# Patient Record
Sex: MALE | Race: OTHER | NOT HISPANIC OR LATINO | URBAN - METROPOLITAN AREA
[De-identification: names, ages, dates, MRNs, and addresses within clinical notes are randomized per-mention and may not be internally consistent; named-entity substitution may affect disease eponyms.]

---

## 2019-04-06 ENCOUNTER — INPATIENT (INPATIENT)
Facility: HOSPITAL | Age: 44
LOS: 27 days | Discharge: NURSING, REHAB OR LONG-TERM CARE | DRG: 3 | End: 2019-05-04
Attending: NEUROLOGICAL SURGERY | Admitting: NEUROLOGICAL SURGERY
Payer: MEDICAID

## 2019-04-06 VITALS — OXYGEN SATURATION: 96 % | HEART RATE: 99 BPM | RESPIRATION RATE: 17 BRPM

## 2019-04-06 LAB
ALBUMIN SERPL ELPH-MCNC: 4.8 G/DL — SIGNIFICANT CHANGE UP (ref 3.3–5)
ALP SERPL-CCNC: 71 U/L — SIGNIFICANT CHANGE UP (ref 40–120)
ALT FLD-CCNC: 34 U/L — SIGNIFICANT CHANGE UP (ref 10–45)
ANION GAP SERPL CALC-SCNC: 12 MMOL/L — SIGNIFICANT CHANGE UP (ref 5–17)
ANION GAP SERPL CALC-SCNC: 13 MMOL/L — SIGNIFICANT CHANGE UP (ref 5–17)
ANION GAP SERPL CALC-SCNC: 9 MMOL/L — SIGNIFICANT CHANGE UP (ref 5–17)
APPEARANCE UR: CLEAR — SIGNIFICANT CHANGE UP
APTT BLD: 28 SEC — SIGNIFICANT CHANGE UP (ref 27.5–36.3)
AST SERPL-CCNC: 40 U/L — SIGNIFICANT CHANGE UP (ref 10–40)
BILIRUB SERPL-MCNC: 1 MG/DL — SIGNIFICANT CHANGE UP (ref 0.2–1.2)
BILIRUB UR-MCNC: NEGATIVE — SIGNIFICANT CHANGE UP
BLD GP AB SCN SERPL QL: NEGATIVE — SIGNIFICANT CHANGE UP
BLD GP AB SCN SERPL QL: NEGATIVE — SIGNIFICANT CHANGE UP
BUN SERPL-MCNC: 18 MG/DL — SIGNIFICANT CHANGE UP (ref 7–23)
BUN SERPL-MCNC: 21 MG/DL — SIGNIFICANT CHANGE UP (ref 7–23)
BUN SERPL-MCNC: 23 MG/DL — SIGNIFICANT CHANGE UP (ref 7–23)
CALCIUM SERPL-MCNC: 8.5 MG/DL — SIGNIFICANT CHANGE UP (ref 8.4–10.5)
CALCIUM SERPL-MCNC: 8.9 MG/DL — SIGNIFICANT CHANGE UP (ref 8.4–10.5)
CALCIUM SERPL-MCNC: 9.2 MG/DL — SIGNIFICANT CHANGE UP (ref 8.4–10.5)
CHLORIDE SERPL-SCNC: 101 MMOL/L — SIGNIFICANT CHANGE UP (ref 96–108)
CHLORIDE SERPL-SCNC: 105 MMOL/L — SIGNIFICANT CHANGE UP (ref 96–108)
CHLORIDE SERPL-SCNC: 108 MMOL/L — SIGNIFICANT CHANGE UP (ref 96–108)
CHOLEST SERPL-MCNC: 200 MG/DL — HIGH (ref 10–199)
CO2 SERPL-SCNC: 23 MMOL/L — SIGNIFICANT CHANGE UP (ref 22–31)
COLOR SPEC: YELLOW — SIGNIFICANT CHANGE UP
CREAT SERPL-MCNC: 1.22 MG/DL — SIGNIFICANT CHANGE UP (ref 0.5–1.3)
CREAT SERPL-MCNC: 1.23 MG/DL — SIGNIFICANT CHANGE UP (ref 0.5–1.3)
CREAT SERPL-MCNC: 1.42 MG/DL — HIGH (ref 0.5–1.3)
DIFF PNL FLD: ABNORMAL
GLUCOSE SERPL-MCNC: 148 MG/DL — HIGH (ref 70–99)
GLUCOSE SERPL-MCNC: 149 MG/DL — HIGH (ref 70–99)
GLUCOSE SERPL-MCNC: 155 MG/DL — HIGH (ref 70–99)
GLUCOSE UR QL: NEGATIVE — SIGNIFICANT CHANGE UP
HCT VFR BLD CALC: 39.7 % — SIGNIFICANT CHANGE UP (ref 39–50)
HCT VFR BLD CALC: 44.5 % — SIGNIFICANT CHANGE UP (ref 39–50)
HDLC SERPL-MCNC: 70 MG/DL — SIGNIFICANT CHANGE UP
HGB BLD-MCNC: 13.9 G/DL — SIGNIFICANT CHANGE UP (ref 13–17)
HGB BLD-MCNC: 15.4 G/DL — SIGNIFICANT CHANGE UP (ref 13–17)
INR BLD: 1.06 — SIGNIFICANT CHANGE UP (ref 0.88–1.16)
KETONES UR-MCNC: NEGATIVE — SIGNIFICANT CHANGE UP
LEUKOCYTE ESTERASE UR-ACNC: NEGATIVE — SIGNIFICANT CHANGE UP
LIPID PNL WITH DIRECT LDL SERPL: 113 MG/DL — SIGNIFICANT CHANGE UP
MAGNESIUM SERPL-MCNC: 1.9 MG/DL — SIGNIFICANT CHANGE UP (ref 1.6–2.6)
MCHC RBC-ENTMCNC: 31.4 PG — SIGNIFICANT CHANGE UP (ref 27–34)
MCHC RBC-ENTMCNC: 31.9 PG — SIGNIFICANT CHANGE UP (ref 27–34)
MCHC RBC-ENTMCNC: 34.6 GM/DL — SIGNIFICANT CHANGE UP (ref 32–36)
MCHC RBC-ENTMCNC: 35 GM/DL — SIGNIFICANT CHANGE UP (ref 32–36)
MCV RBC AUTO: 90.6 FL — SIGNIFICANT CHANGE UP (ref 80–100)
MCV RBC AUTO: 91.1 FL — SIGNIFICANT CHANGE UP (ref 80–100)
NITRITE UR-MCNC: NEGATIVE — SIGNIFICANT CHANGE UP
NRBC # BLD: 0 /100 WBCS — SIGNIFICANT CHANGE UP (ref 0–0)
NRBC # BLD: 0 /100 WBCS — SIGNIFICANT CHANGE UP (ref 0–0)
PCP SPEC-MCNC: SIGNIFICANT CHANGE UP
PH UR: 6.5 — SIGNIFICANT CHANGE UP (ref 5–8)
PHOSPHATE SERPL-MCNC: 3 MG/DL — SIGNIFICANT CHANGE UP (ref 2.5–4.5)
PLATELET # BLD AUTO: 290 K/UL — SIGNIFICANT CHANGE UP (ref 150–400)
PLATELET # BLD AUTO: 326 K/UL — SIGNIFICANT CHANGE UP (ref 150–400)
POTASSIUM SERPL-MCNC: 3.7 MMOL/L — SIGNIFICANT CHANGE UP (ref 3.5–5.3)
POTASSIUM SERPL-SCNC: 3.7 MMOL/L — SIGNIFICANT CHANGE UP (ref 3.5–5.3)
PROT SERPL-MCNC: 8.2 G/DL — SIGNIFICANT CHANGE UP (ref 6–8.3)
PROT UR-MCNC: NEGATIVE MG/DL — SIGNIFICANT CHANGE UP
PROTHROM AB SERPL-ACNC: 12 SEC — SIGNIFICANT CHANGE UP (ref 10–12.9)
RBC # BLD: 4.36 M/UL — SIGNIFICANT CHANGE UP (ref 4.2–5.8)
RBC # BLD: 4.91 M/UL — SIGNIFICANT CHANGE UP (ref 4.2–5.8)
RBC # FLD: 12.7 % — SIGNIFICANT CHANGE UP (ref 10.3–14.5)
RBC # FLD: 12.8 % — SIGNIFICANT CHANGE UP (ref 10.3–14.5)
RH IG SCN BLD-IMP: POSITIVE — SIGNIFICANT CHANGE UP
RH IG SCN BLD-IMP: POSITIVE — SIGNIFICANT CHANGE UP
SODIUM SERPL-SCNC: 137 MMOL/L — SIGNIFICANT CHANGE UP (ref 135–145)
SODIUM SERPL-SCNC: 140 MMOL/L — SIGNIFICANT CHANGE UP (ref 135–145)
SODIUM SERPL-SCNC: 140 MMOL/L — SIGNIFICANT CHANGE UP (ref 135–145)
SP GR SPEC: <=1.005 — SIGNIFICANT CHANGE UP (ref 1–1.03)
TOTAL CHOLESTEROL/HDL RATIO MEASUREMENT: 2.9 RATIO — LOW (ref 3.4–9.6)
TRIGL SERPL-MCNC: 85 MG/DL — SIGNIFICANT CHANGE UP (ref 10–149)
TSH SERPL-MCNC: 0.73 UIU/ML — SIGNIFICANT CHANGE UP (ref 0.35–4.94)
UROBILINOGEN FLD QL: 0.2 E.U./DL — SIGNIFICANT CHANGE UP
WBC # BLD: 11.61 K/UL — HIGH (ref 3.8–10.5)
WBC # BLD: 13.36 K/UL — HIGH (ref 3.8–10.5)
WBC # FLD AUTO: 11.61 K/UL — HIGH (ref 3.8–10.5)
WBC # FLD AUTO: 13.36 K/UL — HIGH (ref 3.8–10.5)

## 2019-04-06 PROCEDURE — 43752 NASAL/OROGASTRIC W/TUBE PLMT: CPT

## 2019-04-06 PROCEDURE — 93306 TTE W/DOPPLER COMPLETE: CPT | Mod: 26

## 2019-04-06 PROCEDURE — G0508: CPT | Mod: GT

## 2019-04-06 PROCEDURE — 70450 CT HEAD/BRAIN W/O DYE: CPT | Mod: 26,59

## 2019-04-06 PROCEDURE — 70450 CT HEAD/BRAIN W/O DYE: CPT | Mod: 26,59,77

## 2019-04-06 PROCEDURE — 99223 1ST HOSP IP/OBS HIGH 75: CPT

## 2019-04-06 PROCEDURE — 99291 CRITICAL CARE FIRST HOUR: CPT | Mod: 24

## 2019-04-06 PROCEDURE — 70496 CT ANGIOGRAPHY HEAD: CPT | Mod: 26

## 2019-04-06 PROCEDURE — 71045 X-RAY EXAM CHEST 1 VIEW: CPT | Mod: 26

## 2019-04-06 RX ORDER — DEXTROSE 50 % IN WATER 50 %
25 SYRINGE (ML) INTRAVENOUS ONCE
Qty: 0 | Refills: 0 | Status: DISCONTINUED | OUTPATIENT
Start: 2019-04-06 | End: 2019-04-27

## 2019-04-06 RX ORDER — SENNA PLUS 8.6 MG/1
2 TABLET ORAL AT BEDTIME
Qty: 0 | Refills: 0 | Status: DISCONTINUED | OUTPATIENT
Start: 2019-04-06 | End: 2019-04-14

## 2019-04-06 RX ORDER — NICARDIPINE HYDROCHLORIDE 30 MG/1
5 CAPSULE, EXTENDED RELEASE ORAL
Qty: 40 | Refills: 0 | Status: DISCONTINUED | OUTPATIENT
Start: 2019-04-06 | End: 2019-04-10

## 2019-04-06 RX ORDER — PROPOFOL 10 MG/ML
20 INJECTION, EMULSION INTRAVENOUS
Qty: 1000 | Refills: 0 | Status: DISCONTINUED | OUTPATIENT
Start: 2019-04-06 | End: 2019-04-08

## 2019-04-06 RX ORDER — DILTIAZEM HCL 120 MG
5 CAPSULE, EXT RELEASE 24 HR ORAL
Qty: 125 | Refills: 0 | Status: DISCONTINUED | OUTPATIENT
Start: 2019-04-06 | End: 2019-04-06

## 2019-04-06 RX ORDER — ATORVASTATIN CALCIUM 80 MG/1
80 TABLET, FILM COATED ORAL AT BEDTIME
Qty: 0 | Refills: 0 | Status: DISCONTINUED | OUTPATIENT
Start: 2019-04-06 | End: 2019-04-06

## 2019-04-06 RX ORDER — POTASSIUM CHLORIDE 20 MEQ
10 PACKET (EA) ORAL
Qty: 0 | Refills: 0 | Status: COMPLETED | OUTPATIENT
Start: 2019-04-06 | End: 2019-04-06

## 2019-04-06 RX ORDER — CEFAZOLIN SODIUM 1 G
2000 VIAL (EA) INJECTION ONCE
Qty: 0 | Refills: 0 | Status: COMPLETED | OUTPATIENT
Start: 2019-04-06 | End: 2019-04-06

## 2019-04-06 RX ORDER — HYDRALAZINE HCL 50 MG
10 TABLET ORAL EVERY 4 HOURS
Qty: 0 | Refills: 0 | Status: DISCONTINUED | OUTPATIENT
Start: 2019-04-06 | End: 2019-04-10

## 2019-04-06 RX ORDER — GLUCAGON INJECTION, SOLUTION 0.5 MG/.1ML
1 INJECTION, SOLUTION SUBCUTANEOUS ONCE
Qty: 0 | Refills: 0 | Status: DISCONTINUED | OUTPATIENT
Start: 2019-04-06 | End: 2019-05-04

## 2019-04-06 RX ORDER — MIDAZOLAM HYDROCHLORIDE 1 MG/ML
4 INJECTION, SOLUTION INTRAMUSCULAR; INTRAVENOUS ONCE
Qty: 0 | Refills: 0 | Status: DISCONTINUED | OUTPATIENT
Start: 2019-04-06 | End: 2019-04-06

## 2019-04-06 RX ORDER — FENTANYL CITRATE 50 UG/ML
12.5 INJECTION INTRAVENOUS ONCE
Qty: 0 | Refills: 0 | Status: DISCONTINUED | OUTPATIENT
Start: 2019-04-06 | End: 2019-04-06

## 2019-04-06 RX ORDER — ACETAMINOPHEN 500 MG
650 TABLET ORAL EVERY 6 HOURS
Qty: 0 | Refills: 0 | Status: DISCONTINUED | OUTPATIENT
Start: 2019-04-06 | End: 2019-04-07

## 2019-04-06 RX ORDER — ONDANSETRON 8 MG/1
4 TABLET, FILM COATED ORAL EVERY 6 HOURS
Qty: 0 | Refills: 0 | Status: DISCONTINUED | OUTPATIENT
Start: 2019-04-06 | End: 2019-05-04

## 2019-04-06 RX ORDER — LEVETIRACETAM 250 MG/1
1000 TABLET, FILM COATED ORAL EVERY 12 HOURS
Qty: 0 | Refills: 0 | Status: DISCONTINUED | OUTPATIENT
Start: 2019-04-06 | End: 2019-04-11

## 2019-04-06 RX ORDER — FENTANYL CITRATE 50 UG/ML
50 INJECTION INTRAVENOUS ONCE
Qty: 0 | Refills: 0 | Status: DISCONTINUED | OUTPATIENT
Start: 2019-04-06 | End: 2019-04-06

## 2019-04-06 RX ORDER — LIDOCAINE HCL 20 MG/ML
20 VIAL (ML) INJECTION ONCE
Qty: 0 | Refills: 0 | Status: COMPLETED | OUTPATIENT
Start: 2019-04-06 | End: 2019-04-06

## 2019-04-06 RX ORDER — DESMOPRESSIN ACETATE 0.1 MG/1
21 TABLET ORAL ONCE
Qty: 0 | Refills: 0 | Status: COMPLETED | OUTPATIENT
Start: 2019-04-06 | End: 2019-04-06

## 2019-04-06 RX ORDER — SODIUM CHLORIDE 5 G/100ML
1000 INJECTION, SOLUTION INTRAVENOUS
Qty: 0 | Refills: 0 | Status: DISCONTINUED | OUTPATIENT
Start: 2019-04-06 | End: 2019-04-07

## 2019-04-06 RX ORDER — DEXTROSE 50 % IN WATER 50 %
12.5 SYRINGE (ML) INTRAVENOUS ONCE
Qty: 0 | Refills: 0 | Status: DISCONTINUED | OUTPATIENT
Start: 2019-04-06 | End: 2019-04-27

## 2019-04-06 RX ORDER — SODIUM CHLORIDE 9 MG/ML
1000 INJECTION INTRAMUSCULAR; INTRAVENOUS; SUBCUTANEOUS
Qty: 0 | Refills: 0 | Status: DISCONTINUED | OUTPATIENT
Start: 2019-04-06 | End: 2019-04-06

## 2019-04-06 RX ORDER — ATORVASTATIN CALCIUM 80 MG/1
40 TABLET, FILM COATED ORAL AT BEDTIME
Qty: 0 | Refills: 0 | Status: DISCONTINUED | OUTPATIENT
Start: 2019-04-06 | End: 2019-05-04

## 2019-04-06 RX ORDER — CEFAZOLIN SODIUM 1 G
2000 VIAL (EA) INJECTION ONCE
Qty: 0 | Refills: 0 | Status: DISCONTINUED | OUTPATIENT
Start: 2019-04-06 | End: 2019-04-06

## 2019-04-06 RX ORDER — DOCUSATE SODIUM 100 MG
100 CAPSULE ORAL
Qty: 0 | Refills: 0 | Status: DISCONTINUED | OUTPATIENT
Start: 2019-04-06 | End: 2019-04-12

## 2019-04-06 RX ORDER — INSULIN LISPRO 100/ML
VIAL (ML) SUBCUTANEOUS
Qty: 0 | Refills: 0 | Status: DISCONTINUED | OUTPATIENT
Start: 2019-04-06 | End: 2019-04-27

## 2019-04-06 RX ORDER — SODIUM CHLORIDE 9 MG/ML
1000 INJECTION, SOLUTION INTRAVENOUS
Qty: 0 | Refills: 0 | Status: DISCONTINUED | OUTPATIENT
Start: 2019-04-06 | End: 2019-04-27

## 2019-04-06 RX ORDER — PANTOPRAZOLE SODIUM 20 MG/1
40 TABLET, DELAYED RELEASE ORAL DAILY
Qty: 0 | Refills: 0 | Status: DISCONTINUED | OUTPATIENT
Start: 2019-04-06 | End: 2019-04-19

## 2019-04-06 RX ORDER — DESMOPRESSIN ACETATE 0.1 MG/1
20 TABLET ORAL ONCE
Qty: 0 | Refills: 0 | Status: DISCONTINUED | OUTPATIENT
Start: 2019-04-06 | End: 2019-04-06

## 2019-04-06 RX ORDER — DEXTROSE 50 % IN WATER 50 %
15 SYRINGE (ML) INTRAVENOUS ONCE
Qty: 0 | Refills: 0 | Status: DISCONTINUED | OUTPATIENT
Start: 2019-04-06 | End: 2019-04-27

## 2019-04-06 RX ORDER — DOCUSATE SODIUM 100 MG
100 CAPSULE ORAL THREE TIMES A DAY
Qty: 0 | Refills: 0 | Status: DISCONTINUED | OUTPATIENT
Start: 2019-04-06 | End: 2019-04-06

## 2019-04-06 RX ADMIN — Medication 650 MILLIGRAM(S): at 12:20

## 2019-04-06 RX ADMIN — FENTANYL CITRATE 50 MICROGRAM(S): 50 INJECTION INTRAVENOUS at 03:23

## 2019-04-06 RX ADMIN — SODIUM CHLORIDE 50 MILLILITER(S): 9 INJECTION INTRAMUSCULAR; INTRAVENOUS; SUBCUTANEOUS at 03:36

## 2019-04-06 RX ADMIN — FENTANYL CITRATE 12.5 MICROGRAM(S): 50 INJECTION INTRAVENOUS at 16:00

## 2019-04-06 RX ADMIN — FENTANYL CITRATE 50 MICROGRAM(S): 50 INJECTION INTRAVENOUS at 03:31

## 2019-04-06 RX ADMIN — DESMOPRESSIN ACETATE 221 MICROGRAM(S): 0.1 TABLET ORAL at 04:01

## 2019-04-06 RX ADMIN — Medication 100 MILLIEQUIVALENT(S): at 06:39

## 2019-04-06 RX ADMIN — FENTANYL CITRATE 50 MICROGRAM(S): 50 INJECTION INTRAVENOUS at 04:01

## 2019-04-06 RX ADMIN — FENTANYL CITRATE 12.5 MICROGRAM(S): 50 INJECTION INTRAVENOUS at 15:30

## 2019-04-06 RX ADMIN — Medication 2000 MILLIGRAM(S): at 04:25

## 2019-04-06 RX ADMIN — LEVETIRACETAM 400 MILLIGRAM(S): 250 TABLET, FILM COATED ORAL at 18:10

## 2019-04-06 RX ADMIN — Medication 100 MILLIEQUIVALENT(S): at 04:51

## 2019-04-06 RX ADMIN — Medication 100 MILLIEQUIVALENT(S): at 04:17

## 2019-04-06 RX ADMIN — SENNA PLUS 2 TABLET(S): 8.6 TABLET ORAL at 21:08

## 2019-04-06 RX ADMIN — ATORVASTATIN CALCIUM 40 MILLIGRAM(S): 80 TABLET, FILM COATED ORAL at 21:08

## 2019-04-06 RX ADMIN — MIDAZOLAM HYDROCHLORIDE 4 MILLIGRAM(S): 1 INJECTION, SOLUTION INTRAMUSCULAR; INTRAVENOUS at 03:18

## 2019-04-06 RX ADMIN — Medication 100 MILLIGRAM(S): at 18:10

## 2019-04-06 RX ADMIN — Medication 20 MILLILITER(S): at 03:09

## 2019-04-06 RX ADMIN — NICARDIPINE HYDROCHLORIDE 25 MG/HR: 30 CAPSULE, EXTENDED RELEASE ORAL at 11:15

## 2019-04-06 RX ADMIN — Medication 650 MILLIGRAM(S): at 11:24

## 2019-04-06 RX ADMIN — LEVETIRACETAM 400 MILLIGRAM(S): 250 TABLET, FILM COATED ORAL at 06:39

## 2019-04-06 RX ADMIN — SODIUM CHLORIDE 50 MILLILITER(S): 5 INJECTION, SOLUTION INTRAVENOUS at 04:02

## 2019-04-06 RX ADMIN — NICARDIPINE HYDROCHLORIDE 25 MG/HR: 30 CAPSULE, EXTENDED RELEASE ORAL at 05:47

## 2019-04-06 RX ADMIN — NICARDIPINE HYDROCHLORIDE 25 MG/HR: 30 CAPSULE, EXTENDED RELEASE ORAL at 03:14

## 2019-04-06 NOTE — CONSULT NOTE ADULT - SUBJECTIVE AND OBJECTIVE BOX
Stroke Consult Note    HPI: 44 year-old male with no known PMH transferred from [place] for treatment of ICH (mainly right thalamic but also has in left thalamus and right conner).  He had complained of tingling in his left face and arm at 6:30PM.  Found to have left facial droop and left hemiplegia upon arrival to [place].  He was vomiting in ED with elevated blood pressures.  Intubated for airway protection and then transferred.  EVD placed at [facility] by Neurosurgery team.      PAST MEDICAL & SURGICAL HISTORY:  none known    MEDICATIONS  (STANDING):  atorvastatin 40 milliGRAM(s) Oral at bedtime  dextrose 5%. 1000 milliLiter(s) (50 mL/Hr) IV Continuous <Continuous>  dextrose 50% Injectable 12.5 Gram(s) IV Push once  dextrose 50% Injectable 25 Gram(s) IV Push once  dextrose 50% Injectable 25 Gram(s) IV Push once  docusate sodium Liquid 100 milliGRAM(s) Oral two times a day  insulin lispro (HumaLOG) corrective regimen sliding scale   SubCutaneous Before meals and at bedtime  levETIRAcetam  IVPB 1000 milliGRAM(s) IV Intermittent every 12 hours  niCARdipine Infusion 5 mG/Hr (25 mL/Hr) IV Continuous <Continuous>  senna 2 Tablet(s) Oral at bedtime  sodium chloride 2% . 1000 milliLiter(s) (50 mL/Hr) IV Continuous <Continuous>    MEDICATIONS  (PRN):  acetaminophen   Tablet .. 650 milliGRAM(s) Oral every 6 hours PRN Temp greater or equal to 38C (100.4F), Mild Pain (1 - 3)  bisacodyl 5 milliGRAM(s) Oral daily PRN Constipation  bisacodyl Suppository 10 milliGRAM(s) Rectal daily PRN Constipation  dextrose 40% Gel 15 Gram(s) Oral once PRN Blood Glucose LESS THAN 70 milliGRAM(s)/deciliter  glucagon  Injectable 1 milliGRAM(s) IntraMuscular once PRN Glucose LESS THAN 70 milligrams/deciliter  hydrALAZINE Injectable 10 milliGRAM(s) IV Push every 4 hours PRN BP>140  ondansetron Injectable 4 milliGRAM(s) IV Push every 6 hours PRN Nausea and/or Vomiting      Allergies  No Known Allergies    FAMILY HISTORY:  unable to obtain    Social History:  non-smoker    REVIEW OF SYSTEMS:  As per HPI, otherwise negative for Constitutional, Eyes, Ears/Nose/Mouth/Throat, Neck, Cardiovascular, Respiratory, Gastrointestinal, Genitourinary, Skin, Endocrine, Musculoskeletal, Psychiatric, and Hematologic/Lymphatic.    Vital Signs Last 24 Hrs  T(C): 37.7 (06 Apr 2019 09:33), Max: 37.7 (06 Apr 2019 09:33)  T(F): 99.9 (06 Apr 2019 09:33), Max: 99.9 (06 Apr 2019 09:33)  HR: 94 (06 Apr 2019 10:00) (82 - 107)  BP: 137/71 (06 Apr 2019 10:00) (123/62 - 155/77)  BP(mean): 99 (06 Apr 2019 10:00) (84 - 108)  RR: 16 (06 Apr 2019 10:00) (12 - 20)  SpO2: 98% (06 Apr 2019 10:00) (95% - 100%)    CAPILLARY BLOOD GLUCOSE  POCT Blood Glucose.: 133 mg/dL (06 Apr 2019 03:03)      Physical exam:  Gen: lying in bed, intubated, EVD in place, well-nourished  Eyes: anicteric sclera  CV: reg rate and rhythm  Extremities: 2+ radial pulses biltarerally    Neurological examination:  Mental status: unresponsive - barely opened eyes to deep sternal rub despite no sedation, not following commands    Cranial nerves: right gaze preference - able move to midline with oculocephalic reflex, both pupils responsive to light, unable assess facial secondary to ETT, tongue midline  Motor:  Normal bulk and tone, moving right UE spontaneously, moves right LE to pain, no movement on left side    Sensation: Decreased response to pain on left  Coordination: uncooperative  Reflexes: upgoing toe on left, equivocal on right  Gait: deferred    NIHSS:    No tPA secondary to hemorrhage    Labs:                        13.9   13.36 )-----------( 326      ( 06 Apr 2019 06:10 )             39.7     04-06    140  |  105  |  23  ----------------------------<  148<H>  3.7   |  23  |  1.42<H>    Ca    8.9      06 Apr 2019 06:10  Phos  3.0     04-06  Mg     1.9     04-06    TPro  8.2  /  Alb  4.8  /  TBili  1.0  /  DBili  x   /  AST  40  /  ALT  34  /  AlkPhos  71  04-06    PT/INR - ( 06 Apr 2019 01:44 )   PT: 12.0 sec;   INR: 1.06     PTT - ( 06 Apr 2019 01:44 )  PTT:28.0 sec    Hemoglobin A1C, Whole Blood: 4.9 % (04-06-19 @ 02:40)  Cholesterol, Serum: 200 mg/dL (04-06-19 @ 01:44)  HDL Cholesterol, Serum: 70 mg/dL (04-06-19 @ 01:44)  Triglycerides, Serum: 85 mg/dL (04-06-19 @ 01:44)      Radiology and Additional Studies:  CT Head No Cont (04.06.19 @ 02:11) >   Large right basal ganglia/thalamic intraparenchymal acute hemorrhage.   Small left basal ganglia and right pontine acute hemorrhage. Findings are   compatible with hypertensive intracerebral hemorrhage. There is extension   of the hemorrhage into the right lateral ventricle.  There is a 2 mm   right to left midline shift. Additionally there is mass effect with mild   compression of the right lateral ventricle. No hydrocephalus      Assessment and plan: 44 year-old male presents with ICH with IVH extension probably secondary to hypertension, though previously undiagnosed.    - No antiplatelet or anticoagulation due to hemorrhage  - Atorvastatin 40mg   - On Cardene gtt for blood pressure control - sbp 140   - Treatment of cerebral edema with hypertonic saline  - DVT prophylaxis - SCDs in place    Plan as per NSICU/Neurosurgery

## 2019-04-06 NOTE — H&P ADULT - NSHPPHYSICALEXAM_GEN_ALL_CORE
Gen: laying in hospital bed, NAD. +intubated, off sedation  Neuro: Lethargic, not OE, not FC  CN II-XII: PERRL, left pupil with downward gaze, + corneal in right pupil, - corneal in left pupil, weak gag  Motor: MAEx4, LUE extensor posturing, LLE withdraws to pain, RUE and RLE moving spontaneously   Cardiac: regular rate and rhythm  Pulm: clear to auscultation  GI: soft, nontender, nondistended

## 2019-04-06 NOTE — PROGRESS NOTE ADULT - ASSESSMENT
44y/M with   1.  intracerebral hemorrhage (r basal ganglia / thalamic; small L basal ganglia, R pontine), brain compression, cerebral edema   2.  dyslipidemia    PLAN:   NEURO: neurochecks q1h, PRN pain meds with Tylenol  ICH:  BP control; repeat CT this AM  seizure prophylaxis: as per neurosurgery  EVD: monitor output,  REHAB:  physical therapy evaluation and management    EARLY MOB:      PULM:  Room air, incentive spirometry  CARDIO:  SBP goal 100-140mm Hg; cardene drip to SBP goal  ENDO:  Blood sugar goals 140-180 mg/dL, continue insulin sliding scale; decrease atorvastatin to 40mg PO daily   GI:  docusate senna  DIET: NPO for now  RENAL:  IVF while NPO  HEM/ONC: Hb stable; given DDAVP and platelet transfusion for aspirin reversal  VTE Prophylaxis: SCDs only, no DVT chemoprophylaxis for now as patient is high risk for bleed (fresh bleed)  ID: afebrile, no leukocytosis  Social: will update family    ATTENDING ATTESTATION:  I was physically present for the key portions of the evaluation and management (E/M) service provided.  I agree with the above history, physical and plan, which I have reviewed and edited where appropriate.    Patient at high risk for neurological deterioration or death due to:  ICU delirium, aspiration PNA, DVT / PE.  Critical care time, excluding procedures: 60 minutes spent on total encounter, more than 50% of the visit was spent counseling and/or coordinating care by the attending physician.     Plan discussed with RN, house staff. 44y/M with   1.  intracerebral hemorrhage (r basal ganglia / thalamic; small L basal ganglia, R pontine), brain compression, cerebral edema   2.  dyslipidemia    PLAN:   NEURO: neurochecks q1h, PRN pain meds with Tylenol  ICH:  BP control; repeat CT this AM  seizure prophylaxis: as per neurosurgery  EVD: monitor output, keep at 10cm h20  REHAB:  physical therapy evaluation and management    EARLY MOB:   HOB bedrest    PULM:  full vent support  CARDIO:  SBP goal 100-140mm Hg; cardene drip to SBP goal  ENDO:  Blood sugar goals 140-180 mg/dL, continue insulin sliding scale; decrease atorvastatin to 40mg PO daily, A1C   GI:  docusate senna  DIET: NPO for now  RENAL:  Na goal 140-145; cont 2% at 50cc/hr, decrease NS @ 25cc/hr; recheck BMP this afternoon  HEM/ONC: Hb stable; given DDAVP and platelet transfusion for aspirin reversal  VTE Prophylaxis: SCDs only, no DVT chemoprophylaxis for now as patient is high risk for bleed (fresh bleed)  ID: afebrile, leukocytosis prob reactive   Social: will update family; sister freeman, father, common law wife who is pregnant    ATTENDING ATTESTATION:  I was physically present for the key portions of the evaluation and management (E/M) service provided.  I agree with the above history, physical and plan, which I have reviewed and edited where appropriate.    Patient at high risk for neurological deterioration or death due to:  ICU delirium, aspiration PNA, DVT / PE.  Critical care time, excluding procedures: 60 minutes spent on total encounter, more than 50% of the visit was spent counseling and/or coordinating care by the attending physician.     Plan discussed with RN, house staff. 44y/M with   1.  intracerebral hemorrhage (r basal ganglia / thalamic; small L basal ganglia, R pontine), brain compression, cerebral edema   2.  dyslipidemia    PLAN:   NEURO: neurochecks q1h, PRN pain meds with Tylenol  ICH:  BP control; repeat CT this AM  seizure prophylaxis: as per neurosurgery  EVD: monitor output, keep at 10cm h20  REHAB:  physical therapy evaluation and management    EARLY MOB:   HOB bedrest    PULM:  full vent support  CARDIO:  SBP goal 100-140mm Hg; cardene drip to SBP goal  ENDO:  Blood sugar goals 140-180 mg/dL, continue insulin sliding scale; decrease atorvastatin to 40mg PO daily, A1C   GI:  docusate senna; PPI for GI prophylaxis (intubated)  DIET: NPO for now  RENAL:  Na goal 140-145; cont 2% at 50cc/hr, decrease NS @ 25cc/hr; recheck BMP this afternoon  HEM/ONC: Hb stable; given DDAVP and platelet transfusion for aspirin reversal  VTE Prophylaxis: SCDs only, no DVT chemoprophylaxis for now as patient is high risk for bleed (fresh bleed)  ID: afebrile, leukocytosis prob reactive   Social: will update family; sister freeman, father, common law wife who is pregnant    ATTENDING ATTESTATION:  I was physically present for the key portions of the evaluation and management (E/M) service provided.  I agree with the above history, physical and plan, which I have reviewed and edited where appropriate.    Patient at high risk for neurological deterioration or death due to:  ICU delirium, aspiration PNA, DVT / PE.  Critical care time, excluding procedures: 60 minutes spent on total encounter, more than 50% of the visit was spent counseling and/or coordinating care by the attending physician.     Plan discussed with RN, house staff.

## 2019-04-06 NOTE — H&P ADULT - ATTENDING COMMENTS
Patient seen and examined by me. He presented with an acute right thalamic ICH with IVH and hydrocephalus. EVD placed. Patient intubated, continues to be encephalopathic but will follow some commands; left hemiparesis. Catheter angiogram today unrevealing of hemorrhage source. Suspect hypertensive hemorrhage. Plan for EVD drainage @10, ICU supportive care, SBP , MRI brain prior to hospital discharge.    Lamont So M.D.

## 2019-04-06 NOTE — CONSULT NOTE ADULT - SUBJECTIVE AND OBJECTIVE BOX
Patient is a 44y old  Male who presents with a chief complaint of right thalamic hemorrhage (06 Apr 2019 00:04)      HPI:  History obtained by patient's girlfriend and chart from [place], patient is unresponsive:     43 y/o male with no significant PMHx presents to [place] with AMA, left side weakness and numbness. Per [place], [place] when he developed acute onset of left facial numbness and LUE and numbness around 6:30PM. When get got home he developed AMS, dysarthria, and weakness in the LUE and LLE. At [place] ED patient had several episodes of emesis and was hypertensive to SBP in the 200's. Work up revealed right thalamic hemorrhage on CT head. Patient not on any anticoagulation use per girlfriend. Of note, patient took Excedrin for headache at home. Patient transferred to [facility] for further management. (06 Apr 2019 00:04)      Allergies    No Known Allergies    Intolerances        MEDICATIONS  (STANDING):  atorvastatin 80 milliGRAM(s) Oral at bedtime  docusate sodium 100 milliGRAM(s) Oral three times a day  levETIRAcetam  IVPB 1000 milliGRAM(s) IV Intermittent every 12 hours  niCARdipine Infusion 5 mG/Hr (25 mL/Hr) IV Continuous <Continuous>  potassium chloride  10 mEq/100 mL IVPB 10 milliEquivalent(s) IV Intermittent every 1 hour  senna 2 Tablet(s) Oral at bedtime  sodium chloride 0.9%. 1000 milliLiter(s) (50 mL/Hr) IV Continuous <Continuous>  sodium chloride 2% . 1000 milliLiter(s) (50 mL/Hr) IV Continuous <Continuous>    MEDICATIONS  (PRN):  acetaminophen   Tablet .. 650 milliGRAM(s) Oral every 6 hours PRN Temp greater or equal to 38C (100.4F), Mild Pain (1 - 3)  bisacodyl 5 milliGRAM(s) Oral daily PRN Constipation  bisacodyl Suppository 10 milliGRAM(s) Rectal daily PRN Constipation  hydrALAZINE Injectable 10 milliGRAM(s) IV Push every 4 hours PRN BP>140  ondansetron Injectable 4 milliGRAM(s) IV Push every 6 hours PRN Nausea and/or Vomiting      Drug Dosing Weight  Height (cm): 175.3 (06 Apr 2019 03:03)  Weight (kg): 72 (06 Apr 2019 03:03)  BMI (kg/m2): 23.4 (06 Apr 2019 03:03)  BSA (m2): 1.87 (06 Apr 2019 03:03)    PAST MEDICAL & SURGICAL HISTORY:      FAMILY HISTORY:      SOCIAL HISTORY:    ADVANCE DIRECTIVES:            REVIEW OF SYSTEMS: [x ] Unable to Assess due to neurologic exam   [ ] All ROS addressed below are non-contributory, except:  Neuro: [ ] Headache [ ] Back pain [ ] Numbness [ ] Weakness [ ] Ataxia [ ] Dizziness [ ] Aphasia [ ] Dysarthria [ ] Visual disturbance  Resp: [ ] Shortness of breath/dyspnea, [ ] Orthopnea [ ] Cough  CV: [ ] Chest pain [ ] Palpitation [ ] Lightheadedness [ ] Syncope  Renal: [ ] Thirst [ ] Edema  GI: [ ] Nausea [ ] Emesis [ ] Abdominal pain [ ] Constipation [ ] Diarrhea  Hem: [ ] Hematemesis [ ] bright red blood per rectum  ID: [ ] Fever [ ] Chills [ ] Dysuria  ENT: [ ] Rhinorrhea            Mode: AC/ CMV (Assist Control/ Continuous Mandatory Ventilation)  RR (machine): 12  TV (machine): 400  FiO2: 50  PEEP: 5  ITime: 1  MAP: 7.2  PIP: 13      ICU Vital Signs Last 24 Hrs  T(C): 36.8 (06 Apr 2019 01:00), Max: 36.8 (06 Apr 2019 01:00)  T(F): 98.2 (06 Apr 2019 01:00), Max: 98.2 (06 Apr 2019 01:00)  HR: 86 (06 Apr 2019 04:00) (82 - 99)  BP: 138/67 (06 Apr 2019 04:00) (132/67 - 155/77)  BP(mean): 88 (06 Apr 2019 04:00) (88 - 108)  ABP: --  ABP(mean): --  RR: 12 (06 Apr 2019 04:00) (12 - 20)  SpO2: 98% (06 Apr 2019 04:00) (95% - 100%)          I&O's Detail    05 Apr 2019 07:01  -  06 Apr 2019 05:07  --------------------------------------------------------  IN:    IV PiggyBack: 50 mL    niCARdipine Infusion: 300 mL    sodium chloride 0.9%.: 150 mL  Total IN: 500 mL    OUT:    Voided: 225 mL  Total OUT: 225 mL    Total NET: 275 mL        LABS:  CBC Full  -  ( 06 Apr 2019 02:40 )  WBC Count : 11.61 K/uL  RBC Count : 4.91 M/uL  Hemoglobin : 15.4 g/dL  Hematocrit : 44.5 %  Platelet Count - Automated : 290 K/uL  Mean Cell Volume : 90.6 fl  Mean Cell Hemoglobin : 31.4 pg  Mean Cell Hemoglobin Concentration : 34.6 gm/dL  Auto Neutrophil # : x  Auto Lymphocyte # : x  Auto Monocyte # : x  Auto Eosinophil # : x  Auto Basophil # : x  Auto Neutrophil % : x  Auto Lymphocyte % : x  Auto Monocyte % : x  Auto Eosinophil % : x  Auto Basophil % : x    04-06    137  |  101  |  21  ----------------------------<  155<H>  3.7   |  23  |  1.23    Ca    9.2      06 Apr 2019 01:44    TPro  8.2  /  Alb  4.8  /  TBili  1.0  /  DBili  x   /  AST  40  /  ALT  34  /  AlkPhos  71  04-06    CAPILLARY BLOOD GLUCOSE      POCT Blood Glucose.: 133 mg/dL (06 Apr 2019 03:03)    PT/INR - ( 06 Apr 2019 01:44 )   PT: 12.0 sec;   INR: 1.06          PTT - ( 06 Apr 2019 01:44 )  PTT:28.0 sec    Physical Exam: Gen:  NAD. +intubated, off sedation, received fenatnyl 50 mcg and versed for EVD placement   	Neuro: sedated  	CN II-XII: Pupil equal sluggishly reactive, dysconjugate gaze, has dollsL,  + corneal in right pupil, - corneal in left pupil, weak gag  	Motor: localizes briskly with the right UE, LUE extensor posturing, LLE withdraws to pain, RUE and RLE moving spontaneously   	Pulm: clear to auscultation  GI: soft, nontender, nondistended       Labs and Results:  Labs, Radiology, Cardiology, and Other Results: CT head: right thalamic hemorrhage  CTA head and neck: negative per report     Assessment and Plan:   43 y/o male with no PMHx now with right thalamic hemorrhage/IC with IVH and hydrocephalus with CTA neg for vascular malformation per report ( images not on PACS). Likely HTN bleed    Neuro: neuro checks q 1 hr, CT head tomorrow morning,   Hydrocephalus, EVD at 10 cm water keep ICP<20 mmhg, and CPP> 60 mmhg, fentanyl PRN for agitation   keep head elevated and midline   Respiratory: intubated, advance ET 2 cm   CV: NSR,  -140 mmhg,on nicardipine   Endocrine: finger sticks q6hrs, ISS , keep sugar 120-180 mmhg   Heme/Onc: INR <1.5, Plt>100            DVT ppx: SCD, contraindicated to start anticoagulant as she is PBD0   Renal: brain edema, 2 % taregt sodium of 145-147 ( do not increase sodium of > 10 meq in 2 hrs)   ID: afebrile  GI: NPO, protonix, NG, colace   Discharge planning: ICU    Code Status: [x] Full Code [] DNR [] DNI [] Goals of Care:   Disposition: [x] ICU [] Stroke Unit [] RCU []PCU []Floor [] Discharge Home     Patient at high risk for neurologic deterioration,expansion of ICH, seizures, ICP crisis, critical care time, excluding procedures: 60 minutes Patient is a 44y old  Male who presents with a chief complaint of right thalamic hemorrhage (06 Apr 2019 00:04)      HPI:  History obtained by patient's girlfriend and chart from [place], patient is unresponsive:     45 y/o male with no significant PMHx presents to [place] with AMA, left side weakness and numbness. Per [place], [place] when he developed acute onset of left facial numbness and LUE and numbness around 6:30PM. When get got home he developed AMS, dysarthria, and weakness in the LUE and LLE. At [place] ED patient had several episodes of emesis and was hypertensive to SBP in the 200's. Work up revealed right thalamic hemorrhage on CT head. Patient not on any anticoagulation use per girlfriend. Of note, patient took Excedrin for headache at home. Patient transferred to [facility] for further management. (06 Apr 2019 00:04)      Allergies    No Known Allergies    Intolerances        MEDICATIONS  (STANDING):  atorvastatin 80 milliGRAM(s) Oral at bedtime  docusate sodium 100 milliGRAM(s) Oral three times a day  levETIRAcetam  IVPB 1000 milliGRAM(s) IV Intermittent every 12 hours  niCARdipine Infusion 5 mG/Hr (25 mL/Hr) IV Continuous <Continuous>  potassium chloride  10 mEq/100 mL IVPB 10 milliEquivalent(s) IV Intermittent every 1 hour  senna 2 Tablet(s) Oral at bedtime  sodium chloride 0.9%. 1000 milliLiter(s) (50 mL/Hr) IV Continuous <Continuous>  sodium chloride 2% . 1000 milliLiter(s) (50 mL/Hr) IV Continuous <Continuous>    MEDICATIONS  (PRN):  acetaminophen   Tablet .. 650 milliGRAM(s) Oral every 6 hours PRN Temp greater or equal to 38C (100.4F), Mild Pain (1 - 3)  bisacodyl 5 milliGRAM(s) Oral daily PRN Constipation  bisacodyl Suppository 10 milliGRAM(s) Rectal daily PRN Constipation  hydrALAZINE Injectable 10 milliGRAM(s) IV Push every 4 hours PRN BP>140  ondansetron Injectable 4 milliGRAM(s) IV Push every 6 hours PRN Nausea and/or Vomiting      Drug Dosing Weight  Height (cm): 175.3 (06 Apr 2019 03:03)  Weight (kg): 72 (06 Apr 2019 03:03)  BMI (kg/m2): 23.4 (06 Apr 2019 03:03)  BSA (m2): 1.87 (06 Apr 2019 03:03)    PAST MEDICAL & SURGICAL HISTORY:      FAMILY HISTORY:      SOCIAL HISTORY:    ADVANCE DIRECTIVES:            REVIEW OF SYSTEMS: [x ] Unable to Assess due to neurologic exam   [ ] All ROS addressed below are non-contributory, except:  Neuro: [ ] Headache [ ] Back pain [ ] Numbness [ ] Weakness [ ] Ataxia [ ] Dizziness [ ] Aphasia [ ] Dysarthria [ ] Visual disturbance  Resp: [ ] Shortness of breath/dyspnea, [ ] Orthopnea [ ] Cough  CV: [ ] Chest pain [ ] Palpitation [ ] Lightheadedness [ ] Syncope  Renal: [ ] Thirst [ ] Edema  GI: [ ] Nausea [ ] Emesis [ ] Abdominal pain [ ] Constipation [ ] Diarrhea  Hem: [ ] Hematemesis [ ] bright red blood per rectum  ID: [ ] Fever [ ] Chills [ ] Dysuria  ENT: [ ] Rhinorrhea            Mode: AC/ CMV (Assist Control/ Continuous Mandatory Ventilation)  RR (machine): 12  TV (machine): 400  FiO2: 50  PEEP: 5  ITime: 1  MAP: 7.2  PIP: 13      ICU Vital Signs Last 24 Hrs  T(C): 36.8 (06 Apr 2019 01:00), Max: 36.8 (06 Apr 2019 01:00)  T(F): 98.2 (06 Apr 2019 01:00), Max: 98.2 (06 Apr 2019 01:00)  HR: 86 (06 Apr 2019 04:00) (82 - 99)  BP: 138/67 (06 Apr 2019 04:00) (132/67 - 155/77)  BP(mean): 88 (06 Apr 2019 04:00) (88 - 108)  ABP: --  ABP(mean): --  RR: 12 (06 Apr 2019 04:00) (12 - 20)  SpO2: 98% (06 Apr 2019 04:00) (95% - 100%)          I&O's Detail    05 Apr 2019 07:01  -  06 Apr 2019 05:07  --------------------------------------------------------  IN:    IV PiggyBack: 50 mL    niCARdipine Infusion: 300 mL    sodium chloride 0.9%.: 150 mL  Total IN: 500 mL    OUT:    Voided: 225 mL  Total OUT: 225 mL    Total NET: 275 mL        LABS:  CBC Full  -  ( 06 Apr 2019 02:40 )  WBC Count : 11.61 K/uL  RBC Count : 4.91 M/uL  Hemoglobin : 15.4 g/dL  Hematocrit : 44.5 %  Platelet Count - Automated : 290 K/uL  Mean Cell Volume : 90.6 fl  Mean Cell Hemoglobin : 31.4 pg  Mean Cell Hemoglobin Concentration : 34.6 gm/dL  Auto Neutrophil # : x  Auto Lymphocyte # : x  Auto Monocyte # : x  Auto Eosinophil # : x  Auto Basophil # : x  Auto Neutrophil % : x  Auto Lymphocyte % : x  Auto Monocyte % : x  Auto Eosinophil % : x  Auto Basophil % : x    04-06    137  |  101  |  21  ----------------------------<  155<H>  3.7   |  23  |  1.23    Ca    9.2      06 Apr 2019 01:44    TPro  8.2  /  Alb  4.8  /  TBili  1.0  /  DBili  x   /  AST  40  /  ALT  34  /  AlkPhos  71  04-06    CAPILLARY BLOOD GLUCOSE      POCT Blood Glucose.: 133 mg/dL (06 Apr 2019 03:03)    PT/INR - ( 06 Apr 2019 01:44 )   PT: 12.0 sec;   INR: 1.06          PTT - ( 06 Apr 2019 01:44 )  PTT:28.0 sec    Physical Exam: Gen:  NAD. +intubated, off sedation, received fenatnyl 50 mcg and versed for EVD placement   	Neuro: sedated  	CN II-XII: Pupil equal sluggishly reactive, dysconjugate gaze, has dollsL,  + corneal in right pupil, - corneal in left pupil, weak gag  	Motor: localizes briskly with the right UE, LUE extensor posturing, LLE withdraws to pain, RUE and RLE moving spontaneously   	Pulm: clear to auscultation  GI: soft, nontender, nondistended       Labs and Results:  Labs, Radiology, Cardiology, and Other Results: CT head: right thalamic hemorrhage  CTA head and neck: negative per report     Assessment and Plan:   45 y/o male with no PMHx now with right thalamic hemorrhage/IC with IVH and hydrocephalus with CTA neg for vascular malformation per report ( images not on PACS). Likely HTN bleed    Neuro: neuro checks q 1 hr, CT head tomorrow morning,   Hydrocephalus, EVD at 10 cm water keep ICP<20 mmhg, and CPP> 60 mmhg, fentanyl PRN for agitation   keep head elevated and midline   Respiratory failure  intubated, advance ET 2 cm , ABG and adjust vent settings accordingly   CV: NSR,  -140 mmhg,on nicardipine , has an a line   Endocrine: finger sticks q6hrs, ISS , keep sugar 120-180 mmhg   Heme/Onc: INR <1.5, Plt>100            DVT ppx: SCD, contraindicated to start anticoagulant as she is PBD0   Renal: brain edema, 2 % taregt sodium of 145-147 ( do not increase sodium of > 10 meq in 2 hrs)   ID: afebrile  GI: NPO, protonix, NG, colace   Discharge planning: ICU    Code Status: [x] Full Code [] DNR [] DNI [] Goals of Care:   Disposition: [x] ICU [] Stroke Unit [] RCU []PCU []Floor [] Discharge Home     Patient at high risk for neurologic deterioration,expansion of ICH, seizures, ICP crisis, critical care time, excluding procedures: 60 minutes

## 2019-04-06 NOTE — H&P ADULT - HISTORY OF PRESENT ILLNESS
43 y/o male with no significant PMHx presents to [place] with left side weakness and numbness. Work up revealed right thalamic hemorrhage on CT head. Patient transferred to [facility] for further management. Patient not on any anticoagulation use. Patient hypertensive to 's at [place] ED. History obtained by patient's girlfriend and chart from [place], patient is unresponsive:     43 y/o male with no significant PMHx presents to [place] with AMA, left side weakness and numbness. Per girlfriend, [place] when he developed acute onset of left facial numbness and LUE and numbness around 6:30PM. When get got home he developed AMS, dysarthria, and weakness in the LUE and LLE. At [place] ED patient had several episodes of emesis and was hypertensive to SBP in the 200's. Work up revealed right thalamic hemorrhage on CT head. Patient not on any anticoagulation use per girlfriend. Patient transferred to [facility] for further management. History obtained by patient's girlfriend and chart from [place], patient is unresponsive:     45 y/o male with no significant PMHx presents to [place] with AMA, left side weakness and numbness. Per [place], [place] when he developed acute onset of left facial numbness and LUE and numbness around 6:30PM. When get got home he developed AMS, dysarthria, and weakness in the LUE and LLE. At [place] ED patient had several episodes of emesis and was hypertensive to SBP in the 200's. Work up revealed right thalamic hemorrhage on CT head. Patient not on any anticoagulation use per girlfriend. Of note, patient took Excedrin for headache at home. Patient transferred to [facility] for further management. History obtained by patient's girlfriend and chart from [place], patient is unresponsive:     45 y/o male with no significant PMHx presents to [place] with AMS, left side weakness and numbness. Per girlfriend, [place] when he developed acute onset of left facial numbness and LUE and numbness around 6:30PM. When get got home he developed AMS, dysarthria, and weakness in the LUE and LLE. At [place] ED patient had several episodes of emesis and was hypertensive to SBP in the 200's. Work up revealed right thalamic hemorrhage on CT head. Patient not on any anticoagulation use per girlfriend. Of note, patient took Excedrin for headache at home. Patient transferred to [facility] for further management.

## 2019-04-06 NOTE — PROGRESS NOTE ADULT - SUBJECTIVE AND OBJECTIVE BOX
=================================  NEUROCRITICAL CARE ATTENDING NOTE  =================================    ARPAN RUANO   MRN-[id number]  Summary:  44y/M with no PMH presented with L UE weakness / numbness, onset at 630 p.m.; then developed dysarthria, L UE/LE weakness.  He was brought to [place] ED, n/v, SBP 200s.  Imaging showed R thalamic hemorrhage.      COURSE IN THE HOSPITAL:   Admitted, given DDAVP / platelets    Past Medical History:   Allergies:  No Known Allergies  Home meds:     PHYSICAL EXAMINATION  T(C): 37.2 (- @ 06:00), Max: 37.2 (- @ 06:00) HR: 92 (- @ 06:00) (82 - 107) BP: 134/68 (- @ 06:00) (123/62 - 155/77) RR: 16 (- @ 06:00) (12 - 20) SpO2: 99% (- @ 06:00) (95% - 100%)  NEUROLOGIC EXAMINATION:  Patient is   GENERAL: not intubated, not in cardiorespiratory distress  EENT:  anicteric  CARDIOVASCULAR: (+) S1 S2, normal rate and regular rhythm  PULMONARY: clear to auscultation bilaterally  ABDOMEN: soft, nontender with normoactive bowel sounds  EXTREMITIES: no edema  SKIN: no rash    LABS:  CAPILLARY BLOOD GLUCOSE 133               13.9   13.36 )-----------( 326      ( 2019 06:10 )             39.7     137  |  101  |  21  ----------------------------<  155<H>  3.7   |  23  |  1.23    Ca    9.2      2019 01:44    TPro  8.2  /  Alb  4.8  /  TBili  1.0  /  DBili  x   /  AST  40  /  ALT  34  /  AlkPhos  71  04-06    PT/INR - ( 2019 01:44 )   PT: 12.0 sec;   INR: 1.06      PTT - ( 2019 01:44 )  PTT:28.0 sec    LDL = 113 ()   HDL = 70 ()  TG = 85 ()  TSH = 0.733 ()    04-05 @ 07:01  -   @ 07:00  IN: 1162.5 mL / OUT: 395 mL / NET: 767.5 mL    Bacteriology:  CSF studies:  EEG:  Neuroimagin/06 2:11 a.m. CT head:  large R basal ganglia / thalamic ICH, small L basal ganglia / R pontine acute hemorrhage; c/w hypertensive ICH; extension into R lateral ventricle; MLS, mass effect, no HCP  Other imaging:    MEDICATIONS: levetiracetam 1G IV q12h bisacodyl PO/supp PRN docusate 100mg PO BID senna 2mg PO HS atorvastatin 80mg PO HS    IV FLUIDS:  DRIPS: cardene  DIET:  Lines:  Drains:   EVD @ ?cm H20, 35cc/24h  Wounds:    CODE STATUS:  Full Code                       GOALS OF CARE:  aggressive                      DISPOSITION:  ICU  ICH Score on admission  =   GCS Score: 3-4 (2 pts)   GCS Score: 5-12 (1 pt)   ICH Volume: >30  (+) IVH    Estimated 30-day mortality  0 points: 0%  1 point: 13%  2 points: 26%  3 points: 72%  4 points: 97%  5 points: 100%  6 points: 100% =================================  NEUROCRITICAL CARE ATTENDING NOTE  =================================    ARPAN RUANO   MRN-[id number]  Summary:  44y/M with no PMH presented with L UE weakness / numbness, onset at 630 p.m.; then developed dysarthria, L UE/LE weakness.  He was brought to [place] ED, n/v, SBP 200s, more altered, intubated.  Imaging showed R thalamic hemorrhage.  Transferred to [facility].    COURSE IN THE HOSPITAL:   Admitted, given DDAVP / platelets; EVD inserted, improved     Past Medical History:   Allergies:  No Known Allergies  Home meds:     PHYSICAL EXAMINATION  T(C): 37.2 (- @ 06:00), Max: 37.2 (- @ 06:00) HR: 92 ( @ 06:00) (82 - 107) BP: 134/68 (- @ 06:00) (123/62 - 155/77) RR: 16 ( @ 06:00) (12 - 20) SpO2: 99% ( @ 06:00) (95% - 100%)  NEUROLOGIC EXAMINATION:  Patient is lethargic, rousable, follows commands (shows 2 fingers on R), pupils reactive and equal, gaze preferemce not fixed, downward and left; moves R UE/LE spontaneously, extensor posturing L UE, withdraws L LE  GENERAL: intubated AC 40 400 12 +5  EENT:  anicteric  CARDIOVASCULAR: (+) S1 S2, normal rate and regular rhythm  PULMONARY: clear to auscultation bilaterally  ABDOMEN: soft, nontender with normoactive bowel sounds  EXTREMITIES: no edema  SKIN: no rash    LABS:  CAPILLARY BLOOD GLUCOSE 133               13.9   13.36 )-----------( 326      ( 2019 06:10 )             39.7     137  |  101  |  21  ----------------------------<  155<H>  3.7   |  23  |  1.23    Ca    9.2      2019 01:44    TPro  8.2  /  Alb  4.8  /  TBili  1.0  /  DBili  x   /  AST  40  /  ALT  34  /  AlkPhos  71      PT/INR - ( 2019 01:44 )   PT: 12.0 sec;   INR: 1.06      PTT - ( 2019 01:44 )  PTT:28.0 sec    LDL = 113 ()   HDL = 70 ()  TG = 85 ()  TSH = 0.733 ()    04-05 @ 07:  -   @ 07:00  IN: 1162.5 mL / OUT: 395 mL / NET: 767.5 mL    Bacteriology:  CSF studies:  EEG:  Neuroimagin/06 2:11 a.m. CT head:  large R basal ganglia / thalamic ICH, small L basal ganglia / R pontine acute hemorrhage; c/w hypertensive ICH; extension into R lateral ventricle; MLS, mass effect, no HCP  Other imaging:    MEDICATIONS: levetiracetam 1G IV q12h bisacodyl PO/supp PRN docusate 100mg PO BID senna 2mg PO HS atorvastatin 80mg PO HS    IV FLUIDS: NS@50 2%@50  DRIPS: cardene  DIET:  Lines: Lizabeth Becerra (Ambar)  Drains:   EVD @ 10cm H20, 35cc/24h ICP 2-6   Wounds:    CODE STATUS:  Full Code                       GOALS OF CARE:  aggressive                      DISPOSITION:  ICU  ICH Score on admission  = GCS Score: 3-4 (2 pts) E1VTM2 (+) IVH = 3  ICH volume 20ml  Estimated 30-day mortality 3 points: 72%

## 2019-04-06 NOTE — H&P ADULT - ASSESSMENT
45 y/o male with no PMHx now with right thalamic hemorrhage on CT head    - neuro checks Q1 hour  - vitals checks  - pain control  - CT head  - possible EVD placement  - Keppra  - Versed for sedation  - full vent support  - SBP goal <140  - cardene gtt, cardizem gtt  - NPO  - DVT ppx: SCD's    d/w Dr. So 43 y/o male with no PMHx now with right thalamic hemorrhage on CT head    - neuro checks Q1 hour  - vitals checks  - pain control  - CT head  - repeat CT head, CTA in AM   - EVD placement  - Keppra 1g BID  - Versed for sedation  - full vent support  - SBP goal <140  - cardene gtt  - ddavp, platelets for Excedrin reversal   - 2% at 50, Na goal 140-150  - NPO  - DVT ppx: SCD's    d/w Dr. So 45 y/o male with no PMHx now with right thalamic hemorrhage on CT head, ICH score 3    - neuro checks Q1 hour  - vitals checks  - pain control  - CT head  - repeat CT head, CTA in AM   - EVD placement  - Keppra 1g BID  - Versed for sedation  - full vent support  - SBP goal <140  - cardene gtt  - ddavp, platelets for Excedrin reversal   - 2% at 50, Na goal 140-150  - NPO  - DVT ppx: SCD's    d/w Dr. So 45 y/o male with no PMHx now with right thalamic hemorrhage on CT head, ICH score 3, NIHSS 16    - neuro checks Q1 hour  - vitals checks  - pain control  - CT head  - repeat CT head, CTA in AM   - EVD placement  - Keppra 1g BID  - Versed for sedation  - full vent support  - SBP goal <140  - cardene gtt  - ddavp, platelets for Excedrin reversal   - 2% at 50, Na goal 140-150  - NPO  - DVT ppx: SCD's    d/w Dr. So

## 2019-04-07 LAB
ANION GAP SERPL CALC-SCNC: 7 MMOL/L — SIGNIFICANT CHANGE UP (ref 5–17)
ANION GAP SERPL CALC-SCNC: 8 MMOL/L — SIGNIFICANT CHANGE UP (ref 5–17)
BUN SERPL-MCNC: 13 MG/DL — SIGNIFICANT CHANGE UP (ref 7–23)
BUN SERPL-MCNC: 14 MG/DL — SIGNIFICANT CHANGE UP (ref 7–23)
CALCIUM SERPL-MCNC: 8.5 MG/DL — SIGNIFICANT CHANGE UP (ref 8.4–10.5)
CALCIUM SERPL-MCNC: 8.6 MG/DL — SIGNIFICANT CHANGE UP (ref 8.4–10.5)
CHLORIDE SERPL-SCNC: 113 MMOL/L — HIGH (ref 96–108)
CHLORIDE SERPL-SCNC: 114 MMOL/L — HIGH (ref 96–108)
CO2 SERPL-SCNC: 24 MMOL/L — SIGNIFICANT CHANGE UP (ref 22–31)
CO2 SERPL-SCNC: 24 MMOL/L — SIGNIFICANT CHANGE UP (ref 22–31)
CREAT SERPL-MCNC: 0.92 MG/DL — SIGNIFICANT CHANGE UP (ref 0.5–1.3)
CREAT SERPL-MCNC: 1.03 MG/DL — SIGNIFICANT CHANGE UP (ref 0.5–1.3)
GLUCOSE SERPL-MCNC: 125 MG/DL — HIGH (ref 70–99)
GLUCOSE SERPL-MCNC: 148 MG/DL — HIGH (ref 70–99)
HCT VFR BLD CALC: 36.7 % — LOW (ref 39–50)
HGB BLD-MCNC: 12.3 G/DL — LOW (ref 13–17)
MAGNESIUM SERPL-MCNC: 2.3 MG/DL — SIGNIFICANT CHANGE UP (ref 1.6–2.6)
MCHC RBC-ENTMCNC: 31.8 PG — SIGNIFICANT CHANGE UP (ref 27–34)
MCHC RBC-ENTMCNC: 33.5 GM/DL — SIGNIFICANT CHANGE UP (ref 32–36)
MCV RBC AUTO: 94.8 FL — SIGNIFICANT CHANGE UP (ref 80–100)
NRBC # BLD: 0 /100 WBCS — SIGNIFICANT CHANGE UP (ref 0–0)
PHOSPHATE SERPL-MCNC: 2.6 MG/DL — SIGNIFICANT CHANGE UP (ref 2.5–4.5)
PLATELET # BLD AUTO: 256 K/UL — SIGNIFICANT CHANGE UP (ref 150–400)
POTASSIUM SERPL-MCNC: 3.7 MMOL/L — SIGNIFICANT CHANGE UP (ref 3.5–5.3)
POTASSIUM SERPL-MCNC: 3.7 MMOL/L — SIGNIFICANT CHANGE UP (ref 3.5–5.3)
POTASSIUM SERPL-SCNC: 3.7 MMOL/L — SIGNIFICANT CHANGE UP (ref 3.5–5.3)
POTASSIUM SERPL-SCNC: 3.7 MMOL/L — SIGNIFICANT CHANGE UP (ref 3.5–5.3)
RBC # BLD: 3.87 M/UL — LOW (ref 4.2–5.8)
RBC # FLD: 13 % — SIGNIFICANT CHANGE UP (ref 10.3–14.5)
SODIUM SERPL-SCNC: 144 MMOL/L — SIGNIFICANT CHANGE UP (ref 135–145)
SODIUM SERPL-SCNC: 146 MMOL/L — HIGH (ref 135–145)
WBC # BLD: 8.85 K/UL — SIGNIFICANT CHANGE UP (ref 3.8–10.5)
WBC # FLD AUTO: 8.85 K/UL — SIGNIFICANT CHANGE UP (ref 3.8–10.5)

## 2019-04-07 PROCEDURE — 71045 X-RAY EXAM CHEST 1 VIEW: CPT | Mod: 26

## 2019-04-07 PROCEDURE — 99291 CRITICAL CARE FIRST HOUR: CPT | Mod: 24

## 2019-04-07 RX ORDER — DEXMEDETOMIDINE HYDROCHLORIDE IN 0.9% SODIUM CHLORIDE 4 UG/ML
0.01 INJECTION INTRAVENOUS
Qty: 200 | Refills: 0 | Status: DISCONTINUED | OUTPATIENT
Start: 2019-04-07 | End: 2019-04-08

## 2019-04-07 RX ORDER — SODIUM CHLORIDE 9 MG/ML
1000 INJECTION INTRAMUSCULAR; INTRAVENOUS; SUBCUTANEOUS
Qty: 0 | Refills: 0 | Status: DISCONTINUED | OUTPATIENT
Start: 2019-04-08 | End: 2019-04-11

## 2019-04-07 RX ORDER — HEPARIN SODIUM 5000 [USP'U]/ML
5000 INJECTION INTRAVENOUS; SUBCUTANEOUS EVERY 8 HOURS
Qty: 0 | Refills: 0 | Status: DISCONTINUED | OUTPATIENT
Start: 2019-04-07 | End: 2019-04-08

## 2019-04-07 RX ORDER — FENTANYL CITRATE 50 UG/ML
12.5 INJECTION INTRAVENOUS EVERY 4 HOURS
Qty: 0 | Refills: 0 | Status: DISCONTINUED | OUTPATIENT
Start: 2019-04-07 | End: 2019-04-07

## 2019-04-07 RX ORDER — FENTANYL CITRATE 50 UG/ML
25 INJECTION INTRAVENOUS
Qty: 0 | Refills: 0 | Status: DISCONTINUED | OUTPATIENT
Start: 2019-04-07 | End: 2019-04-11

## 2019-04-07 RX ORDER — FENTANYL CITRATE 50 UG/ML
12.5 INJECTION INTRAVENOUS ONCE
Qty: 0 | Refills: 0 | Status: DISCONTINUED | OUTPATIENT
Start: 2019-04-07 | End: 2019-04-07

## 2019-04-07 RX ORDER — ACETAMINOPHEN 500 MG
650 TABLET ORAL EVERY 6 HOURS
Qty: 0 | Refills: 0 | Status: DISCONTINUED | OUTPATIENT
Start: 2019-04-07 | End: 2019-04-21

## 2019-04-07 RX ORDER — SODIUM CHLORIDE 5 G/100ML
1000 INJECTION, SOLUTION INTRAVENOUS
Qty: 0 | Refills: 0 | Status: DISCONTINUED | OUTPATIENT
Start: 2019-04-07 | End: 2019-04-07

## 2019-04-07 RX ORDER — LISINOPRIL 2.5 MG/1
10 TABLET ORAL DAILY
Qty: 0 | Refills: 0 | Status: DISCONTINUED | OUTPATIENT
Start: 2019-04-07 | End: 2019-04-08

## 2019-04-07 RX ORDER — FENTANYL CITRATE 50 UG/ML
50 INJECTION INTRAVENOUS ONCE
Qty: 0 | Refills: 0 | Status: DISCONTINUED | OUTPATIENT
Start: 2019-04-07 | End: 2019-04-07

## 2019-04-07 RX ADMIN — FENTANYL CITRATE 50 MICROGRAM(S): 50 INJECTION INTRAVENOUS at 20:02

## 2019-04-07 RX ADMIN — PANTOPRAZOLE SODIUM 40 MILLIGRAM(S): 20 TABLET, DELAYED RELEASE ORAL at 11:31

## 2019-04-07 RX ADMIN — FENTANYL CITRATE 12.5 MICROGRAM(S): 50 INJECTION INTRAVENOUS at 06:12

## 2019-04-07 RX ADMIN — FENTANYL CITRATE 12.5 MICROGRAM(S): 50 INJECTION INTRAVENOUS at 09:10

## 2019-04-07 RX ADMIN — Medication 2: at 11:30

## 2019-04-07 RX ADMIN — FENTANYL CITRATE 12.5 MICROGRAM(S): 50 INJECTION INTRAVENOUS at 18:42

## 2019-04-07 RX ADMIN — LEVETIRACETAM 400 MILLIGRAM(S): 250 TABLET, FILM COATED ORAL at 05:22

## 2019-04-07 RX ADMIN — SENNA PLUS 2 TABLET(S): 8.6 TABLET ORAL at 21:51

## 2019-04-07 RX ADMIN — FENTANYL CITRATE 12.5 MICROGRAM(S): 50 INJECTION INTRAVENOUS at 07:00

## 2019-04-07 RX ADMIN — FENTANYL CITRATE 12.5 MICROGRAM(S): 50 INJECTION INTRAVENOUS at 19:00

## 2019-04-07 RX ADMIN — ATORVASTATIN CALCIUM 40 MILLIGRAM(S): 80 TABLET, FILM COATED ORAL at 21:51

## 2019-04-07 RX ADMIN — FENTANYL CITRATE 12.5 MICROGRAM(S): 50 INJECTION INTRAVENOUS at 09:30

## 2019-04-07 RX ADMIN — LEVETIRACETAM 400 MILLIGRAM(S): 250 TABLET, FILM COATED ORAL at 18:53

## 2019-04-07 RX ADMIN — Medication 100 MILLIGRAM(S): at 05:22

## 2019-04-07 RX ADMIN — HEPARIN SODIUM 5000 UNIT(S): 5000 INJECTION INTRAVENOUS; SUBCUTANEOUS at 21:51

## 2019-04-07 RX ADMIN — FENTANYL CITRATE 50 MICROGRAM(S): 50 INJECTION INTRAVENOUS at 20:19

## 2019-04-07 RX ADMIN — LISINOPRIL 10 MILLIGRAM(S): 2.5 TABLET ORAL at 11:31

## 2019-04-07 RX ADMIN — PROPOFOL 8.64 MICROGRAM(S)/KG/MIN: 10 INJECTION, EMULSION INTRAVENOUS at 20:53

## 2019-04-07 RX ADMIN — Medication 100 MILLIGRAM(S): at 18:53

## 2019-04-07 RX ADMIN — FENTANYL CITRATE 25 MICROGRAM(S): 50 INJECTION INTRAVENOUS at 23:45

## 2019-04-07 NOTE — PATIENT PROFILE ADULT - STATED REASON FOR ADMISSION
subdural hematoma - s/p trach 1/9   - weaning from vent as tolerated, tolerating trach collar during the day  - trach change done secondary to leak from collar 1/22/19  - trach care, suction PRN  - chest PT hemorrhage stroke

## 2019-04-07 NOTE — PROGRESS NOTE ADULT - SUBJECTIVE AND OBJECTIVE BOX
=================================  NEUROCRITICAL CARE ATTENDING NOTE  =================================    ARPAN RUANO   MRN-[id number]  Summary:  44y/M with no PMH presented with L UE weakness / numbness, onset at 630 p.m.; then developed dysarthria, L UE/LE weakness.  He was brought to [place] ED, n/v, SBP 200s, more altered, intubated.  Imaging showed R thalamic hemorrhage.  Transferred to [facility].    COURSE IN THE HOSPITAL:   Admitted, given DDAVP / platelets; EVD inserted, improved     Past Medical History:   Allergies:  No Known Allergies  Home meds:     PHYSICAL EXAMINATION  T(C): 37.6 ( @ 07:00), Max: 38.6 ( @ 11:00) HR: 76 ( @ 07:00) (76 - 108) BP: 129/66 ( @ 07:00) (124/69 - 156/71) RR: 16 ( @ 07:00) (15 - 25) SpO2: 99% ( @ 07:00) (97% - 100%)  NEUROLOGIC EXAMINATION:  Patient is lethargic, rousable, follows commands (shows 2 fingers on R), pupils reactive and equal, gaze preferemce not fixed, downward and left; moves R UE/LE spontaneously, extensor posturing L UE, withdraws L LE  GENERAL: intubated AC 40 400 12 +5  EENT:  anicteric  CARDIOVASCULAR: (+) S1 S2, normal rate and regular rhythm  PULMONARY: clear to auscultation bilaterally  ABDOMEN: soft, nontender with normoactive bowel sounds  EXTREMITIES: no edema  SKIN: no rash    LABS:  CAPILLARY BLOOD GLUCOSE 115 111 144 143               12.3   8.85  )-----------( 256      ( 2019 05:25 )             36.7     144  |  113<H>  |  14  ----------------------------<  148<H>  3.7   |  24  |  1.03    Ca    8.5      2019 05:25  Phos  2.6       Mg     2.3         TPro  8.2  /  Alb  4.8  /  TBili  1.0  /  DBili  x   /  AST  40  /  ALT  34  /  AlkPhos  71   @ 07:01  -   @ 07:00  IN: 2880.5 mL / OUT: 2445 mL / NET: 435.5 mL    LDL = 113 ()   HDL = 70 ()  TG = 85 ()  TSH = 0.733 ()    Bacteriology:  CSF studies:  EEG:  Neuroimagin/06 12:48 p.m. CT head:  s/p EVD, decrease in size of ventricles, R ICH unchanged with IV extension, punctate hyperdensity R BG R conner R thalamus, unchanged,    2:11 a.m. CT head:  large R basal ganglia / thalamic ICH, small L basal ganglia / R pontine acute hemorrhage; c/w hypertensive ICH; extension into R lateral ventricle; MLS, mass effect, no HCP  Other imaging:    MEDICATIONS: levetiracetam 1G IV q12h bisacodyl PO/supp PRN docusate 100mg PO BID senna 2mg PO HS atorvastatin 80mg PO HS    MEDICATIONS: levetiracetam 1G IV q12h docusate 100 PO BID pantoprazole 40 daily senna 2mg PO HS atorvastatin 40mg PO HS mod ISS bisacodyl PRN supp PRN fentanyl PRN     IV FLUIDS: NS@50 2%@50  DRIPS: cardene propofol   DIET:  Lines: Lizabeth Heartey (Ambar)  Drains:   EVD @ 10cm H20, 35cc/24h ICP 2-6   Wounds:    CODE STATUS:  Full Code                       GOALS OF CARE:  aggressive                      DISPOSITION:  ICU  ICH Score on admission  = GCS Score: 3-4 (2 pts) E1VTM2 (+) IVH = 3  ICH volume 20ml  Estimated 30-day mortality 3 points: 72% =================================  NEUROCRITICAL CARE ATTENDING NOTE  =================================    ARPAN RUANO   MRN-[id number]  Summary:  44y/M with no PMH presented with L UE weakness / numbness, onset at 630 p.m.; then developed dysarthria, L UE/LE weakness.  He was brought to [place] ED, n/v, SBP 200s, more altered, intubated.  Imaging showed R thalamic hemorrhage.  Transferred to [facility].    COURSE IN THE HOSPITAL:   Admitted, given DDAVP / platelets; EVD inserted, improved     Past Medical History:   Allergies:  No Known Allergies  Home meds:     PHYSICAL EXAMINATION  T(C): 37.6 ( @ 07:00), Max: 38.6 ( @ 11:00) HR: 76 ( @ :00) (76 - 108) BP: 129/66 ( @ 07:00) (124/69 - 156/71) RR: 16 ( @ 07:00) (15 - 25) SpO2: 99% ( @ 07:00) (97% - 100%)  NEUROLOGIC EXAMINATION:  Patient is   lethargic, rousable, follows commands (shows 2 fingers on R), pupils reactive and equal, gaze preference not fixed, downward and left; moves R UE/LE spontaneously, extensor posturing L UE, withdraws L LE  GENERAL: intubated AC 40 400 12 +5  EENT:  anicteric  CARDIOVASCULAR: (+) S1 S2, normal rate and regular rhythm  PULMONARY: clear to auscultation bilaterally; min secretions  ABDOMEN: soft, nontender with normoactive bowel sounds  EXTREMITIES: no edema  SKIN: no rash    LABS:  CAPILLARY BLOOD GLUCOSE 115 111 144 143               12.3   8.85  )-----------( 256      ( 2019 05:25 )             36.7     144  |  113<H>  |  14  ----------------------------<  148<H>  3.7   |  24  |  1.03    Ca    8.5      2019 05:25  Phos  2.6       Mg     2.3         TPro  8.2  /  Alb  4.8  /  TBili  1.0  /  DBili  x   /  AST  40  /  ALT  34  /  AlkPhos  71   @ 07:01  -   @ 07:00  IN: 2880.5 mL / OUT: 2445 mL / NET: 435.5 mL    LDL = 113 ()   HDL = 70 ()  TG = 85 ()  TSH = 0.733 ()    Bacteriology:  CSF studies:  EEG:  Neuroimagin/06 12:48 p.m. CT head:  s/p EVD, decrease in size of ventricles, R ICH unchanged with IV extension, punctate hyperdensity R BG R conner R thalamus, unchanged,    2:11 a.m. CT head:  large R basal ganglia / thalamic ICH, small L basal ganglia / R pontine acute hemorrhage; c/w hypertensive ICH; extension into R lateral ventricle; MLS, mass effect, no HCP  Other imaging:    MEDICATIONS: levetiracetam 1G IV q12h docusate 100 PO BID pantoprazole 40 daily senna 2mg PO HS atorvastatin 40mg PO HS mod ISS bisacodyl PRN supp PRN fentanyl PRN     IV FLUIDS: 2%50  DRIPS: cardene (11cc/hr) propofol   DIET: Jevity at 60cc/hr  Lines: Lizabeth Becerra ([place])  Drains:   EVD @ 10cm H20, 267 cc/24h ICP 6-9  Wounds:    CODE STATUS:  Full Code                       GOALS OF CARE:  aggressive                      DISPOSITION:  ICU  ICH Score on admission  = GCS Score: 3-4 (2 pts) E1VTM2 (+) IVH = 3  ICH volume 20ml  Estimated 30-day mortality 3 points: 72%

## 2019-04-07 NOTE — PROGRESS NOTE ADULT - ASSESSMENT
44y/M with   1.  intracerebral hemorrhage (r basal ganglia / thalamic; small L basal ganglia, R pontine), brain compression, cerebral edema   2.  dyslipidemia    PLAN:   NEURO: neurochecks q1h, PRN pain meds with Tylenol  ICH:  BP control; repeat CT this AM  seizure prophylaxis: as per neurosurgery  EVD: monitor output, keep at 10cm h20  REHAB:  physical therapy evaluation and management    EARLY MOB:   HOB bedrest    PULM:  full vent support  CARDIO:  SBP goal 100-140mm Hg; cardene drip to SBP goal  ENDO:  Blood sugar goals 140-180 mg/dL, continue insulin sliding scale; decrease atorvastatin to 40mg PO daily, A1C   GI:  docusate senna; PPI for GI prophylaxis (intubated)  DIET: NPO for now  RENAL:  Na goal 140-145; cont 2% at 50cc/hr, decrease NS @ 25cc/hr; recheck BMP this afternoon  HEM/ONC: Hb stable; given DDAVP and platelet transfusion for aspirin reversal  VTE Prophylaxis: SCDs only, no DVT chemoprophylaxis for now as patient is high risk for bleed (fresh bleed)  ID: afebrile, leukocytosis prob reactive   Social: will update family; sister freeman, father, common law wife who is pregnant    ATTENDING ATTESTATION:  I was physically present for the key portions of the evaluation and management (E/M) service provided.  I agree with the above history, physical and plan, which I have reviewed and edited where appropriate.    Patient at high risk for neurological deterioration or death due to:  ICU delirium, aspiration PNA, DVT / PE.  Critical care time, excluding procedures: 60 minutes spent on total encounter, more than 50% of the visit was spent counseling and/or coordinating care by the attending physician.     Plan discussed with RN, house staff. 44y/M with   1.  intracerebral hemorrhage (r basal ganglia / thalamic; small L basal ganglia, R pontine), brain compression, cerebral edema   2.  dyslipidemia    PLAN:   NEURO: neurochecks q1h, PRN pain meds with Tylenol / fentanyl PRN; switch sedation from propofol to precedex to RASS of 0 to -1  ICH:  BP control  seizure prophylaxis: as per neurosurgery  EVD: monitor output, keep at 10cm h20  REHAB:  physical therapy evaluation and management    EARLY MOB:   HOB bedrest    PULM:  full vent support; CPAP trial this afternoon if RASS achieved   CARDIO:  SBP goal 100-140mm Hg; cardene drip to SBP goal; start lisinopril 10 mg PO daily   ENDO:  Blood sugar goals 140-180 mg/dL, continue insulin sliding scale; atorvastatin 40mg PO daily, A1C  4.9  GI:  docusate senna; PPI for GI prophylaxis (intubated)  DIET: continue Jevity at goal  RENAL:  Na goal 140-145; decrease NS @ 25cc/hr; recheck BMP this afternoon  HEM/ONC: Hb stable; given DDAVP and platelet transfusion for aspirin reversal  VTE Prophylaxis: SCDs, SQH tonight if ok with NS  ID: afebrile, no leukocytosis, no ABx, high for aspiration / HAP  Social: will update family; sister freeman, father, common law wife who is pregnant    ATTENDING ATTESTATION:  I was physically present for the key portions of the evaluation and management (E/M) service provided.  I agree with the above history, physical and plan, which I have reviewed and edited where appropriate.    Patient at high risk for neurological deterioration or death due to:  ICU delirium, aspiration PNA, DVT / PE.  Critical care time, excluding procedures: 60 minutes spent on total encounter, more than 50% of the visit was spent counseling and/or coordinating care by the attending physician.     Plan discussed with RN, house staff. 44y/M with   1.  intracerebral hemorrhage (r basal ganglia / thalamic; small L basal ganglia, R pontine), brain compression, cerebral edema   2.  dyslipidemia    PLAN:   NEURO: neurochecks q1h, PRN pain meds with Tylenol / fentanyl PRN; switch sedation from propofol to precedex to RASS of 0 to -1  ICH:  BP control  seizure prophylaxis: as per neurosurgery  EVD: monitor output, keep at 10cm h20  REHAB:  physical therapy evaluation and management    EARLY MOB:   HOB bedrest    PULM:  full vent support; CPAP trial this afternoon if RASS achieved   CARDIO:  SBP goal 100-140mm Hg; cardene drip to SBP goal; start lisinopril 10 mg PO daily   ENDO:  Blood sugar goals 140-180 mg/dL, continue insulin sliding scale; atorvastatin 40mg PO daily, A1C  4.9  GI:  docusate senna; PPI for GI prophylaxis (intubated)  DIET: continue Jevity at goal  RENAL:  Na goal 140-145; decrease 2% to 25cc/hr; recheck BMP this afternoon  HEM/ONC: Hb stable; given DDAVP and platelet transfusion for aspirin reversal  VTE Prophylaxis: SCDs, SQH tonight if ok with NS  ID: afebrile, no leukocytosis, no ABx, high for aspiration / HAP  Social: will update family; sister freeman, father, common law wife who is pregnant    ATTENDING ATTESTATION:  I was physically present for the key portions of the evaluation and management (E/M) service provided.  I agree with the above history, physical and plan, which I have reviewed and edited where appropriate.    Patient at high risk for neurological deterioration or death due to:  ICU delirium, aspiration PNA, DVT / PE.  Critical care time, excluding procedures: 60 minutes spent on total encounter, more than 50% of the visit was spent counseling and/or coordinating care by the attending physician.     Plan discussed with RN, house staff.

## 2019-04-07 NOTE — PROGRESS NOTE ADULT - SUBJECTIVE AND OBJECTIVE BOX
43 y/o male with no significant PMHx presents to [place] with AMS, left side weakness and numbness. Per [place], [place] when he developed acute onset of left facial numbness and LUE and numbness around 6:30PM. When get got home he developed AMS, dysarthria, and weakness in the LUE and LLE. At [place] ED patient had several episodes of emesis and was hypertensive to SBP in the 200's. Work up revealed right thalamic hemorrhage on CT head. Patient not on any anticoagulation use per girlfriend. Of note, patient took Excedrin for headache at home. Patient transferred to [facility] for further management.     ***      Vital Signs Last 24 Hrs  T(C): 37.6 (07 Apr 2019 07:00), Max: 38.6 (06 Apr 2019 11:00)  T(F): 99.7 (07 Apr 2019 07:00), Max: 101.4 (06 Apr 2019 11:00)  HR: 90 (07 Apr 2019 08:00) (76 - 108)  BP: 146/74 (07 Apr 2019 08:00) (124/69 - 156/71)  BP(mean): 102 (07 Apr 2019 08:00) (91 - 112)  RR: 18 (07 Apr 2019 08:00) (15 - 25)  SpO2: 99% (07 Apr 2019 08:00) (97% - 100%)    I&O's Detail    06 Apr 2019 07:01  -  07 Apr 2019 07:00  --------------------------------------------------------  IN:    Enteral Tube Flush: 105 mL    niCARdipine Infusion: 872.5 mL    ns in tub fed  [id number]: 440 mL    propofol Infusion: 213 mL    sodium chloride 0.9%: 50 mL    sodium chloride 2% .: 1200 mL  Total IN: 2880.5 mL    OUT:    External Ventricular Device: 267 mL    Indwelling Catheter - Urethral: 2063 mL    Voided: 115 mL  Total OUT: 2445 mL    Total NET: 435.5 mL      07 Apr 2019 07:01  -  07 Apr 2019 08:40  --------------------------------------------------------  IN:    niCARdipine Infusion: 55 mL    ns in tub fed  [id number]: 60 mL    propofol Infusion: 15 mL    sodium chloride 2% .: 50 mL  Total IN: 180 mL    OUT:    External Ventricular Device: 10 mL    Indwelling Catheter - Urethral: 40 mL  Total OUT: 50 mL    Total NET: 130 mL        I&O's Summary    06 Apr 2019 07:01 - 07 Apr 2019 07:00  --------------------------------------------------------  IN: 2880.5 mL / OUT: 2445 mL / NET: 435.5 mL    07 Apr 2019 07:01  -  07 Apr 2019 08:40  --------------------------------------------------------  IN: 180 mL / OUT: 50 mL / NET: 130 mL        PHYSICAL EXAM:  Neurological:    Motor exam:         [] Upper extremity              Bi(c5)  WE(c6)  EE(c7)   FF(c8)                                                R         5/5        5/5        5/5       5/5                                               L          5/5        5/5        5/5       5/5         [] Lower extremeity          HF(l2)   KE(l3)    TA(l4)   EHL(l5)  GS(s1)                                                 R        5/5        5/5        5/5       5/5         5/5                                               L         5/5        5/5       5/5       5/5          5/5                                                        [] warm well perfused; capillary refill <3 seconds     Sensation: [] intact to light touch  [] decreased:       Cardiovascular:  Respiratory:  Gastrointestinal:  Genitourinary:  Extremities:    Incision/Wound:    DEVICE/DRAIN DRESSING:    TUBES/LINES:  [] CVC  [] A-line  [] Lumbar Drain  [] Ventriculostomy  [] Other    DIET:  [] NPO  [] Mechanical  [] Tube feeds    LABS:                        12.3   8.85  )-----------( 256      ( 07 Apr 2019 05:25 )             36.7     04-07    144  |  113<H>  |  14  ----------------------------<  148<H>  3.7   |  24  |  1.03    Ca    8.5      07 Apr 2019 05:25  Phos  2.6     04-07  Mg     2.3     04-07    TPro  8.2  /  Alb  4.8  /  TBili  1.0  /  DBili  x   /  AST  40  /  ALT  34  /  AlkPhos  71  04-06    PT/INR - ( 06 Apr 2019 01:44 )   PT: 12.0 sec;   INR: 1.06          PTT - ( 06 Apr 2019 01:44 )  PTT:28.0 sec  Urinalysis Basic - ( 06 Apr 2019 13:18 )    Color: Yellow / Appearance: Clear / SG: <=1.005 / pH: x  Gluc: x / Ketone: NEGATIVE  / Bili: Negative / Urobili: 0.2 E.U./dL   Blood: x / Protein: NEGATIVE mg/dL / Nitrite: NEGATIVE   Leuk Esterase: NEGATIVE / RBC: > 10 /HPF / WBC < 5 /HPF   Sq Epi: x / Non Sq Epi: 0-5 /HPF / Bacteria: None /HPF      CARDIAC MARKERS ( 06 Apr 2019 01:44 )  x     / <0.01 ng/mL / 669 U/L / x     / 4.0 ng/mL      CAPILLARY BLOOD GLUCOSE      POCT Blood Glucose.: 115 mg/dL (07 Apr 2019 06:22)  POCT Blood Glucose.: 111 mg/dL (06 Apr 2019 21:33)  POCT Blood Glucose.: 144 mg/dL (06 Apr 2019 17:29)  POCT Blood Glucose.: 143 mg/dL (06 Apr 2019 11:09)      Drug Levels: [] N/A    CSF Analysis: [] N/A      Allergies    No Known Allergies    Intolerances      MEDICATIONS:  Antibiotics:    Neuro:  acetaminophen   Tablet .. 650 milliGRAM(s) Oral every 6 hours PRN  fentaNYL    Injectable 12.5 MICROGram(s) IV Push every 4 hours PRN  levETIRAcetam  IVPB 1000 milliGRAM(s) IV Intermittent every 12 hours  ondansetron Injectable 4 milliGRAM(s) IV Push every 6 hours PRN  propofol Infusion 20 MICROgram(s)/kG/Min IV Continuous <Continuous>    Anticoagulation:    OTHER:  atorvastatin 40 milliGRAM(s) Oral at bedtime  bisacodyl 5 milliGRAM(s) Oral daily PRN  bisacodyl Suppository 10 milliGRAM(s) Rectal daily PRN  dextrose 40% Gel 15 Gram(s) Oral once PRN  dextrose 50% Injectable 12.5 Gram(s) IV Push once  dextrose 50% Injectable 25 Gram(s) IV Push once  dextrose 50% Injectable 25 Gram(s) IV Push once  docusate sodium Liquid 100 milliGRAM(s) Oral two times a day  glucagon  Injectable 1 milliGRAM(s) IntraMuscular once PRN  hydrALAZINE Injectable 10 milliGRAM(s) IV Push every 4 hours PRN  insulin lispro (HumaLOG) corrective regimen sliding scale   SubCutaneous Before meals and at bedtime  niCARdipine Infusion 5 mG/Hr IV Continuous <Continuous>  pantoprazole   Suspension 40 milliGRAM(s) Oral daily  senna 2 Tablet(s) Oral at bedtime    IVF:  dextrose 5%. 1000 milliLiter(s) IV Continuous <Continuous>  sodium chloride 2% . 1000 milliLiter(s) IV Continuous <Continuous>    CULTURES:    RADIOLOGY & ADDITIONAL TESTS:      ASSESSMENT:  44y Male s/p    HEMORRHAGE STROKE  HEMORRHAGE  Handoff      PLAN:  NEURO:  evd at [id number]  pain meds, prn fentanyl, switch sedation  neuro/vitals q1h  keppra prophylaxis    CARDIOVASCULAR:  - cardene  - lisinopil    PULMONARY:  - full vent support,  -CPAP if RASS of -2 achieved    RENAL:    GI:    HEME:    ID:    ENDO:  ISS    DVT PROPHYLAXIS:  [x] Venodynes                                [] Heparin/Lovenox    FALL RISK:  [] Low Risk                                    [] Impulsive    DISPOSITION:       Assessment:  Present when checked    []  GCS  E   V  M     Heart Failure: []Acute, [] acute on chronic , []chronic  Heart Failure:  [] Diastolic (HFpEF), [] Systolic (HFrEF), []Combined (HFpEF and HFrEF), [] RHF, [] Pulm HTN, [] Other    [] KRISTA, [] ATN, [] AIN, [] other  [] CKD1, [] CKD2, [] CKD 3, [] CKD 4, [] CKD 5, []ESRD    Encephalopathy: [] Metabolic, [] Hepatic, [] toxic, [] Neurological, [] Other    Abnormal Nurtitional Status: [] malnurtition (see nutrition note), [ ]underweight: BMI < 19, [] morbid obesity: BMI >40, [] Cachexia    [] Sepsis  [] hypovolemic shock,[] cardiogenic shock, [] hemorrhagic shock, [] neuogenic shock  [] Acute Respiratory Failure  []Cerebral edema, [] Brain compression/ herniation,   [] Functional quadriplegia  [] Acute blood loss anemia 45 y/o male with no significant PMHx presents to [place] with AMS, left side weakness and numbness. Per [place], [place] when he developed acute onset of left facial numbness and LUE and numbness around 6:30PM. When get got home he developed AMS, dysarthria, and weakness in the LUE and LLE. At [place] ED patient had several episodes of emesis and was hypertensive to SBP in the 200's. Work up revealed right thalamic hemorrhage on CT head. Patient not on any anticoagulation use per girlfriend. Of note, patient took Excedrin for headache at home. Patient transferred to [facility] for further management.     4/7: overnight, patient requiring large amount of cardene and propofol.  Plan today is to transition to precedex, start PO lisinopril and wean of cardene.  Becomes extremely agitated when off sedation.      Vital Signs Last 24 Hrs  T(C): 37.6 (07 Apr 2019 07:00), Max: 38.6 (06 Apr 2019 11:00)  T(F): 99.7 (07 Apr 2019 07:00), Max: 101.4 (06 Apr 2019 11:00)  HR: 90 (07 Apr 2019 08:00) (76 - 108)  BP: 146/74 (07 Apr 2019 08:00) (124/69 - 156/71)  BP(mean): 102 (07 Apr 2019 08:00) (91 - 112)  RR: 18 (07 Apr 2019 08:00) (15 - 25)  SpO2: 99% (07 Apr 2019 08:00) (97% - 100%)    I&O's Detail    06 Apr 2019 07:01  -  07 Apr 2019 07:00  --------------------------------------------------------  IN:    Enteral Tube Flush: 105 mL    niCARdipine Infusion: 872.5 mL    ns in tub fed  [id number]: 440 mL    propofol Infusion: 213 mL    sodium chloride 0.9%: 50 mL    sodium chloride 2% .: 1200 mL  Total IN: 2880.5 mL    OUT:    External Ventricular Device: 267 mL    Indwelling Catheter - Urethral: 2063 mL    Voided: 115 mL  Total OUT: 2445 mL    Total NET: 435.5 mL      07 Apr 2019 07:01  -  07 Apr 2019 08:40  --------------------------------------------------------  IN:    niCARdipine Infusion: 55 mL    ns in tub fed  [id number]: 60 mL    propofol Infusion: 15 mL    sodium chloride 2% .: 50 mL  Total IN: 180 mL    OUT:    External Ventricular Device: 10 mL    Indwelling Catheter - Urethral: 40 mL  Total OUT: 50 mL    Total NET: 130 mL        I&O's Summary    06 Apr 2019 07:01  -  07 Apr 2019 07:00  --------------------------------------------------------  IN: 2880.5 mL / OUT: 2445 mL / NET: 435.5 mL    07 Apr 2019 07:01  -  07 Apr 2019 08:40  --------------------------------------------------------  IN: 180 mL / OUT: 50 mL / NET: 130 mL        PHYSICAL EXAM:  on precedex  PERRL  FC in Rue and RLE  LLE spontaneous movement, not reproducable  LUE 0/5      DEVICE/DRAIN DRESSING:  EVD site C/D/I    TUBES/LINES:  [] CVC  [x] A-line  [x] Ventriculostomy  [] Other    DIET:  [] NPO  [] Mechanical  [] Tube feeds    LABS:                        12.3   8.85  )-----------( 256      ( 07 Apr 2019 05:25 )             36.7     04-07    144  |  113<H>  |  14  ----------------------------<  148<H>  3.7   |  24  |  1.03    Ca    8.5      07 Apr 2019 05:25  Phos  2.6     04-07  Mg     2.3     04-07    TPro  8.2  /  Alb  4.8  /  TBili  1.0  /  DBili  x   /  AST  40  /  ALT  34  /  AlkPhos  71  04-06    PT/INR - ( 06 Apr 2019 01:44 )   PT: 12.0 sec;   INR: 1.06          PTT - ( 06 Apr 2019 01:44 )  PTT:28.0 sec  Urinalysis Basic - ( 06 Apr 2019 13:18 )    Color: Yellow / Appearance: Clear / SG: <=1.005 / pH: x  Gluc: x / Ketone: NEGATIVE  / Bili: Negative / Urobili: 0.2 E.U./dL   Blood: x / Protein: NEGATIVE mg/dL / Nitrite: NEGATIVE   Leuk Esterase: NEGATIVE / RBC: > 10 /HPF / WBC < 5 /HPF   Sq Epi: x / Non Sq Epi: 0-5 /HPF / Bacteria: None /HPF      CARDIAC MARKERS ( 06 Apr 2019 01:44 )  x     / <0.01 ng/mL / 669 U/L / x     / 4.0 ng/mL      CAPILLARY BLOOD GLUCOSE      POCT Blood Glucose.: 115 mg/dL (07 Apr 2019 06:22)  POCT Blood Glucose.: 111 mg/dL (06 Apr 2019 21:33)  POCT Blood Glucose.: 144 mg/dL (06 Apr 2019 17:29)  POCT Blood Glucose.: 143 mg/dL (06 Apr 2019 11:09)      Drug Levels: [] N/A    CSF Analysis: [] N/A      Allergies    No Known Allergies    Intolerances      MEDICATIONS:  Antibiotics:    Neuro:  acetaminophen   Tablet .. 650 milliGRAM(s) Oral every 6 hours PRN  fentaNYL    Injectable 12.5 MICROGram(s) IV Push every 4 hours PRN  levETIRAcetam  IVPB 1000 milliGRAM(s) IV Intermittent every 12 hours  ondansetron Injectable 4 milliGRAM(s) IV Push every 6 hours PRN  propofol Infusion 20 MICROgram(s)/kG/Min IV Continuous <Continuous>    Anticoagulation:    OTHER:  atorvastatin 40 milliGRAM(s) Oral at bedtime  bisacodyl 5 milliGRAM(s) Oral daily PRN  bisacodyl Suppository 10 milliGRAM(s) Rectal daily PRN  dextrose 40% Gel 15 Gram(s) Oral once PRN  dextrose 50% Injectable 12.5 Gram(s) IV Push once  dextrose 50% Injectable 25 Gram(s) IV Push once  dextrose 50% Injectable 25 Gram(s) IV Push once  docusate sodium Liquid 100 milliGRAM(s) Oral two times a day  glucagon  Injectable 1 milliGRAM(s) IntraMuscular once PRN  hydrALAZINE Injectable 10 milliGRAM(s) IV Push every 4 hours PRN  insulin lispro (HumaLOG) corrective regimen sliding scale   SubCutaneous Before meals and at bedtime  niCARdipine Infusion 5 mG/Hr IV Continuous <Continuous>  pantoprazole   Suspension 40 milliGRAM(s) Oral daily  senna 2 Tablet(s) Oral at bedtime    IVF:  dextrose 5%. 1000 milliLiter(s) IV Continuous <Continuous>  sodium chloride 2% . 1000 milliLiter(s) IV Continuous <Continuous>    CULTURES:    RADIOLOGY & ADDITIONAL TESTS:      ASSESSMENT:  44y Male s/p    HEMORRHAGE STROKE  HEMORRHAGE  Handoff      PLAN:  NEURO:  evd at [id number]  pain meds, prn fentanyl, switch sedation  neuro/vitals q1h  keppra prophylaxis    CARDIOVASCULAR:  - cardene  - lisinopil    PULMONARY:  - full vent support,  -CPAP if RASS of -2 achieved    RENAL:    GI:    HEME:    ID:    ENDO:  ISS    DVT PROPHYLAXIS:  [x] Venodynes                                [] Heparin/Lovenox    FALL RISK:  [] Low Risk                                    [] Impulsive    DISPOSITION:       Assessment:  Present when checked    []  GCS  E   V  M     Heart Failure: []Acute, [] acute on chronic , []chronic  Heart Failure:  [] Diastolic (HFpEF), [] Systolic (HFrEF), []Combined (HFpEF and HFrEF), [] RHF, [] Pulm HTN, [] Other    [] KRISTA, [] ATN, [] AIN, [] other  [] CKD1, [] CKD2, [] CKD 3, [] CKD 4, [] CKD 5, []ESRD    Encephalopathy: [] Metabolic, [] Hepatic, [] toxic, [] Neurological, [] Other    Abnormal Nurtitional Status: [] malnurtition (see nutrition note), [ ]underweight: BMI < 19, [] morbid obesity: BMI >40, [] Cachexia    [] Sepsis  [] hypovolemic shock,[] cardiogenic shock, [] hemorrhagic shock, [] neuogenic shock  [] Acute Respiratory Failure  []Cerebral edema, [] Brain compression/ herniation,   [] Functional quadriplegia  [] Acute blood loss anemia 45 y/o male with no significant PMHx presents to [place] with AMS, left side weakness and numbness. [place] when he developed acute onset of left facial numbness and LUE and numbness around 6:30PM. When get got home he developed AMS, dysarthria, and weakness in the LUE and LLE. At [place] ED patient had several episodes of emesis and was hypertensive to SBP in the 200's. Work up revealed right thalamic hemorrhage on CT head. Patient not on any anticoagulation use per girlfriend. Of note, patient took Excedrin for headache at home. Patient transferred to [facility] for [facility] management.     4/7: overnight, patient requiring large amount of cardene and propofol.  Plan today is to transition to precedex, start PO lisinopril and wean of cardene.  Becomes extremely agitated when off sedation.      Vital Signs Last 24 Hrs  T(C): 37.6 (07 Apr 2019 07:00), Max: 38.6 (06 Apr 2019 11:00)  T(F): 99.7 (07 Apr 2019 07:00), Max: 101.4 (06 Apr 2019 11:00)  HR: 90 (07 Apr 2019 08:00) (76 - 108)  BP: 146/74 (07 Apr 2019 08:00) (124/69 - 156/71)  BP(mean): 102 (07 Apr 2019 08:00) (91 - 112)  RR: 18 (07 Apr 2019 08:00) (15 - 25)  SpO2: 99% (07 Apr 2019 08:00) (97% - 100%)    I&O's Detail    06 Apr 2019 07:01  -  07 Apr 2019 07:00  --------------------------------------------------------  IN:    Enteral Tube Flush: 105 mL    niCARdipine Infusion: 872.5 mL    ns in tub fed  [id number]: 440 mL    propofol Infusion: 213 mL    sodium chloride 0.9%: 50 mL    sodium chloride 2% .: 1200 mL  Total IN: 2880.5 mL    OUT:    External Ventricular Device: 267 mL    Indwelling Catheter - Urethral: 2063 mL    Voided: 115 mL  Total OUT: 2445 mL    Total NET: 435.5 mL      07 Apr 2019 07:01  -  07 Apr 2019 08:40  --------------------------------------------------------  IN:    niCARdipine Infusion: 55 mL    ns in tub fed  [id number]: 60 mL    propofol Infusion: 15 mL    sodium chloride 2% .: 50 mL  Total IN: 180 mL    OUT:    External Ventricular Device: 10 mL    Indwelling Catheter - Urethral: 40 mL  Total OUT: 50 mL    Total NET: 130 mL        I&O's Summary    06 Apr 2019 07:01  -  07 Apr 2019 07:00  --------------------------------------------------------  IN: 2880.5 mL / OUT: 2445 mL / NET: 435.5 mL    07 Apr 2019 07:01  -  07 Apr 2019 08:40  --------------------------------------------------------  IN: 180 mL / OUT: 50 mL / NET: 130 mL        PHYSICAL EXAM:  on precedex  PERRL, (+) cough (+) gag  FC in Rue and RLE  LLE spontaneous movement, not reproducable  LUE 0/5      DEVICE/DRAIN DRESSING:  EVD site C/D/I    TUBES/LINES:  [] CVC  [x] A-line  [x] Ventriculostomy  [] Other    DIET:  [] NPO  [] Mechanical  [] Tube feeds    LABS:                        12.3   8.85  )-----------( 256      ( 07 Apr 2019 05:25 )             36.7     04-07    144  |  113<H>  |  14  ----------------------------<  148<H>  3.7   |  24  |  1.03    Ca    8.5      07 Apr 2019 05:25  Phos  2.6     04-07  Mg     2.3     04-07    TPro  8.2  /  Alb  4.8  /  TBili  1.0  /  DBili  x   /  AST  40  /  ALT  34  /  AlkPhos  71  04-06    PT/INR - ( 06 Apr 2019 01:44 )   PT: 12.0 sec;   INR: 1.06          PTT - ( 06 Apr 2019 01:44 )  PTT:28.0 sec  Urinalysis Basic - ( 06 Apr 2019 13:18 )    Color: Yellow / Appearance: Clear / SG: <=1.005 / pH: x  Gluc: x / Ketone: NEGATIVE  / Bili: Negative / Urobili: 0.2 E.U./dL   Blood: x / Protein: NEGATIVE mg/dL / Nitrite: NEGATIVE   Leuk Esterase: NEGATIVE / RBC: > 10 /HPF / WBC < 5 /HPF   Sq Epi: x / Non Sq Epi: 0-5 /HPF / Bacteria: None /HPF      CARDIAC MARKERS ( 06 Apr 2019 01:44 )  x     / <0.01 ng/mL / 669 U/L / x     / 4.0 ng/mL      CAPILLARY BLOOD GLUCOSE      POCT Blood Glucose.: 115 mg/dL (07 Apr 2019 06:22)  POCT Blood Glucose.: 111 mg/dL (06 Apr 2019 21:33)  POCT Blood Glucose.: 144 mg/dL (06 Apr 2019 17:29)  POCT Blood Glucose.: 143 mg/dL (06 Apr 2019 11:09)      Drug Levels: [] N/A    CSF Analysis: [] N/A      Allergies    No Known Allergies    Intolerances      MEDICATIONS:  Antibiotics:    Neuro:  acetaminophen   Tablet .. 650 milliGRAM(s) Oral every 6 hours PRN  fentaNYL    Injectable 12.5 MICROGram(s) IV Push every 4 hours PRN  levETIRAcetam  IVPB 1000 milliGRAM(s) IV Intermittent every 12 hours  ondansetron Injectable 4 milliGRAM(s) IV Push every 6 hours PRN  propofol Infusion 20 MICROgram(s)/kG/Min IV Continuous <Continuous>    Anticoagulation:    OTHER:  atorvastatin 40 milliGRAM(s) Oral at bedtime  bisacodyl 5 milliGRAM(s) Oral daily PRN  bisacodyl Suppository 10 milliGRAM(s) Rectal daily PRN  dextrose 40% Gel 15 Gram(s) Oral once PRN  dextrose 50% Injectable 12.5 Gram(s) IV Push once  dextrose 50% Injectable 25 Gram(s) IV Push once  dextrose 50% Injectable 25 Gram(s) IV Push once  docusate sodium Liquid 100 milliGRAM(s) Oral two times a day  glucagon  Injectable 1 milliGRAM(s) IntraMuscular once PRN  hydrALAZINE Injectable 10 milliGRAM(s) IV Push every 4 hours PRN  insulin lispro (HumaLOG) corrective regimen sliding scale   SubCutaneous Before meals and at bedtime  niCARdipine Infusion 5 mG/Hr IV Continuous <Continuous>  pantoprazole   Suspension 40 milliGRAM(s) Oral daily  senna 2 Tablet(s) Oral at bedtime    IVF:  dextrose 5%. 1000 milliLiter(s) IV Continuous <Continuous>  sodium chloride 2% . 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  CTA with 3mm Basilar tip aneurysm  Currently agitated and hypertensive requiring high doses of sedation and cardene respectively.    PLAN:  NEURO:  evd at [id number]  plan for angio for possible coil embo of basilar aneurysm - consented  pain meds, prn fentanyl, switch sedation  neuro/vitals q1h  keppra prophylaxis    CARDIOVASCULAR:  - cardene - try to week  - lisinopil PO 10mg    PULMONARY:  - full vent support,  -CPAP if RASS of -2 achieved    RENAL:  - trend NA  - replete electrolyes PRN    GI:  - ppi Prophylaxis  -NPO after midnight    HEME:  - ok for chemoprophylaxis    ID:  -afebrile    ENDO:  ISS    DVT PROPHYLAXIS:  [x] Venodynes                                [x] Heparin/Lovenox    FALL RISK:  [] Low Risk                                    [] Impulsive    DISPOSITION: continue ICU      Assessment:  Present when checked    []  GCS  E   V  M     Heart Failure: []Acute, [] acute on chronic , []chronic  Heart Failure:  [] Diastolic (HFpEF), [] Systolic (HFrEF), []Combined (HFpEF and HFrEF), [] RHF, [] Pulm HTN, [] Other    [] KRISTA, [] ATN, [] AIN, [] other  [] CKD1, [] CKD2, [] CKD 3, [] CKD 4, [] CKD 5, []ESRD    Encephalopathy: [] Metabolic, [] Hepatic, [] toxic, [x] Neurological, [] Other    Abnormal Nurtitional Status: [] malnurtition (see nutrition note), [ ]underweight: BMI < 19, [] morbid obesity: BMI >40, [] Cachexia    [] Sepsis  [] hypovolemic shock,[] cardiogenic shock, [] hemorrhagic shock, [] neurogenic shock  [] Acute Respiratory Failure  [x]Cerebral edema, [x] Brain compression/ herniation,   [] Functional quadriplegia  [] Acute blood loss anemia

## 2019-04-07 NOTE — PROGRESS NOTE ADULT - SUBJECTIVE AND OBJECTIVE BOX
Surgery: cerebral angiogram, possible coil embolization of aneurysm  Consent: Signed by HCP sister Rosana Ortega                    No Known Allergies        T(C): 37.3 (04-07-19 @ 09:35), Max: 38.1 (04-06-19 @ 18:00)  HR: 70 (04-07-19 @ 13:00) (70 - 108)  BP: 121/66 (04-07-19 @ 13:00) (121/66 - 154/84)  RR: 15 (04-07-19 @ 13:00) (15 - 21)  SpO2: 98% (04-07-19 @ 13:00) (97% - 100%)  Wt(kg): --          04-07    144  |  113<H>  |  14  ----------------------------<  148<H>  3.7   |  24  |  1.03    Ca    8.5      07 Apr 2019 05:25  Phos  2.6     04-07  Mg     2.3     04-07    TPro  8.2  /  Alb  4.8  /  TBili  1.0  /  DBili  x   /  AST  40  /  ALT  34  /  AlkPhos  71  04-06    CBC Full  -  ( 07 Apr 2019 05:25 )  WBC Count : 8.85 K/uL  RBC Count : 3.87 M/uL  Hemoglobin : 12.3 g/dL  Hematocrit : 36.7 %  Platelet Count - Automated : 256 K/uL  Mean Cell Volume : 94.8 fl  Mean Cell Hemoglobin : 31.8 pg  Mean Cell Hemoglobin Concentration : 33.5 gm/dL  Auto Neutrophil # : x  Auto Lymphocyte # : x  Auto Monocyte # : x  Auto Eosinophil # : x  Auto Basophil # : x  Auto Neutrophil % : x  Auto Lymphocyte % : x  Auto Monocyte % : x  Auto Eosinophil % : x  Auto Basophil % : x    PT/INR - ( 06 Apr 2019 01:44 )   PT: 12.0 sec;   INR: 1.06          PTT - ( 06 Apr 2019 01:44 )  PTT:28.0 sec    Pregnancy test: n/a  Type & Screen (in past 72hrs):sent to     CXR:NAPD  EKG:NSR  ECHO:< from: Echocardiogram (04.06.19 @ 15:06) >  he left atrial size is normal.There is mild concentricleft ventricular   hypertrophy.Normal internal left ventricular dimensions.The left   ventricular   wall motion is normal. The left ventricular ejection fraction is 60%.      < end of copied text >    Medical Clearances: neurocrtitical care  Other Clearances:     Last dose of antiplatelet/anticoagulation drug:n/a    Implanted Devices (pacemaker, drug pump...etc):  []YES   [x] NO                  If yes --> EPS consulted to interrogate/adjust device:                 Assessment: 43yo male w/3mm basilar tip aneurysm.     Plan:NPO p MN

## 2019-04-08 LAB
ANION GAP SERPL CALC-SCNC: 12 MMOL/L — SIGNIFICANT CHANGE UP (ref 5–17)
ANION GAP SERPL CALC-SCNC: 8 MMOL/L — SIGNIFICANT CHANGE UP (ref 5–17)
APPEARANCE UR: CLEAR — SIGNIFICANT CHANGE UP
BILIRUB UR-MCNC: NEGATIVE — SIGNIFICANT CHANGE UP
BUN SERPL-MCNC: 13 MG/DL — SIGNIFICANT CHANGE UP (ref 7–23)
BUN SERPL-MCNC: 14 MG/DL — SIGNIFICANT CHANGE UP (ref 7–23)
CALCIUM SERPL-MCNC: 8.2 MG/DL — LOW (ref 8.4–10.5)
CALCIUM SERPL-MCNC: 8.5 MG/DL — SIGNIFICANT CHANGE UP (ref 8.4–10.5)
CHLORIDE SERPL-SCNC: 110 MMOL/L — HIGH (ref 96–108)
CHLORIDE SERPL-SCNC: 112 MMOL/L — HIGH (ref 96–108)
CO2 SERPL-SCNC: 23 MMOL/L — SIGNIFICANT CHANGE UP (ref 22–31)
CO2 SERPL-SCNC: 24 MMOL/L — SIGNIFICANT CHANGE UP (ref 22–31)
COLOR SPEC: YELLOW — SIGNIFICANT CHANGE UP
CREAT SERPL-MCNC: 0.88 MG/DL — SIGNIFICANT CHANGE UP (ref 0.5–1.3)
CREAT SERPL-MCNC: 0.9 MG/DL — SIGNIFICANT CHANGE UP (ref 0.5–1.3)
DIFF PNL FLD: ABNORMAL
GLUCOSE SERPL-MCNC: 109 MG/DL — HIGH (ref 70–99)
GLUCOSE SERPL-MCNC: 135 MG/DL — HIGH (ref 70–99)
GLUCOSE UR QL: NEGATIVE — SIGNIFICANT CHANGE UP
HCT VFR BLD CALC: 37.3 % — LOW (ref 39–50)
HGB BLD-MCNC: 12.7 G/DL — LOW (ref 13–17)
KETONES UR-MCNC: NEGATIVE — SIGNIFICANT CHANGE UP
LEUKOCYTE ESTERASE UR-ACNC: NEGATIVE — SIGNIFICANT CHANGE UP
MAGNESIUM SERPL-MCNC: 2.1 MG/DL — SIGNIFICANT CHANGE UP (ref 1.6–2.6)
MCHC RBC-ENTMCNC: 32.2 PG — SIGNIFICANT CHANGE UP (ref 27–34)
MCHC RBC-ENTMCNC: 34 GM/DL — SIGNIFICANT CHANGE UP (ref 32–36)
MCV RBC AUTO: 94.7 FL — SIGNIFICANT CHANGE UP (ref 80–100)
NITRITE UR-MCNC: NEGATIVE — SIGNIFICANT CHANGE UP
NRBC # BLD: 0 /100 WBCS — SIGNIFICANT CHANGE UP (ref 0–0)
PH UR: 6 — SIGNIFICANT CHANGE UP (ref 5–8)
PHOSPHATE SERPL-MCNC: 1.8 MG/DL — LOW (ref 2.5–4.5)
PLATELET # BLD AUTO: 250 K/UL — SIGNIFICANT CHANGE UP (ref 150–400)
POTASSIUM SERPL-MCNC: 3.6 MMOL/L — SIGNIFICANT CHANGE UP (ref 3.5–5.3)
POTASSIUM SERPL-MCNC: 3.7 MMOL/L — SIGNIFICANT CHANGE UP (ref 3.5–5.3)
POTASSIUM SERPL-SCNC: 3.6 MMOL/L — SIGNIFICANT CHANGE UP (ref 3.5–5.3)
POTASSIUM SERPL-SCNC: 3.7 MMOL/L — SIGNIFICANT CHANGE UP (ref 3.5–5.3)
PROT UR-MCNC: NEGATIVE MG/DL — SIGNIFICANT CHANGE UP
RBC # BLD: 3.94 M/UL — LOW (ref 4.2–5.8)
RBC # FLD: 12.3 % — SIGNIFICANT CHANGE UP (ref 10.3–14.5)
SODIUM SERPL-SCNC: 143 MMOL/L — SIGNIFICANT CHANGE UP (ref 135–145)
SODIUM SERPL-SCNC: 146 MMOL/L — HIGH (ref 135–145)
SP GR SPEC: 1.01 — SIGNIFICANT CHANGE UP (ref 1–1.03)
UROBILINOGEN FLD QL: 1 E.U./DL — SIGNIFICANT CHANGE UP
WBC # BLD: 11.8 K/UL — HIGH (ref 3.8–10.5)
WBC # FLD AUTO: 11.8 K/UL — HIGH (ref 3.8–10.5)

## 2019-04-08 PROCEDURE — 99291 CRITICAL CARE FIRST HOUR: CPT | Mod: 24

## 2019-04-08 PROCEDURE — 71045 X-RAY EXAM CHEST 1 VIEW: CPT | Mod: 26

## 2019-04-08 PROCEDURE — 76377 3D RENDER W/INTRP POSTPROCES: CPT | Mod: 26

## 2019-04-08 PROCEDURE — 70450 CT HEAD/BRAIN W/O DYE: CPT | Mod: 26

## 2019-04-08 PROCEDURE — 36224 PLACE CATH CAROTD ART: CPT | Mod: RT

## 2019-04-08 PROCEDURE — 36223 PLACE CATH CAROTID/INOM ART: CPT | Mod: 59,LT

## 2019-04-08 PROCEDURE — 71045 X-RAY EXAM CHEST 1 VIEW: CPT | Mod: 26,77

## 2019-04-08 PROCEDURE — 99292 CRITICAL CARE ADDL 30 MIN: CPT | Mod: 24

## 2019-04-08 PROCEDURE — 36227 PLACE CATH XTRNL CAROTID: CPT | Mod: RT

## 2019-04-08 PROCEDURE — 99223 1ST HOSP IP/OBS HIGH 75: CPT

## 2019-04-08 PROCEDURE — 36226 PLACE CATH VERTEBRAL ART: CPT | Mod: RT

## 2019-04-08 RX ORDER — LISINOPRIL 2.5 MG/1
10 TABLET ORAL ONCE
Qty: 0 | Refills: 0 | Status: COMPLETED | OUTPATIENT
Start: 2019-04-08 | End: 2019-04-08

## 2019-04-08 RX ORDER — INFLUENZA VIRUS VACCINE 15; 15; 15; 15 UG/.5ML; UG/.5ML; UG/.5ML; UG/.5ML
0.5 SUSPENSION INTRAMUSCULAR ONCE
Qty: 0 | Refills: 0 | Status: DISCONTINUED | OUTPATIENT
Start: 2019-04-08 | End: 2019-05-04

## 2019-04-08 RX ORDER — AMLODIPINE BESYLATE 2.5 MG/1
10 TABLET ORAL DAILY
Qty: 0 | Refills: 0 | Status: DISCONTINUED | OUTPATIENT
Start: 2019-04-08 | End: 2019-05-03

## 2019-04-08 RX ORDER — PROPOFOL 10 MG/ML
5 INJECTION, EMULSION INTRAVENOUS
Qty: 1000 | Refills: 0 | Status: DISCONTINUED | OUTPATIENT
Start: 2019-04-08 | End: 2019-04-08

## 2019-04-08 RX ORDER — DEXMEDETOMIDINE HYDROCHLORIDE IN 0.9% SODIUM CHLORIDE 4 UG/ML
0.2 INJECTION INTRAVENOUS
Qty: 200 | Refills: 0 | Status: DISCONTINUED | OUTPATIENT
Start: 2019-04-08 | End: 2019-04-09

## 2019-04-08 RX ORDER — LISINOPRIL 2.5 MG/1
20 TABLET ORAL DAILY
Qty: 0 | Refills: 0 | Status: DISCONTINUED | OUTPATIENT
Start: 2019-04-09 | End: 2019-04-09

## 2019-04-08 RX ORDER — POTASSIUM PHOSPHATE, MONOBASIC POTASSIUM PHOSPHATE, DIBASIC 236; 224 MG/ML; MG/ML
30 INJECTION, SOLUTION INTRAVENOUS ONCE
Qty: 0 | Refills: 0 | Status: COMPLETED | OUTPATIENT
Start: 2019-04-08 | End: 2019-04-08

## 2019-04-08 RX ORDER — MIDAZOLAM HYDROCHLORIDE 1 MG/ML
0.02 INJECTION, SOLUTION INTRAMUSCULAR; INTRAVENOUS
Qty: 100 | Refills: 0 | Status: DISCONTINUED | OUTPATIENT
Start: 2019-04-08 | End: 2019-04-15

## 2019-04-08 RX ADMIN — LEVETIRACETAM 400 MILLIGRAM(S): 250 TABLET, FILM COATED ORAL at 21:09

## 2019-04-08 RX ADMIN — FENTANYL CITRATE 25 MICROGRAM(S): 50 INJECTION INTRAVENOUS at 01:40

## 2019-04-08 RX ADMIN — MIDAZOLAM HYDROCHLORIDE 1.44 MG/KG/HR: 1 INJECTION, SOLUTION INTRAMUSCULAR; INTRAVENOUS at 05:00

## 2019-04-08 RX ADMIN — FENTANYL CITRATE 25 MICROGRAM(S): 50 INJECTION INTRAVENOUS at 00:00

## 2019-04-08 RX ADMIN — DEXMEDETOMIDINE HYDROCHLORIDE IN 0.9% SODIUM CHLORIDE 3.6 MICROGRAM(S)/KG/HR: 4 INJECTION INTRAVENOUS at 22:41

## 2019-04-08 RX ADMIN — POTASSIUM PHOSPHATE, MONOBASIC POTASSIUM PHOSPHATE, DIBASIC 83.33 MILLIMOLE(S): 236; 224 INJECTION, SOLUTION INTRAVENOUS at 07:43

## 2019-04-08 RX ADMIN — SENNA PLUS 2 TABLET(S): 8.6 TABLET ORAL at 21:17

## 2019-04-08 RX ADMIN — LEVETIRACETAM 400 MILLIGRAM(S): 250 TABLET, FILM COATED ORAL at 05:18

## 2019-04-08 RX ADMIN — Medication 100 MILLIGRAM(S): at 05:18

## 2019-04-08 RX ADMIN — HEPARIN SODIUM 5000 UNIT(S): 5000 INJECTION INTRAVENOUS; SUBCUTANEOUS at 13:57

## 2019-04-08 RX ADMIN — Medication 650 MILLIGRAM(S): at 23:37

## 2019-04-08 RX ADMIN — NICARDIPINE HYDROCHLORIDE 25 MG/HR: 30 CAPSULE, EXTENDED RELEASE ORAL at 02:27

## 2019-04-08 RX ADMIN — ATORVASTATIN CALCIUM 40 MILLIGRAM(S): 80 TABLET, FILM COATED ORAL at 21:10

## 2019-04-08 RX ADMIN — AMLODIPINE BESYLATE 10 MILLIGRAM(S): 2.5 TABLET ORAL at 16:20

## 2019-04-08 RX ADMIN — Medication 4 MILLIGRAM(S): at 07:40

## 2019-04-08 RX ADMIN — Medication 650 MILLIGRAM(S): at 16:19

## 2019-04-08 RX ADMIN — Medication 4 MILLIGRAM(S): at 14:12

## 2019-04-08 RX ADMIN — NICARDIPINE HYDROCHLORIDE 25 MG/HR: 30 CAPSULE, EXTENDED RELEASE ORAL at 22:33

## 2019-04-08 RX ADMIN — LISINOPRIL 10 MILLIGRAM(S): 2.5 TABLET ORAL at 05:18

## 2019-04-08 RX ADMIN — FENTANYL CITRATE 25 MICROGRAM(S): 50 INJECTION INTRAVENOUS at 02:00

## 2019-04-08 RX ADMIN — HEPARIN SODIUM 5000 UNIT(S): 5000 INJECTION INTRAVENOUS; SUBCUTANEOUS at 05:43

## 2019-04-08 RX ADMIN — Medication 650 MILLIGRAM(S): at 16:35

## 2019-04-08 RX ADMIN — PANTOPRAZOLE SODIUM 40 MILLIGRAM(S): 20 TABLET, DELAYED RELEASE ORAL at 13:51

## 2019-04-08 RX ADMIN — LISINOPRIL 10 MILLIGRAM(S): 2.5 TABLET ORAL at 16:19

## 2019-04-08 NOTE — PROGRESS NOTE ADULT - SUBJECTIVE AND OBJECTIVE BOX
NEUROCRITICAL CARE PROGRESS NOTE    ARPAN RUANO   MRN-[id number]  Summary:  /  HPI:  History obtained by patient's girlfriend and chart from [facility], patient is unresponsive:     43 y/o male with no significant PMHx presents to [facility] with AMS, left side weakness and numbness. Per [place], [place] when he developed acute onset of left facial numbness and LUE and numbness around 6:30PM. When get got home he developed AMS, dysarthria, and weakness in the LUE and LLE. At [facility] ED patient had several episodes of emesis and was hypertensive to SBP in the 200's. Work up revealed right thalamic hemorrhage on CT head. Patient not on any anticoagulation use per girlfriend. Of note, patient took Excedrin for headache at home. Patient transferred to [facility] for further management. (2019 00:04)      S/Overnight events:    POD#     EVD:    CSF :    ICPs:      Vital Signs Last 24 Hrs  T(C): 37.9 (2019 05:33), Max: 38 (2019 00:00)  T(F): 100.2 (2019 05:33), Max: 100.4 (2019 00:00)  HR: 106 (2019 07:38) (64 - 108)  BP: 126/72 (2019 07:00) (117/67 - 170/78)  BP(mean): 99 (2019 07:00) (79 - 122)  RR: 26 (2019 07:38) (12 - 44)  SpO2: 98% (2019 07:38) (95% - 100%)    Mode: AC/ CMV (Assist Control/ Continuous Mandatory Ventilation), RR (machine): 12, TV (machine): 400, FiO2: 40, PEEP: 5, ITime: 1, MAP: 7.7, PIP: 19    I&O's Detail    2019 07:01  -  2019 07:00  --------------------------------------------------------  IN:    dexmedetomidine Infusion: 138.2 mL    Enteral Tube Flush: 120 mL    IV PiggyBack: 100 mL    midazolam Infusion: 8 mL    niCARdipine Infusion: 954 mL    ns in tub fed  [id number]: 1020 mL    propofol Infusion: 291.9 mL    sodium chloride 0.9%.: 500 mL    sodium chloride 2%: 50 mL  Total IN: 3182.1 mL    OUT:    External Ventricular Device: 267 mL    Indwelling Catheter - Urethral: 165 mL    Voided: 1325 mL  Total OUT: 1757 mL    Total NET: 1425.1 mL      2019 07:01  -  2019 08:13  --------------------------------------------------------  IN:    niCARdipine Infusion: 50 mL    propofol Infusion: 16.8 mL    sodium chloride 0.9%.: 75 mL  Total IN: 141.8 mL    OUT:    External Ventricular Device: 10 mL    Voided: 75 mL  Total OUT: 85 mL    Total NET: 56.8 mL          LABS:                        12.7   11.80 )-----------( 250      ( 2019 05:44 )             37.3     04-08    146<H>  |  110<H>  |  13  ----------------------------<  109<H>  3.6   |  24  |  0.90    Ca    8.5      2019 05:44  Phos  1.8     -08  Mg     2.1     04-08        Urinalysis Basic - ( 2019 13:18 )    Color: Yellow / Appearance: Clear / SG: <=1.005 / pH: x  Gluc: x / Ketone: NEGATIVE  / Bili: Negative / Urobili: 0.2 E.U./dL   Blood: x / Protein: NEGATIVE mg/dL / Nitrite: NEGATIVE   Leuk Esterase: NEGATIVE / RBC: > 10 /HPF / WBC < 5 /HPF   Sq Epi: x / Non Sq Epi: 0-5 /HPF / Bacteria: None /HPF          CAPILLARY BLOOD GLUCOSE      POCT Blood Glucose.: 109 mg/dL (2019 06:07)  POCT Blood Glucose.: 115 mg/dL (2019 20:38)  POCT Blood Glucose.: 131 mg/dL (2019 18:50)  POCT Blood Glucose.: 158 mg/dL (2019 11:25)      Drug Levels: [] N/A    CSF Analysis: [] N/A      Allergies    No Known Allergies    Intolerances      MEDICATIONS:  Antibiotics:    Neuro:  acetaminophen    Suspension .. 650 milliGRAM(s) Oral every 6 hours PRN  dexmedetomidine Infusion 0.011 MICROgram(s)/kG/Hr IV Continuous <Continuous>  fentaNYL    Injectable 25 MICROGram(s) IV Push every 2 hours PRN  levETIRAcetam  IVPB 1000 milliGRAM(s) IV Intermittent every 12 hours  midazolam Infusion 0.02 mG/kG/Hr IV Continuous <Continuous>  ondansetron Injectable 4 milliGRAM(s) IV Push every 6 hours PRN  propofol Infusion 20 MICROgram(s)/kG/Min IV Continuous <Continuous>    Anticoagulation:  heparin  Injectable 5000 Unit(s) SubCutaneous every 8 hours    OTHER:  atorvastatin 40 milliGRAM(s) Oral at bedtime  bisacodyl 5 milliGRAM(s) Oral daily PRN  bisacodyl Suppository 10 milliGRAM(s) Rectal daily PRN  dextrose 40% Gel 15 Gram(s) Oral once PRN  dextrose 50% Injectable 12.5 Gram(s) IV Push once  dextrose 50% Injectable 25 Gram(s) IV Push once  dextrose 50% Injectable 25 Gram(s) IV Push once  docusate sodium Liquid 100 milliGRAM(s) Oral two times a day  glucagon  Injectable 1 milliGRAM(s) IntraMuscular once PRN  hydrALAZINE Injectable 10 milliGRAM(s) IV Push every 4 hours PRN  influenza   Vaccine 0.5 milliLiter(s) IntraMuscular once  insulin lispro (HumaLOG) corrective regimen sliding scale   SubCutaneous Before meals and at bedtime  lisinopril 10 milliGRAM(s) Enteral Tube daily  niCARdipine Infusion 5 mG/Hr IV Continuous <Continuous>  pantoprazole   Suspension 40 milliGRAM(s) Oral daily  senna 2 Tablet(s) Oral at bedtime    IVF:  dextrose 5%. 1000 milliLiter(s) IV Continuous <Continuous>  sodium chloride 0.9%. 1000 milliLiter(s) IV Continuous <Continuous>              PHYSICAL EXAMINATION    NEUROLOGIC EXAMINATION:  Patient is   lethargic, rousable, follows commands (shows 2 fingers on R), pupils reactive and equal, gaze preference not fixed, downward and left; moves R UE/LE spontaneously, extensor posturing L UE, withdraws L LE  GENERAL: intubated AC 40 400 12 +5    CARDIOVASCULAR: (+) S1 S2, normal rate and regular rhythm  PULMONARY: clear to auscultation bilaterally; min secretions  ABDOMEN: soft, nontender with normoactive bowel sounds  EXTREMITIES: no edema  SKIN: no rash    LABS:  CAPILLARY BLOOD GLUCOSE 115 111 144 143    LDL = 113 (-)   HDL = 70 (-)  TG = 85 ()  TSH = 0.733 ()    Bacteriology:  CSF studies:  EEG:  Neuroimagin/06 12:48 p.m. CT head:  s/p EVD, decrease in size of ventricles, R ICH unchanged with IV extension, punctate hyperdensity R BG R conner R thalamus, unchanged,    2:11 a.m. CT head:  large R basal ganglia / thalamic ICH, small L basal ganglia / R pontine acute hemorrhage; c/w hypertensive ICH; extension into R lateral ventricle; MLS, mass effect, no HCP  Other imaging:    MEDICATIONS: levetiracetam 1G IV q12h docusate 100 PO BID pantoprazole 40 daily senna 2mg PO HS atorvastatin 40mg PO HS mod ISS bisacodyl PRN supp PRN fentanyl PRN     IV FLUIDS: 2%50  DRIPS: cardene (11cc/hr) propofol   DIET: Jevity at 60cc/hr  Lines: Lizabeth Becerra (Ambar)  Drains:   EVD @ 10cm H20, 267 cc/24h ICP 6-9  Wounds:    CODE STATUS:  Full Code                       GOALS OF CARE:  aggressive                      DISPOSITION:  ICU  ICH Score on admission  = GCS Score: 3-4 (2 pts) E1VTM2 (+) IVH = 3  ICH volume 20ml  Estimated 30-day mortality 3 points: 72% NEUROCRITICAL CARE PROGRESS NOTE    ARPAN RUANO   MRN-[id number]  Summary:  /  HPI:  History obtained by patient's girlfriend and chart from [facility], patient is unresponsive:     43 y/o male with no significant PMHx presents to [facility] with AMS, left side weakness and numbness. [place] when he developed acute onset of left facial numbness and LUE and numbness around 6:30PM. When get got home he developed AMS, dysarthria, and weakness in the LUE and LLE. At [facility] ED patient had several episodes of emesis and was hypertensive to SBP in the 200's. Work up revealed right thalamic hemorrhage on CT head. Patient not on any anticoagulation use per girlfriend. Of note, patient took Excedrin for headache at home. Patient transferred to [facility] for further management. (2019 00:04)    S/Overnight events:  on nicardipine 8mg gtt, on prop, versed gtt, NPO, angiogram this AM, agitation, versed gtt started,     EVD:  10  CSF :  267  ICPs:  1-8    Vital Signs Last 24 Hrs  T(C): 37.9 (2019 05:33), Max: 38 (2019 00:00)  T(F): 100.2 (2019 05:33), Max: 100.4 (2019 00:00)  HR: 106 (2019 07:38) (64 - 108)  BP: 126/72 (2019 07:00) (117/67 - 170/78)  BP(mean): 99 (2019 07:00) (79 - 122)  RR: 26 (2019 07:38) (12 - 44)  SpO2: 98% (2019 07:38) (95% - 100%)    Mode: AC/ CMV (Assist Control/ Continuous Mandatory Ventilation), RR (machine): 12, TV (machine): 400, FiO2: 40, PEEP: 5, ITime: 1, MAP: 7.7, PIP: 19    I&O's Detail    2019 07:01  -  2019 07:00  --------------------------------------------------------  IN:    dexmedetomidine Infusion: 138.2 mL    Enteral Tube Flush: 120 mL    IV PiggyBack: 100 mL    midazolam Infusion: 8 mL    niCARdipine Infusion: 954 mL    ns in tub fed  [id number]: 1020 mL    propofol Infusion: 291.9 mL    sodium chloride 0.9%.: 500 mL    sodium chloride 2%: 50 mL  Total IN: 3182.1 mL    OUT:    External Ventricular Device: 267 mL    Indwelling Catheter - Urethral: 165 mL    Voided: 1325 mL  Total OUT: 1757 mL    Total NET: 1425.1 mL      2019 07:01  -  2019 08:13  --------------------------------------------------------  IN:    niCARdipine Infusion: 50 mL    propofol Infusion: 16.8 mL    sodium chloride 0.9%.: 75 mL  Total IN: 141.8 mL    OUT:    External Ventricular Device: 10 mL    Voided: 75 mL  Total OUT: 85 mL    Total NET: 56.8 mL          LABS:                        12.7   11.80 )-----------( 250      ( 2019 05:44 )             37.3     04-08    146<H>  |  110<H>  |  13  ----------------------------<  109<H>  3.6   |  24  |  0.90    Ca    8.5      2019 05:44  Phos  1.8     04-08  Mg     2.1     04-08        Urinalysis Basic - ( 2019 13:18 )    Color: Yellow / Appearance: Clear / SG: <=1.005 / pH: x  Gluc: x / Ketone: NEGATIVE  / Bili: Negative / Urobili: 0.2 E.U./dL   Blood: x / Protein: NEGATIVE mg/dL / Nitrite: NEGATIVE   Leuk Esterase: NEGATIVE / RBC: > 10 /HPF / WBC < 5 /HPF   Sq Epi: x / Non Sq Epi: 0-5 /HPF / Bacteria: None /HPF    CAPILLARY BLOOD GLUCOSE      POCT Blood Glucose.: 109 mg/dL (2019 06:07)  POCT Blood Glucose.: 115 mg/dL (2019 20:38)  POCT Blood Glucose.: 131 mg/dL (2019 18:50)  POCT Blood Glucose.: 158 mg/dL (2019 11:25)      Drug Levels: [] N/A    CSF Analysis: [] N/A      Allergies    No Known Allergies    Intolerances      MEDICATIONS:  Antibiotics:    Neuro:  acetaminophen    Suspension .. 650 milliGRAM(s) Oral every 6 hours PRN  dexmedetomidine Infusion 0.011 MICROgram(s)/kG/Hr IV Continuous <Continuous>  fentaNYL    Injectable 25 MICROGram(s) IV Push every 2 hours PRN  levETIRAcetam  IVPB 1000 milliGRAM(s) IV Intermittent every 12 hours  midazolam Infusion 0.02 mG/kG/Hr IV Continuous <Continuous>  ondansetron Injectable 4 milliGRAM(s) IV Push every 6 hours PRN  propofol Infusion 20 MICROgram(s)/kG/Min IV Continuous <Continuous>    Anticoagulation:  heparin  Injectable 5000 Unit(s) SubCutaneous every 8 hours    OTHER:  atorvastatin 40 milliGRAM(s) Oral at bedtime  bisacodyl 5 milliGRAM(s) Oral daily PRN  bisacodyl Suppository 10 milliGRAM(s) Rectal daily PRN  dextrose 40% Gel 15 Gram(s) Oral once PRN  dextrose 50% Injectable 12.5 Gram(s) IV Push once  dextrose 50% Injectable 25 Gram(s) IV Push once  dextrose 50% Injectable 25 Gram(s) IV Push once  docusate sodium Liquid 100 milliGRAM(s) Oral two times a day  glucagon  Injectable 1 milliGRAM(s) IntraMuscular once PRN  hydrALAZINE Injectable 10 milliGRAM(s) IV Push every 4 hours PRN  influenza   Vaccine 0.5 milliLiter(s) IntraMuscular once  insulin lispro (HumaLOG) corrective regimen sliding scale   SubCutaneous Before meals and at bedtime  lisinopril 10 milliGRAM(s) Enteral Tube daily  niCARdipine Infusion 5 mG/Hr IV Continuous <Continuous>  pantoprazole   Suspension 40 milliGRAM(s) Oral daily  senna 2 Tablet(s) Oral at bedtime    IVF:  dextrose 5%. 1000 milliLiter(s) IV Continuous <Continuous>  sodium chloride 0.9%. 1000 milliLiter(s) IV Continuous <Continuous>      PHYSICAL EXAMINATION    NEUROLOGIC EXAMINATION:  Patient is   lethargic, rousable, follows commands (shows 2 fingers on R), pupils reactive and equal, gaze preference not fixed, downward and left; moves R UE/LE spontaneously, extensor posturing L UE, withdraws L LE  GENERAL: intubated AC 40 400 12 +5    CARDIOVASCULAR: (+) S1 S2, normal rate and regular rhythm  PULMONARY: clear to auscultation bilaterally; min secretions  ABDOMEN: soft, nontender with normoactive bowel sounds  EXTREMITIES: no edema  SKIN: no rash    LABS:  CAPILLARY BLOOD GLUCOSE 115 111 144 143    LDL = 113 (-)   HDL = 70 ()  TG = 85 (-)  TSH = 0.733 ()    Bacteriology:  CSF studies:  EEG:  Neuroimagin/06 12:48 p.m. CT head:  s/p EVD, decrease in size of ventricles, R ICH unchanged with IV extension, punctate hyperdensity R BG R conner R thalamus, unchanged,    2:11 a.m. CT head:  large R basal ganglia / thalamic ICH, small L basal ganglia / R pontine acute hemorrhage; c/w hypertensive ICH; extension into R lateral ventricle; MLS, mass effect, no HCP  Other imaging:    MEDICATIONS: levetiracetam 1G IV q12h docusate 100 PO BID pantoprazole 40 daily senna 2mg PO HS atorvastatin 40mg PO HS mod ISS bisacodyl PRN supp PRN fentanyl PRN     IV FLUIDS: 2%50  DRIPS: cardene (11cc/hr) propofol   DIET: Jevity at 60cc/hr  Lines: Lizabeth Becerra (Ambar)  Drains:   EVD @ 10cm H20, 267 cc/24h ICP 6-9  Wounds:    CODE STATUS:  Full Code                       GOALS OF CARE:  aggressive                      DISPOSITION:  ICU  ICH Score on admission  = GCS Score: 3-4 (2 pts) E1VTM2 (+) IVH = 3  ICH volume 20ml  Estimated 30-day mortality 3 points: 72%

## 2019-04-08 NOTE — BRIEF OPERATIVE NOTE - OPERATION/FINDINGS
Under MAC sedation, using a 4 fr sheath right CFA, cerebral angiogram was performed.  Findings: No aneurysm, AVM, or early venous shunting.  Full report to follow.  Patient tolerated procedure well, no new neurological deficit, hemodynamically stable.  Right groin/vasc access site: Direct manual compression applied, hemostasis achieved, no hematoma.  Patient was transferred back to NSICU  Above d/w Dr. Reinoso Under general anesthesia, using a 4 fr sheath right CFA, cerebral angiogram was performed.  Findings: No aneurysm, AVM, or early venous shunting.  Full report to follow.  Patient tolerated procedure well, no new neurological deficit, hemodynamically stable.  Right groin/vasc access site: Direct manual compression applied, hemostasis achieved, no hematoma.  Patient was transferred back to NSICU  Above d/w Dr. Reinoso

## 2019-04-08 NOTE — PROGRESS NOTE ADULT - SUBJECTIVE AND OBJECTIVE BOX
45 y/o male with no significant PMHx presents to [place] with AMS, left side weakness and numbness. Per [place], [place] when he developed acute onset of left facial numbness and LUE and numbness around 6:30PM. When get got home he developed AMS, dysarthria, and weakness in the LUE and LLE. At [place] ED patient had several episodes of emesis and was hypertensive to SBP in the 200's. Work up revealed right thalamic hemorrhage on CT head. Patient not on any anticoagulation use per girlfriend. Of note, patient took Excedrin for headache at home. Patient transferred to [facility] for further management.     4/7: overnight, patient requiring large amount of cardene and propofol.  Plan today is to transition to precedex, start PO lisinopril and wean of cardene.  Becomes extremely agitated when off sedation.    Vital Signs Last 24 Hrs  T(C): 37.9 (08 Apr 2019 05:33), Max: 38 (08 Apr 2019 00:00)  T(F): 100.2 (08 Apr 2019 05:33), Max: 100.4 (08 Apr 2019 00:00)  HR: 74 (08 Apr 2019 05:47) (64 - 108)  BP: 122/65 (08 Apr 2019 04:00) (117/67 - 170/78)  BP(mean): 90 (08 Apr 2019 04:00) (79 - 122)  RR: 15 (08 Apr 2019 05:47) (12 - 44)  SpO2: 98% (08 Apr 2019 05:47) (95% - 100%)    I&O's Detail    06 Apr 2019 07:01  -  07 Apr 2019 07:00  --------------------------------------------------------  IN:    Enteral Tube Flush: 105 mL    niCARdipine Infusion: 872.5 mL    ns in tub fed  [id number]: 440 mL    propofol Infusion: 213 mL    sodium chloride 0.9%: 50 mL    sodium chloride 2%: 1200 mL  Total IN: 2880.5 mL    OUT:    External Ventricular Device: 267 mL    Indwelling Catheter - Urethral: 2063 mL    Voided: 115 mL  Total OUT: 2445 mL    Total NET: 435.5 mL      07 Apr 2019 07:01  -  08 Apr 2019 06:36  --------------------------------------------------------  IN:    dexmedetomidine Infusion: 138.2 mL    Enteral Tube Flush: 120 mL    IV PiggyBack: 100 mL    midazolam Infusion: 2 mL    niCARdipine Infusion: 904 mL    ns in tub fed  [id number]: 1020 mL    propofol Infusion: 275.1 mL    sodium chloride 0.9%.: 425 mL    sodium chloride 2%: 50 mL  Total IN: 3034.3 mL    OUT:    External Ventricular Device: 242 mL    Indwelling Catheter - Urethral: 165 mL    Voided: 1225 mL  Total OUT: 1632 mL    Total NET: 1402.3 mL        I&O's Summary    06 Apr 2019 07:01  -  07 Apr 2019 07:00  --------------------------------------------------------  IN: 2880.5 mL / OUT: 2445 mL / NET: 435.5 mL    07 Apr 2019 07:01  -  08 Apr 2019 06:36  --------------------------------------------------------  IN: 3034.3 mL / OUT: 1632 mL / NET: 1402.3 mL        PHYSICAL EXAM:  PHYSICAL EXAM:  on precedex  PERRL, (+) cough (+) gag  FC in Rue and RLE  LLE spontaneous movement, not reproducable  LUE 0/5      DEVICE/DRAIN DRESSING:  EVD site C/D/I    TUBES/LINES:  [] CVC  [x] A-line  [] Lumbar Drain  [x] Ventriculostomy  [] Other    DIET:  [x] NPO  [] Mechanical  [] Tube feeds    LABS:                        12.7   11.80 )-----------( 250      ( 08 Apr 2019 05:44 )             37.3     04-08    146<H>  |  110<H>  |  13  ----------------------------<  109<H>  3.6   |  24  |  0.90    Ca    8.5      08 Apr 2019 05:44  Phos  1.8     04-08  Mg     2.1     04-08        Urinalysis Basic - ( 06 Apr 2019 13:18 )    Color: Yellow / Appearance: Clear / SG: <=1.005 / pH: x  Gluc: x / Ketone: NEGATIVE  / Bili: Negative / Urobili: 0.2 E.U./dL   Blood: x / Protein: NEGATIVE mg/dL / Nitrite: NEGATIVE   Leuk Esterase: NEGATIVE / RBC: > 10 /HPF / WBC < 5 /HPF   Sq Epi: x / Non Sq Epi: 0-5 /HPF / Bacteria: None /HPF          CAPILLARY BLOOD GLUCOSE      POCT Blood Glucose.: 109 mg/dL (08 Apr 2019 06:07)  POCT Blood Glucose.: 115 mg/dL (07 Apr 2019 20:38)  POCT Blood Glucose.: 131 mg/dL (07 Apr 2019 18:50)  POCT Blood Glucose.: 158 mg/dL (07 Apr 2019 11:25)      Drug Levels: [] N/A    CSF Analysis: [] N/A      Allergies    No Known Allergies    Intolerances      MEDICATIONS:  Antibiotics:    Neuro:  acetaminophen    Suspension .. 650 milliGRAM(s) Oral every 6 hours PRN  dexmedetomidine Infusion 0.011 MICROgram(s)/kG/Hr IV Continuous <Continuous>  fentaNYL    Injectable 25 MICROGram(s) IV Push every 2 hours PRN  levETIRAcetam  IVPB 1000 milliGRAM(s) IV Intermittent every 12 hours  midazolam Infusion 0.02 mG/kG/Hr IV Continuous <Continuous>  ondansetron Injectable 4 milliGRAM(s) IV Push every 6 hours PRN  propofol Infusion 20 MICROgram(s)/kG/Min IV Continuous <Continuous>    Anticoagulation:  heparin  Injectable 5000 Unit(s) SubCutaneous every 8 hours    OTHER:  atorvastatin 40 milliGRAM(s) Oral at bedtime  bisacodyl 5 milliGRAM(s) Oral daily PRN  bisacodyl Suppository 10 milliGRAM(s) Rectal daily PRN  dextrose 40% Gel 15 Gram(s) Oral once PRN  dextrose 50% Injectable 12.5 Gram(s) IV Push once  dextrose 50% Injectable 25 Gram(s) IV Push once  dextrose 50% Injectable 25 Gram(s) IV Push once  docusate sodium Liquid 100 milliGRAM(s) Oral two times a day  glucagon  Injectable 1 milliGRAM(s) IntraMuscular once PRN  hydrALAZINE Injectable 10 milliGRAM(s) IV Push every 4 hours PRN  influenza   Vaccine 0.5 milliLiter(s) IntraMuscular once  insulin lispro (HumaLOG) corrective regimen sliding scale   SubCutaneous Before meals and at bedtime  lisinopril 10 milliGRAM(s) Enteral Tube daily  niCARdipine Infusion 5 mG/Hr IV Continuous <Continuous>  pantoprazole   Suspension 40 milliGRAM(s) Oral daily  senna 2 Tablet(s) Oral at bedtime    IVF:  dextrose 5%. 1000 milliLiter(s) IV Continuous <Continuous>  potassium phosphate IVPB 30 milliMole(s) IV Intermittent once  sodium chloride 0.9%. 1000 milliLiter(s) IV Continuous <Continuous>    CULTURES:    RADIOLOGY & ADDITIONAL TESTS:      ASSESSMENT:  44y Male s/p right thalamic IPH with IVH s/p Emergent EVD placement.  CTA with 3mm Basilar tip aneurysm  Currently agitated and hypertensive requiring high doses of sedation and cardene respectively.    HEMORRHAGE STROKE  HEMORRHAGE  Handoff    PLAN:  NEURO:  evd at [id number]  plan for angio for possible coil embo of basilar aneurysm - consented  pain meds, prn fentanyl, switch sedation  neuro/vitals q1h  keppra prophylaxis    CARDIOVASCULAR:  - cardene - try to week  - lisinopil PO 10mg    PULMONARY:  - full vent support,  -CPAP if RASS of -2 achieved    RENAL:  - trend NA  - replete electrolyes PRN    GI:  - ppi Prophylaxis  -NPO after midnight    HEME:  - ok for chemoprophylaxis    ID:  -afebrile    ENDO:  ISS    DVT PROPHYLAXIS:  [x] Venodynes                                [x] Heparin/Lovenox    FALL RISK:  [x] Low Risk                                    [] Impulsive    DISPOSITION: continue ICU, D/W Lanre Wheeler and [facility]      Assessment:  Present when checked    []  GCS  E   V  M     Heart Failure: []Acute, [] acute on chronic , []chronic  Heart Failure:  [] Diastolic (HFpEF), [] Systolic (HFrEF), []Combined (HFpEF and HFrEF), [] RHF, [] Pulm HTN, [] Other    [] KRISTA, [] ATN, [] AIN, [] other  [] CKD1, [] CKD2, [] CKD 3, [] CKD 4, [] CKD 5, []ESRD    Encephalopathy: [] Metabolic, [] Hepatic, [] toxic, [x] Neurological, [] Other    Abnormal Nurtitional Status: [] malnurtition (see nutrition note), [ ]underweight: BMI < 19, [] morbid obesity: BMI >40, [] Cachexia    [] Sepsis  [] hypovolemic shock,[] cardiogenic shock, [] hemorrhagic shock, [] neuogenic shock  [] Acute Respiratory Failure  []Cerebral edema, [] Brain compression/ herniation,   [] Functional quadriplegia  [] Acute blood loss anemia 45 y/o male with no significant PMHx presents to [place] with AMS, left side weakness and numbness. Per [place], [place] when he developed acute onset of left facial numbness and LUE and numbness around 6:30PM. When get got home he developed AMS, dysarthria, and weakness in the LUE and LLE. At [place] ED patient had several episodes of emesis and was hypertensive to SBP in the 200's. Work up revealed right thalamic hemorrhage on CT head. Patient not on any anticoagulation use per girlfriend. Of note, patient took Excedrin for headache at home. Patient transferred [facility] for further management.     4/7: overnight, patient requiring large amount of cardene and propofol.  Plan today is to transition to precedex, start PO lisinopril and wean of cardene.  Becomes extremely agitated when off sedation.    Vital Signs Last 24 Hrs  T(C): 37.9 (08 Apr 2019 05:33), Max: 38 (08 Apr 2019 00:00)  T(F): 100.2 (08 Apr 2019 05:33), Max: 100.4 (08 Apr 2019 00:00)  HR: 74 (08 Apr 2019 05:47) (64 - 108)  BP: 122/65 (08 Apr 2019 04:00) (117/67 - 170/78)  BP(mean): 90 (08 Apr 2019 04:00) (79 - 122)  RR: 15 (08 Apr 2019 05:47) (12 - 44)  SpO2: 98% (08 Apr 2019 05:47) (95% - 100%)    I&O's Detail    06 Apr 2019 07:01  -  07 Apr 2019 07:00  --------------------------------------------------------  IN:    Enteral Tube Flush: 105 mL    niCARdipine Infusion: 872.5 mL    ns in tub fed  [id number]: 440 mL    propofol Infusion: 213 mL    sodium chloride 0.9%: 50 mL    sodium chloride 2%: 1200 mL  Total IN: 2880.5 mL    OUT:    External Ventricular Device: 267 mL    Indwelling Catheter - Urethral: 2063 mL    Voided: 115 mL  Total OUT: 2445 mL    Total NET: 435.5 mL      07 Apr 2019 07:01  -  08 Apr 2019 06:36  --------------------------------------------------------  IN:    dexmedetomidine Infusion: 138.2 mL    Enteral Tube Flush: 120 mL    IV PiggyBack: 100 mL    midazolam Infusion: 2 mL    niCARdipine Infusion: 904 mL    ns in tub fed  [id number]: 1020 mL    propofol Infusion: 275.1 mL    sodium chloride 0.9%.: 425 mL    sodium chloride 2%: 50 mL  Total IN: 3034.3 mL    OUT:    External Ventricular Device: 242 mL    Indwelling Catheter - Urethral: 165 mL    Voided: 1225 mL  Total OUT: 1632 mL    Total NET: 1402.3 mL        I&O's Summary    06 Apr 2019 07:01  -  07 Apr 2019 07:00  --------------------------------------------------------  IN: 2880.5 mL / OUT: 2445 mL / NET: 435.5 mL    07 Apr 2019 07:01  -  08 Apr 2019 06:36  --------------------------------------------------------  IN: 3034.3 mL / OUT: 1632 mL / NET: 1402.3 mL        PHYSICAL EXAM:  PHYSICAL EXAM:  on precedex  PERRL, (+) cough (+) gag  FC in Rue and RLE  LLE spontaneous movement, not reproducable  LUE 0/5      DEVICE/DRAIN DRESSING:  EVD site C/D/I    TUBES/LINES:  [] CVC  [x] A-line  [] Lumbar Drain  [x] Ventriculostomy  [] Other    DIET:  [x] NPO  [] Mechanical  [] Tube feeds    LABS:                        12.7   11.80 )-----------( 250      ( 08 Apr 2019 05:44 )             37.3     04-08    146<H>  |  110<H>  |  13  ----------------------------<  109<H>  3.6   |  24  |  0.90    Ca    8.5      08 Apr 2019 05:44  Phos  1.8     04-08  Mg     2.1     04-08        Urinalysis Basic - ( 06 Apr 2019 13:18 )    Color: Yellow / Appearance: Clear / SG: <=1.005 / pH: x  Gluc: x / Ketone: NEGATIVE  / Bili: Negative / Urobili: 0.2 E.U./dL   Blood: x / Protein: NEGATIVE mg/dL / Nitrite: NEGATIVE   Leuk Esterase: NEGATIVE / RBC: > 10 /HPF / WBC < 5 /HPF   Sq Epi: x / Non Sq Epi: 0-5 /HPF / Bacteria: None /HPF          CAPILLARY BLOOD GLUCOSE      POCT Blood Glucose.: 109 mg/dL (08 Apr 2019 06:07)  POCT Blood Glucose.: 115 mg/dL (07 Apr 2019 20:38)  POCT Blood Glucose.: 131 mg/dL (07 Apr 2019 18:50)  POCT Blood Glucose.: 158 mg/dL (07 Apr 2019 11:25)      Drug Levels: [] N/A    CSF Analysis: [] N/A      Allergies    No Known Allergies    Intolerances      MEDICATIONS:  Antibiotics:    Neuro:  acetaminophen    Suspension .. 650 milliGRAM(s) Oral every 6 hours PRN  dexmedetomidine Infusion 0.011 MICROgram(s)/kG/Hr IV Continuous <Continuous>  fentaNYL    Injectable 25 MICROGram(s) IV Push every 2 hours PRN  levETIRAcetam  IVPB 1000 milliGRAM(s) IV Intermittent every 12 hours  midazolam Infusion 0.02 mG/kG/Hr IV Continuous <Continuous>  ondansetron Injectable 4 milliGRAM(s) IV Push every 6 hours PRN  propofol Infusion 20 MICROgram(s)/kG/Min IV Continuous <Continuous>    Anticoagulation:  heparin  Injectable 5000 Unit(s) SubCutaneous every 8 hours    OTHER:  atorvastatin 40 milliGRAM(s) Oral at bedtime  bisacodyl 5 milliGRAM(s) Oral daily PRN  bisacodyl Suppository 10 milliGRAM(s) Rectal daily PRN  dextrose 40% Gel 15 Gram(s) Oral once PRN  dextrose 50% Injectable 12.5 Gram(s) IV Push once  dextrose 50% Injectable 25 Gram(s) IV Push once  dextrose 50% Injectable 25 Gram(s) IV Push once  docusate sodium Liquid 100 milliGRAM(s) Oral two times a day  glucagon  Injectable 1 milliGRAM(s) IntraMuscular once PRN  hydrALAZINE Injectable 10 milliGRAM(s) IV Push every 4 hours PRN  influenza   Vaccine 0.5 milliLiter(s) IntraMuscular once  insulin lispro (HumaLOG) corrective regimen sliding scale   SubCutaneous Before meals and at bedtime  lisinopril 10 milliGRAM(s) Enteral Tube daily  niCARdipine Infusion 5 mG/Hr IV Continuous <Continuous>  pantoprazole   Suspension 40 milliGRAM(s) Oral daily  senna 2 Tablet(s) Oral at bedtime    IVF:  dextrose 5%. 1000 milliLiter(s) IV Continuous <Continuous>  potassium phosphate IVPB 30 milliMole(s) IV Intermittent once  sodium chloride 0.9%. 1000 milliLiter(s) IV Continuous <Continuous>    CULTURES:    RADIOLOGY & ADDITIONAL TESTS:      ASSESSMENT:  44y Male s/p right thalamic IPH with IVH s/p Emergent EVD placement.  CTA with 3mm Basilar tip aneurysm  Currently agitated and hypertensive requiring high doses of sedation and cardene respectively.    HEMORRHAGE STROKE  HEMORRHAGE  Handoff    PLAN:  NEURO:  evd at [id number]  plan for angio for possible coil embo of basilar aneurysm - consented  pain meds, prn fentanyl, switch sedation  neuro/vitals q1h  keppra prophylaxis    CARDIOVASCULAR:  - cardene - try to week  - lisinopil PO 10mg    PULMONARY:  - full vent support,  -CPAP if RASS of -2 achieved    RENAL:  - trend NA  - replete electrolyes PRN    GI:  - ppi Prophylaxis  -NPO after midnight    HEME:  - ok for chemoprophylaxis    ID:  -afebrile    ENDO:  ISS    DVT PROPHYLAXIS:  [x] Venodynes                                [x] Heparin/Lovenox    FALL RISK:  [x] Low Risk                                    [] Impulsive    DISPOSITION: continue ICU, D/W [facility]re Wheeler and [facility]      Assessment:  Present when checked    []  GCS  E   V  M     Heart Failure: []Acute, [] acute on chronic , []chronic  Heart Failure:  [] Diastolic (HFpEF), [] Systolic (HFrEF), []Combined (HFpEF and HFrEF), [] RHF, [] Pulm HTN, [] Other    [] KRISTA, [] ATN, [] AIN, [] other  [] CKD1, [] CKD2, [] CKD 3, [] CKD 4, [] CKD 5, []ESRD    Encephalopathy: [] Metabolic, [] Hepatic, [] toxic, [x] Neurological, [] Other    Abnormal Nurtitional Status: [] malnurtition (see nutrition note), [ ]underweight: BMI < 19, [] morbid obesity: BMI >40, [] Cachexia    [] Sepsis  [] hypovolemic shock,[] cardiogenic shock, [] hemorrhagic shock, [] neuogenic shock  [x] Acute Respiratory Failure  []Cerebral edema, [x] Brain compression/ herniation,   [] Functional quadriplegia  [] Acute blood loss anemia

## 2019-04-08 NOTE — PROVIDER CONTACT NOTE (CHANGE IN STATUS NOTIFICATION) - ACTION/TREATMENT ORDERED:
Tylenol suspension 650mg  to be administered. Blood Cultures to be obtained as well as sputum and urine culture.
PA to notify attending per plan

## 2019-04-08 NOTE — DIETITIAN INITIAL EVALUATION ADULT. - ENERGY NEEDS
Ht (4/6): 175.3cm, Wt (4/8 per family): 72.7kg IBW: 160lb +/-10%, %IBW: 100%, BMI: 23.6   IBW used to calculate energy needs as pt vented. Fluid needs per team 2/2 hypernatremia.

## 2019-04-08 NOTE — DIETITIAN INITIAL EVALUATION ADULT. - OTHER INFO
44y Male s/p right thalamic IPH with IVH s/p Emergent EVD placement. CTA with 3mm Basilar tip aneurysm. Pt intubated, vent mode: AC/CMV, full vent support, CPAP trials as tolerated. Pt NPO for angiogram this AM per RN. Significant other at bedside. Pt with good appetite PTA, no wt loss PTA. Bed scale= 97.9kg, but pt’s significant other states pt weighs ~160lb. GI: WDL, abdomen soft/nontender, no documented stool per flow sheet. Skin: EVD site R head. Recommend Jevity 1.2 @ 20 mL/hr increase to goal 67 mL/hr x24 hours with Prostat SF 1x/day- please see below for full recommendations. Discussed plan with team. RD to monitor closely and f/u per high risk protocol.

## 2019-04-08 NOTE — DIETITIAN INITIAL EVALUATION ADULT. - NS AS NUTRI INTERV ENTERAL NUTRITION
When medically appropriate, recommend Jevity 1.2 Start at 30mL, increase by 20mL q 4h to goal rate 67 mL/hr with Prostat SF 1x/day (1608 mL TV, 2030 kcal, 104 gm pro, 1298 mL free water, 161% RDI vitamin/mineral)-additional water flushes per team. Order for volume based feeding protocol & monitor for signs of intolerance.

## 2019-04-08 NOTE — DIETITIAN INITIAL EVALUATION ADULT. - PERTINENT MEDS FT
NaCl @ 75 mL/hr, Humalog, Versed, Precedex, Fentanyl, Zestril, Lipitor, bowel regimen, Nicardipine infusion, Zofran prn, Protonix prn

## 2019-04-08 NOTE — PROGRESS NOTE ADULT - SUBJECTIVE AND OBJECTIVE BOX
NEUROSURGERY POST-OP NOTE:    Patient seen and examined at bedside, s/p diagnostic cerebral angiogram. Angiogram negative.      HPI:  History obtained by patient's girlfriend and chart from [facility], patient is unresponsive:     45 y/o male with no significant PMHx presents to [facility] with AMS, left side weakness and numbness. Per girlfriend, [place] when he developed acute onset of left facial numbness and LUE and numbness around 6:30PM. When get got home he developed AMS, dysarthria, and weakness in the LUE and LLE. At [facility] ED patient had several episodes of emesis and was hypertensive to SBP in the 200's. Work up revealed right thalamic hemorrhage on CT head. Patient not on any anticoagulation use per girlfriend. Of note, patient took Excedrin for headache at home. Patient transferred to [facility] for further management. (06 Apr 2019 00:04)        Vital Signs Last 24 Hrs  T(C): 38.4 (08 Apr 2019 16:00), Max: 38.4 (08 Apr 2019 16:00)  T(F): 101.2 (08 Apr 2019 16:00), Max: 101.2 (08 Apr 2019 16:00)  HR: 102 (08 Apr 2019 16:00) (64 - 110)  BP: 140/62 (08 Apr 2019 16:00) (117/67 - 170/78)  BP(mean): 90 (08 Apr 2019 16:00) (79 - 124)  RR: 24 (08 Apr 2019 16:00) (12 - 44)  SpO2: 97% (08 Apr 2019 16:00) (94% - 100%)    I&O's Detail    07 Apr 2019 07:01  -  08 Apr 2019 07:00  --------------------------------------------------------  IN:    dexmedetomidine Infusion: 138.2 mL    Enteral Tube Flush: 120 mL    IV PiggyBack: 100 mL    midazolam Infusion: 8 mL    niCARdipine Infusion: 954 mL    ns in tub fed  [id number]: 1020 mL    propofol Infusion: 291.9 mL    sodium chloride 0.9%.: 500 mL    sodium chloride 2%: 50 mL  Total IN: 3182.1 mL    OUT:    External Ventricular Device: 267 mL    Indwelling Catheter - Urethral: 165 mL    Voided: 1325 mL  Total OUT: 1757 mL    Total NET: 1425.1 mL      08 Apr 2019 07:01  -  08 Apr 2019 16:48  --------------------------------------------------------  IN:    dexmedetomidine Infusion: 34.4 mL    midazolam Infusion: 10 mL    niCARdipine Infusion: 293 mL    propofol Infusion: 59.8 mL    sodium chloride 0.9%.: 525 mL  Total IN: 922.2 mL    OUT:    External Ventricular Device: 66 mL    Voided: 1050 mL  Total OUT: 1116 mL    Total NET: -193.8 mL        I&O's Summary    07 Apr 2019 07:01  -  08 Apr 2019 07:00  --------------------------------------------------------  IN: 3182.1 mL / OUT: 1757 mL / NET: 1425.1 mL    08 Apr 2019 07:01  -  08 Apr 2019 16:48  --------------------------------------------------------  IN: 922.2 mL / OUT: 1116 mL / NET: -193.8 mL        PHYSICAL EXAM:  Neurological:  Intubated, on sedation, not opening eyes, follows commands briskly on right side  PERRL, downward gaze  RUE/RLE moving spontaneously  LUE extensor posturing, LLE TF  SILT throughout   Extremities: distal extremities warm and well perfused, pulses intact  Incision/wound: right groin incision clean, dry and intact; dressing in place- no hemorrhage or hematoma present   +[id number]      TUBES/LINES:  [] CVC  [] A-line  [] Lumbar Drain  [] Ventriculostomy  [] ELAINE  [] Becerra  [] NGT   [] Other    DIET:  [] NPO  [] Mechanical  [] Tube feeds    LABS:                        12.7   11.80 )-----------( 250      ( 08 Apr 2019 05:44 )             37.3     04-08    146<H>  |  110<H>  |  13  ----------------------------<  109<H>  3.6   |  24  |  0.90    Ca    8.5      08 Apr 2019 05:44  Phos  1.8     04-08  Mg     2.1     04-08              CAPILLARY BLOOD GLUCOSE      POCT Blood Glucose.: 107 mg/dL (08 Apr 2019 16:40)  POCT Blood Glucose.: 109 mg/dL (08 Apr 2019 06:07)  POCT Blood Glucose.: 115 mg/dL (07 Apr 2019 20:38)  POCT Blood Glucose.: 131 mg/dL (07 Apr 2019 18:50)      Drug Levels: [] N/A    CSF Analysis: [] N/A      Allergies    No Known Allergies    Intolerances        Home Medications:      MEDICATIONS:  Antibiotics:    Neuro:  acetaminophen    Suspension .. 650 milliGRAM(s) Oral every 6 hours PRN  dexmedetomidine Infusion 0.2 MICROgram(s)/kG/Hr IV Continuous <Continuous>  fentaNYL    Injectable 25 MICROGram(s) IV Push every 2 hours PRN  levETIRAcetam  IVPB 1000 milliGRAM(s) IV Intermittent every 12 hours  LORazepam   Injectable 4 milliGRAM(s) IV Push every 4 hours PRN  LORazepam   Injectable 2 milliGRAM(s) IV Push every 4 hours PRN  LORazepam   Injectable 1 milliGRAM(s) IV Push every 4 hours PRN  midazolam Infusion 0.02 mG/kG/Hr IV Continuous <Continuous>  ondansetron Injectable 4 milliGRAM(s) IV Push every 6 hours PRN  propofol Infusion 5 MICROgram(s)/kG/Min IV Continuous <Continuous>  propofol Infusion 20 MICROgram(s)/kG/Min IV Continuous <Continuous>    Anticoagulation:  heparin  Injectable 5000 Unit(s) SubCutaneous every 8 hours    OTHER:  amLODIPine   Tablet 10 milliGRAM(s) Oral daily  atorvastatin 40 milliGRAM(s) Oral at bedtime  bisacodyl 5 milliGRAM(s) Oral daily PRN  bisacodyl Suppository 10 milliGRAM(s) Rectal daily PRN  dextrose 40% Gel 15 Gram(s) Oral once PRN  dextrose 50% Injectable 12.5 Gram(s) IV Push once  dextrose 50% Injectable 25 Gram(s) IV Push once  dextrose 50% Injectable 25 Gram(s) IV Push once  docusate sodium Liquid 100 milliGRAM(s) Oral two times a day  glucagon  Injectable 1 milliGRAM(s) IntraMuscular once PRN  hydrALAZINE Injectable 10 milliGRAM(s) IV Push every 4 hours PRN  influenza   Vaccine 0.5 milliLiter(s) IntraMuscular once  insulin lispro (HumaLOG) corrective regimen sliding scale   SubCutaneous Before meals and at bedtime  niCARdipine Infusion 5 mG/Hr IV Continuous <Continuous>  pantoprazole   Suspension 40 milliGRAM(s) Oral daily  senna 2 Tablet(s) Oral at bedtime    IVF:  dextrose 5%. 1000 milliLiter(s) IV Continuous <Continuous>  sodium chloride 0.9%. 1000 milliLiter(s) IV Continuous <Continuous>    CULTURES:    RADIOLOGY & ADDITIONAL TESTS:      ASSESSMENT:  44y Male s/p right thalamic IPH with IVH s/p Emergent EVD placement.  CTA with 3mm Basilar tip aneurysm. Cerebral angiogram performed 4/8 negative for aneurysm or other vascular abnormality.      HEMORRHAGE STROKE  HEMORRHAGE  Handoff  ICH (intracerebral hemorrhage)  ICH (intracerebral hemorrhage)  Angiogram, carotid and cerebral, bilateral      PLAN:  -neuro checks, vital checks  -groin checks, pulse checks   -HOB flat x 4 hours post angiogram   -pain control prn  -continue keppra 1g bid  -EVD @ [id number]- monitor ICPs and output  -sedation prn (versed drip, precedex drip, ativan and fentanyl pushes prn)  --140, wean cardene as tolerated- add PO antihypertensives  -continue full ventilator support for now  -resume NGT feeds when HOB up  -urinating well  -GI/DVT ppx      DISPOSITION:   -ICU status   -Full code  -Discussed with Dr. So     Assessment:  Present when checked    []  GCS  E   V  M     Heart Failure: []Acute, [] acute on chronic , []chronic  Heart Failure:  [] Diastolic (HFpEF), [] Systolic (HFrEF), []Combined (HFpEF and HFrEF), [] RHF, [] Pulm HTN, [] Other    [] KRISTA, [] ATN, [] AIN, [] other  [] CKD1, [] CKD2, [] CKD 3, [] CKD 4, [] CKD 5, []ESRD    Encephalopathy: [] Metabolic, [] Hepatic, [] toxic, [] Neurological, [] Other    Abnormal Nurtitional Status: [] malnurtition (see nutrition note), [ ]underweight: BMI < 19, [] morbid obesity: BMI >40, [] Cachexia    [] Sepsis  [] hypovolemic shock,[] cardiogenic shock, [] hemorrhagic shock, [] neuogenic shock  [] Acute Respiratory Failure  []Cerebral edema, [] Brain compression/ herniation,   [] Functional quadriplegia  [] Acute blood loss anemia

## 2019-04-09 LAB
ANION GAP SERPL CALC-SCNC: 12 MMOL/L — SIGNIFICANT CHANGE UP (ref 5–17)
APTT BLD: 29.9 SEC — SIGNIFICANT CHANGE UP (ref 27.5–36.3)
BLD GP AB SCN SERPL QL: NEGATIVE — SIGNIFICANT CHANGE UP
BUN SERPL-MCNC: 12 MG/DL — SIGNIFICANT CHANGE UP (ref 7–23)
CALCIUM SERPL-MCNC: 8.8 MG/DL — SIGNIFICANT CHANGE UP (ref 8.4–10.5)
CHLORIDE SERPL-SCNC: 107 MMOL/L — SIGNIFICANT CHANGE UP (ref 96–108)
CO2 SERPL-SCNC: 23 MMOL/L — SIGNIFICANT CHANGE UP (ref 22–31)
CREAT SERPL-MCNC: 0.88 MG/DL — SIGNIFICANT CHANGE UP (ref 0.5–1.3)
GLUCOSE SERPL-MCNC: 130 MG/DL — HIGH (ref 70–99)
GRAM STN FLD: SIGNIFICANT CHANGE UP
HCT VFR BLD CALC: 38.9 % — LOW (ref 39–50)
HGB BLD-MCNC: 13.1 G/DL — SIGNIFICANT CHANGE UP (ref 13–17)
INR BLD: 1.16 — SIGNIFICANT CHANGE UP (ref 0.88–1.16)
MAGNESIUM SERPL-MCNC: 2.2 MG/DL — SIGNIFICANT CHANGE UP (ref 1.6–2.6)
MCHC RBC-ENTMCNC: 31.6 PG — SIGNIFICANT CHANGE UP (ref 27–34)
MCHC RBC-ENTMCNC: 33.7 GM/DL — SIGNIFICANT CHANGE UP (ref 32–36)
MCV RBC AUTO: 94 FL — SIGNIFICANT CHANGE UP (ref 80–100)
MRSA PCR RESULT.: NEGATIVE — SIGNIFICANT CHANGE UP
NRBC # BLD: 0 /100 WBCS — SIGNIFICANT CHANGE UP (ref 0–0)
PHOSPHATE SERPL-MCNC: 3.3 MG/DL — SIGNIFICANT CHANGE UP (ref 2.5–4.5)
PLATELET # BLD AUTO: 249 K/UL — SIGNIFICANT CHANGE UP (ref 150–400)
POTASSIUM SERPL-MCNC: 3.8 MMOL/L — SIGNIFICANT CHANGE UP (ref 3.5–5.3)
POTASSIUM SERPL-SCNC: 3.8 MMOL/L — SIGNIFICANT CHANGE UP (ref 3.5–5.3)
PROTHROM AB SERPL-ACNC: 13.2 SEC — HIGH (ref 10–12.9)
RBC # BLD: 4.14 M/UL — LOW (ref 4.2–5.8)
RBC # FLD: 12.1 % — SIGNIFICANT CHANGE UP (ref 10.3–14.5)
RH IG SCN BLD-IMP: POSITIVE — SIGNIFICANT CHANGE UP
S AUREUS DNA NOSE QL NAA+PROBE: NEGATIVE — SIGNIFICANT CHANGE UP
SODIUM SERPL-SCNC: 142 MMOL/L — SIGNIFICANT CHANGE UP (ref 135–145)
SPECIMEN SOURCE: SIGNIFICANT CHANGE UP
WBC # BLD: 10.5 K/UL — SIGNIFICANT CHANGE UP (ref 3.8–10.5)
WBC # FLD AUTO: 10.5 K/UL — SIGNIFICANT CHANGE UP (ref 3.8–10.5)

## 2019-04-09 PROCEDURE — 71045 X-RAY EXAM CHEST 1 VIEW: CPT | Mod: 26

## 2019-04-09 PROCEDURE — 99291 CRITICAL CARE FIRST HOUR: CPT | Mod: 24

## 2019-04-09 PROCEDURE — 95813 EEG EXTND MNTR 61-119 MIN: CPT | Mod: 26

## 2019-04-09 PROCEDURE — 70450 CT HEAD/BRAIN W/O DYE: CPT | Mod: 26

## 2019-04-09 PROCEDURE — 61107 TDH PNXR IMPLT VENTR CATH: CPT

## 2019-04-09 PROCEDURE — 99292 CRITICAL CARE ADDL 30 MIN: CPT | Mod: 24

## 2019-04-09 PROCEDURE — 70450 CT HEAD/BRAIN W/O DYE: CPT | Mod: 26,77

## 2019-04-09 RX ORDER — METOPROLOL TARTRATE 50 MG
25 TABLET ORAL
Qty: 0 | Refills: 0 | Status: DISCONTINUED | OUTPATIENT
Start: 2019-04-09 | End: 2019-04-09

## 2019-04-09 RX ORDER — METOPROLOL TARTRATE 50 MG
50 TABLET ORAL EVERY 12 HOURS
Qty: 0 | Refills: 0 | Status: DISCONTINUED | OUTPATIENT
Start: 2019-04-09 | End: 2019-04-17

## 2019-04-09 RX ORDER — LISINOPRIL 2.5 MG/1
40 TABLET ORAL DAILY
Qty: 0 | Refills: 0 | Status: DISCONTINUED | OUTPATIENT
Start: 2019-04-10 | End: 2019-05-03

## 2019-04-09 RX ORDER — CEFAZOLIN SODIUM 1 G
2000 VIAL (EA) INJECTION ONCE
Qty: 0 | Refills: 0 | Status: COMPLETED | OUTPATIENT
Start: 2019-04-09 | End: 2019-04-09

## 2019-04-09 RX ORDER — PROPOFOL 10 MG/ML
25 INJECTION, EMULSION INTRAVENOUS
Qty: 1000 | Refills: 0 | Status: DISCONTINUED | OUTPATIENT
Start: 2019-04-09 | End: 2019-04-09

## 2019-04-09 RX ORDER — METOPROLOL TARTRATE 50 MG
25 TABLET ORAL ONCE
Qty: 0 | Refills: 0 | Status: COMPLETED | OUTPATIENT
Start: 2019-04-09 | End: 2019-04-09

## 2019-04-09 RX ORDER — LISINOPRIL 2.5 MG/1
20 TABLET ORAL ONCE
Qty: 0 | Refills: 0 | Status: COMPLETED | OUTPATIENT
Start: 2019-04-09 | End: 2019-04-09

## 2019-04-09 RX ORDER — MIDAZOLAM HYDROCHLORIDE 1 MG/ML
4 INJECTION, SOLUTION INTRAMUSCULAR; INTRAVENOUS ONCE
Qty: 0 | Refills: 0 | Status: DISCONTINUED | OUTPATIENT
Start: 2019-04-09 | End: 2019-04-09

## 2019-04-09 RX ADMIN — Medication 1 MILLIGRAM(S): at 14:26

## 2019-04-09 RX ADMIN — AMLODIPINE BESYLATE 10 MILLIGRAM(S): 2.5 TABLET ORAL at 05:54

## 2019-04-09 RX ADMIN — NICARDIPINE HYDROCHLORIDE 25 MG/HR: 30 CAPSULE, EXTENDED RELEASE ORAL at 16:29

## 2019-04-09 RX ADMIN — Medication 25 MILLIGRAM(S): at 17:25

## 2019-04-09 RX ADMIN — Medication 50 MILLIGRAM(S): at 21:26

## 2019-04-09 RX ADMIN — Medication 2 MILLIGRAM(S): at 00:24

## 2019-04-09 RX ADMIN — Medication 25 MILLIGRAM(S): at 10:00

## 2019-04-09 RX ADMIN — MIDAZOLAM HYDROCHLORIDE 1.44 MG/KG/HR: 1 INJECTION, SOLUTION INTRAMUSCULAR; INTRAVENOUS at 16:28

## 2019-04-09 RX ADMIN — NICARDIPINE HYDROCHLORIDE 25 MG/HR: 30 CAPSULE, EXTENDED RELEASE ORAL at 09:28

## 2019-04-09 RX ADMIN — Medication 10 MILLIGRAM(S): at 14:26

## 2019-04-09 RX ADMIN — Medication 2000 MILLIGRAM(S): at 12:59

## 2019-04-09 RX ADMIN — PANTOPRAZOLE SODIUM 40 MILLIGRAM(S): 20 TABLET, DELAYED RELEASE ORAL at 12:59

## 2019-04-09 RX ADMIN — DEXMEDETOMIDINE HYDROCHLORIDE IN 0.9% SODIUM CHLORIDE 3.6 MICROGRAM(S)/KG/HR: 4 INJECTION INTRAVENOUS at 02:15

## 2019-04-09 RX ADMIN — Medication 4 MILLIGRAM(S): at 15:00

## 2019-04-09 RX ADMIN — Medication 100 MILLIGRAM(S): at 05:53

## 2019-04-09 RX ADMIN — LEVETIRACETAM 400 MILLIGRAM(S): 250 TABLET, FILM COATED ORAL at 05:54

## 2019-04-09 RX ADMIN — ATORVASTATIN CALCIUM 40 MILLIGRAM(S): 80 TABLET, FILM COATED ORAL at 21:26

## 2019-04-09 RX ADMIN — Medication 650 MILLIGRAM(S): at 00:00

## 2019-04-09 RX ADMIN — Medication 4 MILLIGRAM(S): at 04:05

## 2019-04-09 RX ADMIN — MIDAZOLAM HYDROCHLORIDE 4 MILLIGRAM(S): 1 INJECTION, SOLUTION INTRAMUSCULAR; INTRAVENOUS at 12:59

## 2019-04-09 RX ADMIN — NICARDIPINE HYDROCHLORIDE 25 MG/HR: 30 CAPSULE, EXTENDED RELEASE ORAL at 20:13

## 2019-04-09 RX ADMIN — LISINOPRIL 20 MILLIGRAM(S): 2.5 TABLET ORAL at 10:00

## 2019-04-09 RX ADMIN — LISINOPRIL 20 MILLIGRAM(S): 2.5 TABLET ORAL at 05:56

## 2019-04-09 RX ADMIN — LEVETIRACETAM 400 MILLIGRAM(S): 250 TABLET, FILM COATED ORAL at 17:25

## 2019-04-09 NOTE — PROGRESS NOTE ADULT - ASSESSMENT
44y/M with   1.  intracerebral hemorrhage (r basal ganglia / thalamic; small L basal ganglia, R pontine), brain compression, cerebral edema   2.  dyslipidemia    PLAN:   NEURO: neurochecks q1h, PRN pain meds with Tylenol / fentanyl PRN; switch sedation from propofol to precedex to RASS of 0 to -1  ICH:  BP control  seizure prophylaxis: as per neurosurgery  EVD: monitor output, keep at 10cm h20  REHAB:  physical therapy evaluation and management    EARLY MOB:   HOB bedrest    PULM:  full vent support; CPAP trial this afternoon if RASS achieved   CARDIO:  SBP goal 100-140mm Hg; cardene drip to SBP goal; increase lisinopril 20 mg PO daily ; start norvasc 10 d  ENDO:  Blood sugar goals 140-180 mg/dL, continue insulin sliding scale; atorvastatin 40mg PO daily, A1C  4.9  GI:  docusate senna; PPI for GI prophylaxis (intubated)  DIET: continue Jevity at goal  RENAL:  Na goal 140-145; decrease 2% to 25cc/hr; recheck BMP this afternoon  HEM/ONC: Hb stable; given DDAVP and platelet transfusion for aspirin reversal  VTE Prophylaxis: SCDs, SQH tonight if ok with NS  ID: afebrile, no leukocytosis, no ABx, high for aspiration / HAP  Social: will update family; sister freeman, father, common law wife who is pregnant    ATTENDING ATTESTATION:  I was physically present for the key portions of the evaluation and management (E/M) service provided.  I agree with the above history, physical and plan, which I have reviewed and edited where appropriate.    Patient at high risk for neurological deterioration or death due to:  ICU delirium, aspiration PNA, DVT / PE.  Critical care time, excluding procedures: 75 minutes spent on total encounter, more than 50% of the visit was spent counseling and/or coordinating care by the attending physician.     Plan discussed with RN, house staff. 44y/M with   1.  intracerebral hemorrhage (r basal ganglia / thalamic; small L basal ganglia, R pontine), brain compression, cerebral edema; ICH Score on admission  = GCS Score: 3-4 (2 pts) E1VTM2 (+) IVH = 3  ICH volume 20ml  Estimated 30-day mortality 3 points: 72%  2.  dyslipidemia    PLAN:   NEURO: neurochecks q1h, PRN pain meds with Tylenol / fentanyl PRN; switch sedation from propofol to precedex to RASS of 0 to -1  ICH:  BP control  seizure prophylaxis: as per neurosurgery  EVD: monitor output, keep at 10cm h20  REHAB:  physical therapy evaluation and management    EARLY MOB:   HOB bedrest    PULM:  full vent support; CPAP trial this afternoon if RASS achieved   CARDIO:  SBP goal 100-140mm Hg; cardene drip to SBP goal; increase lisinopril 40 mg PO daily ; norvasc 10 d, start metop 25 bid  ENDO:  Blood sugar goals 140-180 mg/dL, continue insulin sliding scale; atorvastatin 40mg PO daily, A1C  4.9  GI:  docusate senna; PPI for GI prophylaxis (intubated)  DIET: continue Jevity at goal  RENAL:  Na goal 140-145  HEM/ONC: Hb stable; given DDAVP and platelet transfusion for aspirin reversal  VTE Prophylaxis: SCDs, SQH tonight if ok with NS  ID: afebrile, no leukocytosis, no ABx, high for aspiration / HAP  Social: will update family; sister freeman, father, common law wife who is pregnant    ATTENDING ATTESTATION:  I was physically present for the key portions of the evaluation and management (E/M) service provided.  I agree with the above history, physical and plan, which I have reviewed and edited where appropriate.    Patient at high risk for neurological deterioration or death due to:  ICU delirium, aspiration PNA, DVT / PE.  Critical care time, excluding procedures: 75 minutes spent on total encounter, more than 50% of the visit was spent counseling and/or coordinating care by the attending physician.     Plan discussed with RN, house staff.

## 2019-04-09 NOTE — PROGRESS NOTE ADULT - SUBJECTIVE AND OBJECTIVE BOX
Surgery:  Right craniotomy for penumbra evacuation of IPH  Consent: Signed by patient vs HCP   HCP                   NAME/NUMBER of HCP: Ondina [phone]    No Known Allergies      T(C): 38.3 (19 @ 09:44), Max: 38.9 (19 @ 18:05)  HR: 106 (19 @ 15:00) (66 - 106)  BP: 154/73 (19 @ 15:00) (129/69 - 160/86)  RR: 28 (19 @ 15:00) (12 - 34)  SpO2: 95% (19 @ 15:00) (94% - 100%)  Wt(kg): --    EXAM:  Neuro: CNs limited. Mild decorticate posturing to LUE to noxious stimulus but intermittently spontaneously moving right side. No eye opening. Not following commands, PERRL, +corneals, +gag        142  |  107  |  12  ----------------------------<  130<H>  3.8   |  23  |  0.88    Ca    8.8      2019 05:35  Phos  3.3       Mg     2.2           CBC Full  -  ( 2019 05:35 )  WBC Count : 10.50 K/uL  RBC Count : 4.14 M/uL  Hemoglobin : 13.1 g/dL  Hematocrit : 38.9 %  Platelet Count - Automated : 249 K/uL  Mean Cell Volume : 94.0 fl  Mean Cell Hemoglobin : 31.6 pg  Mean Cell Hemoglobin Concentration : 33.7 gm/dL  Auto Neutrophil # : x  Auto Lymphocyte # : x  Auto Monocyte # : x  Auto Eosinophil # : x  Auto Basophil # : x  Auto Neutrophil % : x  Auto Lymphocyte % : x  Auto Monocyte % : x  Auto Eosinophil % : x  Auto Basophil % : x    PT/INR - ( 2019 09:43 )   PT: 13.2 sec;   INR: 1.16       PTT - ( 2019 09:43 )  PTT:29.9 sec    Pregnancy test: Not indicated  Type & Screen (in past 72hrs): 19    CXR: 19 slightly increased vascular markings  EK19 NSR  ECHO: N/A    Last dose of antiplatelet/anticoagulation dru19    Implanted Devices (pacemaker, drug pump...etc):  []YES   [x] NO                  If yes --> EPS consulted to interrogate/adjust device:    MRSA swab(exclusion - cerebral angiograms and microdiscectomy):  pending               Assessment: 44y Male w/ ETOH abuse now s/p right thalamic IPH with IVH s/p Emergent Rt EVD placement 19 (removed 19), s/p Cerebral angiogram 19, now s/p left EVD placement 19    Plan:  -OR tomorrow for penumbra evacuation of IPH  -Consent in chart  -Coags reviewed  -Type and screen  -NPO after midnight, IVF   -Chemoprophylaxis held  -D/w Dr. So Surgery:  Right craniotomy for penumbra evacuation of IPH  Consent: Signed by patient vs HCP   HCP                   NAME/NUMBER of HCP: Rosana Ortega [phone]    No Known Allergies      T(C): 38.3 (19 @ 09:44), Max: 38.9 (19 @ 18:05)  HR: 106 (19 @ 15:00) (66 - 106)  BP: 154/73 (19 @ 15:00) (129/69 - 160/86)  RR: 28 (19 @ 15:00) (12 - 34)  SpO2: 95% (19 @ 15:00) (94% - 100%)  Wt(kg): --    EXAM:  Neuro: CNs limited. Mild decorticate posturing to LUE to noxious stimulus but intermittently spontaneously moving right side. No eye opening. Not following commands, PERRL, +corneals, +gag        142  |  107  |  12  ----------------------------<  130<H>  3.8   |  23  |  0.88    Ca    8.8      2019 05:35  Phos  3.3       Mg     2.2           CBC Full  -  ( 2019 05:35 )  WBC Count : 10.50 K/uL  RBC Count : 4.14 M/uL  Hemoglobin : 13.1 g/dL  Hematocrit : 38.9 %  Platelet Count - Automated : 249 K/uL  Mean Cell Volume : 94.0 fl  Mean Cell Hemoglobin : 31.6 pg  Mean Cell Hemoglobin Concentration : 33.7 gm/dL  Auto Neutrophil # : x  Auto Lymphocyte # : x  Auto Monocyte # : x  Auto Eosinophil # : x  Auto Basophil # : x  Auto Neutrophil % : x  Auto Lymphocyte % : x  Auto Monocyte % : x  Auto Eosinophil % : x  Auto Basophil % : x    PT/INR - ( 2019 09:43 )   PT: 13.2 sec;   INR: 1.16       PTT - ( 2019 09:43 )  PTT:29.9 sec    Pregnancy test: Not indicated  Type & Screen (in past 72hrs): 19    CXR: 19 slightly increased vascular markings  EK19 NSR  ECHO: N/A    Last dose of antiplatelet/anticoagulation dru19    Implanted Devices (pacemaker, drug pump...etc):  []YES   [x] NO                  If yes --> EPS consulted to interrogate/adjust device:    MRSA swab(exclusion - cerebral angiograms and microdiscectomy):  pending               Assessment: 44y Male w/ ETOH abuse now s/p right thalamic IPH with IVH s/p Emergent Rt EVD placement 19 (removed 19), s/p Cerebral angiogram 19, now s/p left EVD placement 19    Plan:  -OR tomorrow for penumbra evacuation of IPH  -Consent in chart  -Coags reviewed  -Type and screen  -NPO after midnight, IVF   -Chemoprophylaxis held  -D/w Dr. So

## 2019-04-09 NOTE — PROGRESS NOTE ADULT - SUBJECTIVE AND OBJECTIVE BOX
Neurology Stroke Progress Note    INTERVAL HPI/OVERNIGHT EVENTS:  Patient seen and examined.     MEDICATIONS  (STANDING):  amLODIPine   Tablet 10 milliGRAM(s) Oral daily  atorvastatin 40 milliGRAM(s) Oral at bedtime  ceFAZolin  Injectable. 2000 milliGRAM(s) IV Push once  dexmedetomidine Infusion 0.2 MICROgram(s)/kG/Hr (3.6 mL/Hr) IV Continuous <Continuous>  dextrose 5%. 1000 milliLiter(s) (50 mL/Hr) IV Continuous <Continuous>  dextrose 50% Injectable 12.5 Gram(s) IV Push once  dextrose 50% Injectable 25 Gram(s) IV Push once  dextrose 50% Injectable 25 Gram(s) IV Push once  docusate sodium Liquid 100 milliGRAM(s) Oral two times a day  influenza   Vaccine 0.5 milliLiter(s) IntraMuscular once  insulin lispro (HumaLOG) corrective regimen sliding scale   SubCutaneous Before meals and at bedtime  levETIRAcetam  IVPB 1000 milliGRAM(s) IV Intermittent every 12 hours  lisinopril 20 milliGRAM(s) Oral once  metoprolol tartrate 25 milliGRAM(s) Oral two times a day  midazolam Infusion 0.02 mG/kG/Hr (1.44 mL/Hr) IV Continuous <Continuous>  midazolam Injectable 4 milliGRAM(s) IV Push once  niCARdipine Infusion 5 mG/Hr (25 mL/Hr) IV Continuous <Continuous>  pantoprazole   Suspension 40 milliGRAM(s) Oral daily  propofol Infusion 25 MICROgram(s)/kG/Min (10.8 mL/Hr) IV Continuous <Continuous>  senna 2 Tablet(s) Oral at bedtime  sodium chloride 0.9%. 1000 milliLiter(s) (75 mL/Hr) IV Continuous <Continuous>    MEDICATIONS  (PRN):  acetaminophen    Suspension .. 650 milliGRAM(s) Oral every 6 hours PRN Temp greater or equal to 38.5C (101.3F)  bisacodyl 5 milliGRAM(s) Oral daily PRN Constipation  bisacodyl Suppository 10 milliGRAM(s) Rectal daily PRN Constipation  dextrose 40% Gel 15 Gram(s) Oral once PRN Blood Glucose LESS THAN 70 milliGRAM(s)/deciliter  fentaNYL    Injectable 25 MICROGram(s) IV Push every 2 hours PRN Severe Pain (7 - 10)  glucagon  Injectable 1 milliGRAM(s) IntraMuscular once PRN Glucose LESS THAN 70 milligrams/deciliter  hydrALAZINE Injectable 10 milliGRAM(s) IV Push every 4 hours PRN BP>140  LORazepam   Injectable 4 milliGRAM(s) IV Push every 4 hours PRN RASS 4  LORazepam   Injectable 2 milliGRAM(s) IV Push every 4 hours PRN RASS 3  LORazepam   Injectable 1 milliGRAM(s) IV Push every 4 hours PRN RASS 2  ondansetron Injectable 4 milliGRAM(s) IV Push every 6 hours PRN Nausea and/or Vomiting      Allergies    No Known Allergies    Intolerances        ROS: As per HPI, otherwise negative    Vital Signs Last 24 Hrs  T(C): 38.3 (2019 09:44), Max: 38.9 (2019 18:05)  T(F): 100.9 (2019 09:44), Max: 102.1 (2019 18:05)  HR: 80 (2019 11:00) (66 - 104)  BP: 155/80 (2019 11:00) (125/65 - 166/94)  BP(mean): 106 (2019 11:00) (89 - 124)  RR: 19 (2019 11:00) (12 - 34)  SpO2: 96% (2019 11:00) (95% - 100%)    Physical exam:  General: awake and alert, sitting comfortably, no acute distress    Neurologic:  Mental status: awake, alert, oriented to person, place, and time. Speech is fluent - able to name, repeat, and describe picture fully. Follows commands. Attention/concentration intact. No dysarthria, no aphasia.  Cranial nerves:   II: visual fields are full to confrontation. pupils equally round and reactive to light,   III, IV, VI: EOMI without nystagmus  V:  V1-V3 sensation intact,   VII: no facial droop, facie is symmetric with normal eye closure and smile  VIII: hearing is intact to finger rub  IX, X: Uvula is midline and soft palate rises symmetrically  XI: head turning and shoulder shrug are intact.  XII: tongue midline  Motor: Normal bulk and tone, strength 5/5 in b/l UE and LE,  strength 5/5. No pronator drift  Sensation: intact to light touch. No neglect.  Coordination: No dysmetria on finger-to-nose and heel-to-shin  Reflexes: downgoing toes bilaterally  Gait: narrow and steady, no ataxia. Romberg negative        LABS:                        13.1   10.50 )-----------( 249      ( 2019 05:35 )             38.9     04-    142  |  107  |  12  ----------------------------<  130<H>  3.8   |  23  |  0.88    Ca    8.8      2019 05:35  Phos  3.3     -  Mg     2.2           PT/INR - ( 2019 09:43 )   PT: 13.2 sec;   INR: 1.16          PTT - ( 2019 09:43 )  PTT:29.9 sec  Urinalysis Basic - ( 2019 18:26 )    Color: Yellow / Appearance: Clear / S.015 / pH: x  Gluc: x / Ketone: NEGATIVE  / Bili: Negative / Urobili: 1.0 E.U./dL   Blood: x / Protein: NEGATIVE mg/dL / Nitrite: NEGATIVE   Leuk Esterase: NEGATIVE / RBC: < 5 /HPF / WBC < 5 /HPF   Sq Epi: x / Non Sq Epi: 0-5 /HPF / Bacteria: Present /HPF        RADIOLOGY & ADDITIONAL TESTS:      Assessment and Plan  44 year-old male presents with ICH with IVH extension probably secondary to hypertension, though previously undiagnosed. S/p EVD placement , though now not working per neurosurgery.      1)Secondary stroke prevention  -No antiplatelet as patient has ICH  -Atorvastatin 40    2) Stroke risk factors  -    3) Further workup     DVT prophylaxis   -Heparin SQ TID and SCDs Neurology Stroke Progress Note    INTERVAL HPI/OVERNIGHT EVENTS:  44 year-old male presents with ICH with IVH extension probably secondary to hypertension, though previously undiagnosed. S/p EVD placement , though now not working per neurosurgery.  Febrile  Patient seen and examined.   Comatose.     MEDICATIONS  (STANDING):  amLODIPine   Tablet 10 milliGRAM(s) Oral daily  atorvastatin 40 milliGRAM(s) Oral at bedtime  ceFAZolin  Injectable. 2000 milliGRAM(s) IV Push once  dexmedetomidine Infusion 0.2 MICROgram(s)/kG/Hr (3.6 mL/Hr) IV Continuous <Continuous>  dextrose 5%. 1000 milliLiter(s) (50 mL/Hr) IV Continuous <Continuous>  dextrose 50% Injectable 12.5 Gram(s) IV Push once  dextrose 50% Injectable 25 Gram(s) IV Push once  dextrose 50% Injectable 25 Gram(s) IV Push once  docusate sodium Liquid 100 milliGRAM(s) Oral two times a day  influenza   Vaccine 0.5 milliLiter(s) IntraMuscular once  insulin lispro (HumaLOG) corrective regimen sliding scale   SubCutaneous Before meals and at bedtime  levETIRAcetam  IVPB 1000 milliGRAM(s) IV Intermittent every 12 hours  lisinopril 20 milliGRAM(s) Oral once  metoprolol tartrate 25 milliGRAM(s) Oral two times a day  midazolam Infusion 0.02 mG/kG/Hr (1.44 mL/Hr) IV Continuous <Continuous>  midazolam Injectable 4 milliGRAM(s) IV Push once  niCARdipine Infusion 5 mG/Hr (25 mL/Hr) IV Continuous <Continuous>  pantoprazole   Suspension 40 milliGRAM(s) Oral daily  propofol Infusion 25 MICROgram(s)/kG/Min (10.8 mL/Hr) IV Continuous <Continuous>  senna 2 Tablet(s) Oral at bedtime  sodium chloride 0.9%. 1000 milliLiter(s) (75 mL/Hr) IV Continuous <Continuous>    MEDICATIONS  (PRN):  acetaminophen    Suspension .. 650 milliGRAM(s) Oral every 6 hours PRN Temp greater or equal to 38.5C (101.3F)  bisacodyl 5 milliGRAM(s) Oral daily PRN Constipation  bisacodyl Suppository 10 milliGRAM(s) Rectal daily PRN Constipation  dextrose 40% Gel 15 Gram(s) Oral once PRN Blood Glucose LESS THAN 70 milliGRAM(s)/deciliter  fentaNYL    Injectable 25 MICROGram(s) IV Push every 2 hours PRN Severe Pain (7 - 10)  glucagon  Injectable 1 milliGRAM(s) IntraMuscular once PRN Glucose LESS THAN 70 milligrams/deciliter  hydrALAZINE Injectable 10 milliGRAM(s) IV Push every 4 hours PRN BP>140  LORazepam   Injectable 4 milliGRAM(s) IV Push every 4 hours PRN RASS 4  LORazepam   Injectable 2 milliGRAM(s) IV Push every 4 hours PRN RASS 3  LORazepam   Injectable 1 milliGRAM(s) IV Push every 4 hours PRN RASS 2  ondansetron Injectable 4 milliGRAM(s) IV Push every 6 hours PRN Nausea and/or Vomiting      Allergies    No Known Allergies        ROS: As per HPI, otherwise negative    Vital Signs Last 24 Hrs  T(C): 38.3 (2019 09:44), Max: 38.9 (2019 18:05)  T(F): 100.9 (2019 09:44), Max: 102.1 (2019 18:05)  HR: 80 (2019 11:00) (66 - 104)  BP: 155/80 (2019 11:00) (125/65 - 166/94)  BP(mean): 106 (2019 11:00) (89 - 124)  RR: 19 (2019 11:00) (12 - 34)  SpO2: 96% (2019 11:00) (95% - 100%)    Physical exam:  General: comatose, intubated, sedation just stopped    Neurologic:  Mental status: comatose, intubated, sedation just stopped.   +Corneal reflex  Cranial nerves:   II:  pupils equally round and minimally reactive to light,   III, IV, VI: eyes midline  Motor: Normal bulk and tone, slight movement of right hand, extensor posturing of left hand, moves away from painful stimuli of right leg, no movement of left  Sensation: decreased sensation of left side  Coordination: did not cooperate  Reflexes: equivocal toes bilaterally  Gait: deferred        LABS:                        13.1   10.50 )-----------( 249      ( 2019 05:35 )             38.9     04-09    142  |  107  |  12  ----------------------------<  130<H>  3.8   |  23  |  0.88    Ca    8.8      2019 05:35  Phos  3.3     -  Mg     2.2     -      PT/INR - ( 2019 09:43 )   PT: 13.2 sec;   INR: 1.16          PTT - ( 2019 09:43 )  PTT:29.9 sec  Urinalysis Basic - ( 2019 18:26 )    Color: Yellow / Appearance: Clear / S.015 / pH: x  Gluc: x / Ketone: NEGATIVE  / Bili: Negative / Urobili: 1.0 E.U./dL   Blood: x / Protein: NEGATIVE mg/dL / Nitrite: NEGATIVE   Leuk Esterase: NEGATIVE / RBC: < 5 /HPF / WBC < 5 /HPF   Sq Epi: x / Non Sq Epi: 0-5 /HPF / Bacteria: Present /HPF          RADIOLOGY & ADDITIONAL TESTS:  < from: CT Head No Cont (19 @ 10:15) >  IMPRESSION:    1. No change in right frontal ventricular catheter position approximating   anterior suprasellar cistern with hemorrhage along the course of the   catheter tract including its intraventricular course.  2. No change in right thalamic hematoma with surrounding edema, focal   mass effect, and right to left shift and no change in IVH with perhaps   less seen within left frontal horn currently. No new hemorrhage seen.  3. No change in sub 5mm foci of increased attenuation left thalamus and   right lateral conner.  4. No change in ventricular size.    < end of copied text >    < from: Echocardiogram (19 @ 15:06) >  Interpretation Summary  The left atrial size is normal.There is mild concentricleft ventricular   hypertrophy.Normal internal left ventricular dimensions.The left   ventricular   wall motion is normal. The left ventricular ejection fraction is 60%.    Right   atrial size is normal.The right ventricle is normal in size and   function.There   was insufficient TR detected from which to calculate pulmonary artery   systolic   pressure.  The IVC is dilated (>2.1 cm) however with normal inspiratory   collapse (>50%) consistent with mildly elevated right atrial pressure   (  8mmHg, range 5-10mmHg).  No aortic root dilatation.Normal size aortic   arch with no hemodynamic evidence for coarctation.A trivial pericardial   effusion noted.    < end of copied text >      < from: CT Head No Cont (04.08.19 @ 20:25) >  IMPRESSION: Stable size of an acute right thalamic parenchymal hematoma   with surrounding vasogenic edema and mass effect resulting in   approximately 5-6 mm of right-to-left midline shift.     Interval increase in intraventricularhemorrhage as well as mild increase   in ventricular dilatation consistent with evolving hydrocephalus.    New hemorrhage along the right frontal approach ventricular shunt   catheter, the catheter appears unchanged in both positioning and   trajectory from the prior study.    < end of copied text >        Assessment and Plan  44 year-old male presents with ICH with IVH extension probably secondary to hypertension, though previously undiagnosed. S/p EVD placement , though now not working per neurosurgery.      1)Secondary stroke prevention  -No antiplatelet as patient has ICH  -Atorvastatin 40    2) Stroke risk factors  -likely HTN    3) Further workup   -Post op care per neurosurgery    DVT prophylaxis   -SCDs

## 2019-04-09 NOTE — PROGRESS NOTE ADULT - SUBJECTIVE AND OBJECTIVE BOX
S/Overnight events:        Hospital Course:   POD#       Vital Signs Last 24 Hrs  T(C): 38 (2019 05:00), Max: 38.9 (2019 18:05)  T(F): 100.4 (2019 05:00), Max: 102.1 (2019 18:05)  HR: 80 (2019 06:00) (66 - 110)  BP: 155/88 (2019 06:00) (121/60 - 167/85)  BP(mean): 113 (2019 06:00) (83 - 124)  RR: 20 (2019 06:00) (15 - 34)  SpO2: 99% (2019 06:00) (94% - 100%)    I&O's Detail    2019 07:01  -  2019 07:00  --------------------------------------------------------  IN:    dexmedetomidine Infusion: 138.2 mL    Enteral Tube Flush: 120 mL    IV PiggyBack: 100 mL    midazolam Infusion: 8 mL    niCARdipine Infusion: 954 mL    ns in tub fed  [id number]: 1020 mL    propofol Infusion: 291.9 mL    sodium chloride 0.9%.: 500 mL    sodium chloride 2%: 50 mL  Total IN: 3182.1 mL    OUT:    External Ventricular Device: 267 mL    Indwelling Catheter - Urethral: 165 mL    Voided: 1325 mL  Total OUT: 1757 mL    Total NET: 1425.1 mL      2019 07:01  -  2019 06:20  --------------------------------------------------------  IN:    dexmedetomidine Infusion: 192.1 mL    Enteral Tube Flush: 30 mL    IV PiggyBack: 200 mL    midazolam Infusion: 18 mL    niCARdipine Infusion: 798 mL    ns in tub fed  [id number]: 681 mL    propofol Infusion: 59.8 mL    sodium chloride 0.9%.: 1425 mL  Total IN: 3403.9 mL    OUT:    External Ventricular Device: 74 mL    Voided: 2575 mL  Total OUT: 2649 mL    Total NET: 754.9 mL        I&O's Summary    2019 07:  -  2019 07:00  --------------------------------------------------------  IN: 3182.1 mL / OUT: 1757 mL / NET: 1425.1 mL    2019 07:  -  2019 06:20  --------------------------------------------------------  IN: 3403.9 mL / OUT: 2649 mL / NET: 754.9 mL        PHYSICAL EXAM:  Gen:  HEENT:  Neck:  Lungs:  Heart:  Abd:  Exts:  Neuro:    TUBES/LINES:  [] CVC  [] A-line  [] Lumbar Drain  [] Ventriculostomy  [] Other    DIET:  [] NPO  [] Mechanical  [] Tube feeds    LABS:                        13.1   10.50 )-----------( 249      ( 2019 05:35 )             38.9     04-    142  |  107  |  12  ----------------------------<  130<H>  3.8   |  23  |  0.88    Ca    8.8      2019 05:35  Phos  3.3     04-09  Mg     2.2     04-09        Urinalysis Basic - ( 2019 18:26 )    Color: Yellow / Appearance: Clear / S.015 / pH: x  Gluc: x / Ketone: NEGATIVE  / Bili: Negative / Urobili: 1.0 E.U./dL   Blood: x / Protein: NEGATIVE mg/dL / Nitrite: NEGATIVE   Leuk Esterase: NEGATIVE / RBC: < 5 /HPF / WBC < 5 /HPF   Sq Epi: x / Non Sq Epi: 0-5 /HPF / Bacteria: Present /HPF          CAPILLARY BLOOD GLUCOSE      POCT Blood Glucose.: 126 mg/dL (2019 22:01)  POCT Blood Glucose.: 107 mg/dL (2019 16:40)      Drug Levels: [] N/A    CSF Analysis: [] N/A      Allergies    No Known Allergies    Intolerances      MEDICATIONS:  Antibiotics:    Neuro:  acetaminophen    Suspension .. 650 milliGRAM(s) Oral every 6 hours PRN  dexmedetomidine Infusion 0.2 MICROgram(s)/kG/Hr IV Continuous <Continuous>  fentaNYL    Injectable 25 MICROGram(s) IV Push every 2 hours PRN  levETIRAcetam  IVPB 1000 milliGRAM(s) IV Intermittent every 12 hours  LORazepam   Injectable 4 milliGRAM(s) IV Push every 4 hours PRN  LORazepam   Injectable 2 milliGRAM(s) IV Push every 4 hours PRN  LORazepam   Injectable 1 milliGRAM(s) IV Push every 4 hours PRN  midazolam Infusion 0.02 mG/kG/Hr IV Continuous <Continuous>  ondansetron Injectable 4 milliGRAM(s) IV Push every 6 hours PRN    Anticoagulation:    OTHER:  amLODIPine   Tablet 10 milliGRAM(s) Oral daily  atorvastatin 40 milliGRAM(s) Oral at bedtime  bisacodyl 5 milliGRAM(s) Oral daily PRN  bisacodyl Suppository 10 milliGRAM(s) Rectal daily PRN  dextrose 40% Gel 15 Gram(s) Oral once PRN  dextrose 50% Injectable 12.5 Gram(s) IV Push once  dextrose 50% Injectable 25 Gram(s) IV Push once  dextrose 50% Injectable 25 Gram(s) IV Push once  docusate sodium Liquid 100 milliGRAM(s) Oral two times a day  glucagon  Injectable 1 milliGRAM(s) IntraMuscular once PRN  hydrALAZINE Injectable 10 milliGRAM(s) IV Push every 4 hours PRN  influenza   Vaccine 0.5 milliLiter(s) IntraMuscular once  insulin lispro (HumaLOG) corrective regimen sliding scale   SubCutaneous Before meals and at bedtime  lisinopril 20 milliGRAM(s) Oral daily  niCARdipine Infusion 5 mG/Hr IV Continuous <Continuous>  pantoprazole   Suspension 40 milliGRAM(s) Oral daily  senna 2 Tablet(s) Oral at bedtime    IVF:  dextrose 5%. 1000 milliLiter(s) IV Continuous <Continuous>  sodium chloride 0.9%. 1000 milliLiter(s) IV Continuous <Continuous>    CULTURES:  Culture Results:   No growth at 12 hours ( @ 17:16)  Culture Results:   No growth at 12 hours ( @ 17:16)    RADIOLOGY & ADDITIONAL TESTS:      ASSESSMENT:  44y Male s/p    HEMORRHAGE STROKE  HEMORRHAGE  Handoff  ICH (intracerebral hemorrhage)  ICH (intracerebral hemorrhage)  Angiogram, carotid and cerebral, bilateral      PLAN:  NEURO:    CARDIOVASCULAR:    PULMONARY:    RENAL:    GI:    ID:    ENDO:    DVT PROPHYLAXIS:    DISPOSITION:       Assessment:  Present when checked    []  GCS  E   V  M     Heart Failure: []Acute, [] acute on chronic , []chronic  Heart Failure:  [] Diastolic (HFpEF), [] Systolic (HFrEF), []Combined (HFpEF and HFrEF), [] RHF, [] Pulm HTN, [] Other    [] KRISTA, [] ATN, [] AIN, [] other  [] CKD1, [] CKD2, [] CKD 3, [] CKD 4, [] CKD 5, []ESRD    Encephalopathy: [] Metabolic, [] Hepatic, [] toxic, [] Neurological, [] Other    Abnormal Nurtitional Status: [] malnurtition (see nutrition note), [ ]underweight: BMI < 19, [] morbid obesity: BMI >40, [] Cachexia    [] Sepsis  [] hypovolemic shock,[] cardiogenic shock, [] hemorrhagic shock, [] neuogenic shock  [] Acute Respiratory Failure  []Cerebral edema, [] Brain compression/ herniation,   [] Functional quadriplegia  [] Acute blood loss anemia S/Overnight events:  EVD not working well overnight, s/p CT Head with tract hemorrhage along EVD. Attempted to pull back EVD 1cm without improvement. Worsening clinical exam but stable overnight with fevers.      Hospital Course:   : overnight, patient requiring large amount of cardene and propofol.  Plan today is to transition to precedex, start PO lisinopril and wean of cardene.  Becomes extremely agitated when off sedation.  : Cerebral angio without findings of aneurysm. EVD stopped working, pulled back without improvement. CT Head performed.  :     Vital Signs Last 24 Hrs  T(C): 38 (2019 05:00), Max: 38.9 (2019 18:05)  T(F): 100.4 (2019 05:00), Max: 102.1 (2019 18:05)  HR: 80 (2019 06:00) (66 - 110)  BP: 155/88 (2019 06:00) (121/60 - 167/85)  BP(mean): 113 (2019 06:00) (83 - 124)  RR: 20 (2019 06:00) (15 - 34)  SpO2: 99% (2019 06:00) (94% - 100%)    I&O's Detail    2019 07:01  -  2019 07:00  --------------------------------------------------------  IN:    dexmedetomidine Infusion: 138.2 mL    Enteral Tube Flush: 120 mL    IV PiggyBack: 100 mL    midazolam Infusion: 8 mL    niCARdipine Infusion: 954 mL    ns in tub fed  [id number]: 1020 mL    propofol Infusion: 291.9 mL    sodium chloride 0.9%.: 500 mL    sodium chloride 2%: 50 mL  Total IN: 3182.1 mL    OUT:    External Ventricular Device: 267 mL    Indwelling Catheter - Urethral: 165 mL    Voided: 1325 mL  Total OUT: 1757 mL    Total NET: 1425.1 mL      2019 07:01  -  2019 06:20  --------------------------------------------------------  IN:    dexmedetomidine Infusion: 192.1 mL    Enteral Tube Flush: 30 mL    IV PiggyBack: 200 mL    midazolam Infusion: 18 mL    niCARdipine Infusion: 798 mL    ns in tub fed  [id number]: 681 mL    propofol Infusion: 59.8 mL    sodium chloride 0.9%.: 1425 mL  Total IN: 3403.9 mL    OUT:    External Ventricular Device: 74 mL    Voided: 2575 mL  Total OUT: 2649 mL    Total NET: 754.9 mL        I&O's Summary    2019 07:  -  2019 07:00  --------------------------------------------------------  IN: 3182.1 mL / OUT: 1757 mL / NET: 1425.1 mL    2019 07:  -  2019 06:20  --------------------------------------------------------  IN: 3403.9 mL / OUT: 2649 mL / NET: 754.9 mL        PHYSICAL EXAM:  Gen: NAD, Intubated on ventilator, sedated.  HEENT: PERRL, Right frontal EVD site C/D/I.  Lungs: Clear b/l  Heart: S1, S2. NSR.  Abd: Soft, NT/ND. +BS  Exts: Pulses 2+ throughout  Neuro: CNs limited. Mild decorticate posturing to b/l UEs to noxious stimulus but intermittently spontaneously moving right side. No eye opening. Not following commands.    TUBES/LINES:  [] CVC  [x] A-line  [] Lumbar Drain  [x] Ventriculostomy  [] Other    DIET:  [] NPO  [] Mechanical  [x] Tube feeds    LABS:                        13.1   10.50 )-----------( 249      ( 2019 05:35 )             38.9     04-09    142  |  107  |  12  ----------------------------<  130<H>  3.8   |  23  |  0.88    Ca    8.8      2019 05:35  Phos  3.3     -  Mg     2.2             Urinalysis Basic - ( 2019 18:26 )    Color: Yellow / Appearance: Clear / S.015 / pH: x  Gluc: x / Ketone: NEGATIVE  / Bili: Negative / Urobili: 1.0 E.U./dL   Blood: x / Protein: NEGATIVE mg/dL / Nitrite: NEGATIVE   Leuk Esterase: NEGATIVE / RBC: < 5 /HPF / WBC < 5 /HPF   Sq Epi: x / Non Sq Epi: 0-5 /HPF / Bacteria: Present /HPF          CAPILLARY BLOOD GLUCOSE      POCT Blood Glucose.: 126 mg/dL (2019 22:01)  POCT Blood Glucose.: 107 mg/dL (2019 16:40)      Drug Levels: [] N/A    CSF Analysis: [] N/A      Allergies    No Known Allergies    Intolerances      MEDICATIONS:  Antibiotics:    Neuro:  acetaminophen    Suspension .. 650 milliGRAM(s) Oral every 6 hours PRN  dexmedetomidine Infusion 0.2 MICROgram(s)/kG/Hr IV Continuous <Continuous>  fentaNYL    Injectable 25 MICROGram(s) IV Push every 2 hours PRN  levETIRAcetam  IVPB 1000 milliGRAM(s) IV Intermittent every 12 hours  LORazepam   Injectable 4 milliGRAM(s) IV Push every 4 hours PRN  LORazepam   Injectable 2 milliGRAM(s) IV Push every 4 hours PRN  LORazepam   Injectable 1 milliGRAM(s) IV Push every 4 hours PRN  midazolam Infusion 0.02 mG/kG/Hr IV Continuous <Continuous>  ondansetron Injectable 4 milliGRAM(s) IV Push every 6 hours PRN    Anticoagulation:    OTHER:  amLODIPine   Tablet 10 milliGRAM(s) Oral daily  atorvastatin 40 milliGRAM(s) Oral at bedtime  bisacodyl 5 milliGRAM(s) Oral daily PRN  bisacodyl Suppository 10 milliGRAM(s) Rectal daily PRN  dextrose 40% Gel 15 Gram(s) Oral once PRN  dextrose 50% Injectable 12.5 Gram(s) IV Push once  dextrose 50% Injectable 25 Gram(s) IV Push once  dextrose 50% Injectable 25 Gram(s) IV Push once  docusate sodium Liquid 100 milliGRAM(s) Oral two times a day  glucagon  Injectable 1 milliGRAM(s) IntraMuscular once PRN  hydrALAZINE Injectable 10 milliGRAM(s) IV Push every 4 hours PRN  influenza   Vaccine 0.5 milliLiter(s) IntraMuscular once  insulin lispro (HumaLOG) corrective regimen sliding scale   SubCutaneous Before meals and at bedtime  lisinopril 20 milliGRAM(s) Oral daily  niCARdipine Infusion 5 mG/Hr IV Continuous <Continuous>  pantoprazole   Suspension 40 milliGRAM(s) Oral daily  senna 2 Tablet(s) Oral at bedtime    IVF:  dextrose 5%. 1000 milliLiter(s) IV Continuous <Continuous>  sodium chloride 0.9%. 1000 milliLiter(s) IV Continuous <Continuous>    CULTURES:  Culture Results:   No growth at 12 hours (04-08 @ 17:16)  Culture Results:   No growth at 12 hours (- @ 17:16)    RADIOLOGY & ADDITIONAL TESTS:      ASSESSMENT:  44y Male w/ ETOH abuse now s/p right thalamic IPH with IVH s/p Emergent Rt EVD placement, s/p Cerebral angiogram POD#1,          PLAN:  NEURO: EVD not working, poor waveform, at 5cm above tragus, Dr. So aware,  EEG ordered, on Keppra for seizure prophylaxis,  Versed held, Precedex sedation running, Ativan PRN agitation  Neuro checks,    CARDIOVASCULAR: SBP goal <140, Cardene drip PRN,  Daily labs, electrolyte repletion PRN,  Continue Lisinopril and Statin therapy    PULMONARY: Continue ventilator support,  Daily CXR    RENAL: Voiding, condom catheter  Na goal >140    GI: NPO w/ TFs via NG,  Stool softeners PRN,  PPI while intubated,    ID: Panculture for fever work-up    ENDO: ISS    DVT PROPHYLAXIS: SCDs, SQH stopped    DISPOSITION: Continue NSICU care,  Full code,  D/w Dr. Wheeler, Dr. So      Assessment:  Present when checked    []  GCS  E   V  M     Heart Failure: []Acute, [] acute on chronic , []chronic  Heart Failure:  [] Diastolic (HFpEF), [] Systolic (HFrEF), []Combined (HFpEF and HFrEF), [] RHF, [] Pulm HTN, [] Other    [] KRISTA, [] ATN, [] AIN, [] other  [] CKD1, [] CKD2, [] CKD 3, [] CKD 4, [] CKD 5, []ESRD    Encephalopathy: [] Metabolic, [] Hepatic, [] toxic, [x] Neurological, [] Other    Abnormal Nurtitional Status: [] malnurtition (see nutrition note), [ ]underweight: BMI < 19, [] morbid obesity: BMI >40, [] Cachexia    [] Sepsis  [] hypovolemic shock,[] cardiogenic shock, [] hemorrhagic shock, [] neuogenic shock  [x] Acute Respiratory Failure  [x]Cerebral edema, [x] Brain compression/ herniation,   [] Functional quadriplegia  [] Acute blood loss anemia S/Overnight events:  EVD not working well overnight, s/p CT Head with tract hemorrhage along EVD. Attempted to pull back EVD 1cm without improvement. Worsening clinical exam but stable overnight with fevers.      Hospital Course:   : overnight, patient requiring large amount of cardene and propofol.  Plan today is to transition to precedex, start PO lisinopril and wean of cardene.  Becomes extremely agitated when off sedation.  : Cerebral angio without findings of aneurysm. EVD stopped working, pulled back without improvement. CT Head performed.  : Received ativan 6mg for agitation. EVD stopped draining at 6pm yesterday taken for stat CTH. Dr. So notified and EVD lowered to 5cmH2O without output.     Vital Signs Last 24 Hrs  T(C): 38 (2019 05:00), Max: 38.9 (2019 18:05)  T(F): 100.4 (2019 05:00), Max: 102.1 (2019 18:05)  HR: 80 (2019 06:00) (66 - 110)  BP: 155/88 (2019 06:00) (121/60 - 167/85)  BP(mean): 113 (2019 06:00) (83 - 124)  RR: 20 (2019 06:00) (15 - 34)  SpO2: 99% (2019 06:00) (94% - 100%)    I&O's Detail    2019 07:01  -  2019 07:00  --------------------------------------------------------  IN:    dexmedetomidine Infusion: 138.2 mL    Enteral Tube Flush: 120 mL    IV PiggyBack: 100 mL    midazolam Infusion: 8 mL    niCARdipine Infusion: 954 mL    ns in tub fed  [id number]: 1020 mL    propofol Infusion: 291.9 mL    sodium chloride 0.9%.: 500 mL    sodium chloride 2%: 50 mL  Total IN: 3182.1 mL    OUT:    External Ventricular Device: 267 mL    Indwelling Catheter - Urethral: 165 mL    Voided: 1325 mL  Total OUT: 1757 mL    Total NET: 1425.1 mL      2019 07:  -  2019 06:20  --------------------------------------------------------  IN:    dexmedetomidine Infusion: 192.1 mL    Enteral Tube Flush: 30 mL    IV PiggyBack: 200 mL    midazolam Infusion: 18 mL    niCARdipine Infusion: 798 mL    ns in tub fed  [id number]: 681 mL    propofol Infusion: 59.8 mL    sodium chloride 0.9%.: 1425 mL  Total IN: 3403.9 mL    OUT:    External Ventricular Device: 74 mL    Voided: 2575 mL  Total OUT: 2649 mL    Total NET: 754.9 mL        I&O's Summary    2019 07:  -  2019 07:00  --------------------------------------------------------  IN: 3182.1 mL / OUT: 1757 mL / NET: 1425.1 mL    2019 07:  -  2019 06:20  --------------------------------------------------------  IN: 3403.9 mL / OUT: 2649 mL / NET: 754.9 mL        PHYSICAL EXAM:  Gen: NAD, Intubated on ventilator, sedated.  HEENT: PERRL, Right frontal EVD site C/D/I.  Lungs: Clear b/l  Heart: S1, S2. NSR.  Abd: Soft, NT/ND. +BS  Exts: Pulses 2+ throughout  Neuro: CNs limited. Mild decorticate posturing to b/l UEs to noxious stimulus but intermittently spontaneously moving right side. No eye opening. Not following commands.    TUBES/LINES:  [] CVC  [x] A-line  [] Lumbar Drain  [x] Ventriculostomy  [] Other    DIET:  [] NPO  [] Mechanical  [x] Tube feeds    LABS:                        13.1   10.50 )-----------( 249      ( 2019 05:35 )             38.9     04-09    142  |  107  |  12  ----------------------------<  130<H>  3.8   |  23  |  0.88    Ca    8.8      2019 05:35  Phos  3.3     04-  Mg     2.2     04-09        Urinalysis Basic - ( 2019 18:26 )    Color: Yellow / Appearance: Clear / S.015 / pH: x  Gluc: x / Ketone: NEGATIVE  / Bili: Negative / Urobili: 1.0 E.U./dL   Blood: x / Protein: NEGATIVE mg/dL / Nitrite: NEGATIVE   Leuk Esterase: NEGATIVE / RBC: < 5 /HPF / WBC < 5 /HPF   Sq Epi: x / Non Sq Epi: 0-5 /HPF / Bacteria: Present /HPF          CAPILLARY BLOOD GLUCOSE      POCT Blood Glucose.: 126 mg/dL (2019 22:01)  POCT Blood Glucose.: 107 mg/dL (2019 16:40)      Drug Levels: [] N/A    CSF Analysis: [] N/A      Allergies    No Known Allergies    Intolerances      MEDICATIONS:  Antibiotics:    Neuro:  acetaminophen    Suspension .. 650 milliGRAM(s) Oral every 6 hours PRN  dexmedetomidine Infusion 0.2 MICROgram(s)/kG/Hr IV Continuous <Continuous>  fentaNYL    Injectable 25 MICROGram(s) IV Push every 2 hours PRN  levETIRAcetam  IVPB 1000 milliGRAM(s) IV Intermittent every 12 hours  LORazepam   Injectable 4 milliGRAM(s) IV Push every 4 hours PRN  LORazepam   Injectable 2 milliGRAM(s) IV Push every 4 hours PRN  LORazepam   Injectable 1 milliGRAM(s) IV Push every 4 hours PRN  midazolam Infusion 0.02 mG/kG/Hr IV Continuous <Continuous>  ondansetron Injectable 4 milliGRAM(s) IV Push every 6 hours PRN    Anticoagulation:    OTHER:  amLODIPine   Tablet 10 milliGRAM(s) Oral daily  atorvastatin 40 milliGRAM(s) Oral at bedtime  bisacodyl 5 milliGRAM(s) Oral daily PRN  bisacodyl Suppository 10 milliGRAM(s) Rectal daily PRN  dextrose 40% Gel 15 Gram(s) Oral once PRN  dextrose 50% Injectable 12.5 Gram(s) IV Push once  dextrose 50% Injectable 25 Gram(s) IV Push once  dextrose 50% Injectable 25 Gram(s) IV Push once  docusate sodium Liquid 100 milliGRAM(s) Oral two times a day  glucagon  Injectable 1 milliGRAM(s) IntraMuscular once PRN  hydrALAZINE Injectable 10 milliGRAM(s) IV Push every 4 hours PRN  influenza   Vaccine 0.5 milliLiter(s) IntraMuscular once  insulin lispro (HumaLOG) corrective regimen sliding scale   SubCutaneous Before meals and at bedtime  lisinopril 20 milliGRAM(s) Oral daily  niCARdipine Infusion 5 mG/Hr IV Continuous <Continuous>  pantoprazole   Suspension 40 milliGRAM(s) Oral daily  senna 2 Tablet(s) Oral at bedtime    IVF:  dextrose 5%. 1000 milliLiter(s) IV Continuous <Continuous>  sodium chloride 0.9%. 1000 milliLiter(s) IV Continuous <Continuous>    CULTURES:  Culture Results:   No growth at 12 hours ( @ 17:16)  Culture Results:   No growth at 12 hours ( @ 17:16)    RADIOLOGY & ADDITIONAL TESTS:      ASSESSMENT:  44y Male w/ ETOH abuse now s/p right thalamic IPH with IVH s/p Emergent Rt EVD placement, s/p Cerebral angiogram POD#1,          PLAN:  NEURO: EVD not working, poor waveform, at 5cm above tragus, Dr. So aware,  EEG ordered, on Keppra for seizure prophylaxis,  Versed held, Precedex sedation running, Ativan PRN agitation  Neuro checks,    CARDIOVASCULAR: SBP goal <140, Cardene drip PRN,  Start lopressor 25mg BID, and increase lisinopril 20mg daily  Daily labs, electrolyte repletion PRN,  Continue Lisinopril and Statin therapy    PULMONARY: Continue ventilator support,  Daily CXR    RENAL: Voiding, condom catheter  Na goal >140  IVF with NS while NPO, total fluids 100c/hr    GI: NPO w/ TFs via NG,  Stool softeners PRN,  PPI while intubated,    ID: Panculture for fever work-up    ENDO: ISS    DVT PROPHYLAXIS: SCDs, SQH stopped    DISPOSITION: Continue NSICU care,  Full code,  D/w Dr. Wheeler, Dr. So      Assessment:  Present when checked    []  GCS  E   V  M     Heart Failure: []Acute, [] acute on chronic , []chronic  Heart Failure:  [] Diastolic (HFpEF), [] Systolic (HFrEF), []Combined (HFpEF and HFrEF), [] RHF, [] Pulm HTN, [] Other    [] KRISTA, [] ATN, [] AIN, [] other  [] CKD1, [] CKD2, [] CKD 3, [] CKD 4, [] CKD 5, []ESRD    Encephalopathy: [] Metabolic, [] Hepatic, [] toxic, [x] Neurological, [] Other    Abnormal Nurtitional Status: [] malnurtition (see nutrition note), [ ]underweight: BMI < 19, [] morbid obesity: BMI >40, [] Cachexia    [] Sepsis  [] hypovolemic shock,[] cardiogenic shock, [] hemorrhagic shock, [] neuogenic shock  [x] Acute Respiratory Failure  [x]Cerebral edema, [x] Brain compression/ herniation,   [] Functional quadriplegia  [] Acute blood loss anemia

## 2019-04-09 NOTE — PROGRESS NOTE ADULT - SUBJECTIVE AND OBJECTIVE BOX
NEUROCRITICAL CARE PROGRESS NOTE    ARPAN RUANO   MRN-[id number]  Summary:  /  HPI:  History obtained by patient's girlfriend and chart from [facility], patient is unresponsive:     43 y/o male with no significant PMHx presents to [facility] with AMS, left side weakness and numbness. Per [place], [place] when he developed acute onset of left facial numbness and LUE and numbness around 6:30PM. When get got home he developed AMS, dysarthria, and weakness in the LUE and LLE. At [facility] ED patient had several episodes of emesis and was hypertensive to SBP in the 200's. Work up revealed right thalamic hemorrhage on CT head. Patient not on any anticoagulation use per girlfriend. Of note, patient took Excedrin for headache at home. Patient transferred to [facility] for further management. (2019 00:04)      S/Overnight events:    POD#     EVD:    CSF :    ICPs:      Vital Signs Last 24 Hrs  T(C): 38 (2019 05:00), Max: 38.9 (2019 18:05)  T(F): 100.4 (2019 05:00), Max: 102.1 (2019 18:05)  HR: 72 (2019 07:00) (66 - 110)  BP: 138/81 (2019 07:00) (121/60 - 167/85)  BP(mean): 104 (2019 07:00) (83 - 124)  RR: 18 (2019 07:00) (15 - 34)  SpO2: 97% (2019 07:00) (94% - 100%)    Mode: AC/ CMV (Assist Control/ Continuous Mandatory Ventilation), RR (machine): 12, TV (machine): 400, FiO2: 40, PEEP: 5, ITime: 1, MAP: 7.7, PIP: 19    I&O's Detail    2019 07:01  -  2019 07:00  --------------------------------------------------------  IN:    dexmedetomidine Infusion: 202.6 mL    Enteral Tube Flush: 30 mL    IV PiggyBack: 200 mL    midazolam Infusion: 18 mL    niCARdipine Infusion: 838 mL    ns in tub fed  [id number]: 681 mL    propofol Infusion: 59.8 mL    sodium chloride 0.9%.: 1425 mL  Total IN: 3454.4 mL    OUT:    External Ventricular Device: 74 mL    Voided: 2875 mL  Total OUT: 2949 mL    Total NET: 505.4 mL      2019 07:01  -  2019 07:36  --------------------------------------------------------  IN:    dexmedetomidine Infusion: 8.3 mL    niCARdipine Infusion: 40 mL  Total IN: 48.3 mL    OUT:  Total OUT: 0 mL    Total NET: 48.3 mL          LABS:                        13.1   10.50 )-----------( 249      ( 2019 05:35 )             38.9     04-    142  |  107  |  12  ----------------------------<  130<H>  3.8   |  23  |  0.88    Ca    8.8      2019 05:35  Phos  3.3       Mg     2.2             Urinalysis Basic - ( 2019 18:26 )    Color: Yellow / Appearance: Clear / S.015 / pH: x  Gluc: x / Ketone: NEGATIVE  / Bili: Negative / Urobili: 1.0 E.U./dL   Blood: x / Protein: NEGATIVE mg/dL / Nitrite: NEGATIVE   Leuk Esterase: NEGATIVE / RBC: < 5 /HPF / WBC < 5 /HPF   Sq Epi: x / Non Sq Epi: 0-5 /HPF / Bacteria: Present /HPF          CAPILLARY BLOOD GLUCOSE      POCT Blood Glucose.: 111 mg/dL (2019 06:14)  POCT Blood Glucose.: 126 mg/dL (2019 22:01)  POCT Blood Glucose.: 107 mg/dL (2019 16:40)      Drug Levels: [] N/A    CSF Analysis: [] N/A      Allergies    No Known Allergies    Intolerances      MEDICATIONS:  Antibiotics:    Neuro:  acetaminophen    Suspension .. 650 milliGRAM(s) Oral every 6 hours PRN  dexmedetomidine Infusion 0.2 MICROgram(s)/kG/Hr IV Continuous <Continuous>  fentaNYL    Injectable 25 MICROGram(s) IV Push every 2 hours PRN  levETIRAcetam  IVPB 1000 milliGRAM(s) IV Intermittent every 12 hours  LORazepam   Injectable 4 milliGRAM(s) IV Push every 4 hours PRN  LORazepam   Injectable 2 milliGRAM(s) IV Push every 4 hours PRN  LORazepam   Injectable 1 milliGRAM(s) IV Push every 4 hours PRN  midazolam Infusion 0.02 mG/kG/Hr IV Continuous <Continuous>  ondansetron Injectable 4 milliGRAM(s) IV Push every 6 hours PRN    Anticoagulation:    OTHER:  amLODIPine   Tablet 10 milliGRAM(s) Oral daily  atorvastatin 40 milliGRAM(s) Oral at bedtime  bisacodyl 5 milliGRAM(s) Oral daily PRN  bisacodyl Suppository 10 milliGRAM(s) Rectal daily PRN  dextrose 40% Gel 15 Gram(s) Oral once PRN  dextrose 50% Injectable 12.5 Gram(s) IV Push once  dextrose 50% Injectable 25 Gram(s) IV Push once  dextrose 50% Injectable 25 Gram(s) IV Push once  docusate sodium Liquid 100 milliGRAM(s) Oral two times a day  glucagon  Injectable 1 milliGRAM(s) IntraMuscular once PRN  hydrALAZINE Injectable 10 milliGRAM(s) IV Push every 4 hours PRN  influenza   Vaccine 0.5 milliLiter(s) IntraMuscular once  insulin lispro (HumaLOG) corrective regimen sliding scale   SubCutaneous Before meals and at bedtime  lisinopril 20 milliGRAM(s) Oral daily  niCARdipine Infusion 5 mG/Hr IV Continuous <Continuous>  pantoprazole   Suspension 40 milliGRAM(s) Oral daily  senna 2 Tablet(s) Oral at bedtime    IVF:  dextrose 5%. 1000 milliLiter(s) IV Continuous <Continuous>  sodium chloride 0.9%. 1000 milliLiter(s) IV Continuous <Continuous>    CULTURES:  Culture Results:   No growth at 12 hours ( @ 17:16)  Culture Results:   No growth at 12 hours ( @ 17:16)                PHYSICAL EXAMINATION    NEUROLOGIC EXAMINATION:  Patient is   lethargic, rousable, follows commands (shows 2 fingers on R), pupils reactive and equal, gaze preference not fixed, downward and left; moves R UE/LE spontaneously, extensor posturing L UE, withdraws L LE  GENERAL: intubated AC 40 400 12 +5    CARDIOVASCULAR: (+) S1 S2, normal rate and regular rhythm  PULMONARY: clear to auscultation bilaterally; min secretions  ABDOMEN: soft, nontender with normoactive bowel sounds  EXTREMITIES: no edema  SKIN: no rash    LABS:  CAPILLARY BLOOD GLUCOSE 115 111 144 143    LDL = 113 (-)   HDL = 70 (-)  TG = 85 ()  TSH = 0.733 ()    Bacteriology:  CSF studies:  EEG:  Neuroimagin/06 12:48 p.m. CT head:  s/p EVD, decrease in size of ventricles, R ICH unchanged with IV extension, punctate hyperdensity R BG R conner R thalamus, unchanged,    2:11 a.m. CT head:  large R basal ganglia / thalamic ICH, small L basal ganglia / R pontine acute hemorrhage; c/w hypertensive ICH; extension into R lateral ventricle; MLS, mass effect, no HCP  Other imaging:    MEDICATIONS: levetiracetam 1G IV q12h docusate 100 PO BID pantoprazole 40 daily senna 2mg PO HS atorvastatin 40mg PO HS mod ISS bisacodyl PRN supp PRN fentanyl PRN     IV FLUIDS: 2%50  DRIPS: cardene (11cc/hr) propofol   DIET: Jevity at 60cc/hr  Lines: Lizabeth Becerra (Amb[place])  Drains:   EVD @ 10cm H20, 267 cc/24h ICP 6-9  Wounds:    CODE STATUS:  Full Code                       GOALS OF CARE:  aggressive                      DISPOSITION:  ICU  ICH Score on admission  = GCS Score: 3-4 (2 pts) E1VTM2 (+) IVH = 3  ICH volume 20ml  Estimated 30-day mortality 3 points: 72% NEUROCRITICAL CARE PROGRESS NOTE    ARPAN RUANO   MRN-[id number]  Summary:  /  HPI:  History obtained by patient's girlfriend and chart from [facility], patient is unresponsive:     43 y/o male with no significant PMHx presents to [facility] with AMS, left side weakness and numbness. Per [place], [place] when he developed acute onset of left facial numbness and LUE and numbness around 6:30PM. When get got home he developed AMS, dysarthria, and weakness in the LUE and LLE. At [facility] ED patient had several episodes of emesis and was hypertensive to SBP in the 200's. Work up revealed right thalamic hemorrhage on CT head. Patient not on any anticoagulation use per girlfriend. Of note, patient took Excedrin for headache at home. Patient transferred to [facility] for further management. (2019 00:04)      S/Overnight events:  fever workup, EVD stopped draining, cardene gtt 9 mg/hr, versed gtt stopped, possible OR, Ativan 4 mg, Precedex 0.6 mcg/kg/min, NPO     EVD:  5  CSF :  70  ICPs:  5    Vital Signs Last 24 Hrs  T(C): 38 (2019 05:00), Max: 38.9 (2019 18:05)  T(F): 100.4 (2019 05:00), Max: 102.1 (2019 18:05)  HR: 72 (2019 07:00) (66 - 110)  BP: 138/81 (2019 07:00) (121/60 - 167/85)  BP(mean): 104 (2019 07:00) (83 - 124)  RR: 18 (2019 07:00) (15 - 34)  SpO2: 97% (2019 07:00) (94% - 100%)    Mode: AC/ CMV (Assist Control/ Continuous Mandatory Ventilation), RR (machine): 12, TV (machine): 400, FiO2: 40, PEEP: 5, ITime: 1, MAP: 7.7, PIP: 19    I&O's Detail    2019 07:01  -  2019 07:00  --------------------------------------------------------  IN:    dexmedetomidine Infusion: 202.6 mL    Enteral Tube Flush: 30 mL    IV PiggyBack: 200 mL    midazolam Infusion: 18 mL    niCARdipine Infusion: 838 mL    ns in tub fed  [id number]: 681 mL    propofol Infusion: 59.8 mL    sodium chloride 0.9%.: 1425 mL  Total IN: 3454.4 mL    OUT:    External Ventricular Device: 74 mL    Voided: 2875 mL  Total OUT: 2949 mL    Total NET: 505.4 mL      2019 07:01  -  2019 07:36  --------------------------------------------------------  IN:    dexmedetomidine Infusion: 8.3 mL    niCARdipine Infusion: 40 mL  Total IN: 48.3 mL    OUT:  Total OUT: 0 mL    Total NET: 48.3 mL      LABS:                        13.1   10.50 )-----------( 249      ( 2019 05:35 )             38.9     04-    142  |  107  |  12  ----------------------------<  130<H>  3.8   |  23  |  0.88    Ca    8.8      2019 05:35  Phos  3.3     04-09  Mg     2.2     04-09        Urinalysis Basic - ( 2019 18:26 )    Color: Yellow / Appearance: Clear / S.015 / pH: x  Gluc: x / Ketone: NEGATIVE  / Bili: Negative / Urobili: 1.0 E.U./dL   Blood: x / Protein: NEGATIVE mg/dL / Nitrite: NEGATIVE   Leuk Esterase: NEGATIVE / RBC: < 5 /HPF / WBC < 5 /HPF   Sq Epi: x / Non Sq Epi: 0-5 /HPF / Bacteria: Present /HPF          CAPILLARY BLOOD GLUCOSE      POCT Blood Glucose.: 111 mg/dL (2019 06:14)  POCT Blood Glucose.: 126 mg/dL (2019 22:01)  POCT Blood Glucose.: 107 mg/dL (2019 16:40)      Drug Levels: [] N/A    CSF Analysis: [] N/A      Allergies    No Known Allergies    Intolerances      MEDICATIONS:  Antibiotics:    Neuro:  acetaminophen    Suspension .. 650 milliGRAM(s) Oral every 6 hours PRN  dexmedetomidine Infusion 0.2 MICROgram(s)/kG/Hr IV Continuous <Continuous>  fentaNYL    Injectable 25 MICROGram(s) IV Push every 2 hours PRN  levETIRAcetam  IVPB 1000 milliGRAM(s) IV Intermittent every 12 hours  LORazepam   Injectable 4 milliGRAM(s) IV Push every 4 hours PRN  LORazepam   Injectable 2 milliGRAM(s) IV Push every 4 hours PRN  LORazepam   Injectable 1 milliGRAM(s) IV Push every 4 hours PRN  midazolam Infusion 0.02 mG/kG/Hr IV Continuous <Continuous>  ondansetron Injectable 4 milliGRAM(s) IV Push every 6 hours PRN    Anticoagulation:    OTHER:  amLODIPine   Tablet 10 milliGRAM(s) Oral daily  atorvastatin 40 milliGRAM(s) Oral at bedtime  bisacodyl 5 milliGRAM(s) Oral daily PRN  bisacodyl Suppository 10 milliGRAM(s) Rectal daily PRN  dextrose 40% Gel 15 Gram(s) Oral once PRN  dextrose 50% Injectable 12.5 Gram(s) IV Push once  dextrose 50% Injectable 25 Gram(s) IV Push once  dextrose 50% Injectable 25 Gram(s) IV Push once  docusate sodium Liquid 100 milliGRAM(s) Oral two times a day  glucagon  Injectable 1 milliGRAM(s) IntraMuscular once PRN  hydrALAZINE Injectable 10 milliGRAM(s) IV Push every 4 hours PRN  influenza   Vaccine 0.5 milliLiter(s) IntraMuscular once  insulin lispro (HumaLOG) corrective regimen sliding scale   SubCutaneous Before meals and at bedtime  lisinopril 20 milliGRAM(s) Oral daily  niCARdipine Infusion 5 mG/Hr IV Continuous <Continuous>  pantoprazole   Suspension 40 milliGRAM(s) Oral daily  senna 2 Tablet(s) Oral at bedtime    IVF:  dextrose 5%. 1000 milliLiter(s) IV Continuous <Continuous>  sodium chloride 0.9%. 1000 milliLiter(s) IV Continuous <Continuous>    CULTURES:  Culture Results:   No growth at 12 hours ( @ 17:16)  Culture Results:   No growth at 12 hours ( @ 17:16)                PHYSICAL EXAMINATION    NEUROLOGIC EXAMINATION:  Patient is   lethargic, unarousable, not follows commands, spont movement on R, L side withdrawal,   pupils reactive and equal, gaze preference not fixed,   downward and left; moves R UE/LE spontaneously, extensor posturing L UE, withdraws L LE    CARDIOVASCULAR: (+) S1 S2, normal rate and regular rhythm  PULMONARY: clear to auscultation bilaterally; min secretions  ABDOMEN: soft, nontender with normoactive bowel sounds  EXTREMITIES: no edema  SKIN: no rash    LDL = 113 (-)   HDL = 70 ()  TG = 85 ()  TSH = 0.733 ()    Bacteriology:  CSF studies:  EEG:  Neuroimagin/06 12:48 p.m. CT head:  s/p EVD, decrease in size of ventricles, R ICH unchanged with IV extension, punctate hyperdensity R BG R conner R thalamus, unchanged,    2:11 a.m. CT head:  large R basal ganglia / thalamic ICH, small L basal ganglia / R pontine acute hemorrhage; c/w hypertensive ICH; extension into R lateral ventricle; MLS, mass effect, no HCP  Other imaging:    IV FLUIDS: 2%50  DRIPS: cardene (11cc/hr) propofol   DIET: Jevity at 60cc/hr  Lines: Lizabeth iY)  Drains:   EVD @ 10cm H20, 267 cc/24h ICP 6-9  Wounds:    CODE STATUS:  Full Code                       GOALS OF CARE:  aggressive                      DISPOSITION:  ICU

## 2019-04-09 NOTE — PROGRESS NOTE ADULT - ATTENDING COMMENTS
Patient remains intubated, eyes closed, not following commands, left hemiparesis, in coma. ICH/IVH somewhat increased in size.  I discussed risks of ICH evacuation with his domestic partner. These include but are not limited to infection, stroke, death, permanent disability, need for tracheostomy and feeding tube, death, recurrent hemorrhage. These issues were previously discussed by the neurosurgery team with the domestic partner and sister when a consent for surgery was signed. All are in agreement to move ahead with surgery with the goal of cerebral decompression in hopes of improving his moribund neurological condition.    Lamont So M.D. Patient remains intubated, eyes closed, not following commands, left hemiparesis, in coma. ICH/IVH somewhat increased in size.  I discussed risks of ICH evacuation with his domestic partner. These include but are not limited to infection, stroke, death, permanent disability, need for tracheostomy and feeding tube, death, recurrent hemorrhage. These issues were previously discussed by the neurosurgery team with the domestic partner and sister when a consent for surgery was signed. Situation also discussed with patient's father. Multiple attempts to reach patient's sister by phone were not successful. Anesthesia and neurosurgical staff as well as NICU attending indicate that they spoke patient's sister and she is agreeable to surgery. All are in agreement to move ahead with surgery with the goal of cerebral decompression in hopes of improving his moribund neurological condition.    Lamont So M.D.

## 2019-04-10 LAB
-  CEFAZOLIN: SIGNIFICANT CHANGE UP
-  CLINDAMYCIN: SIGNIFICANT CHANGE UP
-  ERYTHROMYCIN: SIGNIFICANT CHANGE UP
-  LINEZOLID: SIGNIFICANT CHANGE UP
-  OXACILLIN: SIGNIFICANT CHANGE UP
-  PENICILLIN: SIGNIFICANT CHANGE UP
-  RIFAMPIN: SIGNIFICANT CHANGE UP
-  TRIMETHOPRIM/SULFAMETHOXAZOLE: SIGNIFICANT CHANGE UP
-  VANCOMYCIN: SIGNIFICANT CHANGE UP
ANION GAP SERPL CALC-SCNC: 12 MMOL/L — SIGNIFICANT CHANGE UP (ref 5–17)
ANION GAP SERPL CALC-SCNC: 12 MMOL/L — SIGNIFICANT CHANGE UP (ref 5–17)
BASE EXCESS BLDA CALC-SCNC: 0.7 MMOL/L — SIGNIFICANT CHANGE UP (ref -2–3)
BUN SERPL-MCNC: 13 MG/DL — SIGNIFICANT CHANGE UP (ref 7–23)
BUN SERPL-MCNC: 15 MG/DL — SIGNIFICANT CHANGE UP (ref 7–23)
CALCIUM SERPL-MCNC: 8.4 MG/DL — SIGNIFICANT CHANGE UP (ref 8.4–10.5)
CALCIUM SERPL-MCNC: 8.5 MG/DL — SIGNIFICANT CHANGE UP (ref 8.4–10.5)
CHLORIDE SERPL-SCNC: 105 MMOL/L — SIGNIFICANT CHANGE UP (ref 96–108)
CHLORIDE SERPL-SCNC: 108 MMOL/L — SIGNIFICANT CHANGE UP (ref 96–108)
CO2 SERPL-SCNC: 23 MMOL/L — SIGNIFICANT CHANGE UP (ref 22–31)
CO2 SERPL-SCNC: 23 MMOL/L — SIGNIFICANT CHANGE UP (ref 22–31)
CREAT SERPL-MCNC: 0.8 MG/DL — SIGNIFICANT CHANGE UP (ref 0.5–1.3)
CREAT SERPL-MCNC: 0.96 MG/DL — SIGNIFICANT CHANGE UP (ref 0.5–1.3)
CULTURE RESULTS: SIGNIFICANT CHANGE UP
GLUCOSE SERPL-MCNC: 120 MG/DL — HIGH (ref 70–99)
GLUCOSE SERPL-MCNC: 136 MG/DL — HIGH (ref 70–99)
HCO3 BLDA-SCNC: 25 MMOL/L — SIGNIFICANT CHANGE UP (ref 21–28)
HCT VFR BLD CALC: 34.7 % — LOW (ref 39–50)
HCT VFR BLD CALC: 35.2 % — LOW (ref 39–50)
HGB BLD-MCNC: 12 G/DL — LOW (ref 13–17)
HGB BLD-MCNC: 12.3 G/DL — LOW (ref 13–17)
MAGNESIUM SERPL-MCNC: 2.1 MG/DL — SIGNIFICANT CHANGE UP (ref 1.6–2.6)
MAGNESIUM SERPL-MCNC: 2.2 MG/DL — SIGNIFICANT CHANGE UP (ref 1.6–2.6)
MCHC RBC-ENTMCNC: 32.1 PG — SIGNIFICANT CHANGE UP (ref 27–34)
MCHC RBC-ENTMCNC: 32.2 PG — SIGNIFICANT CHANGE UP (ref 27–34)
MCHC RBC-ENTMCNC: 34.6 GM/DL — SIGNIFICANT CHANGE UP (ref 32–36)
MCHC RBC-ENTMCNC: 34.9 GM/DL — SIGNIFICANT CHANGE UP (ref 32–36)
MCV RBC AUTO: 92.1 FL — SIGNIFICANT CHANGE UP (ref 80–100)
MCV RBC AUTO: 92.8 FL — SIGNIFICANT CHANGE UP (ref 80–100)
METHOD TYPE: SIGNIFICANT CHANGE UP
NRBC # BLD: 0 /100 WBCS — SIGNIFICANT CHANGE UP (ref 0–0)
NRBC # BLD: 0 /100 WBCS — SIGNIFICANT CHANGE UP (ref 0–0)
ORGANISM # SPEC MICROSCOPIC CNT: SIGNIFICANT CHANGE UP
ORGANISM # SPEC MICROSCOPIC CNT: SIGNIFICANT CHANGE UP
PCO2 BLDA: 40 MMHG — SIGNIFICANT CHANGE UP (ref 35–48)
PH BLDA: 7.42 — SIGNIFICANT CHANGE UP (ref 7.35–7.45)
PHOSPHATE SERPL-MCNC: 3 MG/DL — SIGNIFICANT CHANGE UP (ref 2.5–4.5)
PHOSPHATE SERPL-MCNC: 3.2 MG/DL — SIGNIFICANT CHANGE UP (ref 2.5–4.5)
PLATELET # BLD AUTO: 262 K/UL — SIGNIFICANT CHANGE UP (ref 150–400)
PLATELET # BLD AUTO: 267 K/UL — SIGNIFICANT CHANGE UP (ref 150–400)
PO2 BLDA: 80 MMHG — LOW (ref 83–108)
POTASSIUM SERPL-MCNC: 3.6 MMOL/L — SIGNIFICANT CHANGE UP (ref 3.5–5.3)
POTASSIUM SERPL-MCNC: 3.7 MMOL/L — SIGNIFICANT CHANGE UP (ref 3.5–5.3)
POTASSIUM SERPL-SCNC: 3.6 MMOL/L — SIGNIFICANT CHANGE UP (ref 3.5–5.3)
POTASSIUM SERPL-SCNC: 3.7 MMOL/L — SIGNIFICANT CHANGE UP (ref 3.5–5.3)
RBC # BLD: 3.74 M/UL — LOW (ref 4.2–5.8)
RBC # BLD: 3.82 M/UL — LOW (ref 4.2–5.8)
RBC # FLD: 11.9 % — SIGNIFICANT CHANGE UP (ref 10.3–14.5)
RBC # FLD: 11.9 % — SIGNIFICANT CHANGE UP (ref 10.3–14.5)
SAO2 % BLDA: 96 % — SIGNIFICANT CHANGE UP (ref 95–100)
SODIUM SERPL-SCNC: 140 MMOL/L — SIGNIFICANT CHANGE UP (ref 135–145)
SODIUM SERPL-SCNC: 143 MMOL/L — SIGNIFICANT CHANGE UP (ref 135–145)
SPECIMEN SOURCE: SIGNIFICANT CHANGE UP
WBC # BLD: 8.11 K/UL — SIGNIFICANT CHANGE UP (ref 3.8–10.5)
WBC # BLD: 8.17 K/UL — SIGNIFICANT CHANGE UP (ref 3.8–10.5)
WBC # FLD AUTO: 8.11 K/UL — SIGNIFICANT CHANGE UP (ref 3.8–10.5)
WBC # FLD AUTO: 8.17 K/UL — SIGNIFICANT CHANGE UP (ref 3.8–10.5)

## 2019-04-10 PROCEDURE — 70450 CT HEAD/BRAIN W/O DYE: CPT | Mod: 26

## 2019-04-10 PROCEDURE — 99233 SBSQ HOSP IP/OBS HIGH 50: CPT

## 2019-04-10 PROCEDURE — 99291 CRITICAL CARE FIRST HOUR: CPT | Mod: 24

## 2019-04-10 PROCEDURE — 61781 SCAN PROC CRANIAL INTRA: CPT

## 2019-04-10 PROCEDURE — 61313 CRNEC/CRNOT STTL ICERE: CPT

## 2019-04-10 PROCEDURE — 71045 X-RAY EXAM CHEST 1 VIEW: CPT | Mod: 26

## 2019-04-10 RX ORDER — HYDRALAZINE HCL 50 MG
50 TABLET ORAL EVERY 8 HOURS
Qty: 0 | Refills: 0 | Status: DISCONTINUED | OUTPATIENT
Start: 2019-04-10 | End: 2019-04-12

## 2019-04-10 RX ORDER — ACETAMINOPHEN 500 MG
650 TABLET ORAL EVERY 6 HOURS
Qty: 0 | Refills: 0 | Status: DISCONTINUED | OUTPATIENT
Start: 2019-04-10 | End: 2019-04-10

## 2019-04-10 RX ORDER — CEFAZOLIN SODIUM 1 G
1000 VIAL (EA) INJECTION EVERY 8 HOURS
Qty: 0 | Refills: 0 | Status: DISCONTINUED | OUTPATIENT
Start: 2019-04-10 | End: 2019-04-10

## 2019-04-10 RX ORDER — VANCOMYCIN HCL 1 G
1000 VIAL (EA) INTRAVENOUS EVERY 12 HOURS
Qty: 0 | Refills: 0 | Status: DISCONTINUED | OUTPATIENT
Start: 2019-04-10 | End: 2019-04-10

## 2019-04-10 RX ORDER — LEVETIRACETAM 250 MG/1
500 TABLET, FILM COATED ORAL EVERY 12 HOURS
Qty: 0 | Refills: 0 | Status: DISCONTINUED | OUTPATIENT
Start: 2019-04-10 | End: 2019-04-10

## 2019-04-10 RX ORDER — PIPERACILLIN AND TAZOBACTAM 4; .5 G/20ML; G/20ML
4.5 INJECTION, POWDER, LYOPHILIZED, FOR SOLUTION INTRAVENOUS EVERY 6 HOURS
Qty: 0 | Refills: 0 | Status: DISCONTINUED | OUTPATIENT
Start: 2019-04-10 | End: 2019-04-10

## 2019-04-10 RX ORDER — DEXTROSE MONOHYDRATE, SODIUM CHLORIDE, AND POTASSIUM CHLORIDE 50; .745; 4.5 G/1000ML; G/1000ML; G/1000ML
1000 INJECTION, SOLUTION INTRAVENOUS
Qty: 0 | Refills: 0 | Status: DISCONTINUED | OUTPATIENT
Start: 2019-04-10 | End: 2019-04-10

## 2019-04-10 RX ORDER — HYDRALAZINE HCL 50 MG
10 TABLET ORAL EVERY 4 HOURS
Qty: 0 | Refills: 0 | Status: DISCONTINUED | OUTPATIENT
Start: 2019-04-10 | End: 2019-04-11

## 2019-04-10 RX ORDER — FAMOTIDINE 10 MG/ML
20 INJECTION INTRAVENOUS EVERY 12 HOURS
Qty: 0 | Refills: 0 | Status: DISCONTINUED | OUTPATIENT
Start: 2019-04-10 | End: 2019-04-10

## 2019-04-10 RX ORDER — VANCOMYCIN HCL 1 G
1000 VIAL (EA) INTRAVENOUS EVERY 12 HOURS
Qty: 0 | Refills: 0 | Status: DISCONTINUED | OUTPATIENT
Start: 2019-04-10 | End: 2019-04-11

## 2019-04-10 RX ORDER — PIPERACILLIN AND TAZOBACTAM 4; .5 G/20ML; G/20ML
4.5 INJECTION, POWDER, LYOPHILIZED, FOR SOLUTION INTRAVENOUS EVERY 6 HOURS
Qty: 0 | Refills: 0 | Status: DISCONTINUED | OUTPATIENT
Start: 2019-04-10 | End: 2019-04-12

## 2019-04-10 RX ORDER — NICARDIPINE HYDROCHLORIDE 30 MG/1
5 CAPSULE, EXTENDED RELEASE ORAL
Qty: 40 | Refills: 0 | Status: DISCONTINUED | OUTPATIENT
Start: 2019-04-10 | End: 2019-04-12

## 2019-04-10 RX ORDER — DOCUSATE SODIUM 100 MG
100 CAPSULE ORAL THREE TIMES A DAY
Qty: 0 | Refills: 0 | Status: DISCONTINUED | OUTPATIENT
Start: 2019-04-10 | End: 2019-04-10

## 2019-04-10 RX ADMIN — NICARDIPINE HYDROCHLORIDE 25 MG/HR: 30 CAPSULE, EXTENDED RELEASE ORAL at 20:11

## 2019-04-10 RX ADMIN — Medication 50 MILLIGRAM(S): at 21:58

## 2019-04-10 RX ADMIN — PIPERACILLIN AND TAZOBACTAM 25 GRAM(S): 4; .5 INJECTION, POWDER, LYOPHILIZED, FOR SOLUTION INTRAVENOUS at 23:56

## 2019-04-10 RX ADMIN — PIPERACILLIN AND TAZOBACTAM 25 GRAM(S): 4; .5 INJECTION, POWDER, LYOPHILIZED, FOR SOLUTION INTRAVENOUS at 18:58

## 2019-04-10 RX ADMIN — PANTOPRAZOLE SODIUM 40 MILLIGRAM(S): 20 TABLET, DELAYED RELEASE ORAL at 13:56

## 2019-04-10 RX ADMIN — Medication 50 MILLIGRAM(S): at 09:29

## 2019-04-10 RX ADMIN — Medication 100 MILLIGRAM(S): at 05:26

## 2019-04-10 RX ADMIN — NICARDIPINE HYDROCHLORIDE 25 MG/HR: 30 CAPSULE, EXTENDED RELEASE ORAL at 19:11

## 2019-04-10 RX ADMIN — AMLODIPINE BESYLATE 10 MILLIGRAM(S): 2.5 TABLET ORAL at 05:26

## 2019-04-10 RX ADMIN — LEVETIRACETAM 400 MILLIGRAM(S): 250 TABLET, FILM COATED ORAL at 05:26

## 2019-04-10 RX ADMIN — Medication 50 MILLIGRAM(S): at 13:59

## 2019-04-10 RX ADMIN — Medication 250 MILLIGRAM(S): at 22:03

## 2019-04-10 RX ADMIN — Medication 50 MILLIGRAM(S): at 22:03

## 2019-04-10 RX ADMIN — PIPERACILLIN AND TAZOBACTAM 25 GRAM(S): 4; .5 INJECTION, POWDER, LYOPHILIZED, FOR SOLUTION INTRAVENOUS at 13:55

## 2019-04-10 RX ADMIN — LEVETIRACETAM 400 MILLIGRAM(S): 250 TABLET, FILM COATED ORAL at 18:58

## 2019-04-10 RX ADMIN — Medication 50 MILLIGRAM(S): at 09:30

## 2019-04-10 RX ADMIN — ATORVASTATIN CALCIUM 40 MILLIGRAM(S): 80 TABLET, FILM COATED ORAL at 22:03

## 2019-04-10 RX ADMIN — MIDAZOLAM HYDROCHLORIDE 1.44 MG/KG/HR: 1 INJECTION, SOLUTION INTRAMUSCULAR; INTRAVENOUS at 20:11

## 2019-04-10 RX ADMIN — LISINOPRIL 40 MILLIGRAM(S): 2.5 TABLET ORAL at 05:26

## 2019-04-10 RX ADMIN — Medication 250 MILLIGRAM(S): at 11:00

## 2019-04-10 NOTE — PROGRESS NOTE ADULT - SUBJECTIVE AND OBJECTIVE BOX
NEUROCRITICAL CARE PROGRESS NOTE    ARPAN RUANO   MRN-[id number]  Summary:  /  HPI:  History obtained by patient's girlfriend and chart from [facility], patient is unresponsive:     45 y/o male with no significant PMHx presents to [facility] with AMS, left side weakness and numbness. [place] when he developed acute onset of left facial numbness and LUE and numbness around 6:30PM. When get got home he developed AMS, dysarthria, and weakness in the LUE and LLE. At [facility] ED patient had several episodes of emesis and was hypertensive to SBP in the 200's. Work up revealed right thalamic hemorrhage on CT head. Patient not on any anticoagulation use per girlfriend. Of note, patient took Excedrin for headache at home. Patient transferred to [facility] for further management. (2019 00:04)      S/Overnight events:    POD#     EVD:    CSF :    ICPs:      Vital Signs Last 24 Hrs  T(C): 37.9 (10 Apr 2019 04:30), Max: 38.3 (2019 09:44)  T(F): 100.3 (10 Apr 2019 04:30), Max: 100.9 (2019 09:44)  HR: 80 (10 Apr 2019 07:00) (74 - 108)  BP: 136/65 (10 Apr 2019 06:20) (129/66 - 155/80)  BP(mean): 92 (10 Apr 2019 06:20) (91 - 109)  RR: 18 (10 Apr 2019 07:00) (12 - 28)  SpO2: 93% (10 Apr 2019 07:00) (92% - 98%)    Mode: AC/ CMV (Assist Control/ Continuous Mandatory Ventilation), RR (machine): 12, TV (machine): 400, FiO2: 40, PEEP: 5, ITime: 1, MAP: 8.9, PIP: 16    I&O's Detail    2019 07:01  -  10 Apr 2019 07:00  --------------------------------------------------------  IN:    dexmedetomidine Infusion: 10.5 mL    Enteral Tube Flush: 150 mL    IV PiggyBack: 50 mL    midazolam Infusion: 104.1 mL    niCARdipine Infusion: 1660 mL    ns in tub fed  [id number]: 469 mL    sodium chloride 0.9%.: 822 mL  Total IN: 3265.6 mL    OUT:    External Ventricular Device: 262 mL    Voided: 2065 mL  Total OUT: 2327 mL    Total NET: 938.6 mL          LABS:                        12.3   8.11  )-----------( 267      ( 10 Apr 2019 04:50 )             35.2     04-10    140  |  105  |  13  ----------------------------<  120<H>  3.6   |  23  |  0.80    Ca    8.4      10 Apr 2019 04:50  Phos  3.0     04-10  Mg     2.1     04-10      PT/INR - ( 2019 09:43 )   PT: 13.2 sec;   INR: 1.16          PTT - ( 2019 09:43 )  PTT:29.9 sec  Urinalysis Basic - ( 2019 18:26 )    Color: Yellow / Appearance: Clear / S.015 / pH: x  Gluc: x / Ketone: NEGATIVE  / Bili: Negative / Urobili: 1.0 E.U./dL   Blood: x / Protein: NEGATIVE mg/dL / Nitrite: NEGATIVE   Leuk Esterase: NEGATIVE / RBC: < 5 /HPF / WBC < 5 /HPF   Sq Epi: x / Non Sq Epi: 0-5 /HPF / Bacteria: Present /HPF          CAPILLARY BLOOD GLUCOSE      POCT Blood Glucose.: 107 mg/dL (10 Apr 2019 06:32)  POCT Blood Glucose.: 93 mg/dL (2019 16:17)  POCT Blood Glucose.: 123 mg/dL (2019 10:48)      Drug Levels: [] N/A    CSF Analysis: [] N/A      Allergies    No Known Allergies    Intolerances      MEDICATIONS:  Antibiotics:    Neuro:  acetaminophen    Suspension .. 650 milliGRAM(s) Oral every 6 hours PRN  fentaNYL    Injectable 25 MICROGram(s) IV Push every 2 hours PRN  levETIRAcetam  IVPB 1000 milliGRAM(s) IV Intermittent every 12 hours  LORazepam   Injectable 4 milliGRAM(s) IV Push every 4 hours PRN  LORazepam   Injectable 2 milliGRAM(s) IV Push every 4 hours PRN  LORazepam   Injectable 1 milliGRAM(s) IV Push every 4 hours PRN  midazolam Infusion 0.02 mG/kG/Hr IV Continuous <Continuous>  ondansetron Injectable 4 milliGRAM(s) IV Push every 6 hours PRN    Anticoagulation:    OTHER:  amLODIPine   Tablet 10 milliGRAM(s) Oral daily  atorvastatin 40 milliGRAM(s) Oral at bedtime  bisacodyl 5 milliGRAM(s) Oral daily PRN  bisacodyl Suppository 10 milliGRAM(s) Rectal daily PRN  dextrose 40% Gel 15 Gram(s) Oral once PRN  dextrose 50% Injectable 12.5 Gram(s) IV Push once  dextrose 50% Injectable 25 Gram(s) IV Push once  dextrose 50% Injectable 25 Gram(s) IV Push once  docusate sodium Liquid 100 milliGRAM(s) Oral two times a day  glucagon  Injectable 1 milliGRAM(s) IntraMuscular once PRN  hydrALAZINE Injectable 10 milliGRAM(s) IV Push every 4 hours PRN  influenza   Vaccine 0.5 milliLiter(s) IntraMuscular once  insulin lispro (HumaLOG) corrective regimen sliding scale   SubCutaneous Before meals and at bedtime  lisinopril 40 milliGRAM(s) Oral daily  metoprolol tartrate 50 milliGRAM(s) Oral every 12 hours  niCARdipine Infusion 5 mG/Hr IV Continuous <Continuous>  pantoprazole   Suspension 40 milliGRAM(s) Oral daily  senna 2 Tablet(s) Oral at bedtime    IVF:  dextrose 5%. 1000 milliLiter(s) IV Continuous <Continuous>  sodium chloride 0.9%. 1000 milliLiter(s) IV Continuous <Continuous>    CULTURES:  Culture Results:   Numerous Staphylococcus aureus  Susceptibility to follow.  No routine respiratory derek Isolated ( @ 20:31)  Culture Results:   No growth at 1 day. ( @ 17:16)                      PHYSICAL EXAMINATION    NEUROLOGIC EXAMINATION:  Patient is   lethargic, unarousable, not follows commands, spont movement on R, L side withdrawal,   pupils reactive and equal, gaze preference not fixed,   downward and left; moves R UE/LE spontaneously, extensor posturing L UE, withdraws L LE    CARDIOVASCULAR: (+) S1 S2, normal rate and regular rhythm  PULMONARY: clear to auscultation bilaterally; min secretions  ABDOMEN: soft, nontender with normoactive bowel sounds  EXTREMITIES: no edema  SKIN: no rash    LDL = 113 (-)   HDL = 70 (-)  TG = 85 ()  TSH = 0.733 ()    Bacteriology:  CSF studies:  EEG:  Neuroimagin/06 12:48 p.m. CT head:  s/p EVD, decrease in size of ventricles, R ICH unchanged with IV extension, punctate hyperdensity R BG R conner R thalamus, unchanged,    2:11 a.m. CT head:  large R basal ganglia / thalamic ICH, small L basal ganglia / R pontine acute hemorrhage; c/w hypertensive ICH; extension into R lateral ventricle; MLS, mass effect, no HCP  Other imaging:    IV FLUIDS: 2%50  DRIPS: cardene (11cc/hr) propofol   DIET: Jevity at 60cc/hr  Lines: Lizabeth Becerra ([facility])  Drains:   EVD @ 10cm H20, 267 cc/24h ICP 6-9  Wounds:    CODE STATUS:  Full Code                       GOALS OF CARE:  aggressive                      DISPOSITION:  ICU

## 2019-04-10 NOTE — PROGRESS NOTE ADULT - SUBJECTIVE AND OBJECTIVE BOX
4/10: s/p penumbra evacuation of ICH. Pt seen and examined at bedside postop. Remains intubated, on versed. ICPs stable.     Hospital Course:   4/7: overnight, patient requiring large amount of cardene and propofol.  Plan today is to transition to precedex, start PO lisinopril and wean of cardene.  Becomes extremely agitated when off sedation.  4/8: Cerebral angio without findings of aneurysm. EVD stopped working, pulled back without improvement. CT Head performed.  4/9: Received ativan 6mg for agitation. EVD stopped draining at 6pm yesterday taken for stat CTH. Dr. So notified and EVD lowered to 5cmH2O without output.     Vital Signs Last 24 Hrs  T(C): 36.8 (10 Apr 2019 14:07), Max: 38.1 (09 Apr 2019 21:01)  T(F): 98.3 (10 Apr 2019 14:07), Max: 100.6 (09 Apr 2019 21:01)  HR: 66 (10 Apr 2019 19:00) (66 - 101)  BP: 139/86 (10 Apr 2019 18:35) (129/66 - 151/69)  BP(mean): 109 (10 Apr 2019 18:35) (88 - 109)  RR: 13 (10 Apr 2019 19:00) (12 - 42)  SpO2: 96% (10 Apr 2019 19:00) (90% - 100%)    I&O's Detail    09 Apr 2019 07:01  -  10 Apr 2019 07:00  --------------------------------------------------------  IN:    dexmedetomidine Infusion: 10.5 mL    Enteral Tube Flush: 150 mL    IV PiggyBack: 50 mL    midazolam Infusion: 104.1 mL    niCARdipine Infusion: 1660 mL    ns in tub fed  [id number]: 469 mL    sodium chloride 0.9%.: 822 mL  Total IN: 3265.6 mL    OUT:    External Ventricular Device: 262 mL    Voided: 2065 mL  Total OUT: 2327 mL    Total NET: 938.6 mL      10 Apr 2019 07:01  -  10 Apr 2019 19:39  --------------------------------------------------------  IN:    Enteral Tube Flush: 30 mL    IV PiggyBack: 350 mL    midazolam Infusion: 49.1 mL    niCARdipine Infusion: 155 mL    niCARdipine Infusion: 33.3 mL    sodium chloride 0.9%.: 600 mL  Total IN: 1217.4 mL    OUT:    External Ventricular Device: 76 mL    Voided: 400 mL  Total OUT: 476 mL    Total NET: 741.4 mL        I&O's Summary    09 Apr 2019 07:01  -  10 Apr 2019 07:00  --------------------------------------------------------  IN: 3265.6 mL / OUT: 2327 mL / NET: 938.6 mL    10 Apr 2019 07:01  -  10 Apr 2019 19:39  --------------------------------------------------------  IN: 1217.4 mL / OUT: 476 mL / NET: 741.4 mL    PHYSICAL EXAM:  Gen: NAD, Intubated on ventilator, sedated on versed  HEENT: PERRL, new left EVD C/D/I  Lungs: Clear b/l  Heart: S1, S2. NSR  Abd: Soft, NT/ND. +BS  Exts: Pulses 2+ throughout  Neuro: CNs limited. Mild decorticate posturing to b/l UEs to noxious stimulus but intermittently spontaneously moving right side. No eye opening. Not following commands. PERRL, +corneals, +gag    DEVICE/DRAIN DRESSING:  L EVD @ 5cmH2O, draining    TUBES/LINES:  [] CVC  [x] A-line  [] Lumbar Drain  [x] Ventriculostomy  [] Other    DIET:  [x] NPO  [] Mechanical  [] Tube feeds    LABS:                        12.0   8.17  )-----------( 262      ( 10 Apr 2019 18:51 )             34.7     04-10    143  |  108  |  15  ----------------------------<  136<H>  3.7   |  23  |  0.96    Ca    8.5      10 Apr 2019 18:51  Phos  3.2     04-10  Mg     2.2     04-10      PT/INR - ( 09 Apr 2019 09:43 )   PT: 13.2 sec;   INR: 1.16          PTT - ( 09 Apr 2019 09:43 )  PTT:29.9 sec        CAPILLARY BLOOD GLUCOSE      POCT Blood Glucose.: 101 mg/dL (10 Apr 2019 12:12)  POCT Blood Glucose.: 107 mg/dL (10 Apr 2019 06:32)      Drug Levels: [] N/A    CSF Analysis: [] N/A      Allergies    No Known Allergies    Intolerances      MEDICATIONS:  Antibiotics:  piperacillin/tazobactam IVPB. 4.5 Gram(s) IV Intermittent every 6 hours  vancomycin  IVPB 1000 milliGRAM(s) IV Intermittent every 12 hours    Neuro:  acetaminophen    Suspension .. 650 milliGRAM(s) Oral every 6 hours PRN  fentaNYL    Injectable 25 MICROGram(s) IV Push every 2 hours PRN  levETIRAcetam  IVPB 1000 milliGRAM(s) IV Intermittent every 12 hours  LORazepam   Injectable 4 milliGRAM(s) IV Push every 4 hours PRN  LORazepam   Injectable 2 milliGRAM(s) IV Push every 4 hours PRN  LORazepam   Injectable 1 milliGRAM(s) IV Push every 4 hours PRN  midazolam Infusion 0.02 mG/kG/Hr IV Continuous <Continuous>  ondansetron Injectable 4 milliGRAM(s) IV Push every 6 hours PRN    Anticoagulation:    OTHER:  amLODIPine   Tablet 10 milliGRAM(s) Oral daily  atorvastatin 40 milliGRAM(s) Oral at bedtime  bisacodyl 5 milliGRAM(s) Oral daily PRN  bisacodyl Suppository 10 milliGRAM(s) Rectal daily PRN  dextrose 40% Gel 15 Gram(s) Oral once PRN  dextrose 50% Injectable 12.5 Gram(s) IV Push once  dextrose 50% Injectable 25 Gram(s) IV Push once  dextrose 50% Injectable 25 Gram(s) IV Push once  docusate sodium Liquid 100 milliGRAM(s) Oral two times a day  glucagon  Injectable 1 milliGRAM(s) IntraMuscular once PRN  hydrALAZINE 50 milliGRAM(s) Oral every 8 hours  hydrALAZINE Injectable 10 milliGRAM(s) IV Push every 4 hours PRN  influenza   Vaccine 0.5 milliLiter(s) IntraMuscular once  insulin lispro (HumaLOG) corrective regimen sliding scale   SubCutaneous Before meals and at bedtime  lisinopril 40 milliGRAM(s) Oral daily  metoprolol tartrate 50 milliGRAM(s) Oral every 12 hours  niCARdipine Infusion 5 mG/Hr IV Continuous <Continuous>  pantoprazole   Suspension 40 milliGRAM(s) Oral daily  senna 2 Tablet(s) Oral at bedtime    IVF:  dextrose 5%. 1000 milliLiter(s) IV Continuous <Continuous>  sodium chloride 0.9%. 1000 milliLiter(s) IV Continuous <Continuous>    CULTURES:  Culture Results:   Numerous Staphylococcus aureus  Rare Routine respiratory derek present (04-08 @ 20:31)  Culture Results:   No growth at 2 days. (04-08 @ 17:16)    RADIOLOGY & ADDITIONAL TESTS:      ASSESSMENT:  44y Male w/ ETOH abuse now s/p right thalamic IPH with IVH s/p Emergent Rt EVD placement, s/p Cerebral angiogram POD#2, now s/p s/p penumbra evacuation of ICH 4/10/19    HEMORRHAGE STROKE  HEMORRHAGE  Handoff  ICH (intracerebral hemorrhage)  ICH (intracerebral hemorrhage)  Craniotomy for intracranial hemorrhage  Evacuation of hematoma of face  Angiogram, carotid and cerebral, bilateral      PLAN:  NEURO:    CARDIOVASCULAR:    PULMONARY:    RENAL:    GI:    HEME:    ID:    ENDO:    DVT PROPHYLAXIS:  [] Venodynes                                [] Heparin/Lovenox    FALL RISK:  [] Low Risk                                    [] Impulsive    DISPOSITION: 4/10: s/p penumbra evacuation of ICH. Pt seen and examined at bedside postop. Remains intubated, on versed. ICPs stable.     Hospital Course:   4/7: overnight, patient requiring large amount of cardene and propofol.  Plan today is to transition to precedex, start PO lisinopril and wean of cardene.  Becomes extremely agitated when off sedation.  4/8: Cerebral angio without findings of aneurysm. EVD stopped working, pulled back without improvement. CT Head performed.  4/9: Received ativan 6mg for agitation. EVD stopped draining at 6pm yesterday taken for stat CTH. Dr. So notified and EVD lowered to 5cmH2O without output.     Vital Signs Last 24 Hrs  T(C): 36.8 (10 Apr 2019 14:07), Max: 38.1 (09 Apr 2019 21:01)  T(F): 98.3 (10 Apr 2019 14:07), Max: 100.6 (09 Apr 2019 21:01)  HR: 66 (10 Apr 2019 19:00) (66 - 101)  BP: 139/86 (10 Apr 2019 18:35) (129/66 - 151/69)  BP(mean): 109 (10 Apr 2019 18:35) (88 - 109)  RR: 13 (10 Apr 2019 19:00) (12 - 42)  SpO2: 96% (10 Apr 2019 19:00) (90% - 100%)    I&O's Detail    09 Apr 2019 07:01  -  10 Apr 2019 07:00  --------------------------------------------------------  IN:    dexmedetomidine Infusion: 10.5 mL    Enteral Tube Flush: 150 mL    IV PiggyBack: 50 mL    midazolam Infusion: 104.1 mL    niCARdipine Infusion: 1660 mL    ns in tub fed  [id number]: 469 mL    sodium chloride 0.9%.: 822 mL  Total IN: 3265.6 mL    OUT:    External Ventricular Device: 262 mL    Voided: 2065 mL  Total OUT: 2327 mL    Total NET: 938.6 mL      10 Apr 2019 07:01  -  10 Apr 2019 19:39  --------------------------------------------------------  IN:    Enteral Tube Flush: 30 mL    IV PiggyBack: 350 mL    midazolam Infusion: 49.1 mL    niCARdipine Infusion: 155 mL    niCARdipine Infusion: 33.3 mL    sodium chloride 0.9%.: 600 mL  Total IN: 1217.4 mL    OUT:    External Ventricular Device: 76 mL    Voided: 400 mL  Total OUT: 476 mL    Total NET: 741.4 mL        I&O's Summary    09 Apr 2019 07:01  -  10 Apr 2019 07:00  --------------------------------------------------------  IN: 3265.6 mL / OUT: 2327 mL / NET: 938.6 mL    10 Apr 2019 07:01  -  10 Apr 2019 19:39  --------------------------------------------------------  IN: 1217.4 mL / OUT: 476 mL / NET: 741.4 mL    PHYSICAL EXAM:  Gen: NAD, Intubated on ventilator, sedated on versed  HEENT: PERRL, new left EVD C/D/I  Lungs: Clear b/l  Heart: S1, S2. NSR  Abd: Soft, NT/ND. +BS  Exts: Pulses 2+ throughout  Neuro: CNs limited. Mild decorticate posturing to b/l UEs to noxious stimulus but intermittently spontaneously moving right side. No eye opening. Not following commands. PERRL, +corneals, +gag    DEVICE/DRAIN DRESSING:  L EVD @ 5cmH2O, draining    TUBES/LINES:  [] CVC  [x] A-line  [] Lumbar Drain  [x] Ventriculostomy  [] Other    DIET:  [x] NPO  [] Mechanical  [] Tube feeds    LABS:                        12.0   8.17  )-----------( 262      ( 10 Apr 2019 18:51 )             34.7     04-10    143  |  108  |  15  ----------------------------<  136<H>  3.7   |  23  |  0.96    Ca    8.5      10 Apr 2019 18:51  Phos  3.2     04-10  Mg     2.2     04-10      PT/INR - ( 09 Apr 2019 09:43 )   PT: 13.2 sec;   INR: 1.16          PTT - ( 09 Apr 2019 09:43 )  PTT:29.9 sec        CAPILLARY BLOOD GLUCOSE      POCT Blood Glucose.: 101 mg/dL (10 Apr 2019 12:12)  POCT Blood Glucose.: 107 mg/dL (10 Apr 2019 06:32)      Drug Levels: [] N/A    CSF Analysis: [] N/A      Allergies    No Known Allergies    Intolerances      MEDICATIONS:  Antibiotics:  piperacillin/tazobactam IVPB. 4.5 Gram(s) IV Intermittent every 6 hours  vancomycin  IVPB 1000 milliGRAM(s) IV Intermittent every 12 hours    Neuro:  acetaminophen    Suspension .. 650 milliGRAM(s) Oral every 6 hours PRN  fentaNYL    Injectable 25 MICROGram(s) IV Push every 2 hours PRN  levETIRAcetam  IVPB 1000 milliGRAM(s) IV Intermittent every 12 hours  LORazepam   Injectable 4 milliGRAM(s) IV Push every 4 hours PRN  LORazepam   Injectable 2 milliGRAM(s) IV Push every 4 hours PRN  LORazepam   Injectable 1 milliGRAM(s) IV Push every 4 hours PRN  midazolam Infusion 0.02 mG/kG/Hr IV Continuous <Continuous>  ondansetron Injectable 4 milliGRAM(s) IV Push every 6 hours PRN    Anticoagulation:    OTHER:  amLODIPine   Tablet 10 milliGRAM(s) Oral daily  atorvastatin 40 milliGRAM(s) Oral at bedtime  bisacodyl 5 milliGRAM(s) Oral daily PRN  bisacodyl Suppository 10 milliGRAM(s) Rectal daily PRN  dextrose 40% Gel 15 Gram(s) Oral once PRN  dextrose 50% Injectable 12.5 Gram(s) IV Push once  dextrose 50% Injectable 25 Gram(s) IV Push once  dextrose 50% Injectable 25 Gram(s) IV Push once  docusate sodium Liquid 100 milliGRAM(s) Oral two times a day  glucagon  Injectable 1 milliGRAM(s) IntraMuscular once PRN  hydrALAZINE 50 milliGRAM(s) Oral every 8 hours  hydrALAZINE Injectable 10 milliGRAM(s) IV Push every 4 hours PRN  influenza   Vaccine 0.5 milliLiter(s) IntraMuscular once  insulin lispro (HumaLOG) corrective regimen sliding scale   SubCutaneous Before meals and at bedtime  lisinopril 40 milliGRAM(s) Oral daily  metoprolol tartrate 50 milliGRAM(s) Oral every 12 hours  niCARdipine Infusion 5 mG/Hr IV Continuous <Continuous>  pantoprazole   Suspension 40 milliGRAM(s) Oral daily  senna 2 Tablet(s) Oral at bedtime    IVF:  dextrose 5%. 1000 milliLiter(s) IV Continuous <Continuous>  sodium chloride 0.9%. 1000 milliLiter(s) IV Continuous <Continuous>    CULTURES:  Culture Results:   Numerous Staphylococcus aureus  Rare Routine respiratory derek present (04-08 @ 20:31)  Culture Results:   No growth at 2 days. (04-08 @ 17:16)    RADIOLOGY & ADDITIONAL TESTS:      ASSESSMENT:  44y Male w/ ETOH abuse now s/p right thalamic IPH with IVH s/p Emergent Rt EVD placement, s/p Cerebral angiogram POD#2, now s/p s/p penumbra evacuation of ICH 4/10/19    HEMORRHAGE STROKE  HEMORRHAGE  Handoff  ICH (intracerebral hemorrhage)  ICH (intracerebral hemorrhage)  Craniotomy for intracranial hemorrhage  Evacuation of hematoma of face  Angiogram, carotid and cerebral, bilateral      PLAN:  NEURO:  Neuro checks q1h  Keppra for seizure prophylaxis  Continue versed, ativan PRN agitation  Keep left EVD at 5cmH2O, monitor ICPs and output    CARDIOVASCULAR:   SBP goal <140, Cardene drip PRN  Continue lopressor 50mg BID, and hydralazine 50mg q8h, norvasc 10mg daily, and lisinopril 40mg daily  Daily labs, electrolyte repletion PRN,  Continue Statin therapy    PULMONARY:   Continue ventilator support,  Daily CXR    RENAL:   Voiding, condom catheter  Normal Na goal  IVF with NS while NPO, total fluids 100c/hr    GI:   NPO   Stool softeners PRN  PPI while intubated    ID:  Continue vanc/zosyn for empiric PNA coverage    ENDO:   ISS    DVT PROPHYLAXIS:   SCDs, SQH held    DISPOSITION: Continue NSICU care,  Full code,  D/w Dr. Wheeler, Dr. So    Assessment:  Present when checked    []  GCS  E   V  M     Heart Failure: []Acute, [] acute on chronic , []chronic  Heart Failure:  [] Diastolic (HFpEF), [] Systolic (HFrEF), []Combined (HFpEF and HFrEF), [] RHF, [] Pulm HTN, [] Other    [] KRISTA, [] ATN, [] AIN, [] other  [] CKD1, [] CKD2, [] CKD 3, [] CKD 4, [] CKD 5, []ESRD    Encephalopathy: [] Metabolic, [] Hepatic, [] toxic, [x] Neurological, [] Other    Abnormal Nurtitional Status: [] malnurtition (see nutrition note), [ ]underweight: BMI < 19, [] morbid obesity: BMI >40, [] Cachexia    [] Sepsis  [] hypovolemic shock,[] cardiogenic shock, [] hemorrhagic shock, [] neuogenic shock  [x] Acute Respiratory Failure  [x]Cerebral edema, [x] Brain compression/ herniation,   [] Functional quadriplegia  [] Acute blood loss anemia 4/10: s/p penumbra evacuation of ICH. Pt seen and examined at bedside postop. Remains intubated, on versed. ICPs stable. (OR EBL 30cc, received LR 200cc)    Hospital Course:   4/7: overnight, patient requiring large amount of cardene and propofol.  Plan today is to transition to precedex, start PO lisinopril and wean of cardene.  Becomes extremely agitated when off sedation.  4/8: Cerebral angio without findings of aneurysm. EVD stopped working, pulled back without improvement. CT Head performed.  4/9: Received ativan 6mg for agitation. EVD stopped draining at 6pm yesterday taken for stat CTH. Dr. So notified and EVD lowered to 5cmH2O without output.     Vital Signs Last 24 Hrs  T(C): 36.8 (10 Apr 2019 14:07), Max: 38.1 (09 Apr 2019 21:01)  T(F): 98.3 (10 Apr 2019 14:07), Max: 100.6 (09 Apr 2019 21:01)  HR: 66 (10 Apr 2019 19:00) (66 - 101)  BP: 139/86 (10 Apr 2019 18:35) (129/66 - 151/69)  BP(mean): 109 (10 Apr 2019 18:35) (88 - 109)  RR: 13 (10 Apr 2019 19:00) (12 - 42)  SpO2: 96% (10 Apr 2019 19:00) (90% - 100%)    I&O's Detail    09 Apr 2019 07:01  -  10 Apr 2019 07:00  --------------------------------------------------------  IN:    dexmedetomidine Infusion: 10.5 mL    Enteral Tube Flush: 150 mL    IV PiggyBack: 50 mL    midazolam Infusion: 104.1 mL    niCARdipine Infusion: 1660 mL    ns in tub fed  [id number]: 469 mL    sodium chloride 0.9%.: 822 mL  Total IN: 3265.6 mL    OUT:    External Ventricular Device: 262 mL    Voided: 2065 mL  Total OUT: 2327 mL    Total NET: 938.6 mL      10 Apr 2019 07:01  -  10 Apr 2019 19:39  --------------------------------------------------------  IN:    Enteral Tube Flush: 30 mL    IV PiggyBack: 350 mL    midazolam Infusion: 49.1 mL    niCARdipine Infusion: 155 mL    niCARdipine Infusion: 33.3 mL    sodium chloride 0.9%.: 600 mL  Total IN: 1217.4 mL    OUT:    External Ventricular Device: 76 mL    Voided: 400 mL  Total OUT: 476 mL    Total NET: 741.4 mL        I&O's Summary    09 Apr 2019 07:01  -  10 Apr 2019 07:00  --------------------------------------------------------  IN: 3265.6 mL / OUT: 2327 mL / NET: 938.6 mL    10 Apr 2019 07:01  -  10 Apr 2019 19:39  --------------------------------------------------------  IN: 1217.4 mL / OUT: 476 mL / NET: 741.4 mL    PHYSICAL EXAM:  Gen: NAD, Intubated on ventilator, sedated on versed  HEENT: PERRL, new left EVD C/D/I  Lungs: Clear b/l  Heart: S1, S2. NSR  Abd: Soft, NT/ND. +BS  Exts: Pulses 2+ throughout  Neuro: CNs limited. Mild decorticate posturing to b/l UEs to noxious stimulus but intermittently spontaneously moving right side. No eye opening. Not following commands. PERRL, +corneals, +gag    DEVICE/DRAIN DRESSING:  L EVD @ 5cmH2O, draining    TUBES/LINES:  [] CVC  [x] A-line  [] Lumbar Drain  [x] Ventriculostomy  [] Other    DIET:  [x] NPO  [] Mechanical  [] Tube feeds    LABS:                        12.0   8.17  )-----------( 262      ( 10 Apr 2019 18:51 )             34.7     04-10    143  |  108  |  15  ----------------------------<  136<H>  3.7   |  23  |  0.96    Ca    8.5      10 Apr 2019 18:51  Phos  3.2     04-10  Mg     2.2     04-10      PT/INR - ( 09 Apr 2019 09:43 )   PT: 13.2 sec;   INR: 1.16          PTT - ( 09 Apr 2019 09:43 )  PTT:29.9 sec        CAPILLARY BLOOD GLUCOSE      POCT Blood Glucose.: 101 mg/dL (10 Apr 2019 12:12)  POCT Blood Glucose.: 107 mg/dL (10 Apr 2019 06:32)      Drug Levels: [] N/A    CSF Analysis: [] N/A      Allergies    No Known Allergies    Intolerances      MEDICATIONS:  Antibiotics:  piperacillin/tazobactam IVPB. 4.5 Gram(s) IV Intermittent every 6 hours  vancomycin  IVPB 1000 milliGRAM(s) IV Intermittent every 12 hours    Neuro:  acetaminophen    Suspension .. 650 milliGRAM(s) Oral every 6 hours PRN  fentaNYL    Injectable 25 MICROGram(s) IV Push every 2 hours PRN  levETIRAcetam  IVPB 1000 milliGRAM(s) IV Intermittent every 12 hours  LORazepam   Injectable 4 milliGRAM(s) IV Push every 4 hours PRN  LORazepam   Injectable 2 milliGRAM(s) IV Push every 4 hours PRN  LORazepam   Injectable 1 milliGRAM(s) IV Push every 4 hours PRN  midazolam Infusion 0.02 mG/kG/Hr IV Continuous <Continuous>  ondansetron Injectable 4 milliGRAM(s) IV Push every 6 hours PRN    Anticoagulation:    OTHER:  amLODIPine   Tablet 10 milliGRAM(s) Oral daily  atorvastatin 40 milliGRAM(s) Oral at bedtime  bisacodyl 5 milliGRAM(s) Oral daily PRN  bisacodyl Suppository 10 milliGRAM(s) Rectal daily PRN  dextrose 40% Gel 15 Gram(s) Oral once PRN  dextrose 50% Injectable 12.5 Gram(s) IV Push once  dextrose 50% Injectable 25 Gram(s) IV Push once  dextrose 50% Injectable 25 Gram(s) IV Push once  docusate sodium Liquid 100 milliGRAM(s) Oral two times a day  glucagon  Injectable 1 milliGRAM(s) IntraMuscular once PRN  hydrALAZINE 50 milliGRAM(s) Oral every 8 hours  hydrALAZINE Injectable 10 milliGRAM(s) IV Push every 4 hours PRN  influenza   Vaccine 0.5 milliLiter(s) IntraMuscular once  insulin lispro (HumaLOG) corrective regimen sliding scale   SubCutaneous Before meals and at bedtime  lisinopril 40 milliGRAM(s) Oral daily  metoprolol tartrate 50 milliGRAM(s) Oral every 12 hours  niCARdipine Infusion 5 mG/Hr IV Continuous <Continuous>  pantoprazole   Suspension 40 milliGRAM(s) Oral daily  senna 2 Tablet(s) Oral at bedtime    IVF:  dextrose 5%. 1000 milliLiter(s) IV Continuous <Continuous>  sodium chloride 0.9%. 1000 milliLiter(s) IV Continuous <Continuous>    CULTURES:  Culture Results:   Numerous Staphylococcus aureus  Rare Routine respiratory derek present (04-08 @ 20:31)  Culture Results:   No growth at 2 days. (04-08 @ 17:16)    RADIOLOGY & ADDITIONAL TESTS:      ASSESSMENT:  44y Male w/ ETOH abuse now s/p right thalamic IPH with IVH s/p Emergent Rt EVD placement, s/p Cerebral angiogram POD#2, now s/p s/p penumbra evacuation of ICH 4/10/19    HEMORRHAGE STROKE  HEMORRHAGE  Handoff  ICH (intracerebral hemorrhage)  ICH (intracerebral hemorrhage)  Craniotomy for intracranial hemorrhage  Evacuation of hematoma of face  Angiogram, carotid and cerebral, bilateral      PLAN:  NEURO:  Neuro checks q1h  Keppra for seizure prophylaxis  Continue versed, ativan PRN agitation  Keep left EVD at 5cmH2O, monitor ICPs and output    CARDIOVASCULAR:   SBP goal <140, Cardene drip PRN  Continue lopressor 50mg BID, and hydralazine 50mg q8h, norvasc 10mg daily, and lisinopril 40mg daily  Daily labs, electrolyte repletion PRN,  Continue Statin therapy    PULMONARY:   Continue ventilator support,  Daily CXR    RENAL:   Voiding, condom catheter  Normal Na goal  IVF with NS while NPO, total fluids 100c/hr    GI:   NPO   Stool softeners PRN  PPI while intubated    ID:  Continue vanc/zosyn for empiric PNA coverage    ENDO:   ISS    DVT PROPHYLAXIS:   SCDs, SQH held    DISPOSITION: Continue NSICU care,  Full code,  D/w Dr. Wheeler, Dr. So    Assessment:  Present when checked    []  GCS  E   V  M     Heart Failure: []Acute, [] acute on chronic , []chronic  Heart Failure:  [] Diastolic (HFpEF), [] Systolic (HFrEF), []Combined (HFpEF and HFrEF), [] RHF, [] Pulm HTN, [] Other    [] KRISTA, [] ATN, [] AIN, [] other  [] CKD1, [] CKD2, [] CKD 3, [] CKD 4, [] CKD 5, []ESRD    Encephalopathy: [] Metabolic, [] Hepatic, [] toxic, [x] Neurological, [] Other    Abnormal Nurtitional Status: [] malnurtition (see nutrition note), [ ]underweight: BMI < 19, [] morbid obesity: BMI >40, [] Cachexia    [] Sepsis  [] hypovolemic shock,[] cardiogenic shock, [] hemorrhagic shock, [] neuogenic shock  [x] Acute Respiratory Failure  [x]Cerebral edema, [x] Brain compression/ herniation,   [] Functional quadriplegia  [] Acute blood loss anemia

## 2019-04-10 NOTE — PROGRESS NOTE ADULT - ASSESSMENT
44y/M with   1.  intracerebral hemorrhage (r basal ganglia / thalamic; small L basal ganglia, R pontine), brain compression, cerebral edema; ICH Score on admission  = GCS Score: 3-4 (2 pts) E1VTM2 (+) IVH = 3  ICH volume 20ml  Estimated 30-day mortality 3 points: 72%  2.  dyslipidemia    PLAN:   NEURO: neurochecks q1h, PRN pain meds with Tylenol / fentanyl PRN; switch sedation from propofol to precedex to RASS of 0 to -1  ICH:  BP control  seizure prophylaxis: as per neurosurgery  EVD: monitor output, keep at 10cm h20  REHAB:  physical therapy evaluation and management    EARLY MOB:   HOB bedrest    PULM:  full vent support; CPAP trial this afternoon if RASS achieved   CARDIO:  SBP goal 100-140mm Hg; cardene drip to SBP goal; increase lisinopril 40 mg PO daily ; norvasc 10 d, start metop 25 bid  ENDO:  Blood sugar goals 140-180 mg/dL, continue insulin sliding scale; atorvastatin 40mg PO daily, A1C  4.9  GI:  docusate senna; PPI for GI prophylaxis (intubated)  DIET: continue Jevity at goal  RENAL:  Na goal 140-145  HEM/ONC: Hb stable; given DDAVP and platelet transfusion for aspirin reversal  VTE Prophylaxis: SCDs, SQH tonight if ok with NS  ID: afebrile, no leukocytosis, no ABx, high for aspiration / HAP  Social: will update family; sister freeman, father, common law wife who is pregnant    ATTENDING ATTESTATION:  I was physically present for the key portions of the evaluation and management (E/M) service provided.  I agree with the above history, physical and plan, which I have reviewed and edited where appropriate.    Patient at high risk for neurological deterioration or death due to:  ICU delirium, aspiration PNA, DVT / PE.  Critical care time, excluding procedures: 75 minutes spent on total encounter, more than 50% of the visit was spent counseling and/or coordinating care by the attending physician.     Plan discussed with RN, house staff.

## 2019-04-10 NOTE — PROGRESS NOTE ADULT - SUBJECTIVE AND OBJECTIVE BOX
Neurology Stroke Progress Note    INTERVAL HPI/OVERNIGHT EVENTS:  Patient seen and examined. Posturing    MEDICATIONS  (STANDING):  amLODIPine   Tablet 10 milliGRAM(s) Oral daily  atorvastatin 40 milliGRAM(s) Oral at bedtime  dextrose 5%. 1000 milliLiter(s) (50 mL/Hr) IV Continuous <Continuous>  dextrose 50% Injectable 12.5 Gram(s) IV Push once  dextrose 50% Injectable 25 Gram(s) IV Push once  dextrose 50% Injectable 25 Gram(s) IV Push once  docusate sodium Liquid 100 milliGRAM(s) Oral two times a day  hydrALAZINE 50 milliGRAM(s) Oral every 8 hours  influenza   Vaccine 0.5 milliLiter(s) IntraMuscular once  insulin lispro (HumaLOG) corrective regimen sliding scale   SubCutaneous Before meals and at bedtime  levETIRAcetam  IVPB 1000 milliGRAM(s) IV Intermittent every 12 hours  lisinopril 40 milliGRAM(s) Oral daily  metoprolol tartrate 50 milliGRAM(s) Oral every 12 hours  midazolam Infusion 0.02 mG/kG/Hr (1.44 mL/Hr) IV Continuous <Continuous>  niCARdipine Infusion 5 mG/Hr (25 mL/Hr) IV Continuous <Continuous>  pantoprazole   Suspension 40 milliGRAM(s) Oral daily  piperacillin/tazobactam IVPB. 4.5 Gram(s) IV Intermittent every 6 hours  senna 2 Tablet(s) Oral at bedtime  sodium chloride 0.9%. 1000 milliLiter(s) (75 mL/Hr) IV Continuous <Continuous>  vancomycin  IVPB 1000 milliGRAM(s) IV Intermittent every 12 hours    MEDICATIONS  (PRN):  acetaminophen    Suspension .. 650 milliGRAM(s) Oral every 6 hours PRN Temp greater or equal to 38.5C (101.3F)  bisacodyl 5 milliGRAM(s) Oral daily PRN Constipation  bisacodyl Suppository 10 milliGRAM(s) Rectal daily PRN Constipation  dextrose 40% Gel 15 Gram(s) Oral once PRN Blood Glucose LESS THAN 70 milliGRAM(s)/deciliter  fentaNYL    Injectable 25 MICROGram(s) IV Push every 2 hours PRN Severe Pain (7 - 10)  glucagon  Injectable 1 milliGRAM(s) IntraMuscular once PRN Glucose LESS THAN 70 milligrams/deciliter  hydrALAZINE Injectable 10 milliGRAM(s) IV Push every 4 hours PRN BP>150  LORazepam   Injectable 4 milliGRAM(s) IV Push every 4 hours PRN RASS 4  LORazepam   Injectable 2 milliGRAM(s) IV Push every 4 hours PRN RASS 3  LORazepam   Injectable 1 milliGRAM(s) IV Push every 4 hours PRN RASS 2  ondansetron Injectable 4 milliGRAM(s) IV Push every 6 hours PRN Nausea and/or Vomiting      Allergies    No Known Allergies    Intolerances        ROS: As per HPI, otherwise negative    Vital Signs Last 24 Hrs  T(C): 37.3 (10 Apr 2019 09:07), Max: 38.1 (2019 21:01)  T(F): 99.2 (10 Apr 2019 09:07), Max: 100.6 (2019 21:01)  HR: 88 (10 Apr 2019 10:00) (74 - 108)  BP: 131/62 (10 Apr 2019 09:00) (129/66 - 155/80)  BP(mean): 88 (10 Apr 2019 09:00) (88 - 106)  RR: 21 (10 Apr 2019 10:00) (18 - 28)  SpO2: 93% (10 Apr 2019 10:00) (90% - 98%)    Physical exam:  General: awake and alert, sitting comfortably, no acute distress    Neurologic:  General: comatose, intubated, not sedated    Neurologic:  Mental status: comatose, intubated, not sedated  +Corneal reflex  Cranial nerves:   II:  pupils equally round and not reactive to light,   III, IV, VI: eyes midline  Motor: Normal bulk and tone, slight movement of right hand, extensor posturing of both hands  Sensation: decreased sensation of left side  Coordination: did not cooperate  Reflexes: equivocal toes bilaterally  Gait: deferred        LABS:                        12.3   8.11  )-----------( 267      ( 10 Apr 2019 04:50 )             35.2     04-10    140  |  105  |  13  ----------------------------<  120<H>  3.6   |  23  |  0.80    Ca    8.4      10 Apr 2019 04:50  Phos  3.0     04-10  Mg     2.1     04-10      PT/INR - ( 2019 09:43 )   PT: 13.2 sec;   INR: 1.16          PTT - ( 2019 09:43 )  PTT:29.9 sec  Urinalysis Basic - ( 2019 18:26 )    Color: Yellow / Appearance: Clear / S.015 / pH: x  Gluc: x / Ketone: NEGATIVE  / Bili: Negative / Urobili: 1.0 E.U./dL   Blood: x / Protein: NEGATIVE mg/dL / Nitrite: NEGATIVE   Leuk Esterase: NEGATIVE / RBC: < 5 /HPF / WBC < 5 /HPF   Sq Epi: x / Non Sq Epi: 0-5 /HPF / Bacteria: Present /HPF        RADIOLOGY & ADDITIONAL TESTS:  < from: CT Head No Cont (19 @ 19:43) >  IMPRESSION: Interval removal of the right EVD catheter left-sided EVD   catheter in place. Intracranial hemorrhage with increase in the right   frontal hematoma new cast of hemorrhage within the left lateral   ventricle. Images are available for surgical planning.    < end of copied text >        Assessment and Plan  44 year-old male presents with ICH with IVH extension probably secondary to hypertension, though previously undiagnosed. S/p EVD placement , now for possible procedure today      1)Secondary stroke prevention  -No antiplatelet as patient has ICH  -Atorvastatin 40    2) Stroke risk factors  -likely HTN    3) Further workup   -Care per neurosurgery    DVT prophylaxis   -SCDs

## 2019-04-10 NOTE — PROGRESS NOTE ADULT - SUBJECTIVE AND OBJECTIVE BOX
HPI:  History obtained by patient's girlfriend and chart from [facility], patient is unresponsive:     43 y/o male with no significant PMHx presents to [facility] with AMS, left side weakness and numbness. Per girlfriend, [place] when he developed acute onset of left facial numbness and LUE and numbness around 6:30PM. When get got home he developed AMS, dysarthria, and weakness in the LUE and LLE. At [facility] ED patient had several episodes of emesis and was hypertensive to SBP in the 200's. Work up revealed right thalamic hemorrhage on CT head. Patient not on any anticoagulation use per girlfriend. Of note, patient took Excedrin for headache at home. Patient transferred to [facility] for further management. (2019 00:04)        S/Overnight events:  frequent suctioning requirement. L EVD working well at 5 cc       Hospital Course:    Hospital Course:   : overnight, patient requiring large amount of cardene and propofol.  Plan today is to transition to precedex, start PO lisinopril and wean of cardene.  Becomes extremely agitated when off sedation.  : Cerebral angio without findings of aneurysm. EVD stopped working, pulled back without improvement. CT Head performed.  : Received ativan 6mg for agitation. EVD stopped draining at 6pm yesterday taken for stat CTH. Dr. So notified and EVD lowered to 5cmH2O without output.         Vital Signs Last 24 Hrs  T(C): 37.3 (10 Apr 2019 09:07), Max: 38.1 (2019 21:01)  T(F): 99.2 (10 Apr 2019 09:07), Max: 100.6 (2019 21:01)  HR: 88 (10 Apr 2019 09:00) (74 - 108)  BP: 131/62 (10 Apr 2019 09:00) (129/66 - 155/80)  BP(mean): 88 (10 Apr 2019 09:00) (88 - 106)  RR: 22 (10 Apr 2019 09:00) (18 - 28)  SpO2: 93% (10 Apr 2019 09:00) (90% - 98%)    I&O's Detail    2019 07:01  -  10 Apr 2019 07:00  --------------------------------------------------------  IN:    dexmedetomidine Infusion: 10.5 mL    Enteral Tube Flush: 150 mL    IV PiggyBack: 50 mL    midazolam Infusion: 104.1 mL    niCARdipine Infusion: 1660 mL    ns in tub fed  dcyesx24: 469 mL    sodium chloride 0.9%.: 822 mL  Total IN: 3265.6 mL    OUT:    External Ventricular Device: 262 mL    Voided: 2065 mL  Total OUT: 2327 mL    Total NET: 938.6 mL      10 Apr 2019 07:  -  10 Apr 2019 10:06  --------------------------------------------------------  IN:    midazolam Infusion: 7.8 mL    niCARdipine Infusion: 75 mL    sodium chloride 0.9%.: 75 mL  Total IN: 157.8 mL    OUT:    External Ventricular Device: 12 mL  Total OUT: 12 mL    Total NET: 145.8 mL        I&O's Summary    2019 07:  -  10 Apr 2019 07:00  --------------------------------------------------------  IN: 3265.6 mL / OUT: 2327 mL / NET: 938.6 mL    10 Apr 2019 07:  -  10 Apr 2019 10:06  --------------------------------------------------------  IN: 157.8 mL / OUT: 12 mL / NET: 145.8 mL        PHYSICAL EXAM:  Gen: NAD, Intubated on ventilator, sedated.  HEENT: PERRL, Right frontal EVD removed, new left EVD c/d/i   Lungs: Clear b/l  Heart: S1, S2. NSR.  Abd: Soft, NT/ND. +BS  Exts: Pulses 2+ throughout  Neuro: CNs limited. Mild decorticate posturing to b/l UEs to noxious stimulus but intermittently spontaneously moving right side. No eye opening. Not following commands.  DEVICE/DRAIN DRESSING:  L EVD @ 5cmH2O   TUBES/LINES:  [] CVC  [x] A-line  [] Lumbar Drain  [x] Ventriculostomy  [] Other    DIET:  [x] NPO  [] Mechanical  [] Tube feeds    LABS:                        12.3   8.11  )-----------( 267      ( 10 Apr 2019 04:50 )             35.2     04-10    140  |  105  |  13  ----------------------------<  120<H>  3.6   |  23  |  0.80    Ca    8.4      10 Apr 2019 04:50  Phos  3.0     04-10  Mg     2.1     04-10      PT/INR - ( 2019 09:43 )   PT: 13.2 sec;   INR: 1.16          PTT - ( 2019 09:43 )  PTT:29.9 sec  Urinalysis Basic - ( 2019 18:26 )    Color: Yellow / Appearance: Clear / S.015 / pH: x  Gluc: x / Ketone: NEGATIVE  / Bili: Negative / Urobili: 1.0 E.U./dL   Blood: x / Protein: NEGATIVE mg/dL / Nitrite: NEGATIVE   Leuk Esterase: NEGATIVE / RBC: < 5 /HPF / WBC < 5 /HPF   Sq Epi: x / Non Sq Epi: 0-5 /HPF / Bacteria: Present /HPF          CAPILLARY BLOOD GLUCOSE      POCT Blood Glucose.: 107 mg/dL (10 Apr 2019 06:32)  POCT Blood Glucose.: 93 mg/dL (2019 16:17)  POCT Blood Glucose.: 123 mg/dL (2019 10:48)      Drug Levels: [] N/A    CSF Analysis: [] N/A      Allergies    No Known Allergies    Intolerances      MEDICATIONS:  Antibiotics:    Neuro:  acetaminophen    Suspension .. 650 milliGRAM(s) Oral every 6 hours PRN  fentaNYL    Injectable 25 MICROGram(s) IV Push every 2 hours PRN  levETIRAcetam  IVPB 1000 milliGRAM(s) IV Intermittent every 12 hours  LORazepam   Injectable 4 milliGRAM(s) IV Push every 4 hours PRN  LORazepam   Injectable 2 milliGRAM(s) IV Push every 4 hours PRN  LORazepam   Injectable 1 milliGRAM(s) IV Push every 4 hours PRN  midazolam Infusion 0.02 mG/kG/Hr IV Continuous <Continuous>  ondansetron Injectable 4 milliGRAM(s) IV Push every 6 hours PRN    Anticoagulation:    OTHER:  amLODIPine   Tablet 10 milliGRAM(s) Oral daily  atorvastatin 40 milliGRAM(s) Oral at bedtime  bisacodyl 5 milliGRAM(s) Oral daily PRN  bisacodyl Suppository 10 milliGRAM(s) Rectal daily PRN  dextrose 40% Gel 15 Gram(s) Oral once PRN  dextrose 50% Injectable 12.5 Gram(s) IV Push once  dextrose 50% Injectable 25 Gram(s) IV Push once  dextrose 50% Injectable 25 Gram(s) IV Push once  docusate sodium Liquid 100 milliGRAM(s) Oral two times a day  glucagon  Injectable 1 milliGRAM(s) IntraMuscular once PRN  hydrALAZINE 50 milliGRAM(s) Oral every 8 hours  hydrALAZINE Injectable 10 milliGRAM(s) IV Push every 4 hours PRN  influenza   Vaccine 0.5 milliLiter(s) IntraMuscular once  insulin lispro (HumaLOG) corrective regimen sliding scale   SubCutaneous Before meals and at bedtime  lisinopril 40 milliGRAM(s) Oral daily  metoprolol tartrate 50 milliGRAM(s) Oral every 12 hours  niCARdipine Infusion 5 mG/Hr IV Continuous <Continuous>  pantoprazole   Suspension 40 milliGRAM(s) Oral daily  senna 2 Tablet(s) Oral at bedtime    IVF:  dextrose 5%. 1000 milliLiter(s) IV Continuous <Continuous>  sodium chloride 0.9%. 1000 milliLiter(s) IV Continuous <Continuous>    CULTURES:  Culture Results:   Numerous Staphylococcus aureus  Susceptibility to follow.  No routine respiratory derek Isolated ( @ 20:31)  Culture Results:   No growth at 1 day. ( @ 17:16)    RADIOLOGY & ADDITIONAL TESTS:      ASSESSMENT:  44y Male     HEMORRHAGE STROKE  HEMORRHAGE  Handoff  ICH (intracerebral hemorrhage)  ICH (intracerebral hemorrhage)  Angiogram, carotid and cerebral, bilateral      w/ ETOH abuse now s/p right thalamic IPH with IVH s/p Emergent Rt EVD placement, s/p Cerebral angiogram POD#1,          PLAN:  NEURO: EVD not working, poor waveform, at 5cm above tragus, Dr. So aware,  EEG ordered, on Keppra for seizure prophylaxis,  Versed held, Precedex sedation running, Ativan PRN agitation  Neuro checks,    CARDIOVASCULAR: SBP goal <140, Cardene drip PRN,  Start lopressor 25mg BID, and increase lisinopril 20mg daily  Daily labs, electrolyte repletion PRN,  Continue Lisinopril and Statin therapy    PULMONARY: Continue ventilator support,  Daily CXR    RENAL: Voiding, condom catheter  Na goal >140  IVF with NS while NPO, total fluids 100c/hr    GI: NPO w/ TFs via NG,  Stool softeners PRN,  PPI while intubated,    ID: Panculture for fever work-up    ENDO: ISS    DVT PROPHYLAXIS: SCDs, SQH stopped    DISPOSITION: Continue NSICU care,  Full code,  D/w Dr. Wheeler, Dr. So      Assessment:  Present when checked    []  GCS  E   V  M     Heart Failure: []Acute, [] acute on chronic , []chronic  Heart Failure:  [] Diastolic (HFpEF), [] Systolic (HFrEF), []Combined (HFpEF and HFrEF), [] RHF, [] Pulm HTN, [] Other    [] KRISTA, [] ATN, [] AIN, [] other  [] CKD1, [] CKD2, [] CKD 3, [] CKD 4, [] CKD 5, []ESRD    Encephalopathy: [] Metabolic, [] Hepatic, [] toxic, [x] Neurological, [] Other    Abnormal Nurtitional Status: [] malnurtition (see nutrition note), [ ]underweight: BMI < 19, [] morbid obesity: BMI >40, [] Cachexia    [] Sepsis  [] hypovolemic shock,[] cardiogenic shock, [] hemorrhagic shock, [] neuogenic shock  [x] Acute Respiratory Failure  [x]Cerebral edema, [x] Brain compression/ herniation,   [] Functional quadriplegia  [] Acute blood loss anemia

## 2019-04-11 LAB
ANION GAP SERPL CALC-SCNC: 12 MMOL/L — SIGNIFICANT CHANGE UP (ref 5–17)
APPEARANCE CSF: SIGNIFICANT CHANGE UP
APPEARANCE SPUN FLD: ABNORMAL
BUN SERPL-MCNC: 16 MG/DL — SIGNIFICANT CHANGE UP (ref 7–23)
CALCIUM SERPL-MCNC: 8.6 MG/DL — SIGNIFICANT CHANGE UP (ref 8.4–10.5)
CHLORIDE SERPL-SCNC: 112 MMOL/L — HIGH (ref 96–108)
CO2 SERPL-SCNC: 22 MMOL/L — SIGNIFICANT CHANGE UP (ref 22–31)
COLOR CSF: ABNORMAL
CREAT SERPL-MCNC: 0.95 MG/DL — SIGNIFICANT CHANGE UP (ref 0.5–1.3)
CSF COMMENTS: SIGNIFICANT CHANGE UP
GLUCOSE CSF-MCNC: 95 MG/DL — HIGH (ref 40–70)
GLUCOSE SERPL-MCNC: 146 MG/DL — HIGH (ref 70–99)
GRAM STN FLD: SIGNIFICANT CHANGE UP
GRAM STN FLD: SIGNIFICANT CHANGE UP
HCT VFR BLD CALC: 35.5 % — LOW (ref 39–50)
HGB BLD-MCNC: 12.1 G/DL — LOW (ref 13–17)
LACTATE CSF-MCNC: 3.4 MMOL/L — HIGH (ref 1.1–2.4)
LYMPHOCYTES # CSF: 1 % — LOW (ref 40–80)
MAGNESIUM SERPL-MCNC: 2.2 MG/DL — SIGNIFICANT CHANGE UP (ref 1.6–2.6)
MCHC RBC-ENTMCNC: 31.7 PG — SIGNIFICANT CHANGE UP (ref 27–34)
MCHC RBC-ENTMCNC: 34.1 GM/DL — SIGNIFICANT CHANGE UP (ref 32–36)
MCV RBC AUTO: 92.9 FL — SIGNIFICANT CHANGE UP (ref 80–100)
MONOS+MACROS NFR CSF: 1 % — LOW (ref 15–45)
NEUTROPHILS # CSF: 35 % — HIGH (ref 0–6)
NRBC # BLD: 0 /100 WBCS — SIGNIFICANT CHANGE UP (ref 0–0)
NRBC NFR CSF: 37 /UL — HIGH (ref 0–5)
PHOSPHATE SERPL-MCNC: 2.3 MG/DL — LOW (ref 2.5–4.5)
PLATELET # BLD AUTO: 316 K/UL — SIGNIFICANT CHANGE UP (ref 150–400)
POTASSIUM SERPL-MCNC: 4 MMOL/L — SIGNIFICANT CHANGE UP (ref 3.5–5.3)
POTASSIUM SERPL-SCNC: 4 MMOL/L — SIGNIFICANT CHANGE UP (ref 3.5–5.3)
PROT CSF-MCNC: 121 MG/DL — HIGH (ref 15–45)
RBC # BLD: 3.82 M/UL — LOW (ref 4.2–5.8)
RBC # CSF: 4800 /UL — HIGH (ref 0–0)
RBC # FLD: 11.9 % — SIGNIFICANT CHANGE UP (ref 10.3–14.5)
SODIUM SERPL-SCNC: 146 MMOL/L — HIGH (ref 135–145)
SPECIMEN SOURCE: SIGNIFICANT CHANGE UP
TUBE TYPE: SIGNIFICANT CHANGE UP
VANCOMYCIN TROUGH SERPL-MCNC: 6.1 UG/ML — LOW (ref 10–20)
WBC # BLD: 7.87 K/UL — SIGNIFICANT CHANGE UP (ref 3.8–10.5)
WBC # FLD AUTO: 7.87 K/UL — SIGNIFICANT CHANGE UP (ref 3.8–10.5)

## 2019-04-11 PROCEDURE — 99291 CRITICAL CARE FIRST HOUR: CPT | Mod: 24

## 2019-04-11 PROCEDURE — 71045 X-RAY EXAM CHEST 1 VIEW: CPT | Mod: 26

## 2019-04-11 PROCEDURE — 99292 CRITICAL CARE ADDL 30 MIN: CPT | Mod: 24

## 2019-04-11 RX ORDER — LEVETIRACETAM 250 MG/1
1000 TABLET, FILM COATED ORAL
Qty: 0 | Refills: 0 | Status: DISCONTINUED | OUTPATIENT
Start: 2019-04-11 | End: 2019-05-04

## 2019-04-11 RX ORDER — VANCOMYCIN HCL 1 G
1500 VIAL (EA) INTRAVENOUS EVERY 12 HOURS
Qty: 0 | Refills: 0 | Status: DISCONTINUED | OUTPATIENT
Start: 2019-04-11 | End: 2019-04-12

## 2019-04-11 RX ORDER — FENTANYL CITRATE 50 UG/ML
25 INJECTION INTRAVENOUS
Qty: 0 | Refills: 0 | Status: DISCONTINUED | OUTPATIENT
Start: 2019-04-11 | End: 2019-04-14

## 2019-04-11 RX ORDER — FENTANYL CITRATE 50 UG/ML
50 INJECTION INTRAVENOUS
Qty: 0 | Refills: 0 | Status: DISCONTINUED | OUTPATIENT
Start: 2019-04-11 | End: 2019-04-14

## 2019-04-11 RX ORDER — HYDRALAZINE HCL 50 MG
10 TABLET ORAL EVERY 4 HOURS
Qty: 0 | Refills: 0 | Status: DISCONTINUED | OUTPATIENT
Start: 2019-04-11 | End: 2019-04-20

## 2019-04-11 RX ORDER — FENTANYL CITRATE 50 UG/ML
12.5 INJECTION INTRAVENOUS
Qty: 0 | Refills: 0 | Status: DISCONTINUED | OUTPATIENT
Start: 2019-04-11 | End: 2019-04-14

## 2019-04-11 RX ORDER — HYDRALAZINE HCL 50 MG
10 TABLET ORAL ONCE
Qty: 0 | Refills: 0 | Status: COMPLETED | OUTPATIENT
Start: 2019-04-11 | End: 2019-04-11

## 2019-04-11 RX ADMIN — Medication 100 MILLIGRAM(S): at 17:02

## 2019-04-11 RX ADMIN — Medication 10 MILLIGRAM(S): at 16:59

## 2019-04-11 RX ADMIN — FENTANYL CITRATE 12.5 MICROGRAM(S): 50 INJECTION INTRAVENOUS at 11:56

## 2019-04-11 RX ADMIN — Medication 2 MILLIGRAM(S): at 07:31

## 2019-04-11 RX ADMIN — Medication 50 MILLIGRAM(S): at 23:19

## 2019-04-11 RX ADMIN — PIPERACILLIN AND TAZOBACTAM 25 GRAM(S): 4; .5 INJECTION, POWDER, LYOPHILIZED, FOR SOLUTION INTRAVENOUS at 06:02

## 2019-04-11 RX ADMIN — Medication 2: at 11:47

## 2019-04-11 RX ADMIN — PIPERACILLIN AND TAZOBACTAM 25 GRAM(S): 4; .5 INJECTION, POWDER, LYOPHILIZED, FOR SOLUTION INTRAVENOUS at 23:18

## 2019-04-11 RX ADMIN — Medication 650 MILLIGRAM(S): at 04:53

## 2019-04-11 RX ADMIN — Medication 50 MILLIGRAM(S): at 09:56

## 2019-04-11 RX ADMIN — Medication 2: at 15:44

## 2019-04-11 RX ADMIN — Medication 4 MILLIGRAM(S): at 21:52

## 2019-04-11 RX ADMIN — PIPERACILLIN AND TAZOBACTAM 25 GRAM(S): 4; .5 INJECTION, POWDER, LYOPHILIZED, FOR SOLUTION INTRAVENOUS at 17:01

## 2019-04-11 RX ADMIN — AMLODIPINE BESYLATE 10 MILLIGRAM(S): 2.5 TABLET ORAL at 06:04

## 2019-04-11 RX ADMIN — FENTANYL CITRATE 50 MICROGRAM(S): 50 INJECTION INTRAVENOUS at 22:35

## 2019-04-11 RX ADMIN — NICARDIPINE HYDROCHLORIDE 25 MG/HR: 30 CAPSULE, EXTENDED RELEASE ORAL at 02:36

## 2019-04-11 RX ADMIN — Medication 10 MILLIGRAM(S): at 21:21

## 2019-04-11 RX ADMIN — Medication 50 MILLIGRAM(S): at 14:52

## 2019-04-11 RX ADMIN — Medication 650 MILLIGRAM(S): at 06:13

## 2019-04-11 RX ADMIN — MIDAZOLAM HYDROCHLORIDE 1.44 MG/KG/HR: 1 INJECTION, SOLUTION INTRAMUSCULAR; INTRAVENOUS at 17:40

## 2019-04-11 RX ADMIN — ATORVASTATIN CALCIUM 40 MILLIGRAM(S): 80 TABLET, FILM COATED ORAL at 22:02

## 2019-04-11 RX ADMIN — Medication 2 MILLIGRAM(S): at 19:20

## 2019-04-11 RX ADMIN — PIPERACILLIN AND TAZOBACTAM 25 GRAM(S): 4; .5 INJECTION, POWDER, LYOPHILIZED, FOR SOLUTION INTRAVENOUS at 11:46

## 2019-04-11 RX ADMIN — PANTOPRAZOLE SODIUM 40 MILLIGRAM(S): 20 TABLET, DELAYED RELEASE ORAL at 11:46

## 2019-04-11 RX ADMIN — LEVETIRACETAM 1000 MILLIGRAM(S): 250 TABLET, FILM COATED ORAL at 17:02

## 2019-04-11 RX ADMIN — Medication 10 MILLIGRAM(S): at 12:40

## 2019-04-11 RX ADMIN — FENTANYL CITRATE 25 MICROGRAM(S): 50 INJECTION INTRAVENOUS at 14:52

## 2019-04-11 RX ADMIN — FENTANYL CITRATE 50 MICROGRAM(S): 50 INJECTION INTRAVENOUS at 18:25

## 2019-04-11 RX ADMIN — Medication 250 MILLIGRAM(S): at 09:57

## 2019-04-11 RX ADMIN — Medication 50 MILLIGRAM(S): at 06:04

## 2019-04-11 RX ADMIN — FENTANYL CITRATE 50 MICROGRAM(S): 50 INJECTION INTRAVENOUS at 21:21

## 2019-04-11 RX ADMIN — Medication 50 MILLIGRAM(S): at 22:03

## 2019-04-11 RX ADMIN — LEVETIRACETAM 400 MILLIGRAM(S): 250 TABLET, FILM COATED ORAL at 06:04

## 2019-04-11 RX ADMIN — Medication 650 MILLIGRAM(S): at 11:55

## 2019-04-11 RX ADMIN — LISINOPRIL 40 MILLIGRAM(S): 2.5 TABLET ORAL at 06:04

## 2019-04-11 NOTE — CHART NOTE - NSCHARTNOTEFT_GEN_A_CORE
Admitting Diagnosis:   Patient is a 44y old  Male who presents with a chief complaint of right thalamic hemorrhage (11 Apr 2019 08:16)    PAST MEDICAL & SURGICAL HISTORY:    Current Nutrition Order: Jevity 1.2 @ 67 mL/hr, Prostat SF 1x/day (1608 mL TV, 2030 kcal, 104 gm pro, 1298 mL free water, 161% RDI vitamin/mineral)     PO Intake: N/A as pt on EN    GI Issues: no stool, soft/ non tender abdomen per flow sheet    Pain: Unable to fully assess as pt vented, no apparent pain/distress at this time    Skin Integrity: no pressure related injuries per flow sheet    Labs:   04-11    146<H>  |  112<H>  |  16  ----------------------------<  146<H>  4.0   |  22  |  0.95    Ca    8.6      11 Apr 2019 05:57  Phos  2.3     04-11  Mg     2.2     04-11      CAPILLARY BLOOD GLUCOSE    POCT Blood Glucose.: 160 mg/dL (11 Apr 2019 11:06)  POCT Blood Glucose.: 132 mg/dL (11 Apr 2019 05:36)    Medications:  MEDICATIONS  (STANDING):  amLODIPine   Tablet 10 milliGRAM(s) Oral daily  atorvastatin 40 milliGRAM(s) Oral at bedtime  dextrose 5%. 1000 milliLiter(s) (50 mL/Hr) IV Continuous <Continuous>  dextrose 50% Injectable 12.5 Gram(s) IV Push once  dextrose 50% Injectable 25 Gram(s) IV Push once  dextrose 50% Injectable 25 Gram(s) IV Push once  docusate sodium Liquid 100 milliGRAM(s) Oral two times a day  hydrALAZINE 50 milliGRAM(s) Oral every 8 hours  influenza   Vaccine 0.5 milliLiter(s) IntraMuscular once  insulin lispro (HumaLOG) corrective regimen sliding scale   SubCutaneous Before meals and at bedtime  levETIRAcetam  Solution 1000 milliGRAM(s) Oral two times a day  lisinopril 40 milliGRAM(s) Oral daily  metoprolol tartrate 50 milliGRAM(s) Oral every 12 hours  midazolam Infusion 0.02 mG/kG/Hr (1.44 mL/Hr) IV Continuous <Continuous>  niCARdipine Infusion 5 mG/Hr (25 mL/Hr) IV Continuous <Continuous>  pantoprazole   Suspension 40 milliGRAM(s) Oral daily  piperacillin/tazobactam IVPB. 4.5 Gram(s) IV Intermittent every 6 hours  senna 2 Tablet(s) Oral at bedtime  vancomycin  IVPB 1000 milliGRAM(s) IV Intermittent every 12 hours    MEDICATIONS  (PRN):  acetaminophen    Suspension .. 650 milliGRAM(s) Oral every 6 hours PRN Temp greater or equal to 38.5C (101.3F)  bisacodyl 5 milliGRAM(s) Oral daily PRN Constipation  bisacodyl Suppository 10 milliGRAM(s) Rectal daily PRN Constipation  dextrose 40% Gel 15 Gram(s) Oral once PRN Blood Glucose LESS THAN 70 milliGRAM(s)/deciliter  fentaNYL    Injectable 50 MICROGram(s) IV Push every 2 hours PRN Severe Pain (7 - 10)  fentaNYL    Injectable 25 MICROGram(s) IV Push every 2 hours PRN Moderate Pain (4 - 6)  fentaNYL    Injectable 12.5 MICROGram(s) IV Push every 2 hours PRN Mild Pain (1 - 3)  glucagon  Injectable 1 milliGRAM(s) IntraMuscular once PRN Glucose LESS THAN 70 milligrams/deciliter  hydrALAZINE Injectable 10 milliGRAM(s) IV Push every 4 hours PRN BP>140  LORazepam   Injectable 4 milliGRAM(s) IV Push every 4 hours PRN RASS 4  LORazepam   Injectable 2 milliGRAM(s) IV Push every 4 hours PRN RASS 3  LORazepam   Injectable 1 milliGRAM(s) IV Push every 4 hours PRN RASS 2  ondansetron Injectable 4 milliGRAM(s) IV Push every 6 hours PRN Nausea and/or Vomiting    Weight:  No updated weights    Weight Change:   Please obtain current weight and trend weekly weights for further assessment    Nutrition Focused Physical Exam: Completed [   ]  Not Pertinent [ X  ]    Estimated energy needs:   Ht (4/6): 175.3cm, Wt (4/8 per family): 72.7kg IBW: 160lb +/-10%, %IBW: 100%, BMI: 23.6   IBW used to calculate energy needs as pt vented.     7466-2685 kcal (25-30 kcal/kg), 102-116 gm (1.4-1.6 gm/kg), Fluid needs per team 2/2 hypernatremia    Subjective: 44y Male s/p right thalamic IPH with IVH s/p Emergent EVD placement. CTA with 3mm Basilar tip aneurysm. Pt intubated, vent mode: AC/CMV, full vent support, CPAP trials as tolerated. Pt NPO for angiogram this AM per RN. Significant other at bedside. Pt with good appetite PTA, no wt loss PTA. Bed scale= 97.9kg, but pt’s significant other states pt weighs ~160lb. GI: WDL, abdomen soft/nontender, no documented stool per flow sheet. Skin: EVD site R head. Recommend Jevity 1.2 @ 20 mL/hr increase to goal 67 mL/hr x24 hours with Prostat SF 1x/day- please see below for full recommendations. Discussed plan with team. RD to monitor closely and f/u per high risk protocol.    Previous Nutrition Diagnosis:  Inadequate energy intake RT NPO status AEB pt meeting 0% EER    Active [   ]  Resolved [ X  ]- pt started on TF    If resolved, new PES: Increased protein needs RT increased demand for nutrients AEB pt vented    Goal:  Will meet >75% EER via EN.    Recommendations:  1. Recommend continue Jevity 1.2 @ 67 mL/hr, Prostat SF 1x/day (1608 mL TV, 2030 kcal, 104 gm pro, 1298 mL free water, 161% RDI vitamin/mineral). Continue volume based feeding protocol & monitor for signs of intolerance.   2. Monitor labs, obtain current weight and trend weekly weights.    Education:     Risk Level: High [ X  ] Moderate [   ] Low [   ] Admitting Diagnosis:   Patient is a 44y old  Male who presents with a chief complaint of right thalamic hemorrhage (11 Apr 2019 08:16)    PAST MEDICAL & SURGICAL HISTORY:    Current Nutrition Order: Jevity 1.2 @ 67 mL/hr, Prostat SF 1x/day (1608 mL TV, 2030 kcal, 104 gm pro, 1298 mL free water, 161% RDI vitamin/mineral)     PO Intake: N/A as pt on EN    GI Issues: no stool, soft/non tender abdomen per flow sheet    Pain: Unable to fully assess as pt vented, no apparent pain/distress at this time    Skin Integrity: no pressure related injuries per flow sheet    Labs:   04-11    146<H>  |  112<H>  |  16  ----------------------------<  146<H>  4.0   |  22  |  0.95    Ca    8.6      11 Apr 2019 05:57  Phos  2.3     04-11  Mg     2.2     04-11      CAPILLARY BLOOD GLUCOSE    POCT Blood Glucose.: 160 mg/dL (11 Apr 2019 11:06)  POCT Blood Glucose.: 132 mg/dL (11 Apr 2019 05:36)    Medications:  MEDICATIONS  (STANDING):  amLODIPine   Tablet 10 milliGRAM(s) Oral daily  atorvastatin 40 milliGRAM(s) Oral at bedtime  dextrose 5%. 1000 milliLiter(s) (50 mL/Hr) IV Continuous <Continuous>  dextrose 50% Injectable 12.5 Gram(s) IV Push once  dextrose 50% Injectable 25 Gram(s) IV Push once  dextrose 50% Injectable 25 Gram(s) IV Push once  docusate sodium Liquid 100 milliGRAM(s) Oral two times a day  hydrALAZINE 50 milliGRAM(s) Oral every 8 hours  influenza   Vaccine 0.5 milliLiter(s) IntraMuscular once  insulin lispro (HumaLOG) corrective regimen sliding scale   SubCutaneous Before meals and at bedtime  levETIRAcetam  Solution 1000 milliGRAM(s) Oral two times a day  lisinopril 40 milliGRAM(s) Oral daily  metoprolol tartrate 50 milliGRAM(s) Oral every 12 hours  midazolam Infusion 0.02 mG/kG/Hr (1.44 mL/Hr) IV Continuous <Continuous>  niCARdipine Infusion 5 mG/Hr (25 mL/Hr) IV Continuous <Continuous>  pantoprazole   Suspension 40 milliGRAM(s) Oral daily  piperacillin/tazobactam IVPB. 4.5 Gram(s) IV Intermittent every 6 hours  senna 2 Tablet(s) Oral at bedtime  vancomycin  IVPB 1000 milliGRAM(s) IV Intermittent every 12 hours    MEDICATIONS  (PRN):  acetaminophen    Suspension .. 650 milliGRAM(s) Oral every 6 hours PRN Temp greater or equal to 38.5C (101.3F)  bisacodyl 5 milliGRAM(s) Oral daily PRN Constipation  bisacodyl Suppository 10 milliGRAM(s) Rectal daily PRN Constipation  dextrose 40% Gel 15 Gram(s) Oral once PRN Blood Glucose LESS THAN 70 milliGRAM(s)/deciliter  fentaNYL    Injectable 50 MICROGram(s) IV Push every 2 hours PRN Severe Pain (7 - 10)  fentaNYL    Injectable 25 MICROGram(s) IV Push every 2 hours PRN Moderate Pain (4 - 6)  fentaNYL    Injectable 12.5 MICROGram(s) IV Push every 2 hours PRN Mild Pain (1 - 3)  glucagon  Injectable 1 milliGRAM(s) IntraMuscular once PRN Glucose LESS THAN 70 milligrams/deciliter  hydrALAZINE Injectable 10 milliGRAM(s) IV Push every 4 hours PRN BP>140  LORazepam   Injectable 4 milliGRAM(s) IV Push every 4 hours PRN RASS 4  LORazepam   Injectable 2 milliGRAM(s) IV Push every 4 hours PRN RASS 3  LORazepam   Injectable 1 milliGRAM(s) IV Push every 4 hours PRN RASS 2  ondansetron Injectable 4 milliGRAM(s) IV Push every 6 hours PRN Nausea and/or Vomiting    Weight:  No updated weights    Weight Change:   Please obtain current weight and trend weekly weights for further assessment    Nutrition Focused Physical Exam: Completed [   ]  Not Pertinent [ X  ]    Estimated energy needs:   Ht (4/6): 175.3cm, Wt (4/8 per family): 72.7kg IBW: 160lb +/-10%, %IBW: 100%, BMI: 23.6   IBW used to calculate energy needs as pt vented.     0083-4516 kcal (25-30 kcal/kg), 102-116 gm (1.4-1.6 gm/kg), Fluid needs per team 2/2 hypernatremia    Subjective: 44y Male w/ ETOH abuse now s/p right thalamic IPH with IVH s/p Emergent Rt EVD placement 4/6/19, s/p Cerebral angiogram neg 4/8/19, new left EVD placed and R EVD removed 4/9/19, now s/p penumbra evacuation of ICH 4/10/19. Pt remains intubated, vent mode: AC/CMV, full vent support, CPAP trials as tolerated. TF resumed this AM, pt tolerating so far, RN to continue monitoring tolerance. Recommend continue TF regimen, please see full EN recs below. RD to monitor closely and f/u per high risk protocol.    Previous Nutrition Diagnosis:  Inadequate energy intake RT NPO status AEB pt meeting 0% EER    Active [   ]  Resolved [ X  ]- pt started on TF    If resolved, new PES: Increased protein needs RT increased demand for nutrients AEB pt vented    Goal:  Will meet >75% EER via EN.    Recommendations:  1. Recommend continue Jevity 1.2 @ 67 mL/hr, Prostat SF 1x/day (1608 mL TV, 2030 kcal, 104 gm pro, 1298 mL free water, 161% RDI vitamin/mineral). Continue volume based feeding protocol & monitor for signs of intolerance.   2. Monitor labs, obtain current weight and trend weekly weights.    Education: N/A as pt vented    Risk Level: High [ X  ] Moderate [   ] Low [   ]

## 2019-04-11 NOTE — PROGRESS NOTE ADULT - SUBJECTIVE AND OBJECTIVE BOX
Hospital Course:   4/7: overnight, patient requiring large amount of cardene and propofol.  Plan today is to transition to precedex, start PO lisinopril and wean of cardene.  Becomes extremely agitated when off sedation.  4/8: Cerebral angio without findings of aneurysm. EVD stopped working, pulled back without improvement. CT Head performed.  4/9: Received ativan 6mg for agitation. EVD stopped draining at 6pm yesterday taken for stat CTH. Dr. So notified and EVD lowered to 5cmH2O without output.   4/10: s/p penumbra evacuation of ICH. Pt seen and examined at bedside postop. Remains intubated, on versed. ICPs stable.   4/11: JUSTIN overnight. Remains on versed. Remains intubated. Neuro stable.    Vital Signs Last 24 Hrs  T(C): 36.9 (10 Apr 2019 23:00), Max: 38.1 (10 Apr 2019 01:10)  T(F): 98.5 (10 Apr 2019 23:00), Max: 100.5 (10 Apr 2019 01:10)  HR: 73 (11 Apr 2019 00:05) (66 - 101)  BP: 132/74 (10 Apr 2019 23:00) (115/66 - 151/69)  BP(mean): 98 (10 Apr 2019 23:00) (88 - 109)  RR: 18 (11 Apr 2019 00:00) (12 - 42)  SpO2: 98% (11 Apr 2019 00:05) (90% - 100%)    I&O's Detail    09 Apr 2019 07:01  -  10 Apr 2019 07:00  --------------------------------------------------------  IN:    dexmedetomidine Infusion: 10.5 mL    Enteral Tube Flush: 150 mL    IV PiggyBack: 50 mL    midazolam Infusion: 104.1 mL    niCARdipine Infusion: 1660 mL    ns in tub fed  [id number]: 469 mL    sodium chloride 0.9%.: 822 mL  Total IN: 3265.6 mL    OUT:    External Ventricular Device: 262 mL    Voided: 2065 mL  Total OUT: 2327 mL    Total NET: 938.6 mL      10 Apr 2019 07:01  -  11 Apr 2019 01:02  --------------------------------------------------------  IN:    Enteral Tube Flush: 30 mL    IV PiggyBack: 450 mL    midazolam Infusion: 56.5 mL    niCARdipine Infusion: 108.3 mL    niCARdipine Infusion: 155 mL    sodium chloride 0.9%.: 1088 mL  Total IN: 1887.8 mL    OUT:    External Ventricular Device: 153 mL    Indwelling Catheter - Urethral: 900 mL    Voided: 400 mL  Total OUT: 1453 mL    Total NET: 434.8 mL        I&O's Summary    09 Apr 2019 07:01  -  10 Apr 2019 07:00  --------------------------------------------------------  IN: 3265.6 mL / OUT: 2327 mL / NET: 938.6 mL    10 Apr 2019 07:01  -  11 Apr 2019 01:02  --------------------------------------------------------  IN: 1887.8 mL / OUT: 1453 mL / NET: 434.8 mL    PHYSICAL EXAM:  Gen: NAD, Intubated on ventilator, sedated on versed  HEENT: PERRL  Lungs: Clear b/l  Heart: S1, S2. NSR  Abd: Soft, NT/ND. +BS  Exts: Pulses 2+ throughout  Neuro: Mild decorticate posturing to b/l UEs to noxious stimulus but intermittently spontaneously moving right side. No movement to noxious stimuli b/l LE. No eye opening. Not following commands. PERRL, +corneals, +gag    DEVICE/DRAIN DRESSING:  L EVD @ 5cmH2O, draining    TUBES/LINES:  [] CVC  [x] A-line  [] Lumbar Drain  [x] Ventriculostomy  [] Other    DIET:  [x] NPO  [] Mechanical  [] Tube feeds      LABS:                        12.0   8.17  )-----------( 262      ( 10 Apr 2019 18:51 )             34.7     04-10    143  |  108  |  15  ----------------------------<  136<H>  3.7   |  23  |  0.96    Ca    8.5      10 Apr 2019 18:51  Phos  3.2     04-10  Mg     2.2     04-10      PT/INR - ( 09 Apr 2019 09:43 )   PT: 13.2 sec;   INR: 1.16          PTT - ( 09 Apr 2019 09:43 )  PTT:29.9 sec        CAPILLARY BLOOD GLUCOSE      POCT Blood Glucose.: 101 mg/dL (10 Apr 2019 12:12)  POCT Blood Glucose.: 107 mg/dL (10 Apr 2019 06:32)      Drug Levels: [] N/A    CSF Analysis: [] N/A      Allergies    No Known Allergies    Intolerances      MEDICATIONS:  Antibiotics:  piperacillin/tazobactam IVPB. 4.5 Gram(s) IV Intermittent every 6 hours  vancomycin  IVPB 1000 milliGRAM(s) IV Intermittent every 12 hours    Neuro:  acetaminophen    Suspension .. 650 milliGRAM(s) Oral every 6 hours PRN  fentaNYL    Injectable 25 MICROGram(s) IV Push every 2 hours PRN  levETIRAcetam  IVPB 1000 milliGRAM(s) IV Intermittent every 12 hours  LORazepam   Injectable 4 milliGRAM(s) IV Push every 4 hours PRN  LORazepam   Injectable 2 milliGRAM(s) IV Push every 4 hours PRN  LORazepam   Injectable 1 milliGRAM(s) IV Push every 4 hours PRN  midazolam Infusion 0.02 mG/kG/Hr IV Continuous <Continuous>  ondansetron Injectable 4 milliGRAM(s) IV Push every 6 hours PRN    Anticoagulation:    OTHER:  amLODIPine   Tablet 10 milliGRAM(s) Oral daily  atorvastatin 40 milliGRAM(s) Oral at bedtime  bisacodyl 5 milliGRAM(s) Oral daily PRN  bisacodyl Suppository 10 milliGRAM(s) Rectal daily PRN  dextrose 40% Gel 15 Gram(s) Oral once PRN  dextrose 50% Injectable 12.5 Gram(s) IV Push once  dextrose 50% Injectable 25 Gram(s) IV Push once  dextrose 50% Injectable 25 Gram(s) IV Push once  docusate sodium Liquid 100 milliGRAM(s) Oral two times a day  glucagon  Injectable 1 milliGRAM(s) IntraMuscular once PRN  hydrALAZINE 50 milliGRAM(s) Oral every 8 hours  hydrALAZINE Injectable 10 milliGRAM(s) IV Push every 4 hours PRN  influenza   Vaccine 0.5 milliLiter(s) IntraMuscular once  insulin lispro (HumaLOG) corrective regimen sliding scale   SubCutaneous Before meals and at bedtime  lisinopril 40 milliGRAM(s) Oral daily  metoprolol tartrate 50 milliGRAM(s) Oral every 12 hours  niCARdipine Infusion 5 mG/Hr IV Continuous <Continuous>  pantoprazole   Suspension 40 milliGRAM(s) Oral daily  senna 2 Tablet(s) Oral at bedtime    IVF:  dextrose 5%. 1000 milliLiter(s) IV Continuous <Continuous>  sodium chloride 0.9%. 1000 milliLiter(s) IV Continuous <Continuous>    CULTURES:  Culture Results:   Numerous Staphylococcus aureus  Rare Routine respiratory derek present (04-08 @ 20:31)  Culture Results:   No growth at 2 days. (04-08 @ 17:16)    RADIOLOGY & ADDITIONAL TESTS:      ASSESSMENT:  44y Male w/ ETOH abuse now s/p right thalamic IPH with IVH s/p Emergent Rt EVD placement 4/6/19, s/p Cerebral angiogram neg 4/8/19, new left EVD placed and R EVD removed 4/9/19, now s/p penumbra evacuation of ICH 4/10/19    HEMORRHAGE STROKE  HEMORRHAGE  Handoff  ICH (intracerebral hemorrhage)  ICH (intracerebral hemorrhage)  Craniotomy for intracranial hemorrhage  Evacuation of hematoma of face  Angiogram, carotid and cerebral, bilateral      PLAN:  NEURO:  Neuro checks q1h  Keppra for seizure prophylaxis  Continue versed, ativan PRN agitation  Keep left EVD at 5cmH2O, monitor ICPs and output    CARDIOVASCULAR:   SBP goal <140, Cardene drip PRN  Continue lopressor 50mg BID, and hydralazine 50mg q8h, norvasc 10mg daily, and lisinopril 40mg daily  Daily labs, electrolyte repletion PRN,  Continue Statin therapy    PULMONARY:   Continue ventilator support,  Daily CXR    RENAL:   Voiding, condom catheter  Normal Na goal  IVF with NS while NPO, total fluids 100c/hr    GI:   NPO   Stool softeners PRN  PPI while intubated    ID:  Continue vanc/zosyn for empiric PNA coverage    ENDO:   ISS    DVT PROPHYLAXIS:   SCDs, SQH held    DISPOSITION: Continue NSICU care,  Full code,  D/w Dr. So and Dr. Wheeler    Assessment:  Present when checked    []  GCS  E   V  M     Heart Failure: []Acute, [] acute on chronic , []chronic  Heart Failure:  [] Diastolic (HFpEF), [] Systolic (HFrEF), []Combined (HFpEF and HFrEF), [] RHF, [] Pulm HTN, [] Other    [] KRISTA, [] ATN, [] AIN, [] other  [] CKD1, [] CKD2, [] CKD 3, [] CKD 4, [] CKD 5, []ESRD    Encephalopathy: [] Metabolic, [] Hepatic, [] toxic, [x] Neurological, [] Other    Abnormal Nurtitional Status: [] malnurtition (see nutrition note), [ ]underweight: BMI < 19, [] morbid obesity: BMI >40, [] Cachexia    [] Sepsis  [] hypovolemic shock,[] cardiogenic shock, [] hemorrhagic shock, [] neuogenic shock  [x] Acute Respiratory Failure  [x]Cerebral edema, [x] Brain compression/ herniation,   [] Functional quadriplegia  [] Acute blood loss anemia Hospital Course:   4/7: overnight, patient requiring large amount of cardene and propofol.  Plan today is to transition to precedex, start PO lisinopril and wean of cardene.  Becomes extremely agitated when off sedation.  4/8: Cerebral angio without findings of aneurysm. EVD stopped working, pulled back without improvement. CT Head performed.  4/9: Received ativan 6mg for agitation. EVD stopped draining at 6pm yesterday taken for stat CTH. Dr. So notified and EVD lowered to 5cmH2O without output.   4/10: s/p penumbra evacuation of ICH. Pt seen and examined at bedside postop. Remains intubated, on versed. ICPs stable.  (OR EBL 30cc, received LR 200cc)  4/11: JUSTIN overnight. Remains on versed. Remains intubated. Neuro stable.    Vital Signs Last 24 Hrs  T(C): 36.9 (10 Apr 2019 23:00), Max: 38.1 (10 Apr 2019 01:10)  T(F): 98.5 (10 Apr 2019 23:00), Max: 100.5 (10 Apr 2019 01:10)  HR: 73 (11 Apr 2019 00:05) (66 - 101)  BP: 132/74 (10 Apr 2019 23:00) (115/66 - 151/69)  BP(mean): 98 (10 Apr 2019 23:00) (88 - 109)  RR: 18 (11 Apr 2019 00:00) (12 - 42)  SpO2: 98% (11 Apr 2019 00:05) (90% - 100%)    I&O's Detail    09 Apr 2019 07:01  -  10 Apr 2019 07:00  --------------------------------------------------------  IN:    dexmedetomidine Infusion: 10.5 mL    Enteral Tube Flush: 150 mL    IV PiggyBack: 50 mL    midazolam Infusion: 104.1 mL    niCARdipine Infusion: 1660 mL    ns in tub fed  [id number]: 469 mL    sodium chloride 0.9%.: 822 mL  Total IN: 3265.6 mL    OUT:    External Ventricular Device: 262 mL    Voided: 2065 mL  Total OUT: 2327 mL    Total NET: 938.6 mL      10 Apr 2019 07:01  -  11 Apr 2019 01:02  --------------------------------------------------------  IN:    Enteral Tube Flush: 30 mL    IV PiggyBack: 450 mL    midazolam Infusion: 56.5 mL    niCARdipine Infusion: 108.3 mL    niCARdipine Infusion: 155 mL    sodium chloride 0.9%.: 1088 mL  Total IN: 1887.8 mL    OUT:    External Ventricular Device: 153 mL    Indwelling Catheter - Urethral: 900 mL    Voided: 400 mL  Total OUT: 1453 mL    Total NET: 434.8 mL        I&O's Summary    09 Apr 2019 07:01  -  10 Apr 2019 07:00  --------------------------------------------------------  IN: 3265.6 mL / OUT: 2327 mL / NET: 938.6 mL    10 Apr 2019 07:01  -  11 Apr 2019 01:02  --------------------------------------------------------  IN: 1887.8 mL / OUT: 1453 mL / NET: 434.8 mL    PHYSICAL EXAM:  Gen: NAD, Intubated on ventilator, sedated on versed  HEENT: PERRL  Lungs: Clear b/l  Heart: S1, S2. NSR  Abd: Soft, NT/ND. +BS  Exts: Pulses 2+ throughout  Neuro: Mild decorticate posturing to b/l UEs to noxious stimulus but intermittently spontaneously moving right side. No movement to noxious stimuli b/l LE. No eye opening. Not following commands. PERRL, +corneals, +gag    DEVICE/DRAIN DRESSING:  L EVD @ 5cmH2O, draining    TUBES/LINES:  [] CVC  [x] A-line  [] Lumbar Drain  [x] Ventriculostomy  [] Other    DIET:  [x] NPO  [] Mechanical  [] Tube feeds      LABS:                        12.0   8.17  )-----------( 262      ( 10 Apr 2019 18:51 )             34.7     04-10    143  |  108  |  15  ----------------------------<  136<H>  3.7   |  23  |  0.96    Ca    8.5      10 Apr 2019 18:51  Phos  3.2     04-10  Mg     2.2     04-10      PT/INR - ( 09 Apr 2019 09:43 )   PT: 13.2 sec;   INR: 1.16          PTT - ( 09 Apr 2019 09:43 )  PTT:29.9 sec        CAPILLARY BLOOD GLUCOSE      POCT Blood Glucose.: 101 mg/dL (10 Apr 2019 12:12)  POCT Blood Glucose.: 107 mg/dL (10 Apr 2019 06:32)      Drug Levels: [] N/A    CSF Analysis: [] N/A      Allergies    No Known Allergies    Intolerances      MEDICATIONS:  Antibiotics:  piperacillin/tazobactam IVPB. 4.5 Gram(s) IV Intermittent every 6 hours  vancomycin  IVPB 1000 milliGRAM(s) IV Intermittent every 12 hours    Neuro:  acetaminophen    Suspension .. 650 milliGRAM(s) Oral every 6 hours PRN  fentaNYL    Injectable 25 MICROGram(s) IV Push every 2 hours PRN  levETIRAcetam  IVPB 1000 milliGRAM(s) IV Intermittent every 12 hours  LORazepam   Injectable 4 milliGRAM(s) IV Push every 4 hours PRN  LORazepam   Injectable 2 milliGRAM(s) IV Push every 4 hours PRN  LORazepam   Injectable 1 milliGRAM(s) IV Push every 4 hours PRN  midazolam Infusion 0.02 mG/kG/Hr IV Continuous <Continuous>  ondansetron Injectable 4 milliGRAM(s) IV Push every 6 hours PRN    Anticoagulation:    OTHER:  amLODIPine   Tablet 10 milliGRAM(s) Oral daily  atorvastatin 40 milliGRAM(s) Oral at bedtime  bisacodyl 5 milliGRAM(s) Oral daily PRN  bisacodyl Suppository 10 milliGRAM(s) Rectal daily PRN  dextrose 40% Gel 15 Gram(s) Oral once PRN  dextrose 50% Injectable 12.5 Gram(s) IV Push once  dextrose 50% Injectable 25 Gram(s) IV Push once  dextrose 50% Injectable 25 Gram(s) IV Push once  docusate sodium Liquid 100 milliGRAM(s) Oral two times a day  glucagon  Injectable 1 milliGRAM(s) IntraMuscular once PRN  hydrALAZINE 50 milliGRAM(s) Oral every 8 hours  hydrALAZINE Injectable 10 milliGRAM(s) IV Push every 4 hours PRN  influenza   Vaccine 0.5 milliLiter(s) IntraMuscular once  insulin lispro (HumaLOG) corrective regimen sliding scale   SubCutaneous Before meals and at bedtime  lisinopril 40 milliGRAM(s) Oral daily  metoprolol tartrate 50 milliGRAM(s) Oral every 12 hours  niCARdipine Infusion 5 mG/Hr IV Continuous <Continuous>  pantoprazole   Suspension 40 milliGRAM(s) Oral daily  senna 2 Tablet(s) Oral at bedtime    IVF:  dextrose 5%. 1000 milliLiter(s) IV Continuous <Continuous>  sodium chloride 0.9%. 1000 milliLiter(s) IV Continuous <Continuous>    CULTURES:  Culture Results:   Numerous Staphylococcus aureus  Rare Routine respiratory derek present (04-08 @ 20:31)  Culture Results:   No growth at 2 days. (04-08 @ 17:16)    RADIOLOGY & ADDITIONAL TESTS:      ASSESSMENT:  44y Male w/ ETOH abuse now s/p right thalamic IPH with IVH s/p Emergent Rt EVD placement 4/6/19, s/p Cerebral angiogram neg 4/8/19, new left EVD placed and R EVD removed 4/9/19, now s/p penumbra evacuation of ICH 4/10/19    HEMORRHAGE STROKE  HEMORRHAGE  Handoff  ICH (intracerebral hemorrhage)  ICH (intracerebral hemorrhage)  Craniotomy for intracranial hemorrhage  Evacuation of hematoma of face  Angiogram, carotid and cerebral, bilateral      PLAN:  NEURO:  Neuro checks q1h  Keppra for seizure prophylaxis  Continue versed, ativan PRN agitation  Keep left EVD at 5cmH2O, monitor ICPs and output    CARDIOVASCULAR:   SBP goal <140, Cardene drip PRN  Continue lopressor 50mg BID, and hydralazine 50mg q8h, norvasc 10mg daily, and lisinopril 40mg daily  Daily labs, electrolyte repletion PRN,  Continue Statin therapy    PULMONARY:   Continue ventilator support,  Daily CXR    RENAL:   Voiding, condom catheter  Normal Na goal  IVF with NS while NPO, total fluids 100c/hr    GI:   NPO   Stool softeners PRN  PPI while intubated    ID:  Continue vanc/zosyn for empiric PNA coverage    ENDO:   ISS    DVT PROPHYLAXIS:   SCDs, SQH held    DISPOSITION: Continue NSICU care,  Full code,  D/w Dr. So and Dr. Wheeler    Assessment:  Present when checked    []  GCS  E   V  M     Heart Failure: []Acute, [] acute on chronic , []chronic  Heart Failure:  [] Diastolic (HFpEF), [] Systolic (HFrEF), []Combined (HFpEF and HFrEF), [] RHF, [] Pulm HTN, [] Other    [] KRISTA, [] ATN, [] AIN, [] other  [] CKD1, [] CKD2, [] CKD 3, [] CKD 4, [] CKD 5, []ESRD    Encephalopathy: [] Metabolic, [] Hepatic, [] toxic, [x] Neurological, [] Other    Abnormal Nurtitional Status: [] malnurtition (see nutrition note), [ ]underweight: BMI < 19, [] morbid obesity: BMI >40, [] Cachexia    [] Sepsis  [] hypovolemic shock,[] cardiogenic shock, [] hemorrhagic shock, [] neuogenic shock  [x] Acute Respiratory Failure  [x]Cerebral edema, [x] Brain compression/ herniation,   [] Functional quadriplegia  [] Acute blood loss anemia

## 2019-04-11 NOTE — PROGRESS NOTE ADULT - SUBJECTIVE AND OBJECTIVE BOX
NEUROCRITICAL CARE PROGRESS NOTE    ARPAN RUANO   MRN-[id number]  Summary:  /  HPI:  History obtained by patient's girlfriend and chart from [place], patient is unresponsive:     43 y/o male with no significant PMHx presents to [place] with AMS, left side weakness and numbness. Per [place], [place] when he developed acute onset of left facial numbness and LUE and numbness around 6:30PM. When get got home he developed AMS, dysarthria, and weakness in the LUE and LLE. At [place] ED patient had several episodes of emesis and was hypertensive to SBP in the 200's. Work up revealed right thalamic hemorrhage on CT head. Patient not on any anticoagulation use per girlfriend. Of note, patient took Excedrin for headache at home. Patient transferred to [facility] for further management. (2019 00:04)      S/Overnight events:    POD#     EVD:    CSF :    ICPs:      Vital Signs Last 24 Hrs  T(C): 38.4 (2019 07:00), Max: 38.6 (2019 05:00)  T(F): 101.1 (2019 07:00), Max: 101.5 (2019 05:00)  HR: 92 (2019 07:00) (66 - 112)  BP: 151/82 (2019 07:00) (115/66 - 151/82)  BP(mean): 104 (2019 07:00) (88 - 109)  RR: 23 (:00) (12 - 42)  SpO2: 92% (2019 07:00) (91% - 100%)    Mode: AC/ CMV (Assist Control/ Continuous Mandatory Ventilation), RR (machine): 12, TV (machine): 400, FiO2: 40, PEEP: 5, ITime: 1, MAP: 8, PIP: 18    I&O's Detail    10 Apr 2019 07:01  -  2019 07:00  --------------------------------------------------------  IN:    Enteral Tube Flush: 30 mL    IV PiggyBack: 600 mL    midazolam Infusion: 71.3 mL    niCARdipine Infusion: 308.3 mL    niCARdipine Infusion: 155 mL    sodium chloride 0.9%.: 1538 mL  Total IN: 2702.6 mL    OUT:    External Ventricular Device: 267 mL    Indwelling Catheter - Urethral: 1675 mL    Voided: 400 mL  Total OUT: 2342 mL    Total NET: 360.6 mL      LABS:                        12.1   7.87  )-----------( 316      ( 2019 06:47 )             35.5     04-11    146<H>  |  112<H>  |  16  ----------------------------<  146<H>  4.0   |  22  |  0.95    Ca    8.6      2019 05:57  Phos  2.3     04-11  Mg     2.2     04-11      PT/INR - ( 2019 09:43 )   PT: 13.2 sec;   INR: 1.16          PTT - ( 2019 09:43 )  PTT:29.9 sec        CAPILLARY BLOOD GLUCOSE      POCT Blood Glucose.: 132 mg/dL (2019 05:36)  POCT Blood Glucose.: 101 mg/dL (10 Apr 2019 12:12)      Drug Levels: [] N/A    CSF Analysis: [] N/A      Allergies    No Known Allergies    Intolerances      MEDICATIONS:  Antibiotics:  piperacillin/tazobactam IVPB. 4.5 Gram(s) IV Intermittent every 6 hours  vancomycin  IVPB 1000 milliGRAM(s) IV Intermittent every 12 hours    Neuro:  acetaminophen    Suspension .. 650 milliGRAM(s) Oral every 6 hours PRN  fentaNYL    Injectable 25 MICROGram(s) IV Push every 2 hours PRN  levETIRAcetam  IVPB 1000 milliGRAM(s) IV Intermittent every 12 hours  LORazepam   Injectable 4 milliGRAM(s) IV Push every 4 hours PRN  LORazepam   Injectable 2 milliGRAM(s) IV Push every 4 hours PRN  LORazepam   Injectable 1 milliGRAM(s) IV Push every 4 hours PRN  midazolam Infusion 0.02 mG/kG/Hr IV Continuous <Continuous>  ondansetron Injectable 4 milliGRAM(s) IV Push every 6 hours PRN    Anticoagulation:    OTHER:  amLODIPine   Tablet 10 milliGRAM(s) Oral daily  atorvastatin 40 milliGRAM(s) Oral at bedtime  bisacodyl 5 milliGRAM(s) Oral daily PRN  bisacodyl Suppository 10 milliGRAM(s) Rectal daily PRN  dextrose 40% Gel 15 Gram(s) Oral once PRN  dextrose 50% Injectable 12.5 Gram(s) IV Push once  dextrose 50% Injectable 25 Gram(s) IV Push once  dextrose 50% Injectable 25 Gram(s) IV Push once  docusate sodium Liquid 100 milliGRAM(s) Oral two times a day  glucagon  Injectable 1 milliGRAM(s) IntraMuscular once PRN  hydrALAZINE 50 milliGRAM(s) Oral every 8 hours  hydrALAZINE Injectable 10 milliGRAM(s) IV Push every 4 hours PRN  influenza   Vaccine 0.5 milliLiter(s) IntraMuscular once  insulin lispro (HumaLOG) corrective regimen sliding scale   SubCutaneous Before meals and at bedtime  lisinopril 40 milliGRAM(s) Oral daily  metoprolol tartrate 50 milliGRAM(s) Oral every 12 hours  niCARdipine Infusion 5 mG/Hr IV Continuous <Continuous>  pantoprazole   Suspension 40 milliGRAM(s) Oral daily  senna 2 Tablet(s) Oral at bedtime    IVF:  dextrose 5%. 1000 milliLiter(s) IV Continuous <Continuous>  sodium chloride 0.9%. 1000 milliLiter(s) IV Continuous <Continuous>    CULTURES:  Culture Results:   Numerous Staphylococcus aureus  Rare Routine respiratory derek present ( @ 20:31)  Culture Results:   No growth at 2 days. ( @ 17:16)                      PHYSICAL EXAMINATION    NEUROLOGIC EXAMINATION:  Patient is   lethargic, unarousable, not follows commands, spont movement on R, L side withdrawal,   pupils reactive and equal, gaze preference not fixed,   downward and left; moves R UE/LE spontaneously, extensor posturing L UE, withdraws L LE    CARDIOVASCULAR: (+) S1 S2, normal rate and regular rhythm  PULMONARY: clear to auscultation bilaterally; min secretions  ABDOMEN: soft, nontender with normoactive bowel sounds  EXTREMITIES: no edema  SKIN: no rash    LDL = 113 (-)   HDL = 70 (-)  TG = 85 (-)  TSH = 0.733 ()    Bacteriology:  CSF studies:  EEG:  Neuroimagin/06 12:48 p.m. CT head:  s/p EVD, decrease in size of ventricles, R ICH unchanged with IV extension, punctate hyperdensity R BG R conner R thalamus, unchanged,    2:11 a.m. CT head:  large R basal ganglia / thalamic ICH, small L basal ganglia / R pontine acute hemorrhage; c/w hypertensive ICH; extension into R lateral ventricle; MLS, mass effect, no HCP  Other imaging:    IV FLUIDS: 2%50  DRIPS: cardene (11cc/hr) propofol   DIET: Jevity at 60cc/hr  Lines: Lizabeth Humphriesckoff)  Drains:   EVD @ 10cm H20, 267 cc/24h ICP 6-9  Wounds:    CODE STATUS:  Full Code                       GOALS OF CARE:  aggressive                      DISPOSITION:  ICU NEUROCRITICAL CARE PROGRESS NOTE    ARPAN RUANO   MRN-[id number]  Summary:  /  HPI:  History obtained by patient's girlfriend and chart from [place], patient is unresponsive:     43 y/o male with no significant PMHx presents to [place] with AMS, left side weakness and numbness. [place] when he developed acute onset of left facial numbness and LUE and numbness around 6:30PM. When get got home he developed AMS, dysarthria, and weakness in the LUE and LLE. At [place] ED patient had several episodes of emesis and was hypertensive to SBP in the 200's. Work up revealed right thalamic hemorrhage on CT head. Patient not on any anticoagulation use per girlfriend. Of note, patient took Excedrin for headache at home. Patient transferred to [facility] for [facility] management. (2019 00:04)      S/Overnight events:  on versed gtt, on vent, little inserted in OR, EVD draining, TF off, EBL 50 ml, s/p ICH evacuation, CT head seen, spoke to family,     EVD:  5  CSF :  267  ICPs:  10    Vital Signs Last 24 Hrs  T(C): 38.4 (2019 07:00), Max: 38.6 (2019 05:00)  T(F): 101.1 (2019 07:00), Max: 101.5 (2019 05:00)  HR: 92 (:00) (66 - 112)  BP: 151/82 (2019 07:00) (115/66 - 151/82)  BP(mean): 104 (2019 07:00) (88 - 109)  RR: 23 (2019 07:00) (12 - 42)  SpO2: 92% (2019 07:00) (91% - 100%)    Mode: AC/ CMV (Assist Control/ Continuous Mandatory Ventilation), RR (machine): 12, TV (machine): 400, FiO2: 40, PEEP: 5, ITime: 1, MAP: 8, PIP: 18    I&O's Detail    10 Apr 2019 07:01  -  2019 07:00  --------------------------------------------------------  IN:    Enteral Tube Flush: 30 mL    IV PiggyBack: 600 mL    midazolam Infusion: 71.3 mL    niCARdipine Infusion: 308.3 mL    niCARdipine Infusion: 155 mL    sodium chloride 0.9%.: 1538 mL  Total IN: 2702.6 mL    OUT:    External Ventricular Device: 267 mL    Indwelling Catheter - Urethral: 1675 mL    Voided: 400 mL  Total OUT: 2342 mL    Total NET: 360.6 mL      LABS:                        12.1   7.87  )-----------( 316      ( 2019 06:47 )             35.5     04-11    146<H>  |  112<H>  |  16  ----------------------------<  146<H>  4.0   |  22  |  0.95    Ca    8.6      2019 05:57  Phos  2.3     -11  Mg     2.2     04-11      PT/INR - ( 2019 09:43 )   PT: 13.2 sec;   INR: 1.16          PTT - ( 2019 09:43 )  PTT:29.9 sec        CAPILLARY BLOOD GLUCOSE      POCT Blood Glucose.: 132 mg/dL (2019 05:36)  POCT Blood Glucose.: 101 mg/dL (10 Apr 2019 12:12)      Drug Levels: [] N/A    CSF Analysis: [] N/A      Allergies    No Known Allergies    Intolerances      MEDICATIONS:  Antibiotics:  piperacillin/tazobactam IVPB. 4.5 Gram(s) IV Intermittent every 6 hours  vancomycin  IVPB 1000 milliGRAM(s) IV Intermittent every 12 hours    Neuro:  acetaminophen    Suspension .. 650 milliGRAM(s) Oral every 6 hours PRN  fentaNYL    Injectable 25 MICROGram(s) IV Push every 2 hours PRN  levETIRAcetam  IVPB 1000 milliGRAM(s) IV Intermittent every 12 hours  LORazepam   Injectable 4 milliGRAM(s) IV Push every 4 hours PRN  LORazepam   Injectable 2 milliGRAM(s) IV Push every 4 hours PRN  LORazepam   Injectable 1 milliGRAM(s) IV Push every 4 hours PRN  midazolam Infusion 0.02 mG/kG/Hr IV Continuous <Continuous>  ondansetron Injectable 4 milliGRAM(s) IV Push every 6 hours PRN    Anticoagulation:    OTHER:  amLODIPine   Tablet 10 milliGRAM(s) Oral daily  atorvastatin 40 milliGRAM(s) Oral at bedtime  bisacodyl 5 milliGRAM(s) Oral daily PRN  bisacodyl Suppository 10 milliGRAM(s) Rectal daily PRN  dextrose 40% Gel 15 Gram(s) Oral once PRN  dextrose 50% Injectable 12.5 Gram(s) IV Push once  dextrose 50% Injectable 25 Gram(s) IV Push once  dextrose 50% Injectable 25 Gram(s) IV Push once  docusate sodium Liquid 100 milliGRAM(s) Oral two times a day  glucagon  Injectable 1 milliGRAM(s) IntraMuscular once PRN  hydrALAZINE 50 milliGRAM(s) Oral every 8 hours  hydrALAZINE Injectable 10 milliGRAM(s) IV Push every 4 hours PRN  influenza   Vaccine 0.5 milliLiter(s) IntraMuscular once  insulin lispro (HumaLOG) corrective regimen sliding scale   SubCutaneous Before meals and at bedtime  lisinopril 40 milliGRAM(s) Oral daily  metoprolol tartrate 50 milliGRAM(s) Oral every 12 hours  niCARdipine Infusion 5 mG/Hr IV Continuous <Continuous>  pantoprazole   Suspension 40 milliGRAM(s) Oral daily  senna 2 Tablet(s) Oral at bedtime    IVF:  dextrose 5%. 1000 milliLiter(s) IV Continuous <Continuous>  sodium chloride 0.9%. 1000 milliLiter(s) IV Continuous <Continuous>    CULTURES:  Culture Results:   Numerous Staphylococcus aureus  Rare Routine respiratory derek present ( @ 20:31)  Culture Results:   No growth at 2 days. ( @ 17:16)          PHYSICAL EXAMINATION    NEUROLOGIC EXAMINATION:  Patient is   lethargic, unarousable, not follows commands, spont movement on R, L side withdrawal,   pupils reactive and equal, gaze preference not fixed,   downward and left; moves R UE/LE spontaneously, extensor posturing L UE, withdraws L LE    CARDIOVASCULAR: (+) S1 S2, normal rate and regular rhythm  PULMONARY: clear to auscultation bilaterally; min secretions  ABDOMEN: soft, nontender with normoactive bowel sounds  EXTREMITIES: no edema  SKIN: no rash    LDL = 113 ()   HDL = 70 ()  TG = 85 ()  TSH = 0.733 ()    Bacteriology:  CSF studies:  EEG:  Neuroimagin/06 12:48 p.m. CT head:  s/p EVD, decrease in size of ventricles, R ICH unchanged with IV extension, punctate hyperdensity R BG R conner R thalamus, unchanged,    2:11 a.m. CT head:  large R basal ganglia / thalamic ICH, small L basal ganglia / R pontine acute hemorrhage; c/w hypertensive ICH; extension into R lateral ventricle; MLS, mass effect, no HCP  Other imaging:    IV FLUIDS: 2%50  DRIPS: cardene (11cc/hr) propofol   DIET: Jevity at 60cc/hr  Lines: Lizabeth Yi)  Drains:   EVD @ 10cm H20, 267 cc/24h ICP 6-9  Wounds:    CODE STATUS:  Full Code                       GOALS OF CARE:  aggressive                      DISPOSITION:  ICU

## 2019-04-11 NOTE — CHART NOTE - NSCHARTNOTEFT_GEN_A_CORE
Admitting Diagnosis:   Patient is a 44y old  Male who presents with a chief complaint of right thalamic hemorrhage (11 Apr 2019 08:16)    PAST MEDICAL & SURGICAL HISTORY:    Current Nutrition Order: Jevity 1.2 via NGT @ 30 mL/hr increase to goal 67 mL/hr x24 hours, Prostat SF 1x/day (1608 mL TV, 2030 kcal, 104 gm pro, 1298 mL free water, 161% RDI vitamin/mineral)- held at this time     PO Intake: Good (%) [   ]  Fair (50-75%) [   ] Poor (<25%) [   ]    GI Issues: no stool, abdomen soft/non tender per flow sheet    Pain: No apparent pain/distress    Skin Integrity: no pressure related injuries     Labs:   04-11    146<H>  |  112<H>  |  16  ----------------------------<  146<H>  4.0   |  22  |  0.95    Ca    8.6      11 Apr 2019 05:57  Phos  2.3     04-11  Mg     2.2     04-11    CAPILLARY BLOOD GLUCOSE    POCT Blood Glucose.: 132 mg/dL (11 Apr 2019 05:36)  POCT Blood Glucose.: 101 mg/dL (10 Apr 2019 12:12)    Medications:  MEDICATIONS  (STANDING):  amLODIPine   Tablet 10 milliGRAM(s) Oral daily  atorvastatin 40 milliGRAM(s) Oral at bedtime  dextrose 5%. 1000 milliLiter(s) (50 mL/Hr) IV Continuous <Continuous>  dextrose 50% Injectable 12.5 Gram(s) IV Push once  dextrose 50% Injectable 25 Gram(s) IV Push once  dextrose 50% Injectable 25 Gram(s) IV Push once  docusate sodium Liquid 100 milliGRAM(s) Oral two times a day  hydrALAZINE 50 milliGRAM(s) Oral every 8 hours  influenza   Vaccine 0.5 milliLiter(s) IntraMuscular once  insulin lispro (HumaLOG) corrective regimen sliding scale   SubCutaneous Before meals and at bedtime  levETIRAcetam  IVPB 1000 milliGRAM(s) IV Intermittent every 12 hours  lisinopril 40 milliGRAM(s) Oral daily  metoprolol tartrate 50 milliGRAM(s) Oral every 12 hours  midazolam Infusion 0.02 mG/kG/Hr (1.44 mL/Hr) IV Continuous <Continuous>  niCARdipine Infusion 5 mG/Hr (25 mL/Hr) IV Continuous <Continuous>  pantoprazole   Suspension 40 milliGRAM(s) Oral daily  piperacillin/tazobactam IVPB. 4.5 Gram(s) IV Intermittent every 6 hours  senna 2 Tablet(s) Oral at bedtime  sodium chloride 0.9%. 1000 milliLiter(s) (75 mL/Hr) IV Continuous <Continuous>  vancomycin  IVPB 1000 milliGRAM(s) IV Intermittent every 12 hours    MEDICATIONS  (PRN):  acetaminophen    Suspension .. 650 milliGRAM(s) Oral every 6 hours PRN Temp greater or equal to 38.5C (101.3F)  bisacodyl 5 milliGRAM(s) Oral daily PRN Constipation  bisacodyl Suppository 10 milliGRAM(s) Rectal daily PRN Constipation  dextrose 40% Gel 15 Gram(s) Oral once PRN Blood Glucose LESS THAN 70 milliGRAM(s)/deciliter  fentaNYL    Injectable 25 MICROGram(s) IV Push every 2 hours PRN Severe Pain (7 - 10)  glucagon  Injectable 1 milliGRAM(s) IntraMuscular once PRN Glucose LESS THAN 70 milligrams/deciliter  hydrALAZINE Injectable 10 milliGRAM(s) IV Push every 4 hours PRN BP>150  LORazepam   Injectable 4 milliGRAM(s) IV Push every 4 hours PRN RASS 4  LORazepam   Injectable 2 milliGRAM(s) IV Push every 4 hours PRN RASS 3  LORazepam   Injectable 1 milliGRAM(s) IV Push every 4 hours PRN RASS 2  ondansetron Injectable 4 milliGRAM(s) IV Push every 6 hours PRN Nausea and/or Vomiting    Weight:  No updated weights     Weight Change:   Please obtain current weight and trend weekly weights for further assessment    Nutrition Focused Physical Exam: Completed [   ]  Not Pertinent [  X ]  Muscle Wasting- Temporal [   ]  Clavicle/Pectoral [   ]  Shoulder/Deltoid [   ]  Scapula [   ]  Interosseous [   ]  Quadriceps [   ]  Gastrocnemius [   ]  Fat Wasting- Orbital [   ]  Buccal [   ]  Triceps [   ]  Rib [   ]  Suspect [PCM] 2/2 to physical assessment, [poor intake], and [wt loss]; please see malnutrition chart note.    Estimated energy needs:   Ht (4/6): 175.3cm, Wt (4/8 per family): 72.7kg IBW: 160lb +/-10%, %IBW: 100%, BMI: 23.6   IBW used to calculate energy needs as pt vented.     [id number] kcal (25-30 kcal/kg), 102-116 gm (1.4-1.6 gm/kg), Fluid needs per team 2/2 hypernatremia.    Subjective: 44y Male w/ ETOH abuse now s/p right thalamic IPH with IVH s/p Emergent Rt EVD placement 4/6/19, s/p Cerebral angiogram neg 4/8/19, new left EVD placed and R EVD removed 4/9/19, now s/p penumbra evacuation of ICH 4/10/19. Pt remains intubated, vent mode: AC/CMV, full vent support, CPAP trials as tolerated. Discussed plan with team. RD to monitor closely and f/u per high risk protocol.    Previous Nutrition Diagnosis:  Inadequate energy intake RT NPO status AEB pt meeting 0% EER    Active [ X  ]  Resolved [   ]    If resolved, new PES:     Goal: Will meet >75% EER via EN within 24-48 hours.    Recommendations:   1. As medically appropriate recommend resume Jevity 1.2 via NGT @ 30 mL/hr increase to goal 67 mL/hr x24 hours, Prostat SF 1x/day (1608 mL TV, 2030 kcal, 104 gm pro, 1298 mL free water, 161% RDI vitamin/mineral). Order for volume based feeding protocol & monitor for signs of intolerance.   2. Monitor labs, obtain current weight and trend weekly weights for further assessment.    Education: N/A 2/2 pt's medical status    Risk Level: High [ X  ] Moderate [   ] Low [   ]

## 2019-04-12 LAB
-  AMPICILLIN/SULBACTAM: SIGNIFICANT CHANGE UP
-  AMPICILLIN: SIGNIFICANT CHANGE UP
-  AZITHROMYCIN: SIGNIFICANT CHANGE UP
-  CEFAZOLIN: SIGNIFICANT CHANGE UP
-  CEFTRIAXONE: SIGNIFICANT CHANGE UP
-  CEFTRIAXONE: SIGNIFICANT CHANGE UP
-  CHLORAMPHENICOL: SIGNIFICANT CHANGE UP
-  CIPROFLOXACIN: SIGNIFICANT CHANGE UP
-  CLARITHROMYCIN: SIGNIFICANT CHANGE UP
-  CLINDAMYCIN: SIGNIFICANT CHANGE UP
-  ERYTHROMYCIN: SIGNIFICANT CHANGE UP
-  LEVOFLOXACIN: SIGNIFICANT CHANGE UP
-  PENICILLIN: SIGNIFICANT CHANGE UP
-  RIFAMPIN: SIGNIFICANT CHANGE UP
-  TRIMETHOPRIM/SULFAMETHOXAZOLE: SIGNIFICANT CHANGE UP
-  TRIMETHOPRIM/SULFAMETHOXAZOLE: SIGNIFICANT CHANGE UP
-  VANCOMYCIN: SIGNIFICANT CHANGE UP
ANION GAP SERPL CALC-SCNC: 13 MMOL/L — SIGNIFICANT CHANGE UP (ref 5–17)
BUN SERPL-MCNC: 17 MG/DL — SIGNIFICANT CHANGE UP (ref 7–23)
CALCIUM SERPL-MCNC: 8.6 MG/DL — SIGNIFICANT CHANGE UP (ref 8.4–10.5)
CHLORIDE SERPL-SCNC: 107 MMOL/L — SIGNIFICANT CHANGE UP (ref 96–108)
CO2 SERPL-SCNC: 24 MMOL/L — SIGNIFICANT CHANGE UP (ref 22–31)
CREAT SERPL-MCNC: 0.9 MG/DL — SIGNIFICANT CHANGE UP (ref 0.5–1.3)
GLUCOSE SERPL-MCNC: 127 MG/DL — HIGH (ref 70–99)
HCT VFR BLD CALC: 37.1 % — LOW (ref 39–50)
HGB BLD-MCNC: 12.7 G/DL — LOW (ref 13–17)
MAGNESIUM SERPL-MCNC: 2.4 MG/DL — SIGNIFICANT CHANGE UP (ref 1.6–2.6)
MCHC RBC-ENTMCNC: 32.1 PG — SIGNIFICANT CHANGE UP (ref 27–34)
MCHC RBC-ENTMCNC: 34.2 GM/DL — SIGNIFICANT CHANGE UP (ref 32–36)
MCV RBC AUTO: 93.7 FL — SIGNIFICANT CHANGE UP (ref 80–100)
METHOD TYPE: SIGNIFICANT CHANGE UP
METHOD TYPE: SIGNIFICANT CHANGE UP
NRBC # BLD: 0 /100 WBCS — SIGNIFICANT CHANGE UP (ref 0–0)
PHOSPHATE SERPL-MCNC: 2.2 MG/DL — LOW (ref 2.5–4.5)
PLATELET # BLD AUTO: 354 K/UL — SIGNIFICANT CHANGE UP (ref 150–400)
POTASSIUM SERPL-MCNC: 3.9 MMOL/L — SIGNIFICANT CHANGE UP (ref 3.5–5.3)
POTASSIUM SERPL-SCNC: 3.9 MMOL/L — SIGNIFICANT CHANGE UP (ref 3.5–5.3)
RBC # BLD: 3.96 M/UL — LOW (ref 4.2–5.8)
RBC # FLD: 12 % — SIGNIFICANT CHANGE UP (ref 10.3–14.5)
SODIUM SERPL-SCNC: 144 MMOL/L — SIGNIFICANT CHANGE UP (ref 135–145)
WBC # BLD: 12.26 K/UL — HIGH (ref 3.8–10.5)
WBC # FLD AUTO: 12.26 K/UL — HIGH (ref 3.8–10.5)

## 2019-04-12 PROCEDURE — 99292 CRITICAL CARE ADDL 30 MIN: CPT | Mod: 24

## 2019-04-12 PROCEDURE — 71045 X-RAY EXAM CHEST 1 VIEW: CPT | Mod: 26

## 2019-04-12 PROCEDURE — 99291 CRITICAL CARE FIRST HOUR: CPT | Mod: 24

## 2019-04-12 RX ORDER — HYDRALAZINE HCL 50 MG
75 TABLET ORAL EVERY 8 HOURS
Qty: 0 | Refills: 0 | Status: DISCONTINUED | OUTPATIENT
Start: 2019-04-12 | End: 2019-04-13

## 2019-04-12 RX ORDER — ACETAMINOPHEN 500 MG
1000 TABLET ORAL ONCE
Qty: 0 | Refills: 0 | Status: COMPLETED | OUTPATIENT
Start: 2019-04-12 | End: 2019-04-12

## 2019-04-12 RX ORDER — METOPROLOL TARTRATE 50 MG
10 TABLET ORAL ONCE
Qty: 0 | Refills: 0 | Status: COMPLETED | OUTPATIENT
Start: 2019-04-12 | End: 2019-04-12

## 2019-04-12 RX ORDER — CEFTRIAXONE 500 MG/1
1 INJECTION, POWDER, FOR SOLUTION INTRAMUSCULAR; INTRAVENOUS EVERY 24 HOURS
Qty: 0 | Refills: 0 | Status: DISCONTINUED | OUTPATIENT
Start: 2019-04-12 | End: 2019-04-18

## 2019-04-12 RX ORDER — HYDRALAZINE HCL 50 MG
25 TABLET ORAL ONCE
Qty: 0 | Refills: 0 | Status: COMPLETED | OUTPATIENT
Start: 2019-04-12 | End: 2019-04-12

## 2019-04-12 RX ORDER — DEXMEDETOMIDINE HYDROCHLORIDE IN 0.9% SODIUM CHLORIDE 4 UG/ML
0.1 INJECTION INTRAVENOUS
Qty: 200 | Refills: 0 | Status: DISCONTINUED | OUTPATIENT
Start: 2019-04-12 | End: 2019-04-21

## 2019-04-12 RX ORDER — ENOXAPARIN SODIUM 100 MG/ML
40 INJECTION SUBCUTANEOUS AT BEDTIME
Qty: 0 | Refills: 0 | Status: DISCONTINUED | OUTPATIENT
Start: 2019-04-12 | End: 2019-04-23

## 2019-04-12 RX ORDER — NICARDIPINE HYDROCHLORIDE 30 MG/1
5 CAPSULE, EXTENDED RELEASE ORAL
Qty: 40 | Refills: 0 | Status: DISCONTINUED | OUTPATIENT
Start: 2019-04-12 | End: 2019-04-13

## 2019-04-12 RX ORDER — POTASSIUM PHOSPHATE, MONOBASIC POTASSIUM PHOSPHATE, DIBASIC 236; 224 MG/ML; MG/ML
15 INJECTION, SOLUTION INTRAVENOUS ONCE
Qty: 0 | Refills: 0 | Status: COMPLETED | OUTPATIENT
Start: 2019-04-12 | End: 2019-04-12

## 2019-04-12 RX ADMIN — Medication 4 MILLIGRAM(S): at 15:02

## 2019-04-12 RX ADMIN — FENTANYL CITRATE 50 MICROGRAM(S): 50 INJECTION INTRAVENOUS at 03:17

## 2019-04-12 RX ADMIN — Medication 10 MILLIGRAM(S): at 04:41

## 2019-04-12 RX ADMIN — PIPERACILLIN AND TAZOBACTAM 25 GRAM(S): 4; .5 INJECTION, POWDER, LYOPHILIZED, FOR SOLUTION INTRAVENOUS at 11:52

## 2019-04-12 RX ADMIN — AMLODIPINE BESYLATE 10 MILLIGRAM(S): 2.5 TABLET ORAL at 06:00

## 2019-04-12 RX ADMIN — FENTANYL CITRATE 50 MICROGRAM(S): 50 INJECTION INTRAVENOUS at 03:45

## 2019-04-12 RX ADMIN — Medication 1000 MILLIGRAM(S): at 09:00

## 2019-04-12 RX ADMIN — Medication 4 MILLIGRAM(S): at 02:37

## 2019-04-12 RX ADMIN — FENTANYL CITRATE 50 MICROGRAM(S): 50 INJECTION INTRAVENOUS at 00:13

## 2019-04-12 RX ADMIN — ATORVASTATIN CALCIUM 40 MILLIGRAM(S): 80 TABLET, FILM COATED ORAL at 22:46

## 2019-04-12 RX ADMIN — Medication 50 MILLIGRAM(S): at 06:01

## 2019-04-12 RX ADMIN — MIDAZOLAM HYDROCHLORIDE 1.44 MG/KG/HR: 1 INJECTION, SOLUTION INTRAMUSCULAR; INTRAVENOUS at 07:26

## 2019-04-12 RX ADMIN — CEFTRIAXONE 100 GRAM(S): 500 INJECTION, POWDER, FOR SOLUTION INTRAMUSCULAR; INTRAVENOUS at 17:48

## 2019-04-12 RX ADMIN — FENTANYL CITRATE 50 MICROGRAM(S): 50 INJECTION INTRAVENOUS at 13:15

## 2019-04-12 RX ADMIN — Medication 25 MILLIGRAM(S): at 09:45

## 2019-04-12 RX ADMIN — Medication 4 MILLIGRAM(S): at 19:35

## 2019-04-12 RX ADMIN — Medication 10 MILLIGRAM(S): at 18:07

## 2019-04-12 RX ADMIN — Medication 650 MILLIGRAM(S): at 14:05

## 2019-04-12 RX ADMIN — Medication 10 MILLIGRAM(S): at 03:57

## 2019-04-12 RX ADMIN — LEVETIRACETAM 1000 MILLIGRAM(S): 250 TABLET, FILM COATED ORAL at 06:00

## 2019-04-12 RX ADMIN — Medication 2: at 22:45

## 2019-04-12 RX ADMIN — Medication 10 MILLIGRAM(S): at 13:59

## 2019-04-12 RX ADMIN — Medication 75 MILLIGRAM(S): at 22:45

## 2019-04-12 RX ADMIN — LISINOPRIL 40 MILLIGRAM(S): 2.5 TABLET ORAL at 06:00

## 2019-04-12 RX ADMIN — FENTANYL CITRATE 50 MICROGRAM(S): 50 INJECTION INTRAVENOUS at 08:28

## 2019-04-12 RX ADMIN — Medication 4 MILLIGRAM(S): at 11:53

## 2019-04-12 RX ADMIN — PANTOPRAZOLE SODIUM 40 MILLIGRAM(S): 20 TABLET, DELAYED RELEASE ORAL at 11:53

## 2019-04-12 RX ADMIN — Medication 50 MILLIGRAM(S): at 09:46

## 2019-04-12 RX ADMIN — LEVETIRACETAM 1000 MILLIGRAM(S): 250 TABLET, FILM COATED ORAL at 19:08

## 2019-04-12 RX ADMIN — DEXMEDETOMIDINE HYDROCHLORIDE IN 0.9% SODIUM CHLORIDE 1.8 MICROGRAM(S)/KG/HR: 4 INJECTION INTRAVENOUS at 17:49

## 2019-04-12 RX ADMIN — Medication 1000 MILLIGRAM(S): at 21:15

## 2019-04-12 RX ADMIN — DEXMEDETOMIDINE HYDROCHLORIDE IN 0.9% SODIUM CHLORIDE 1.8 MICROGRAM(S)/KG/HR: 4 INJECTION INTRAVENOUS at 20:47

## 2019-04-12 RX ADMIN — Medication 4 MILLIGRAM(S): at 06:39

## 2019-04-12 RX ADMIN — Medication 75 MILLIGRAM(S): at 15:44

## 2019-04-12 RX ADMIN — Medication 300 MILLIGRAM(S): at 00:04

## 2019-04-12 RX ADMIN — POTASSIUM PHOSPHATE, MONOBASIC POTASSIUM PHOSPHATE, DIBASIC 62.5 MILLIMOLE(S): 236; 224 INJECTION, SOLUTION INTRAVENOUS at 09:45

## 2019-04-12 RX ADMIN — MIDAZOLAM HYDROCHLORIDE 1.44 MG/KG/HR: 1 INJECTION, SOLUTION INTRAMUSCULAR; INTRAVENOUS at 16:05

## 2019-04-12 RX ADMIN — Medication 10 MILLIGRAM(S): at 08:13

## 2019-04-12 RX ADMIN — Medication 400 MILLIGRAM(S): at 08:29

## 2019-04-12 RX ADMIN — Medication 50 MILLIGRAM(S): at 22:45

## 2019-04-12 RX ADMIN — PIPERACILLIN AND TAZOBACTAM 25 GRAM(S): 4; .5 INJECTION, POWDER, LYOPHILIZED, FOR SOLUTION INTRAVENOUS at 06:00

## 2019-04-12 RX ADMIN — Medication 400 MILLIGRAM(S): at 21:00

## 2019-04-12 RX ADMIN — NICARDIPINE HYDROCHLORIDE 25 MG/HR: 30 CAPSULE, EXTENDED RELEASE ORAL at 19:34

## 2019-04-12 RX ADMIN — ENOXAPARIN SODIUM 40 MILLIGRAM(S): 100 INJECTION SUBCUTANEOUS at 22:45

## 2019-04-12 RX ADMIN — FENTANYL CITRATE 50 MICROGRAM(S): 50 INJECTION INTRAVENOUS at 08:13

## 2019-04-12 NOTE — PROGRESS NOTE ADULT - SUBJECTIVE AND OBJECTIVE BOX
Hospital Course:   4/7: overnight, patient requiring large amount of cardene and propofol.  Plan today is to transition to precedex, start PO lisinopril and wean of cardene.  Becomes extremely agitated when off sedation.  4/8: Cerebral angio without findings of aneurysm. EVD stopped working, pulled back without improvement. CT Head performed.  4/9: Received ativan 6mg for agitation. EVD stopped draining at 6pm yesterday taken for stat CTH. Dr. So notified and EVD lowered to 5cmH2O without output.   4/10: s/p penumbra evacuation of ICH. Pt seen and examined at bedside postop. Remains intubated, on versed. ICPs stable.  (OR EBL 30cc, received LR 200cc)  4/11: JUSTIN overnight. Remains on versed. Remains intubated. Neuro stable.  4/12: Given ativan overnight, bucking the vent. Remains on versed drip. Neuro stable. ICPs stable.      Vital Signs Last 24 Hrs  T(C): 37.9 (11 Apr 2019 23:09), Max: 38.6 (11 Apr 2019 05:00)  T(F): 100.2 (11 Apr 2019 23:09), Max: 101.5 (11 Apr 2019 05:00)  HR: 80 (12 Apr 2019 00:00) (80 - 112)  BP: 147/79 (12 Apr 2019 00:00) (119/62 - 163/91)  BP(mean): 103 (12 Apr 2019 00:00) (83 - 128)  RR: 21 (12 Apr 2019 00:00) (18 - 36)  SpO2: 97% (12 Apr 2019 00:00) (91% - 100%)    I&O's Detail    10 Apr 2019 07:01  -  11 Apr 2019 07:00  --------------------------------------------------------  IN:    Enteral Tube Flush: 30 mL    IV PiggyBack: 600 mL    midazolam Infusion: 71.3 mL    niCARdipine Infusion: 308.3 mL    niCARdipine Infusion: 155 mL    sodium chloride 0.9%: 1613 mL  Total IN: 2777.6 mL    OUT:    External Ventricular Device: 287 mL    Indwelling Catheter - Urethral: 1675 mL    Voided: 400 mL  Total OUT: 2362 mL    Total NET: 415.6 mL      11 Apr 2019 07:01  -  12 Apr 2019 00:22  --------------------------------------------------------  IN:    Enteral Tube Flush: 335 mL    IV PiggyBack: 450 mL    midazolam Infusion: 88.7 mL    niCARdipine Infusion: 50 mL    ns in tub fed  [id number]: 938 mL    sodium chloride 0.9%: 225 mL  Total IN: 2086.7 mL    OUT:    External Ventricular Device: 183 mL    Indwelling Catheter - Urethral: 1525 mL  Total OUT: 1708 mL    Total NET: 378.7 mL        I&O's Summary    10 Apr 2019 07:01  -  11 Apr 2019 07:00  --------------------------------------------------------  IN: 2777.6 mL / OUT: 2362 mL / NET: 415.6 mL    11 Apr 2019 07:01  -  12 Apr 2019 00:22  --------------------------------------------------------  IN: 2086.7 mL / OUT: 1708 mL / NET: 378.7 mL      PHYSICAL EXAM:  Gen: NAD, Intubated on ventilator, sedated on versed  HEENT: PERRL  Lungs: Clear b/l  Heart: S1, S2. NSR  Abd: Soft, NT/ND. +BS  Exts: Pulses 2+ throughout  Neuro: Mild decorticate posturing to LUE to noxious stimulus, localizing on RUE. Withdrawal to noxious stimuli b/l LE. No eye opening. Not following commands. PERRL, +corneals, +gag    DEVICE/DRAIN DRESSING:  L EVD @ 5cmH2O, draining    TUBES/LINES:  [] CVC  [x] A-line  [] Lumbar Drain  [x] Ventriculostomy  [] Other    DIET:  [] NPO  [] Mechanical  [x] Tube feeds    LABS:                        12.1   7.87  )-----------( 316      ( 11 Apr 2019 06:47 )             35.5     04-11    146<H>  |  112<H>  |  16  ----------------------------<  146<H>  4.0   |  22  |  0.95    Ca    8.6      11 Apr 2019 05:57  Phos  2.3     04-11  Mg     2.2     04-11              CAPILLARY BLOOD GLUCOSE      POCT Blood Glucose.: 110 mg/dL (11 Apr 2019 22:33)  POCT Blood Glucose.: 168 mg/dL (11 Apr 2019 14:51)  POCT Blood Glucose.: 160 mg/dL (11 Apr 2019 11:06)  POCT Blood Glucose.: 132 mg/dL (11 Apr 2019 05:36)      Drug Levels: [] N/A  Vancomycin Level, Trough: 6.1 ug/mL (04-11 @ 22:40)    CSF Analysis: [] N/A  RBC Count - Spinal Fluid: 4800 /uL (04-11 @ 08:02)  CSF Lymphocytes: 1 % (04-11 @ 08:02)  Glucose, CSF: 95 mg/dL (04-11 @ 08:02)  Protein, CSF: 121 mg/dL (04-11 @ 08:02)      Allergies    No Known Allergies    Intolerances      MEDICATIONS:  Antibiotics:  piperacillin/tazobactam IVPB. 4.5 Gram(s) IV Intermittent every 6 hours  vancomycin  IVPB 1500 milliGRAM(s) IV Intermittent every 12 hours    Neuro:  acetaminophen    Suspension .. 650 milliGRAM(s) Oral every 6 hours PRN  fentaNYL    Injectable 50 MICROGram(s) IV Push every 2 hours PRN  fentaNYL    Injectable 25 MICROGram(s) IV Push every 2 hours PRN  fentaNYL    Injectable 12.5 MICROGram(s) IV Push every 2 hours PRN  levETIRAcetam  Solution 1000 milliGRAM(s) Oral two times a day  LORazepam   Injectable 4 milliGRAM(s) IV Push every 4 hours PRN  LORazepam   Injectable 2 milliGRAM(s) IV Push every 4 hours PRN  LORazepam   Injectable 1 milliGRAM(s) IV Push every 4 hours PRN  midazolam Infusion 0.02 mG/kG/Hr IV Continuous <Continuous>  ondansetron Injectable 4 milliGRAM(s) IV Push every 6 hours PRN    Anticoagulation:    OTHER:  amLODIPine   Tablet 10 milliGRAM(s) Oral daily  atorvastatin 40 milliGRAM(s) Oral at bedtime  bisacodyl 5 milliGRAM(s) Oral daily PRN  bisacodyl Suppository 10 milliGRAM(s) Rectal daily PRN  dextrose 40% Gel 15 Gram(s) Oral once PRN  dextrose 50% Injectable 12.5 Gram(s) IV Push once  dextrose 50% Injectable 25 Gram(s) IV Push once  dextrose 50% Injectable 25 Gram(s) IV Push once  docusate sodium Liquid 100 milliGRAM(s) Oral two times a day  glucagon  Injectable 1 milliGRAM(s) IntraMuscular once PRN  hydrALAZINE 50 milliGRAM(s) Oral every 8 hours  hydrALAZINE Injectable 10 milliGRAM(s) IV Push every 4 hours PRN  influenza   Vaccine 0.5 milliLiter(s) IntraMuscular once  insulin lispro (HumaLOG) corrective regimen sliding scale   SubCutaneous Before meals and at bedtime  lisinopril 40 milliGRAM(s) Oral daily  metoprolol tartrate 50 milliGRAM(s) Oral every 12 hours  niCARdipine Infusion 5 mG/Hr IV Continuous <Continuous>  pantoprazole   Suspension 40 milliGRAM(s) Oral daily  senna 2 Tablet(s) Oral at bedtime    IVF:  dextrose 5%. 1000 milliLiter(s) IV Continuous <Continuous>    CULTURES:  Culture Results:   Moderate Staphylococcus aureus Susceptibility to follow.  Moderate Haemophilus influenzae Susceptibility to follow.  Accompanied by normal respiratory derek (04-10 @ 11:27)  Culture Results:   Numerous Staphylococcus aureus  Rare Routine respiratory derek present (04-08 @ 20:31)    RADIOLOGY & ADDITIONAL TESTS:      ASSESSMENT:  44y Male w/ ETOH abuse now s/p right thalamic IPH with IVH s/p Emergent Rt EVD placement 4/6/19, s/p Cerebral angiogram neg 4/8/19, new left EVD placed and R EVD removed 4/9/19, now s/p penumbra evacuation of ICH 4/10/19    HEMORRHAGE STROKE  HEMORRHAGE  Handoff  ICH (intracerebral hemorrhage)  ICH (intracerebral hemorrhage)  Craniotomy for intracranial hemorrhage  Evacuation of hematoma of face  Angiogram, carotid and cerebral, bilateral      PLAN:  NEURO:  Neuro checks q1h  Keppra for seizure prophylaxis  Continue versed, ativan PRN agitation  Keep left EVD at 5cmH2O, monitor ICPs and output    CARDIOVASCULAR:   SBP goal <140, Cardene drip PRN  Continue lopressor 50mg BID, and hydralazine 50mg q8h, norvasc 10mg daily, and lisinopril 40mg daily  Daily labs, electrolyte repletion PRN,  Continue Statin therapy    PULMONARY:   Continue ventilator support,  Daily CXR    RENAL:   Voiding, condom catheter  Normal Na goal  Total fluids 100c/hr    GI:   NGT feeds  Stool softeners PRN  PPI while intubated    ID:  Continue vanc/zosyn for empiric PNA coverage    ENDO:   ISS    DVT PROPHYLAXIS:   SCDs, SQH held    DISPOSITION: Continue NSICU care,  Full code,  D/w Dr. So and Dr. Wheeler    Assessment:  Present when checked    []  GCS  E   V  M     Heart Failure: []Acute, [] acute on chronic , []chronic  Heart Failure:  [] Diastolic (HFpEF), [] Systolic (HFrEF), []Combined (HFpEF and HFrEF), [] RHF, [] Pulm HTN, [] Other    [] KRISTA, [] ATN, [] AIN, [] other  [] CKD1, [] CKD2, [] CKD 3, [] CKD 4, [] CKD 5, []ESRD    Encephalopathy: [] Metabolic, [] Hepatic, [] toxic, [x] Neurological, [] Other    Abnormal Nutritional Status: [] malnutrition (see nutrition note), [ ]underweight: BMI < 19, [] morbid obesity: BMI >40, [] Cachexia    [] Sepsis  [] hypovolemic shock,[] cardiogenic shock, [] hemorrhagic shock, [] neurogenic shock  [x] Acute Respiratory Failure  [x]Cerebral edema, [x] Brain compression/ herniation,   [] Functional quadriplegia  [] Acute blood loss anemia Hospital Course:   4/7: overnight, patient requiring large amount of cardene and propofol.  Plan today is to transition to precedex, start PO lisinopril and wean of cardene.  Becomes extremely agitated when off sedation.  4/8: Cerebral angio without findings of aneurysm. EVD stopped working, pulled back without improvement. CT Head performed.  4/9: Received ativan 6mg for agitation. EVD stopped draining at 6pm yesterday taken for stat CTH. Dr. So notified and EVD lowered to 5cmH2O without output.   4/10: s/p penumbra evacuation of ICH. Pt seen and examined at bedside postop. Remains intubated, on versed. ICPs stable.  (OR EBL 30cc, received LR 200cc)  4/11: JUSTIN overnight. Remains on versed. Remains intubated. Neuro stable.  4/12: Given ativan overnight, bucking the vent. Remains on versed drip. Neuro stable. ICPs stable. Increase hydralazine to 75 Q8.       Vital Signs Last 24 Hrs  T(C): 37.9 (11 Apr 2019 23:09), Max: 38.6 (11 Apr 2019 05:00)  T(F): 100.2 (11 Apr 2019 23:09), Max: 101.5 (11 Apr 2019 05:00)  HR: 80 (12 Apr 2019 00:00) (80 - 112)  BP: 147/79 (12 Apr 2019 00:00) (119/62 - 163/91)  BP(mean): 103 (12 Apr 2019 00:00) (83 - 128)  RR: 21 (12 Apr 2019 00:00) (18 - 36)  SpO2: 97% (12 Apr 2019 00:00) (91% - 100%)    I&O's Detail    10 Apr 2019 07:01  -  11 Apr 2019 07:00  --------------------------------------------------------  IN:    Enteral Tube Flush: 30 mL    IV PiggyBack: 600 mL    midazolam Infusion: 71.3 mL    niCARdipine Infusion: 308.3 mL    niCARdipine Infusion: 155 mL    sodium chloride 0.9%: 1613 mL  Total IN: 2777.6 mL    OUT:    External Ventricular Device: 287 mL    Indwelling Catheter - Urethral: 1675 mL    Voided: 400 mL  Total OUT: 2362 mL    Total NET: 415.6 mL      11 Apr 2019 07:01  -  12 Apr 2019 00:22  --------------------------------------------------------  IN:    Enteral Tube Flush: 335 mL    IV PiggyBack: 450 mL    midazolam Infusion: 88.7 mL    niCARdipine Infusion: 50 mL    ns in tub fed  [id number]: 938 mL    sodium chloride 0.9%: 225 mL  Total IN: 2086.7 mL    OUT:    External Ventricular Device: 183 mL    Indwelling Catheter - Urethral: 1525 mL  Total OUT: 1708 mL    Total NET: 378.7 mL        I&O's Summary    10 Apr 2019 07:01  -  11 Apr 2019 07:00  --------------------------------------------------------  IN: 2777.6 mL / OUT: 2362 mL / NET: 415.6 mL    11 Apr 2019 07:01  -  12 Apr 2019 00:22  --------------------------------------------------------  IN: 2086.7 mL / OUT: 1708 mL / NET: 378.7 mL      PHYSICAL EXAM:  Gen: NAD, Intubated on ventilator, sedated on versed  HEENT: PERRL  Lungs: Clear b/l  Heart: S1, S2. NSR  Abd: Soft, NT/ND. +BS  Exts: Pulses 2+ throughout  Neuro: Mild decorticate posturing to LUE to noxious stimulus, localizing on RUE. Withdrawal to noxious stimuli b/l LE. No eye opening. Not following commands. PERRL, +corneals, +gag    DEVICE/DRAIN DRESSING:  L EVD @ 5cmH2O, draining    TUBES/LINES:  [] CVC  [x] A-line  [] Lumbar Drain  [x] Ventriculostomy  [] Other    DIET:  [] NPO  [] Mechanical  [x] Tube feeds    LABS:                        12.1   7.87  )-----------( 316      ( 11 Apr 2019 06:47 )             35.5     04-11    146<H>  |  112<H>  |  16  ----------------------------<  146<H>  4.0   |  22  |  0.95    Ca    8.6      11 Apr 2019 05:57  Phos  2.3     04-11  Mg     2.2     04-11              CAPILLARY BLOOD GLUCOSE      POCT Blood Glucose.: 110 mg/dL (11 Apr 2019 22:33)  POCT Blood Glucose.: 168 mg/dL (11 Apr 2019 14:51)  POCT Blood Glucose.: 160 mg/dL (11 Apr 2019 11:06)  POCT Blood Glucose.: 132 mg/dL (11 Apr 2019 05:36)      Drug Levels: [] N/A  Vancomycin Level, Trough: 6.1 ug/mL (04-11 @ 22:40)    CSF Analysis: [] N/A  RBC Count - Spinal Fluid: 4800 /uL (04-11 @ 08:02)  CSF Lymphocytes: 1 % (04-11 @ 08:02)  Glucose, CSF: 95 mg/dL (04-11 @ 08:02)  Protein, CSF: 121 mg/dL (04-11 @ 08:02)      Allergies    No Known Allergies    Intolerances      MEDICATIONS:  Antibiotics:  piperacillin/tazobactam IVPB. 4.5 Gram(s) IV Intermittent every 6 hours  vancomycin  IVPB 1500 milliGRAM(s) IV Intermittent every 12 hours    Neuro:  acetaminophen    Suspension .. 650 milliGRAM(s) Oral every 6 hours PRN  fentaNYL    Injectable 50 MICROGram(s) IV Push every 2 hours PRN  fentaNYL    Injectable 25 MICROGram(s) IV Push every 2 hours PRN  fentaNYL    Injectable 12.5 MICROGram(s) IV Push every 2 hours PRN  levETIRAcetam  Solution 1000 milliGRAM(s) Oral two times a day  LORazepam   Injectable 4 milliGRAM(s) IV Push every 4 hours PRN  LORazepam   Injectable 2 milliGRAM(s) IV Push every 4 hours PRN  LORazepam   Injectable 1 milliGRAM(s) IV Push every 4 hours PRN  midazolam Infusion 0.02 mG/kG/Hr IV Continuous <Continuous>  ondansetron Injectable 4 milliGRAM(s) IV Push every 6 hours PRN    Anticoagulation:    OTHER:  amLODIPine   Tablet 10 milliGRAM(s) Oral daily  atorvastatin 40 milliGRAM(s) Oral at bedtime  bisacodyl 5 milliGRAM(s) Oral daily PRN  bisacodyl Suppository 10 milliGRAM(s) Rectal daily PRN  dextrose 40% Gel 15 Gram(s) Oral once PRN  dextrose 50% Injectable 12.5 Gram(s) IV Push once  dextrose 50% Injectable 25 Gram(s) IV Push once  dextrose 50% Injectable 25 Gram(s) IV Push once  docusate sodium Liquid 100 milliGRAM(s) Oral two times a day  glucagon  Injectable 1 milliGRAM(s) IntraMuscular once PRN  hydrALAZINE 50 milliGRAM(s) Oral every 8 hours  hydrALAZINE Injectable 10 milliGRAM(s) IV Push every 4 hours PRN  influenza   Vaccine 0.5 milliLiter(s) IntraMuscular once  insulin lispro (HumaLOG) corrective regimen sliding scale   SubCutaneous Before meals and at bedtime  lisinopril 40 milliGRAM(s) Oral daily  metoprolol tartrate 50 milliGRAM(s) Oral every 12 hours  niCARdipine Infusion 5 mG/Hr IV Continuous <Continuous>  pantoprazole   Suspension 40 milliGRAM(s) Oral daily  senna 2 Tablet(s) Oral at bedtime    IVF:  dextrose 5%. 1000 milliLiter(s) IV Continuous <Continuous>    CULTURES:  Culture Results:   Moderate Staphylococcus aureus Susceptibility to follow.  Moderate Haemophilus influenzae Susceptibility to follow.  Accompanied by normal respiratory derek (04-10 @ 11:27)  Culture Results:   Numerous Staphylococcus aureus  Rare Routine respiratory derek present (04-08 @ 20:31)    RADIOLOGY & ADDITIONAL TESTS:      ASSESSMENT:  44y Male w/ ETOH abuse now s/p right thalamic IPH with IVH s/p Emergent Rt EVD placement 4/6/19, s/p Cerebral angiogram neg 4/8/19, new left EVD placed and R EVD removed 4/9/19, now s/p penumbra evacuation of ICH 4/10/19    HEMORRHAGE STROKE  HEMORRHAGE  Handoff  ICH (intracerebral hemorrhage)  ICH (intracerebral hemorrhage)  Craniotomy for intracranial hemorrhage  Evacuation of hematoma of face  Angiogram, carotid and cerebral, bilateral      PLAN:  NEURO:  Neuro checks q1h  Keppra for seizure prophylaxis  Continue versed, ativan PRN agitation  Keep left EVD at 5cmH2O, monitor ICPs and output    CARDIOVASCULAR:   SBP goal <140, Cardene drip PRN  increase hydralazine 75mg q8h  Continue lopressor 50mg BID, norvasc 10mg daily, and lisinopril 40mg daily  Daily labs, electrolyte repletion PRN,  Continue Statin therapy    PULMONARY:   Continue ventilator support,  Daily CXR    RENAL:   dc little  Voiding, condom catheter  Normal Na goal  Total fluids 100c/hr    GI:   NGT feeds  Stool softeners PRN  PPI while intubated    ID:  Continue vanc/zosyn for empiric PNA coverage    ENDO:   ISS    DVT PROPHYLAXIS:   SCDs, start SQL tonight     DISPOSITION: Continue NSICU care,  Full code,  D/w Dr. So and Dr. Wheeler    Assessment:  Present when checked    []  GCS  E   V  M     Heart Failure: []Acute, [] acute on chronic , []chronic  Heart Failure:  [] Diastolic (HFpEF), [] Systolic (HFrEF), []Combined (HFpEF and HFrEF), [] RHF, [] Pulm HTN, [] Other    [] KRISTA, [] ATN, [] AIN, [] other  [] CKD1, [] CKD2, [] CKD 3, [] CKD 4, [] CKD 5, []ESRD    Encephalopathy: [] Metabolic, [] Hepatic, [] toxic, [x] Neurological, [] Other    Abnormal Nutritional Status: [] malnutrition (see nutrition note), [ ]underweight: BMI < 19, [] morbid obesity: BMI >40, [] Cachexia    [] Sepsis  [] hypovolemic shock,[] cardiogenic shock, [] hemorrhagic shock, [] neurogenic shock  [x] Acute Respiratory Failure  [x]Cerebral edema, [x] Brain compression/ herniation,   [] Functional quadriplegia  [] Acute blood loss anemia

## 2019-04-12 NOTE — PROGRESS NOTE ADULT - ASSESSMENT
44y/M with   1.  intracerebral hemorrhage (r basal ganglia / thalamic; small L basal ganglia, R pontine), brain compression, cerebral edema; ICH Score on admission  = GCS Score: 3-4 (2 pts) E1VTM2 (+) IVH = 3  ICH volume 20ml  Estimated 30-day mortality 3 points: 72%  2.  dyslipidemia    PLAN:   NEURO: neurochecks q1h, PRN pain meds with Tylenol / fentanyl PRN; switch sedation from propofol to precedex to RASS of 0 to -1  ICH:  BP control  seizure prophylaxis: as per neurosurgery  EVD: monitor output, keep at 10cm h20  REHAB:  physical therapy evaluation and management    EARLY MOB:   HOB bedrest    PULM:  full vent support; CPAP trial this afternoon if RASS achieved   CARDIO:  SBP goal 100-140mm Hg; cardene drip to SBP goal; increase lisinopril 40 mg PO daily ; norvasc 10 d, start metop 25 bid  ENDO:  Blood sugar goals 140-180 mg/dL, continue insulin sliding scale; atorvastatin 40mg PO daily, A1C  4.9  GI:  docusate senna; PPI for GI prophylaxis (intubated)  DIET: continue Jevity at goal  RENAL:  Na goal 140-145  HEM/ONC: Hb stable; given DDAVP and platelet transfusion for aspirin reversal  VTE Prophylaxis: SCDs, SQH tonight if ok with NS  ID: afebrile, no leukocytosis, no ABx, high for aspiration / HAP  Social: will update family; sister freeman, father, common law wife who is pregnant    ATTENDING ATTESTATION:  I was physically present for the key portions of the evaluation and management (E/M) service provided.  I agree with the above history, physical and plan, which I have reviewed and edited where appropriate.    Patient at high risk for neurological deterioration or death due to:  ICU delirium, aspiration PNA, DVT / PE.  Critical care time, excluding procedures: 75 minutes spent on total encounter, more than 50% of the visit was spent counseling and/or coordinating care by the attending physician.     Plan discussed with RN, house staff. 44y/M with   1.  intracerebral hemorrhage (r basal ganglia / thalamic; small L basal ganglia, R pontine), brain compression, cerebral edema; ICH Score on admission  = GCS Score: 3-4 (2 pts) E1VTM2 (+) IVH = 3  ICH volume 20ml  Estimated 30-day mortality 3 points: 72%  2.  dyslipidemia    PLAN:   NEURO: neurochecks q1h, PRN pain meds with Tylenol / fentanyl PRN; switch sedation from propofol to precedex to RASS of 0 to -1  ICH:  BP control  seizure prophylaxis: as per neurosurgery  EVD: monitor output, keep at 10cm h20  REHAB:  physical therapy evaluation and management    EARLY MOB:   HOB bedrest    PULM:  full vent support; CPAP trial this afternoon if RASS achieved   CARDIO:  SBP goal 100-140mm Hg; cardene drip to SBP goal; lisinopril 40 mg PO daily ; norvasc 10 d, metop 50 bid, increase hydralazine  ENDO:  Blood sugar goals 140-180 mg/dL, continue insulin sliding scale; atorvastatin 40mg PO daily, A1C  4.9  GI:  docusate senna; PPI for GI prophylaxis (intubated)  DIET: continue Jevity at goal  RENAL:  Na goal 140-145  HEM/ONC: Hb stable; given DDAVP and platelet transfusion for aspirin reversal  VTE Prophylaxis: SCDs, SQH tonight if ok with NS  ID: afebrile, no leukocytosis, no ABx, high for aspiration / HAP  Social: will update family; sister freeman, father, common law wife who is pregnant    ATTENDING ATTESTATION:  I was physically present for the key portions of the evaluation and management (E/M) service provided.  I agree with the above history, physical and plan, which I have reviewed and edited where appropriate.    Patient at high risk for neurological deterioration or death due to:  ICU delirium, aspiration PNA, DVT / PE.  Critical care time, excluding procedures: 75 minutes spent on total encounter, more than 50% of the visit was spent counseling and/or coordinating care by the attending physician.     Plan discussed with RN, house staff.

## 2019-04-12 NOTE — PROGRESS NOTE ADULT - SUBJECTIVE AND OBJECTIVE BOX
NEUROCRITICAL CARE PROGRESS NOTE    ARPAN RUANO   MRN-[id number]  Summary:  Vancomycin Level, Trough: 6.1 ug/mL ( @ 22:40)  /RBC Count - Spinal Fluid: 4800 /uL ( @ 08:02)  CSF Lymphocytes: 1 % ( @ 08:02)  Glucose, CSF: 95 mg/dL ( @ 08:02)  Protein, CSF: 121 mg/dL ( @ 08:02)    HPI:  History obtained by patient's girlfriend and chart from [facility], patient is unresponsive:     45 y/o male with no significant PMHx presents to [facility] with AMS, left side weakness and numbness. Per [place], [place] when he developed acute onset of left facial numbness and LUE and numbness around 6:30PM. When get got home he developed AMS, dysarthria, and weakness in the LUE and LLE. At [facility] ED patient had several episodes of emesis and was hypertensive to SBP in the 200's. Work up revealed right thalamic hemorrhage on CT head. Patient not on any anticoagulation use per girlfriend. Of note, patient took Excedrin for headache at home. Patient transferred to [facility] for further management. (2019 00:04)      S/Overnight events:    POD#     EVD:    CSF :    ICPs:      Vital Signs Last 24 Hrs  T(C): 37.8 (2019 05:00), Max: 38.6 (2019 14:00)  T(F): 100 (2019 05:00), Max: 101.4 (2019 14:00)  HR: 82 (2019 07:00) (78 - 108)  BP: 143/83 (2019 07:00) (119/62 - 176/93)  BP(mean): 109 (2019 07:00) (83 - 131)  RR: 20 (2019 07:00) (18 - 36)  SpO2: 99% (2019 07:00) (91% - 100%)    Mode: AC/ CMV (Assist Control/ Continuous Mandatory Ventilation), RR (machine): 12, TV (machine): 400, FiO2: 40, PEEP: 5, ITime: 1, MAP: 14, PIP: 23    I&O's Detail    2019 07:01  -  2019 07:00  --------------------------------------------------------  IN:    Enteral Tube Flush: 335 mL    IV PiggyBack: 1150 mL    midazolam Infusion: 147.6 mL    niCARdipine Infusion: 50 mL    ns in tub fed  [id number]: 1407 mL    sodium chloride 0.9%: 225 mL  Total IN: 3314.6 mL    OUT:    External Ventricular Device: 278 mL    Indwelling Catheter - Urethral: 3495 mL  Total OUT: 3773 mL    Total NET: -458.4 mL      LABS:                        12.7   12.26 )-----------( 354      ( 2019 05:17 )             37.1     04-12    144  |  107  |  17  ----------------------------<  127<H>  3.9   |  24  |  0.90    Ca    8.6      2019 05:17  Phos  2.2     04-12  Mg     2.4     04-12      CAPILLARY BLOOD GLUCOSE      POCT Blood Glucose.: 119 mg/dL (2019 06:51)  POCT Blood Glucose.: 110 mg/dL (2019 22:33)  POCT Blood Glucose.: 168 mg/dL (2019 14:51)  POCT Blood Glucose.: 160 mg/dL (2019 11:06)      Drug Levels: [] N/A  Vancomycin Level, Trough: 6.1 ug/mL ( @ 22:40)    CSF Analysis: [] N/A  RBC Count - Spinal Fluid: 4800 /uL ( @ 08:02)  CSF Lymphocytes: 1 % ( @ 08:02)  Glucose, CSF: 95 mg/dL ( @ 08:02)  Protein, CSF: 121 mg/dL ( @ 08:02)      Allergies    No Known Allergies    Intolerances      MEDICATIONS:  Antibiotics:  piperacillin/tazobactam IVPB. 4.5 Gram(s) IV Intermittent every 6 hours  vancomycin  IVPB 1500 milliGRAM(s) IV Intermittent every 12 hours    Neuro:  acetaminophen    Suspension .. 650 milliGRAM(s) Oral every 6 hours PRN  fentaNYL    Injectable 50 MICROGram(s) IV Push every 2 hours PRN  fentaNYL    Injectable 25 MICROGram(s) IV Push every 2 hours PRN  fentaNYL    Injectable 12.5 MICROGram(s) IV Push every 2 hours PRN  levETIRAcetam  Solution 1000 milliGRAM(s) Oral two times a day  LORazepam   Injectable 4 milliGRAM(s) IV Push every 4 hours PRN  LORazepam   Injectable 2 milliGRAM(s) IV Push every 4 hours PRN  LORazepam   Injectable 1 milliGRAM(s) IV Push every 4 hours PRN  midazolam Infusion 0.02 mG/kG/Hr IV Continuous <Continuous>  ondansetron Injectable 4 milliGRAM(s) IV Push every 6 hours PRN    Anticoagulation:    OTHER:  amLODIPine   Tablet 10 milliGRAM(s) Oral daily  atorvastatin 40 milliGRAM(s) Oral at bedtime  bisacodyl 5 milliGRAM(s) Oral daily PRN  bisacodyl Suppository 10 milliGRAM(s) Rectal daily PRN  dextrose 40% Gel 15 Gram(s) Oral once PRN  dextrose 50% Injectable 12.5 Gram(s) IV Push once  dextrose 50% Injectable 25 Gram(s) IV Push once  dextrose 50% Injectable 25 Gram(s) IV Push once  docusate sodium Liquid 100 milliGRAM(s) Oral two times a day  glucagon  Injectable 1 milliGRAM(s) IntraMuscular once PRN  hydrALAZINE 50 milliGRAM(s) Oral every 8 hours  hydrALAZINE Injectable 10 milliGRAM(s) IV Push every 4 hours PRN  influenza   Vaccine 0.5 milliLiter(s) IntraMuscular once  insulin lispro (HumaLOG) corrective regimen sliding scale   SubCutaneous Before meals and at bedtime  lisinopril 40 milliGRAM(s) Oral daily  metoprolol tartrate 50 milliGRAM(s) Oral every 12 hours  niCARdipine Infusion 5 mG/Hr IV Continuous <Continuous>  pantoprazole   Suspension 40 milliGRAM(s) Oral daily  senna 2 Tablet(s) Oral at bedtime    IVF:  dextrose 5%. 1000 milliLiter(s) IV Continuous <Continuous>  potassium phosphate IVPB 15 milliMole(s) IV Intermittent once    CULTURES:  Culture Results:   Moderate Staphylococcus aureus Susceptibility to follow.  Moderate Haemophilus influenzae Susceptibility to follow.  Accompanied by normal respiratory derek (04-10 @ 11:27)  Culture Results:   Numerous Staphylococcus aureus  Rare Routine respiratory derek present ( @ 20:31)              PHYSICAL EXAMINATION    NEUROLOGIC EXAMINATION:  Patient is   lethargic, unarousable, not follows commands, spont movement on R, L side withdrawal,   pupils reactive and equal, gaze preference not fixed,   downward and left; moves R UE/LE spontaneously, extensor posturing L UE, withdraws L LE    CARDIOVASCULAR: (+) S1 S2, normal rate and regular rhythm  PULMONARY: clear to auscultation bilaterally; min secretions  ABDOMEN: soft, nontender with normoactive bowel sounds  EXTREMITIES: no edema  SKIN: no rash    LDL = 113 ()   HDL = 70 ()  TG = 85 ()  TSH = 0.733 ()    Bacteriology:  CSF studies:  EEG:  Neuroimagin/06 12:48 p.m. CT head:  s/p EVD, decrease in size of ventricles, R ICH unchanged with IV extension, punctate hyperdensity R BG R conner R thalamus, unchanged,    2:11 a.m. CT head:  large R basal ganglia / thalamic ICH, small L basal ganglia / R pontine acute hemorrhage; c/w hypertensive ICH; extension into R lateral ventricle; MLS, mass effect, no HCP  Other imaging:    IV FLUIDS: 2%50  DRIPS: cardene (11cc/hr) propofol   DIET: Jevity at 60cc/hr  Lines: Lizabeth Becerra ([facility]ar)  Drains:   EVD @ 10cm H20, 267 cc/24h ICP 6-9  Wounds:    CODE STATUS:  Full Code                       GOALS OF CARE:  aggressive                      DISPOSITION:  ICU NEUROCRITICAL CARE PROGRESS NOTE    ARPAN RUANO   MRN-[id number]  Summary:  Vancomycin Level, Trough: 6.1 ug/mL ( @ 22:40)  /RBC Count - Spinal Fluid: 4800 /uL ( @ 08:02)  CSF Lymphocytes: 1 % ( @ 08:02)  Glucose, CSF: 95 mg/dL ( @ 08:02)  Protein, CSF: 121 mg/dL ( @ 08:02)    HPI:  History obtained by patient's girlfriend and chart from [facility], patient is unresponsive:     43 y/o male with no significant PMHx presents to [facility] with AMS, left side weakness and numbness. Per [place] when he developed acute onset of left facial numbness and LUE and numbness around 6:30PM. When get got home he developed AMS, dysarthria, and weakness in the LUE and LLE. At [facility] ED patient had several episodes of emesis and was hypertensive to SBP in the 200's. Work up revealed right thalamic hemorrhage on CT head. Patient not on any anticoagulation use per girlfriend. Of note, patient took Excedrin for headache at home. Patient transferred to [facility] for further management. (2019 00:04)      S/Overnight events:  on vent, ativan given several times, versed gtt increased, febrile, little to be removed,     Vital Signs Last 24 Hrs  T(C): 37.8 (2019 05:00), Max: 38.6 (2019 14:00)  T(F): 100 (2019 05:00), Max: 101.4 (2019 14:00)  HR: 82 (2019 07:00) (78 - 108)  BP: 143/83 (2019 07:00) (119/62 - 176/93)  BP(mean): 109 (2019 07:00) (83 - 131)  RR: 20 (2019 07:00) (18 - 36)  SpO2: 99% (2019 07:00) (91% - 100%)    Mode: AC/ CMV (Assist Control/ Continuous Mandatory Ventilation), RR (machine): 12, TV (machine): 400, FiO2: 40, PEEP: 5, ITime: 1, MAP: 14, PIP: 23    I&O's Detail    2019 07:01  -  2019 07:00  --------------------------------------------------------  IN:    Enteral Tube Flush: 335 mL    IV PiggyBack: 1150 mL    midazolam Infusion: 147.6 mL    niCARdipine Infusion: 50 mL    ns in tub fed  [id number]: 1407 mL    sodium chloride 0.9%: 225 mL  Total IN: 3314.6 mL    OUT:    External Ventricular Device: 278 mL    Indwelling Catheter - Urethral: 3495 mL  Total OUT: 3773 mL    Total NET: -458.4 mL      LABS:                        12.7   12. )-----------( 354      ( 2019 05:17 )             37.1     04-12    144  |  107  |  17  ----------------------------<  127<H>  3.9   |  24  |  0.90    Ca    8.6      2019 05:17  Phos  2.2     04-12  Mg     2.4     04-12      CAPILLARY BLOOD GLUCOSE      POCT Blood Glucose.: 119 mg/dL (2019 06:51)  POCT Blood Glucose.: 110 mg/dL (2019 22:33)  POCT Blood Glucose.: 168 mg/dL (2019 14:51)  POCT Blood Glucose.: 160 mg/dL (2019 11:06)      Drug Levels: [] N/A  Vancomycin Level, Trough: 6.1 ug/mL ( @ 22:40)    CSF Analysis: [] N/A  RBC Count - Spinal Fluid: 4800 /uL ( @ 08:02)  CSF Lymphocytes: 1 % ( @ 08:02)  Glucose, CSF: 95 mg/dL ( @ 08:02)  Protein, CSF: 121 mg/dL ( @ 08:02)      Allergies    No Known Allergies    Intolerances      MEDICATIONS:  Antibiotics:  piperacillin/tazobactam IVPB. 4.5 Gram(s) IV Intermittent every 6 hours  vancomycin  IVPB 1500 milliGRAM(s) IV Intermittent every 12 hours    Neuro:  acetaminophen    Suspension .. 650 milliGRAM(s) Oral every 6 hours PRN  fentaNYL    Injectable 50 MICROGram(s) IV Push every 2 hours PRN  fentaNYL    Injectable 25 MICROGram(s) IV Push every 2 hours PRN  fentaNYL    Injectable 12.5 MICROGram(s) IV Push every 2 hours PRN  levETIRAcetam  Solution 1000 milliGRAM(s) Oral two times a day  LORazepam   Injectable 4 milliGRAM(s) IV Push every 4 hours PRN  LORazepam   Injectable 2 milliGRAM(s) IV Push every 4 hours PRN  LORazepam   Injectable 1 milliGRAM(s) IV Push every 4 hours PRN  midazolam Infusion 0.02 mG/kG/Hr IV Continuous <Continuous>  ondansetron Injectable 4 milliGRAM(s) IV Push every 6 hours PRN    Anticoagulation:    OTHER:  amLODIPine   Tablet 10 milliGRAM(s) Oral daily  atorvastatin 40 milliGRAM(s) Oral at bedtime  bisacodyl 5 milliGRAM(s) Oral daily PRN  bisacodyl Suppository 10 milliGRAM(s) Rectal daily PRN  dextrose 40% Gel 15 Gram(s) Oral once PRN  dextrose 50% Injectable 12.5 Gram(s) IV Push once  dextrose 50% Injectable 25 Gram(s) IV Push once  dextrose 50% Injectable 25 Gram(s) IV Push once  docusate sodium Liquid 100 milliGRAM(s) Oral two times a day  glucagon  Injectable 1 milliGRAM(s) IntraMuscular once PRN  hydrALAZINE 50 milliGRAM(s) Oral every 8 hours  hydrALAZINE Injectable 10 milliGRAM(s) IV Push every 4 hours PRN  influenza   Vaccine 0.5 milliLiter(s) IntraMuscular once  insulin lispro (HumaLOG) corrective regimen sliding scale   SubCutaneous Before meals and at bedtime  lisinopril 40 milliGRAM(s) Oral daily  metoprolol tartrate 50 milliGRAM(s) Oral every 12 hours  niCARdipine Infusion 5 mG/Hr IV Continuous <Continuous>  pantoprazole   Suspension 40 milliGRAM(s) Oral daily  senna 2 Tablet(s) Oral at bedtime    IVF:  dextrose 5%. 1000 milliLiter(s) IV Continuous <Continuous>  potassium phosphate IVPB 15 milliMole(s) IV Intermittent once    CULTURES:  Culture Results:   Moderate Staphylococcus aureus Susceptibility to follow.  Moderate Haemophilus influenzae Susceptibility to follow.  Accompanied by normal respiratory derek (04-10 @ 11:27)  Culture Results:   Numerous Staphylococcus aureus  Rare Routine respiratory derek present ( @ 20:31)              PHYSICAL EXAMINATION    NEUROLOGIC EXAMINATION:  Patient is   lethargic, unarousable, not follows commands, spont movement on R, L side withdrawal,   pupils reactive and equal, gaze preference not fixed,   downward and left; moves R UE/LE spontaneously, extensor posturing L UE, withdraws L LE    CARDIOVASCULAR: (+) S1 S2, normal rate and regular rhythm  PULMONARY: clear to auscultation bilaterally; min secretions  ABDOMEN: soft, nontender with normoactive bowel sounds  EXTREMITIES: no edema  SKIN: no rash    LDL = 113 ()   HDL = 70 ()  TG = 85 ()  TSH = 0.733 ()    Bacteriology:  CSF studies:  EEG:  Neuroimagin/06 12:48 p.m. CT head:  s/p EVD, decrease in size of ventricles, R ICH unchanged with IV extension, punctate hyperdensity R BG R conner R thalamus, unchanged,    2:11 a.m. CT head:  large R basal ganglia / thalamic ICH, small L basal ganglia / R pontine acute hemorrhage; c/w hypertensive ICH; extension into R lateral ventricle; MLS, mass effect, no HCP  Other imaging:    IV FLUIDS: 2%50  DRIPS: cardene (11cc/hr) propofol   DIET: Jevity at 60cc/hr  Lines: Lizabeth Little ([facility])  Drains:   EVD @ 10cm H20, 267 cc/24h ICP 6-9  Wounds:    CODE STATUS:  Full Code                       GOALS OF CARE:  aggressive                      DISPOSITION:  ICU

## 2019-04-13 LAB
-  CEFAZOLIN: SIGNIFICANT CHANGE UP
-  OXACILLIN: SIGNIFICANT CHANGE UP
ANION GAP SERPL CALC-SCNC: 11 MMOL/L — SIGNIFICANT CHANGE UP (ref 5–17)
APPEARANCE UR: CLEAR — SIGNIFICANT CHANGE UP
BILIRUB UR-MCNC: NEGATIVE — SIGNIFICANT CHANGE UP
BUN SERPL-MCNC: 18 MG/DL — SIGNIFICANT CHANGE UP (ref 7–23)
CALCIUM SERPL-MCNC: 9 MG/DL — SIGNIFICANT CHANGE UP (ref 8.4–10.5)
CHLORIDE SERPL-SCNC: 107 MMOL/L — SIGNIFICANT CHANGE UP (ref 96–108)
CO2 SERPL-SCNC: 23 MMOL/L — SIGNIFICANT CHANGE UP (ref 22–31)
COLOR SPEC: YELLOW — SIGNIFICANT CHANGE UP
CREAT SERPL-MCNC: 0.73 MG/DL — SIGNIFICANT CHANGE UP (ref 0.5–1.3)
CULTURE RESULTS: SIGNIFICANT CHANGE UP
DIFF PNL FLD: ABNORMAL
GLUCOSE SERPL-MCNC: 182 MG/DL — HIGH (ref 70–99)
GLUCOSE UR QL: NEGATIVE — SIGNIFICANT CHANGE UP
HCT VFR BLD CALC: 35.6 % — LOW (ref 39–50)
HGB BLD-MCNC: 12 G/DL — LOW (ref 13–17)
KETONES UR-MCNC: NEGATIVE — SIGNIFICANT CHANGE UP
LEUKOCYTE ESTERASE UR-ACNC: NEGATIVE — SIGNIFICANT CHANGE UP
MAGNESIUM SERPL-MCNC: 2.4 MG/DL — SIGNIFICANT CHANGE UP (ref 1.6–2.6)
MCHC RBC-ENTMCNC: 32.2 PG — SIGNIFICANT CHANGE UP (ref 27–34)
MCHC RBC-ENTMCNC: 33.7 GM/DL — SIGNIFICANT CHANGE UP (ref 32–36)
MCV RBC AUTO: 95.4 FL — SIGNIFICANT CHANGE UP (ref 80–100)
METHOD TYPE: SIGNIFICANT CHANGE UP
NITRITE UR-MCNC: NEGATIVE — SIGNIFICANT CHANGE UP
NRBC # BLD: 0 /100 WBCS — SIGNIFICANT CHANGE UP (ref 0–0)
ORGANISM # SPEC MICROSCOPIC CNT: SIGNIFICANT CHANGE UP
PH UR: 6 — SIGNIFICANT CHANGE UP (ref 5–8)
PHOSPHATE SERPL-MCNC: 3.4 MG/DL — SIGNIFICANT CHANGE UP (ref 2.5–4.5)
PLATELET # BLD AUTO: 362 K/UL — SIGNIFICANT CHANGE UP (ref 150–400)
POTASSIUM SERPL-MCNC: 3.9 MMOL/L — SIGNIFICANT CHANGE UP (ref 3.5–5.3)
POTASSIUM SERPL-SCNC: 3.9 MMOL/L — SIGNIFICANT CHANGE UP (ref 3.5–5.3)
PROT UR-MCNC: ABNORMAL MG/DL
RBC # BLD: 3.73 M/UL — LOW (ref 4.2–5.8)
RBC # FLD: 12 % — SIGNIFICANT CHANGE UP (ref 10.3–14.5)
SODIUM SERPL-SCNC: 141 MMOL/L — SIGNIFICANT CHANGE UP (ref 135–145)
SP GR SPEC: 1.02 — SIGNIFICANT CHANGE UP (ref 1–1.03)
SPECIMEN SOURCE: SIGNIFICANT CHANGE UP
UROBILINOGEN FLD QL: 0.2 E.U./DL — SIGNIFICANT CHANGE UP
WBC # BLD: 9.13 K/UL — SIGNIFICANT CHANGE UP (ref 3.8–10.5)
WBC # FLD AUTO: 9.13 K/UL — SIGNIFICANT CHANGE UP (ref 3.8–10.5)

## 2019-04-13 PROCEDURE — 36556 INSERT NON-TUNNEL CV CATH: CPT

## 2019-04-13 PROCEDURE — 71045 X-RAY EXAM CHEST 1 VIEW: CPT | Mod: 26

## 2019-04-13 PROCEDURE — 99292 CRITICAL CARE ADDL 30 MIN: CPT | Mod: 24,25

## 2019-04-13 PROCEDURE — 99291 CRITICAL CARE FIRST HOUR: CPT | Mod: 24,25

## 2019-04-13 RX ORDER — POTASSIUM CHLORIDE 20 MEQ
10 PACKET (EA) ORAL ONCE
Qty: 0 | Refills: 0 | Status: COMPLETED | OUTPATIENT
Start: 2019-04-13 | End: 2019-04-13

## 2019-04-13 RX ORDER — FENTANYL CITRATE 50 UG/ML
100 INJECTION INTRAVENOUS ONCE
Qty: 0 | Refills: 0 | Status: DISCONTINUED | OUTPATIENT
Start: 2019-04-13 | End: 2019-04-13

## 2019-04-13 RX ORDER — HYDRALAZINE HCL 50 MG
100 TABLET ORAL EVERY 8 HOURS
Qty: 0 | Refills: 0 | Status: DISCONTINUED | OUTPATIENT
Start: 2019-04-13 | End: 2019-04-28

## 2019-04-13 RX ORDER — ACETAMINOPHEN 500 MG
1000 TABLET ORAL ONCE
Qty: 0 | Refills: 0 | Status: COMPLETED | OUTPATIENT
Start: 2019-04-13 | End: 2019-04-13

## 2019-04-13 RX ORDER — HYDRALAZINE HCL 50 MG
25 TABLET ORAL ONCE
Qty: 0 | Refills: 0 | Status: COMPLETED | OUTPATIENT
Start: 2019-04-13 | End: 2019-04-13

## 2019-04-13 RX ADMIN — Medication 650 MILLIGRAM(S): at 23:37

## 2019-04-13 RX ADMIN — FENTANYL CITRATE 12.5 MICROGRAM(S): 50 INJECTION INTRAVENOUS at 17:18

## 2019-04-13 RX ADMIN — CEFTRIAXONE 100 GRAM(S): 500 INJECTION, POWDER, FOR SOLUTION INTRAMUSCULAR; INTRAVENOUS at 18:05

## 2019-04-13 RX ADMIN — FENTANYL CITRATE 100 MICROGRAM(S): 50 INJECTION INTRAVENOUS at 16:26

## 2019-04-13 RX ADMIN — DEXMEDETOMIDINE HYDROCHLORIDE IN 0.9% SODIUM CHLORIDE 1.8 MICROGRAM(S)/KG/HR: 4 INJECTION INTRAVENOUS at 06:43

## 2019-04-13 RX ADMIN — AMLODIPINE BESYLATE 10 MILLIGRAM(S): 2.5 TABLET ORAL at 06:51

## 2019-04-13 RX ADMIN — Medication 100 MILLIGRAM(S): at 14:52

## 2019-04-13 RX ADMIN — FENTANYL CITRATE 50 MICROGRAM(S): 50 INJECTION INTRAVENOUS at 23:06

## 2019-04-13 RX ADMIN — Medication 50 MILLIGRAM(S): at 10:10

## 2019-04-13 RX ADMIN — LISINOPRIL 40 MILLIGRAM(S): 2.5 TABLET ORAL at 06:51

## 2019-04-13 RX ADMIN — FENTANYL CITRATE 25 MICROGRAM(S): 50 INJECTION INTRAVENOUS at 03:50

## 2019-04-13 RX ADMIN — DEXMEDETOMIDINE HYDROCHLORIDE IN 0.9% SODIUM CHLORIDE 1.8 MICROGRAM(S)/KG/HR: 4 INJECTION INTRAVENOUS at 23:01

## 2019-04-13 RX ADMIN — Medication 100 MILLIGRAM(S): at 22:26

## 2019-04-13 RX ADMIN — Medication 2: at 06:52

## 2019-04-13 RX ADMIN — PANTOPRAZOLE SODIUM 40 MILLIGRAM(S): 20 TABLET, DELAYED RELEASE ORAL at 14:51

## 2019-04-13 RX ADMIN — Medication 25 MILLIGRAM(S): at 10:10

## 2019-04-13 RX ADMIN — FENTANYL CITRATE 50 MICROGRAM(S): 50 INJECTION INTRAVENOUS at 23:33

## 2019-04-13 RX ADMIN — DEXMEDETOMIDINE HYDROCHLORIDE IN 0.9% SODIUM CHLORIDE 1.8 MICROGRAM(S)/KG/HR: 4 INJECTION INTRAVENOUS at 09:30

## 2019-04-13 RX ADMIN — Medication 650 MILLIGRAM(S): at 23:57

## 2019-04-13 RX ADMIN — Medication 1000 MILLIGRAM(S): at 05:04

## 2019-04-13 RX ADMIN — Medication 0.1 MILLIGRAM(S): at 10:10

## 2019-04-13 RX ADMIN — Medication 50 MILLIGRAM(S): at 22:25

## 2019-04-13 RX ADMIN — LEVETIRACETAM 1000 MILLIGRAM(S): 250 TABLET, FILM COATED ORAL at 18:05

## 2019-04-13 RX ADMIN — Medication 75 MILLIGRAM(S): at 06:51

## 2019-04-13 RX ADMIN — FENTANYL CITRATE 100 MICROGRAM(S): 50 INJECTION INTRAVENOUS at 14:51

## 2019-04-13 RX ADMIN — DEXMEDETOMIDINE HYDROCHLORIDE IN 0.9% SODIUM CHLORIDE 1.8 MICROGRAM(S)/KG/HR: 4 INJECTION INTRAVENOUS at 20:20

## 2019-04-13 RX ADMIN — Medication 2 MILLIGRAM(S): at 12:27

## 2019-04-13 RX ADMIN — Medication 650 MILLIGRAM(S): at 17:02

## 2019-04-13 RX ADMIN — MIDAZOLAM HYDROCHLORIDE 1.44 MG/KG/HR: 1 INJECTION, SOLUTION INTRAMUSCULAR; INTRAVENOUS at 03:04

## 2019-04-13 RX ADMIN — ENOXAPARIN SODIUM 40 MILLIGRAM(S): 100 INJECTION SUBCUTANEOUS at 22:25

## 2019-04-13 RX ADMIN — DEXMEDETOMIDINE HYDROCHLORIDE IN 0.9% SODIUM CHLORIDE 1.8 MICROGRAM(S)/KG/HR: 4 INJECTION INTRAVENOUS at 16:26

## 2019-04-13 RX ADMIN — Medication 650 MILLIGRAM(S): at 17:17

## 2019-04-13 RX ADMIN — Medication 10 MILLIEQUIVALENT(S): at 09:30

## 2019-04-13 RX ADMIN — ATORVASTATIN CALCIUM 40 MILLIGRAM(S): 80 TABLET, FILM COATED ORAL at 22:26

## 2019-04-13 RX ADMIN — Medication 400 MILLIGRAM(S): at 04:45

## 2019-04-13 RX ADMIN — DEXMEDETOMIDINE HYDROCHLORIDE IN 0.9% SODIUM CHLORIDE 1.8 MICROGRAM(S)/KG/HR: 4 INJECTION INTRAVENOUS at 14:50

## 2019-04-13 RX ADMIN — FENTANYL CITRATE 25 MICROGRAM(S): 50 INJECTION INTRAVENOUS at 03:36

## 2019-04-13 RX ADMIN — LEVETIRACETAM 1000 MILLIGRAM(S): 250 TABLET, FILM COATED ORAL at 06:51

## 2019-04-13 RX ADMIN — FENTANYL CITRATE 25 MICROGRAM(S): 50 INJECTION INTRAVENOUS at 17:18

## 2019-04-13 NOTE — PROGRESS NOTE ADULT - SUBJECTIVE AND OBJECTIVE BOX
NEUROCRITICAL CARE PROGRESS NOTE    ARPAN RUANO   MRN-[id number]  Summary:  Vancomycin Level, Trough: 6.1 ug/mL ( @ 22:40)  /  HPI:  History obtained by patient's girlfriend and chart from [facility], patient is unresponsive:     43 y/o male with no significant PMHx presents to [facility] with AMS, left side weakness and numbness. [place] when he developed acute onset of left facial numbness and LUE and numbness around 6:30PM. When get got home he developed AMS, dysarthria, and weakness in the LUE and LLE. At [facility] ED patient had several episodes of emesis and was hypertensive to SBP in the 200's. Work up revealed right thalamic hemorrhage on CT head. Patient not on any anticoagulation use per girlfriend. Of note, patient took Excedrin for headache at home. Patient transferred to [facility] for further management. (2019 00:04)    S/Overnight events:  EVD in place, ETT in place, suctioning frequently, febrile, on vent, central line placed, consent obtained from partner, TF at goal, decreasing versed gtt    Vital Signs Last 24 Hrs  T(C): 38.3 (2019 16:00), Max: 38.6 (2019 19:00)  T(F): 100.9 (2019 16:00), Max: 101.4 (2019 19:00)  HR: 73 (2019 16:54) (60 - 90)  BP: 131/78 (2019 16:00) (112/65 - 178/95)  BP(mean): 100 (2019 16:00) (84 - 131)  RR: 17 (2019 16:00) (11 - 27)  SpO2: 100% (2019 16:54) (90% - 100%)    Mode: AC/ CMV (Assist Control/ Continuous Mandatory Ventilation), RR (machine): 12, TV (machine): 400, FiO2: 40, PEEP: 5, ITime: 1, MAP: 6.8, PIP: 16    I&O's Detail    2019 07:01  -  2019 07:00  --------------------------------------------------------  IN:    dexmedetomidine Infusion: 169.2 mL    Enteral Tube Flush: 500 mL    IV PiggyBack: 450 mL    midazolam Infusion: 242.3 mL    niCARdipine Infusion: 174 mL    ns in tub fed  [id number]: 1608 mL    Solution: 250 mL  Total IN: 3393.5 mL    OUT:    External Ventricular Device: 282 mL    Incontinent per Condom Catheter: 2725 mL    Indwelling Catheter - Urethral: 390 mL    Intermittent Catheterization - Urethral: 1100 mL  Total OUT: 4497 mL    Total NET: -1103.5 mL      2019 07:01  -  2019 17:07  --------------------------------------------------------  IN:    dexmedetomidine Infusion: 161.2 mL    Enteral Tube Flush: 90 mL    midazolam Infusion: 81.4 mL    niCARdipine Infusion: 16 mL    ns in tub fed  [id number]: 469 mL  Total IN: 817.6 mL    OUT:    External Ventricular Device: 84 mL    Intermittent Catheterization - Urethral: 550 mL  Total OUT: 634 mL    Total NET: 183.6 mL          LABS:                        12.0   9.13  )-----------( 362      ( 2019 05:23 )             35.6     04-13    141  |  107  |  18  ----------------------------<  182<H>  3.9   |  23  |  0.73    Ca    9.0      2019 05:23  Phos  3.4     04-13  Mg     2.4     04-13        Urinalysis Basic - ( 2019 07:03 )    Color: x / Appearance: x / SG: x / pH: x  Gluc: x / Ketone: x  / Bili: x / Urobili: x   Blood: x / Protein: x / Nitrite: x   Leuk Esterase: x / RBC: Many /HPF / WBC < 5 /HPF   Sq Epi: x / Non Sq Epi: 0-5 /HPF / Bacteria: Present /HPF          CAPILLARY BLOOD GLUCOSE      POCT Blood Glucose.: 106 mg/dL (2019 10:37)  POCT Blood Glucose.: 154 mg/dL (2019 06:36)  POCT Blood Glucose.: 153 mg/dL (2019 22:16)  POCT Blood Glucose.: 124 mg/dL (2019 17:53)      Drug Levels: [] N/A  Vancomycin Level, Trough: 6.1 ug/mL ( @ 22:40)    CSF Analysis: [] N/A      Allergies    No Known Allergies    Intolerances      MEDICATIONS:  Antibiotics:  cefTRIAXone   IVPB 1 Gram(s) IV Intermittent every 24 hours    Neuro:  acetaminophen    Suspension .. 650 milliGRAM(s) Oral every 6 hours PRN  dexmedetomidine Infusion 0.1 MICROgram(s)/kG/Hr IV Continuous <Continuous>  fentaNYL    Injectable 50 MICROGram(s) IV Push every 2 hours PRN  fentaNYL    Injectable 25 MICROGram(s) IV Push every 2 hours PRN  fentaNYL    Injectable 12.5 MICROGram(s) IV Push every 2 hours PRN  levETIRAcetam  Solution 1000 milliGRAM(s) Oral two times a day  LORazepam   Injectable 4 milliGRAM(s) IV Push every 4 hours PRN  LORazepam   Injectable 2 milliGRAM(s) IV Push every 4 hours PRN  LORazepam   Injectable 1 milliGRAM(s) IV Push every 4 hours PRN  midazolam Infusion 0.02 mG/kG/Hr IV Continuous <Continuous>  ondansetron Injectable 4 milliGRAM(s) IV Push every 6 hours PRN    Anticoagulation:  enoxaparin Injectable 40 milliGRAM(s) SubCutaneous at bedtime    OTHER:  amLODIPine   Tablet 10 milliGRAM(s) Oral daily  atorvastatin 40 milliGRAM(s) Oral at bedtime  cloNIDine 0.1 milliGRAM(s) Oral daily  dextrose 40% Gel 15 Gram(s) Oral once PRN  dextrose 50% Injectable 12.5 Gram(s) IV Push once  dextrose 50% Injectable 25 Gram(s) IV Push once  dextrose 50% Injectable 25 Gram(s) IV Push once  glucagon  Injectable 1 milliGRAM(s) IntraMuscular once PRN  hydrALAZINE 100 milliGRAM(s) Oral every 8 hours  hydrALAZINE Injectable 10 milliGRAM(s) IV Push every 4 hours PRN  influenza   Vaccine 0.5 milliLiter(s) IntraMuscular once  insulin lispro (HumaLOG) corrective regimen sliding scale   SubCutaneous Before meals and at bedtime  lisinopril 40 milliGRAM(s) Oral daily  metoprolol tartrate 50 milliGRAM(s) Oral every 12 hours  pantoprazole   Suspension 40 milliGRAM(s) Oral daily  senna 2 Tablet(s) Oral at bedtime    IVF:  dextrose 5%. 1000 milliLiter(s) IV Continuous <Continuous>    CULTURES:  Culture Results:   No growth to date. ( @ 08:11)  Culture Results:   Moderate Staphylococcus aureus  Moderate Haemophilus influenzae  Accompanied by normal respiratory derek (04-10 @ 11:27)                      PHYSICAL EXAMINATION    NEUROLOGIC EXAMINATION:  Patient is   lethargic, unarousable, not follows commands, spont movement on R, L side withdrawal,   pupils reactive and equal, gaze preference not fixed,   downward and left; moves R UE/LE spontaneously, extensor posturing L UE, withdraws L LE    CARDIOVASCULAR: (+) S1 S2, normal rate and regular rhythm  PULMONARY: clear to auscultation bilaterally; min secretions  ABDOMEN: soft, nontender with normoactive bowel sounds  EXTREMITIES: no edema  SKIN: no rash    LDL = 113 ()   HDL = 70 ()  TG = 85 ()  TSH = 0.733 ()    Bacteriology:  CSF studies:  EEG:  Neuroimagin/06 12:48 p.m. CT head:  s/p EVD, decrease in size of ventricles, R ICH unchanged with IV extension, punctate hyperdensity R BG R conner R thalamus, unchanged,    2:11 a.m. CT head:  large R basal ganglia / thalamic ICH, small L basal ganglia / R pontine acute hemorrhage; c/w hypertensive ICH; extension into R lateral ventricle; MLS, mass effect, no HCP  Other imaging:    IV FLUIDS: 2%50  DRIPS: cardene (11cc/hr) propofol   DIET: Jevity at 60cc/hr  Lines: Lizabeth Yi)  Drains:   EVD @ 10cm H20, 267 cc/24h ICP 6-9  Wounds:    CODE STATUS:  Full Code                       GOALS OF CARE:  aggressive                      DISPOSITION:  ICU

## 2019-04-13 NOTE — PROGRESS NOTE ADULT - SUBJECTIVE AND OBJECTIVE BOX
Hospital Course:   4/7: overnight, patient requiring large amount of cardene and propofol.  Plan today is to transition to precedex, start PO lisinopril and wean of cardene.  Becomes extremely agitated when off sedation.  4/8: Cerebral angio without findings of aneurysm. EVD stopped working, pulled back without improvement. CT Head performed.  4/9: Received ativan 6mg for agitation. EVD stopped draining at 6pm yesterday taken for stat CTH. Dr. So notified and EVD lowered to 5cmH2O without output.   4/10: s/p penumbra evacuation of ICH. Pt seen and examined at bedside postop. Remains intubated, on versed. ICPs stable.  (OR EBL 30cc, received LR 200cc)  4/11: JUSTIN overnight. Remains on versed. Remains intubated. Neuro stable.  4/12: Given ativan overnight, bucking the vent. Remains on versed drip. Neuro stable. ICPs stable. Increase hydralazine to 75 Q8.   4/13 JUSTIN overnight, febrile, neuro exam unchanged since yesterday, started Cardene gtt as per Dr. So goal -140    ICU Vital Signs Last 24 Hrs  T(C): 38.2 (13 Apr 2019 05:00), Max: 38.6 (12 Apr 2019 19:00)  T(F): 100.7 (13 Apr 2019 05:00), Max: 101.4 (12 Apr 2019 19:00)  HR: 64 (13 Apr 2019 06:00) (64 - 96)  BP: 112/65 (13 Apr 2019 05:00) (112/65 - 178/95)  BP(mean): 84 (13 Apr 2019 05:00) (84 - 131)  ABP: 130/78 (13 Apr 2019 06:00) (122/68 - 186/96)  ABP(mean): 94 (13 Apr 2019 06:00) (82 - 126)  RR: 11 (13 Apr 2019 06:00) (11 - 56)  SpO2: 98% (13 Apr 2019 06:00) (95% - 100%)      PHYSICAL EXAM:  Gen: NAD, Intubated on ventilator, sedated on versed  HEENT: PERRL  Lungs: Clear b/l  Heart: S1, S2. NSR  Abd: Soft, NT/ND. +BS  Exts: Pulses 2+ throughout  Neuro: Mild decorticate posturing to LUE to noxious stimulus, localizing on RUE. Withdrawal to noxious stimuli b/l LE. No eye opening. Not following commands. PERRL, +corneals, +gag    DEVICE/DRAIN DRESSING:  L EVD @ 5cmH2O, draining    TUBES/LINES:  [] CVC  [x] A-line  [] Lumbar Drain  [x] Ventriculostomy  [] Other    DIET:  [] NPO  [] Mechanical  [x] Tube feeds    LABS:                         12.0   9.13  )-----------( 362      ( 13 Apr 2019 05:23 )             35.6   04-13    141  |  107  |  18  ----------------------------<  182<H>  3.9   |  23  |  0.73    Ca    9.0      13 Apr 2019 05:23  Phos  3.4     04-13  Mg     2.4     04-13              CAPILLARY BLOOD GLUCOSE      POCT Blood Glucose.: 110 mg/dL (11 Apr 2019 22:33)  POCT Blood Glucose.: 168 mg/dL (11 Apr 2019 14:51)  POCT Blood Glucose.: 160 mg/dL (11 Apr 2019 11:06)  POCT Blood Glucose.: 132 mg/dL (11 Apr 2019 05:36)      Drug Levels: [] N/A  Vancomycin Level, Trough: 6.1 ug/mL (04-11 @ 22:40)    CSF Analysis: [] N/A  RBC Count - Spinal Fluid: 4800 /uL (04-11 @ 08:02)  CSF Lymphocytes: 1 % (04-11 @ 08:02)  Glucose, CSF: 95 mg/dL (04-11 @ 08:02)  Protein, CSF: 121 mg/dL (04-11 @ 08:02)      Allergies    No Known Allergies    Intolerances      MEDICATIONS:  Antibiotics:  piperacillin/tazobactam IVPB. 4.5 Gram(s) IV Intermittent every 6 hours  vancomycin  IVPB 1500 milliGRAM(s) IV Intermittent every 12 hours    Neuro:  acetaminophen    Suspension .. 650 milliGRAM(s) Oral every 6 hours PRN  fentaNYL    Injectable 50 MICROGram(s) IV Push every 2 hours PRN  fentaNYL    Injectable 25 MICROGram(s) IV Push every 2 hours PRN  fentaNYL    Injectable 12.5 MICROGram(s) IV Push every 2 hours PRN  levETIRAcetam  Solution 1000 milliGRAM(s) Oral two times a day  LORazepam   Injectable 4 milliGRAM(s) IV Push every 4 hours PRN  LORazepam   Injectable 2 milliGRAM(s) IV Push every 4 hours PRN  LORazepam   Injectable 1 milliGRAM(s) IV Push every 4 hours PRN  midazolam Infusion 0.02 mG/kG/Hr IV Continuous <Continuous>  ondansetron Injectable 4 milliGRAM(s) IV Push every 6 hours PRN    Anticoagulation:    OTHER:  amLODIPine   Tablet 10 milliGRAM(s) Oral daily  atorvastatin 40 milliGRAM(s) Oral at bedtime  bisacodyl 5 milliGRAM(s) Oral daily PRN  bisacodyl Suppository 10 milliGRAM(s) Rectal daily PRN  dextrose 40% Gel 15 Gram(s) Oral once PRN  dextrose 50% Injectable 12.5 Gram(s) IV Push once  dextrose 50% Injectable 25 Gram(s) IV Push once  dextrose 50% Injectable 25 Gram(s) IV Push once  docusate sodium Liquid 100 milliGRAM(s) Oral two times a day  glucagon  Injectable 1 milliGRAM(s) IntraMuscular once PRN  hydrALAZINE 50 milliGRAM(s) Oral every 8 hours  hydrALAZINE Injectable 10 milliGRAM(s) IV Push every 4 hours PRN  influenza   Vaccine 0.5 milliLiter(s) IntraMuscular once  insulin lispro (HumaLOG) corrective regimen sliding scale   SubCutaneous Before meals and at bedtime  lisinopril 40 milliGRAM(s) Oral daily  metoprolol tartrate 50 milliGRAM(s) Oral every 12 hours  niCARdipine Infusion 5 mG/Hr IV Continuous <Continuous>  pantoprazole   Suspension 40 milliGRAM(s) Oral daily  senna 2 Tablet(s) Oral at bedtime    IVF:  dextrose 5%. 1000 milliLiter(s) IV Continuous <Continuous>    CULTURES:  Culture Results:   Moderate Staphylococcus aureus Susceptibility to follow.  Moderate Haemophilus influenzae Susceptibility to follow.  Accompanied by normal respiratory derek (04-10 @ 11:27)  Culture Results:   Numerous Staphylococcus aureus  Rare Routine respiratory derek present (04-08 @ 20:31)    RADIOLOGY & ADDITIONAL TESTS:      ASSESSMENT:  44y Male w/ ETOH abuse now s/p right thalamic IPH with IVH s/p Emergent Rt EVD placement 4/6/19, s/p Cerebral angiogram neg 4/8/19, new left EVD placed and R EVD removed 4/9/19, now s/p penumbra evacuation of ICH 4/10/19    HEMORRHAGE STROKE  HEMORRHAGE  Handoff  ICH (intracerebral hemorrhage)  ICH (intracerebral hemorrhage)  Craniotomy for intracranial hemorrhage  Evacuation of hematoma of face  Angiogram, carotid and cerebral, bilateral      PLAN:  NEURO:  Neuro checks q1h  Keppra for seizure prophylaxis  Continue versed, ativan PRN agitation  Keep left EVD at 5cmH2O, monitor ICPs and output    CARDIOVASCULAR:   SBP goal <140, Cardene drip PRN  increase hydralazine 75mg q8h  Continue lopressor 50mg BID, norvasc 10mg daily, and lisinopril 40mg daily  Daily labs, electrolyte repletion PRN,  Continue Statin therapy    PULMONARY:   Continue ventilator support,  Daily CXR    RENAL:   dc little  Voiding, condom catheter  Normal Na goal    GI:   NGT feeds  Stool softeners PRN  PPI while intubated    ID:  Ceftriax PNA  ENDO:   ISS    DVT PROPHYLAXIS:   SCDs, SQL     DISPOSITION: Continue NSICU care,  Full code,  D/w Dr. So and Dr. Wheeler    Assessment:  Present when checked    []  GCS  E   V  M     Heart Failure: []Acute, [] acute on chronic , []chronic  Heart Failure:  [] Diastolic (HFpEF), [] Systolic (HFrEF), []Combined (HFpEF and HFrEF), [] RHF, [] Pulm HTN, [] Other    [] KRISTA, [] ATN, [] AIN, [] other  [] CKD1, [] CKD2, [] CKD 3, [] CKD 4, [] CKD 5, []ESRD    Encephalopathy: [] Metabolic, [] Hepatic, [] toxic, [x] Neurological, [] Other    Abnormal Nutritional Status: [] malnutrition (see nutrition note), [ ]underweight: BMI < 19, [] morbid obesity: BMI >40, [] Cachexia    [] Sepsis  [] hypovolemic shock,[] cardiogenic shock, [] hemorrhagic shock, [] neurogenic shock  [x] Acute Respiratory Failure  [x]Cerebral edema, [x] Brain compression/ herniation,   [] Functional quadriplegia  [] Acute blood loss anemia Hospital Course:   4/7: overnight, patient requiring large amount of cardene and propofol.  Plan today is to transition to precedex, start PO lisinopril and wean of cardene.  Becomes extremely agitated when off sedation.  4/8: Cerebral angio without findings of aneurysm. EVD stopped working, pulled back without improvement. CT Head performed.  4/9: Received ativan 6mg for agitation. EVD stopped draining at 6pm yesterday taken for stat CTH. Dr. So notified and EVD lowered to 5cmH2O without output.   4/10: s/p penumbra evacuation of ICH. Pt seen and examined at bedside postop. Remains intubated, on versed. ICPs stable.  (OR EBL 30cc, received LR 200cc)  4/11: JUSTIN overnight. Remains on versed. Remains intubated. Neuro stable.  4/12: Given ativan overnight, bucking the vent. Remains on versed drip. Neuro stable. ICPs stable. Increase hydralazine to 75 Q8.   4/13 JUSTIN overnight, febrile, neuro exam unchanged since yesterday, started Cardene gtt as per Dr. So goal -140    ICU Vital Signs Last 24 Hrs  T(C): 38.2 (13 Apr 2019 05:00), Max: 38.6 (12 Apr 2019 19:00)  T(F): 100.7 (13 Apr 2019 05:00), Max: 101.4 (12 Apr 2019 19:00)  HR: 64 (13 Apr 2019 06:00) (64 - 96)  BP: 112/65 (13 Apr 2019 05:00) (112/65 - 178/95)  BP(mean): 84 (13 Apr 2019 05:00) (84 - 131)  ABP: 130/78 (13 Apr 2019 06:00) (122/68 - 186/96)  ABP(mean): 94 (13 Apr 2019 06:00) (82 - 126)  RR: 11 (13 Apr 2019 06:00) (11 - 56)  SpO2: 98% (13 Apr 2019 06:00) (95% - 100%)      PHYSICAL EXAM:  Gen: NAD, Intubated on ventilator, sedated on versed  HEENT: PERRL  Lungs: Clear b/l  Heart: S1, S2. NSR  Abd: Soft, NT/ND. +BS  Exts: Pulses 2+ throughout  Neuro: Mild decorticate posturing to LUE to noxious stimulus, localizing on RUE. Withdrawal to noxious stimuli b/l LE. No eye opening. Not following commands. PERRL, +corneals, +gag    DEVICE/DRAIN DRESSING:  L EVD @ 5cmH2O, draining    TUBES/LINES:  [] CVC  [x] A-line  [] Lumbar Drain  [x] Ventriculostomy  [] Other    DIET:  [] NPO  [] Mechanical  [x] Tube feeds    LABS:                         12.0   9.13  )-----------( 362      ( 13 Apr 2019 05:23 )             35.6   04-13    141  |  107  |  18  ----------------------------<  182<H>  3.9   |  23  |  0.73    Ca    9.0      13 Apr 2019 05:23  Phos  3.4     04-13  Mg     2.4     04-13              CAPILLARY BLOOD GLUCOSE      POCT Blood Glucose.: 110 mg/dL (11 Apr 2019 22:33)  POCT Blood Glucose.: 168 mg/dL (11 Apr 2019 14:51)  POCT Blood Glucose.: 160 mg/dL (11 Apr 2019 11:06)  POCT Blood Glucose.: 132 mg/dL (11 Apr 2019 05:36)      Drug Levels: [] N/A  Vancomycin Level, Trough: 6.1 ug/mL (04-11 @ 22:40)    CSF Analysis: [] N/A  RBC Count - Spinal Fluid: 4800 /uL (04-11 @ 08:02)  CSF Lymphocytes: 1 % (04-11 @ 08:02)  Glucose, CSF: 95 mg/dL (04-11 @ 08:02)  Protein, CSF: 121 mg/dL (04-11 @ 08:02)      Allergies    No Known Allergies    Intolerances      MEDICATIONS:  Antibiotics:  piperacillin/tazobactam IVPB. 4.5 Gram(s) IV Intermittent every 6 hours  vancomycin  IVPB 1500 milliGRAM(s) IV Intermittent every 12 hours    Neuro:  acetaminophen    Suspension .. 650 milliGRAM(s) Oral every 6 hours PRN  fentaNYL    Injectable 50 MICROGram(s) IV Push every 2 hours PRN  fentaNYL    Injectable 25 MICROGram(s) IV Push every 2 hours PRN  fentaNYL    Injectable 12.5 MICROGram(s) IV Push every 2 hours PRN  levETIRAcetam  Solution 1000 milliGRAM(s) Oral two times a day  LORazepam   Injectable 4 milliGRAM(s) IV Push every 4 hours PRN  LORazepam   Injectable 2 milliGRAM(s) IV Push every 4 hours PRN  LORazepam   Injectable 1 milliGRAM(s) IV Push every 4 hours PRN  midazolam Infusion 0.02 mG/kG/Hr IV Continuous <Continuous>  ondansetron Injectable 4 milliGRAM(s) IV Push every 6 hours PRN    Anticoagulation:    OTHER:  amLODIPine   Tablet 10 milliGRAM(s) Oral daily  atorvastatin 40 milliGRAM(s) Oral at bedtime  bisacodyl 5 milliGRAM(s) Oral daily PRN  bisacodyl Suppository 10 milliGRAM(s) Rectal daily PRN  dextrose 40% Gel 15 Gram(s) Oral once PRN  dextrose 50% Injectable 12.5 Gram(s) IV Push once  dextrose 50% Injectable 25 Gram(s) IV Push once  dextrose 50% Injectable 25 Gram(s) IV Push once  docusate sodium Liquid 100 milliGRAM(s) Oral two times a day  glucagon  Injectable 1 milliGRAM(s) IntraMuscular once PRN  hydrALAZINE 50 milliGRAM(s) Oral every 8 hours  hydrALAZINE Injectable 10 milliGRAM(s) IV Push every 4 hours PRN  influenza   Vaccine 0.5 milliLiter(s) IntraMuscular once  insulin lispro (HumaLOG) corrective regimen sliding scale   SubCutaneous Before meals and at bedtime  lisinopril 40 milliGRAM(s) Oral daily  metoprolol tartrate 50 milliGRAM(s) Oral every 12 hours  niCARdipine Infusion 5 mG/Hr IV Continuous <Continuous>  pantoprazole   Suspension 40 milliGRAM(s) Oral daily  senna 2 Tablet(s) Oral at bedtime    IVF:  dextrose 5%. 1000 milliLiter(s) IV Continuous <Continuous>    CULTURES:  Culture Results:   Moderate Staphylococcus aureus Susceptibility to follow.  Moderate Haemophilus influenzae Susceptibility to follow.  Accompanied by normal respiratory derek (04-10 @ 11:27)  Culture Results:   Numerous Staphylococcus aureus  Rare Routine respiratory derek present (04-08 @ 20:31)    RADIOLOGY & ADDITIONAL TESTS:      ASSESSMENT:  44y Male w/ ETOH abuse now s/p right thalamic IPH with IVH s/p Emergent Rt EVD placement 4/6/19, s/p Cerebral angiogram neg 4/8/19, new left EVD placed and R EVD removed 4/9/19, now s/p penumbra evacuation of ICH 4/10/19    HEMORRHAGE STROKE  HEMORRHAGE  Handoff  ICH (intracerebral hemorrhage)  ICH (intracerebral hemorrhage)  Craniotomy for intracranial hemorrhage  Evacuation of hematoma of face  Angiogram, carotid and cerebral, bilateral      PLAN:  NEURO:  Neuro checks q1h  Keppra for seizure prophylaxis  Continue versed, ativan PRN agitation  Keep left EVD at 5cmH2O, monitor ICPs and output    CARDIOVASCULAR:   SBP goal <140, Cardene drip PRN  increase hydralazine 75mg q8h  Continue lopressor 50mg BID, norvasc 10mg daily, and lisinopril 40mg daily  Daily labs, electrolyte repletion PRN,  Continue Statin therapy    PULMONARY:   Continue ventilator support,  Daily CXR    RENAL:   dc little  Voiding, condom catheter  Normal Na goal    GI:   NGT feeds  Stool softeners PRN  PPI while intubated    ID:  Ceftriax PNA until 4/19  ENDO:   ISS    DVT PROPHYLAXIS:   SCDs, SQL     DISPOSITION: Continue NSICU care,  Full code,  D/w Dr. oS and Dr. Wheeler    Assessment:  Present when checked    []  GCS  E   V  M     Heart Failure: []Acute, [] acute on chronic , []chronic  Heart Failure:  [] Diastolic (HFpEF), [] Systolic (HFrEF), []Combined (HFpEF and HFrEF), [] RHF, [] Pulm HTN, [] Other    [] KRISTA, [] ATN, [] AIN, [] other  [] CKD1, [] CKD2, [] CKD 3, [] CKD 4, [] CKD 5, []ESRD    Encephalopathy: [] Metabolic, [] Hepatic, [] toxic, [x] Neurological, [] Other    Abnormal Nutritional Status: [] malnutrition (see nutrition note), [ ]underweight: BMI < 19, [] morbid obesity: BMI >40, [] Cachexia    [] Sepsis  [] hypovolemic shock,[] cardiogenic shock, [] hemorrhagic shock, [] neurogenic shock  [x] Acute Respiratory Failure  [x]Cerebral edema, [x] Brain compression/ herniation,   [] Functional quadriplegia  [] Acute blood loss anemia Hospital Course:   4/7: overnight, patient requiring large amount of cardene and propofol.  Plan today is to transition to precedex, start PO lisinopril and wean of cardene.  Becomes extremely agitated when off sedation.  4/8: Cerebral angio without findings of aneurysm. EVD stopped working, pulled back without improvement. CT Head performed.  4/9: Received ativan 6mg for agitation. EVD stopped draining at 6pm yesterday taken for stat CTH. Dr. So notified and EVD lowered to 5cmH2O without output.   4/10: s/p penumbra evacuation of ICH. Pt seen and examined at bedside postop. Remains intubated, on versed. ICPs stable.  (OR EBL 30cc, received LR 200cc)  4/11: JUSTIN overnight. Remains on versed. Remains intubated. Neuro stable.  4/12: Given ativan overnight, bucking the vent. Remains on versed drip. Neuro stable. ICPs stable. Increase hydralazine to 75 Q8.   4/13 JUSTIN overnight, febrile, neuro exam unchanged since yesterday, started Cardene gtt as per Dr. So goal -140    ICU Vital Signs Last 24 Hrs  T(C): 38.2 (13 Apr 2019 05:00), Max: 38.6 (12 Apr 2019 19:00)  T(F): 100.7 (13 Apr 2019 05:00), Max: 101.4 (12 Apr 2019 19:00)  HR: 64 (13 Apr 2019 06:00) (64 - 96)  BP: 112/65 (13 Apr 2019 05:00) (112/65 - 178/95)  BP(mean): 84 (13 Apr 2019 05:00) (84 - 131)  ABP: 130/78 (13 Apr 2019 06:00) (122/68 - 186/96)  ABP(mean): 94 (13 Apr 2019 06:00) (82 - 126)  RR: 11 (13 Apr 2019 06:00) (11 - 56)  SpO2: 98% (13 Apr 2019 06:00) (95% - 100%)                          12.0   9.13  )-----------( 362      ( 13 Apr 2019 05:23 )             35.6     04-13    141  |  107  |  18  ----------------------------<  182<H>  3.9   |  23  |  0.73    Ca    9.0      13 Apr 2019 05:23  Phos  3.4     04-13  Mg     2.4     04-13        PHYSICAL EXAM:  Gen: NAD, Intubated on ventilator, sedated on versed  HEENT: PERRL  Lungs: Clear b/l  Heart: S1, S2. NSR  Abd: Soft, NT/ND. +BS  Exts: Pulses 2+ throughout  Neuro: Mild decorticate posturing to LUE to noxious stimulus, localizing on RUE. Withdrawal to noxious stimuli b/l LE. No eye opening. Not following commands. PERRL, +corneals, +gag    DEVICE/DRAIN DRESSING:  L EVD @ 5cmH2O, draining    TUBES/LINES:  [] CVC  [x] A-line  [] Lumbar Drain  [x] Ventriculostomy  [] Other    DIET:  [] NPO  [] Mechanical  [x] Tube feeds    LABS:                         12.0   9.13  )-----------( 362      ( 13 Apr 2019 05:23 )             35.6   04-13    141  |  107  |  18  ----------------------------<  182<H>  3.9   |  23  |  0.73    Ca    9.0      13 Apr 2019 05:23  Phos  3.4     04-13  Mg     2.4     04-13              CAPILLARY BLOOD GLUCOSE      POCT Blood Glucose.: 110 mg/dL (11 Apr 2019 22:33)  POCT Blood Glucose.: 168 mg/dL (11 Apr 2019 14:51)  POCT Blood Glucose.: 160 mg/dL (11 Apr 2019 11:06)  POCT Blood Glucose.: 132 mg/dL (11 Apr 2019 05:36)      Drug Levels: [] N/A  Vancomycin Level, Trough: 6.1 ug/mL (04-11 @ 22:40)    CSF Analysis: [] N/A  RBC Count - Spinal Fluid: 4800 /uL (04-11 @ 08:02)  CSF Lymphocytes: 1 % (04-11 @ 08:02)  Glucose, CSF: 95 mg/dL (04-11 @ 08:02)  Protein, CSF: 121 mg/dL (04-11 @ 08:02)      Allergies    No Known Allergies    Intolerances      MEDICATIONS:  Antibiotics:  piperacillin/tazobactam IVPB. 4.5 Gram(s) IV Intermittent every 6 hours  vancomycin  IVPB 1500 milliGRAM(s) IV Intermittent every 12 hours    Neuro:  acetaminophen    Suspension .. 650 milliGRAM(s) Oral every 6 hours PRN  fentaNYL    Injectable 50 MICROGram(s) IV Push every 2 hours PRN  fentaNYL    Injectable 25 MICROGram(s) IV Push every 2 hours PRN  fentaNYL    Injectable 12.5 MICROGram(s) IV Push every 2 hours PRN  levETIRAcetam  Solution 1000 milliGRAM(s) Oral two times a day  LORazepam   Injectable 4 milliGRAM(s) IV Push every 4 hours PRN  LORazepam   Injectable 2 milliGRAM(s) IV Push every 4 hours PRN  LORazepam   Injectable 1 milliGRAM(s) IV Push every 4 hours PRN  midazolam Infusion 0.02 mG/kG/Hr IV Continuous <Continuous>  ondansetron Injectable 4 milliGRAM(s) IV Push every 6 hours PRN    Anticoagulation:    OTHER:  amLODIPine   Tablet 10 milliGRAM(s) Oral daily  atorvastatin 40 milliGRAM(s) Oral at bedtime  bisacodyl 5 milliGRAM(s) Oral daily PRN  bisacodyl Suppository 10 milliGRAM(s) Rectal daily PRN  dextrose 40% Gel 15 Gram(s) Oral once PRN  dextrose 50% Injectable 12.5 Gram(s) IV Push once  dextrose 50% Injectable 25 Gram(s) IV Push once  dextrose 50% Injectable 25 Gram(s) IV Push once  docusate sodium Liquid 100 milliGRAM(s) Oral two times a day  glucagon  Injectable 1 milliGRAM(s) IntraMuscular once PRN  hydrALAZINE 50 milliGRAM(s) Oral every 8 hours  hydrALAZINE Injectable 10 milliGRAM(s) IV Push every 4 hours PRN  influenza   Vaccine 0.5 milliLiter(s) IntraMuscular once  insulin lispro (HumaLOG) corrective regimen sliding scale   SubCutaneous Before meals and at bedtime  lisinopril 40 milliGRAM(s) Oral daily  metoprolol tartrate 50 milliGRAM(s) Oral every 12 hours  niCARdipine Infusion 5 mG/Hr IV Continuous <Continuous>  pantoprazole   Suspension 40 milliGRAM(s) Oral daily  senna 2 Tablet(s) Oral at bedtime    IVF:  dextrose 5%. 1000 milliLiter(s) IV Continuous <Continuous>    CULTURES:  Culture Results:   Moderate Staphylococcus aureus Susceptibility to follow.  Moderate Haemophilus influenzae Susceptibility to follow.  Accompanied by normal respiratory derek (04-10 @ 11:27)  Culture Results:   Numerous Staphylococcus aureus  Rare Routine respiratory derek present (04-08 @ 20:31)    RADIOLOGY & ADDITIONAL TESTS:      ASSESSMENT:  44y Male w/ ETOH abuse now s/p right thalamic IPH with IVH s/p Emergent Rt EVD placement 4/6/19, s/p Cerebral angiogram neg 4/8/19, new left EVD placed and R EVD removed 4/9/19, now s/p penumbra evacuation of ICH 4/10/19    HEMORRHAGE STROKE  HEMORRHAGE  Handoff  ICH (intracerebral hemorrhage)  ICH (intracerebral hemorrhage)  Craniotomy for intracranial hemorrhage  Evacuation of hematoma of face  Angiogram, carotid and cerebral, bilateral      PLAN:  NEURO:  Neuro checks q1h  Keppra for seizure prophylaxis  Continue versed, ativan PRN agitation  Keep left EVD at 5cmH2O, monitor ICPs and output    CARDIOVASCULAR:   SBP goal <140, Cardene drip PRN  increase hydralazine 75mg q8h  Continue lopressor 50mg BID, norvasc 10mg daily, and lisinopril 40mg daily  Daily labs, electrolyte repletion PRN,  Continue Statin therapy    PULMONARY:   Continue ventilator support,  Daily CXR    RENAL:   dc little  Voiding, condom catheter  Normal Na goal    GI:   NGT feeds  Stool softeners PRN  PPI while intubated    ID:  Ceftriax PNA until 4/19  ENDO:   ISS    DVT PROPHYLAXIS:   SCDs, SQL     DISPOSITION: Continue NSICU care,  Full code,  D/w Dr. So and Dr. Wheeler    Assessment:  Present when checked    []  GCS  E   V  M     Heart Failure: []Acute, [] acute on chronic , []chronic  Heart Failure:  [] Diastolic (HFpEF), [] Systolic (HFrEF), []Combined (HFpEF and HFrEF), [] RHF, [] Pulm HTN, [] Other    [] KRISTA, [] ATN, [] AIN, [] other  [] CKD1, [] CKD2, [] CKD 3, [] CKD 4, [] CKD 5, []ESRD    Encephalopathy: [] Metabolic, [] Hepatic, [] toxic, [x] Neurological, [] Other    Abnormal Nutritional Status: [] malnutrition (see nutrition note), [ ]underweight: BMI < 19, [] morbid obesity: BMI >40, [] Cachexia    [] Sepsis  [] hypovolemic shock,[] cardiogenic shock, [] hemorrhagic shock, [] neurogenic shock  [x] Acute Respiratory Failure  [x]Cerebral edema, [x] Brain compression/ herniation,   [] Functional quadriplegia  [] Acute blood loss anemia Hospital Course:   4/7: overnight, patient requiring large amount of cardene and propofol.  Plan today is to transition to precedex, start PO lisinopril and wean of cardene.  Becomes extremely agitated when off sedation.  4/8: Cerebral angio without findings of aneurysm. EVD stopped working, pulled back without improvement. CT Head performed.  4/9: Received ativan 6mg for agitation. EVD stopped draining at 6pm yesterday taken for stat CTH. Dr. So notified and EVD lowered to 5cmH2O without output.   4/10: s/p penumbra evacuation of ICH. Pt seen and examined at bedside postop. Remains intubated, on versed. ICPs stable.  (OR EBL 30cc, received LR 200cc)  4/11: JUSTIN overnight. Remains on versed. Remains intubated. Neuro stable.  4/12: Given ativan overnight, bucking the vent. Remains on versed drip. Neuro stable. ICPs stable. Increase hydralazine to 75 Q8.   4/13 JUSTIN overnight, febrile, urinary retention Straight Cath x 1 = 1L, neuro exam unchanged since yesterday, started Cardene gtt as per Dr. So goal -140    ICU Vital Signs Last 24 Hrs  T(C): 38.2 (13 Apr 2019 05:00), Max: 38.6 (12 Apr 2019 19:00)  T(F): 100.7 (13 Apr 2019 05:00), Max: 101.4 (12 Apr 2019 19:00)  HR: 64 (13 Apr 2019 06:00) (64 - 96)  BP: 112/65 (13 Apr 2019 05:00) (112/65 - 178/95)  BP(mean): 84 (13 Apr 2019 05:00) (84 - 131)  ABP: 130/78 (13 Apr 2019 06:00) (122/68 - 186/96)  ABP(mean): 94 (13 Apr 2019 06:00) (82 - 126)  RR: 11 (13 Apr 2019 06:00) (11 - 56)  SpO2: 98% (13 Apr 2019 06:00) (95% - 100%)                          12.0   9.13  )-----------( 362      ( 13 Apr 2019 05:23 )             35.6     04-13    141  |  107  |  18  ----------------------------<  182<H>  3.9   |  23  |  0.73    Ca    9.0      13 Apr 2019 05:23  Phos  3.4     04-13  Mg     2.4     04-13        PHYSICAL EXAM:  Gen: NAD, Intubated on ventilator, sedated on versed  HEENT: PERRL  Lungs: Clear b/l  Heart: S1, S2. NSR  Abd: Soft, NT/ND. +BS  Exts: Pulses 2+ throughout  Neuro: Mild decorticate posturing to LUE to noxious stimulus, localizing on RUE. Withdrawal to noxious stimuli b/l LE. No eye opening. Not following commands. PERRL, +corneals, +gag    DEVICE/DRAIN DRESSING:  L EVD @ 5cmH2O, draining    TUBES/LINES:  [] CVC  [x] A-line  [] Lumbar Drain  [x] Ventriculostomy  [] Other    DIET:  [] NPO  [] Mechanical  [x] Tube feeds    LABS:                         12.0   9.13  )-----------( 362      ( 13 Apr 2019 05:23 )             35.6   04-13    141  |  107  |  18  ----------------------------<  182<H>  3.9   |  23  |  0.73    Ca    9.0      13 Apr 2019 05:23  Phos  3.4     04-13  Mg     2.4     04-13              CAPILLARY BLOOD GLUCOSE      POCT Blood Glucose.: 110 mg/dL (11 Apr 2019 22:33)  POCT Blood Glucose.: 168 mg/dL (11 Apr 2019 14:51)  POCT Blood Glucose.: 160 mg/dL (11 Apr 2019 11:06)  POCT Blood Glucose.: 132 mg/dL (11 Apr 2019 05:36)      Drug Levels: [] N/A  Vancomycin Level, Trough: 6.1 ug/mL (04-11 @ 22:40)    CSF Analysis: [] N/A  RBC Count - Spinal Fluid: 4800 /uL (04-11 @ 08:02)  CSF Lymphocytes: 1 % (04-11 @ 08:02)  Glucose, CSF: 95 mg/dL (04-11 @ 08:02)  Protein, CSF: 121 mg/dL (04-11 @ 08:02)      Allergies    No Known Allergies    Intolerances      MEDICATIONS:  Antibiotics:  piperacillin/tazobactam IVPB. 4.5 Gram(s) IV Intermittent every 6 hours  vancomycin  IVPB 1500 milliGRAM(s) IV Intermittent every 12 hours    Neuro:  acetaminophen    Suspension .. 650 milliGRAM(s) Oral every 6 hours PRN  fentaNYL    Injectable 50 MICROGram(s) IV Push every 2 hours PRN  fentaNYL    Injectable 25 MICROGram(s) IV Push every 2 hours PRN  fentaNYL    Injectable 12.5 MICROGram(s) IV Push every 2 hours PRN  levETIRAcetam  Solution 1000 milliGRAM(s) Oral two times a day  LORazepam   Injectable 4 milliGRAM(s) IV Push every 4 hours PRN  LORazepam   Injectable 2 milliGRAM(s) IV Push every 4 hours PRN  LORazepam   Injectable 1 milliGRAM(s) IV Push every 4 hours PRN  midazolam Infusion 0.02 mG/kG/Hr IV Continuous <Continuous>  ondansetron Injectable 4 milliGRAM(s) IV Push every 6 hours PRN    Anticoagulation:    OTHER:  amLODIPine   Tablet 10 milliGRAM(s) Oral daily  atorvastatin 40 milliGRAM(s) Oral at bedtime  bisacodyl 5 milliGRAM(s) Oral daily PRN  bisacodyl Suppository 10 milliGRAM(s) Rectal daily PRN  dextrose 40% Gel 15 Gram(s) Oral once PRN  dextrose 50% Injectable 12.5 Gram(s) IV Push once  dextrose 50% Injectable 25 Gram(s) IV Push once  dextrose 50% Injectable 25 Gram(s) IV Push once  docusate sodium Liquid 100 milliGRAM(s) Oral two times a day  glucagon  Injectable 1 milliGRAM(s) IntraMuscular once PRN  hydrALAZINE 50 milliGRAM(s) Oral every 8 hours  hydrALAZINE Injectable 10 milliGRAM(s) IV Push every 4 hours PRN  influenza   Vaccine 0.5 milliLiter(s) IntraMuscular once  insulin lispro (HumaLOG) corrective regimen sliding scale   SubCutaneous Before meals and at bedtime  lisinopril 40 milliGRAM(s) Oral daily  metoprolol tartrate 50 milliGRAM(s) Oral every 12 hours  niCARdipine Infusion 5 mG/Hr IV Continuous <Continuous>  pantoprazole   Suspension 40 milliGRAM(s) Oral daily  senna 2 Tablet(s) Oral at bedtime    IVF:  dextrose 5%. 1000 milliLiter(s) IV Continuous <Continuous>    CULTURES:  Culture Results:   Moderate Staphylococcus aureus Susceptibility to follow.  Moderate Haemophilus influenzae Susceptibility to follow.  Accompanied by normal respiratory deerk (04-10 @ 11:27)  Culture Results:   Numerous Staphylococcus aureus  Rare Routine respiratory derek present (04-08 @ 20:31)    RADIOLOGY & ADDITIONAL TESTS:      ASSESSMENT:  44y Male w/ ETOH abuse now s/p right thalamic IPH with IVH s/p Emergent Rt EVD placement 4/6/19, s/p Cerebral angiogram neg 4/8/19, new left EVD placed and R EVD removed 4/9/19, now s/p penumbra evacuation of ICH 4/10/19    HEMORRHAGE STROKE  HEMORRHAGE  Handoff  ICH (intracerebral hemorrhage)  ICH (intracerebral hemorrhage)  Craniotomy for intracranial hemorrhage  Evacuation of hematoma of face  Angiogram, carotid and cerebral, bilateral      PLAN:  NEURO:  Neuro checks q1h  Keppra for seizure prophylaxis  Continue versed, ativan PRN agitation  Keep left EVD at 5cmH2O, monitor ICPs and output    CARDIOVASCULAR:   SBP goal <140, Cardene drip PRN  increase hydralazine 75mg q8h  Continue lopressor 50mg BID, norvasc 10mg daily, and lisinopril 40mg daily  Daily labs, electrolyte repletion PRN,  Continue Statin therapy    PULMONARY:   Continue ventilator support,  Daily CXR    RENAL:   dc little  Voiding, condom catheter  Normal Na goal    GI:   NGT feeds  Stool softeners PRN  PPI while intubated    ID:  Ceftriax PNA until 4/19  ENDO:   ISS    DVT PROPHYLAXIS:   SCDs, SQL     DISPOSITION: Continue NSICU care,  Full code,  D/w Dr. So and Dr. Wheeler    Assessment:  Present when checked    []  GCS  E   V  M     Heart Failure: []Acute, [] acute on chronic , []chronic  Heart Failure:  [] Diastolic (HFpEF), [] Systolic (HFrEF), []Combined (HFpEF and HFrEF), [] RHF, [] Pulm HTN, [] Other    [] KRISTA, [] ATN, [] AIN, [] other  [] CKD1, [] CKD2, [] CKD 3, [] CKD 4, [] CKD 5, []ESRD    Encephalopathy: [] Metabolic, [] Hepatic, [] toxic, [x] Neurological, [] Other    Abnormal Nutritional Status: [] malnutrition (see nutrition note), [ ]underweight: BMI < 19, [] morbid obesity: BMI >40, [] Cachexia    [] Sepsis  [] hypovolemic shock,[] cardiogenic shock, [] hemorrhagic shock, [] neurogenic shock  [x] Acute Respiratory Failure  [x]Cerebral edema, [x] Brain compression/ herniation,   [] Functional quadriplegia  [] Acute blood loss anemia Hospital Course:   4/7: overnight, patient requiring large amount of cardene and propofol.  Plan today is to transition to precedex, start PO lisinopril and wean of cardene.  Becomes extremely agitated when off sedation.  4/8: Cerebral angio without findings of aneurysm. EVD stopped working, pulled back without improvement. CT Head performed.  4/9: Received ativan 6mg for agitation. EVD stopped draining at 6pm yesterday taken for stat CTH. Dr. So notified and EVD lowered to 5cmH2O without output.   4/10: s/p penumbra evacuation of ICH. Pt seen and examined at bedside postop. Remains intubated, on versed. ICPs stable.  (OR EBL 30cc, received LR 200cc)  4/11: JUSTIN overnight. Remains on versed. Remains intubated. Neuro stable.  4/12: Given ativan overnight, bucking the vent. Remains on versed drip. Neuro stable. ICPs stable. Increase hydralazine to 75 Q8.   4/13 JUSTIN overnight, febrile, urinary retention Straight Cath x 1 = 1L, neuro exam unchanged since yesterday, started Cardene gtt as per Dr. So goal -140    ICU Vital Signs Last 24 Hrs  T(C): 38.2 (13 Apr 2019 05:00), Max: 38.6 (12 Apr 2019 19:00)  T(F): 100.7 (13 Apr 2019 05:00), Max: 101.4 (12 Apr 2019 19:00)  HR: 64 (13 Apr 2019 06:00) (64 - 96)  BP: 112/65 (13 Apr 2019 05:00) (112/65 - 178/95)  BP(mean): 84 (13 Apr 2019 05:00) (84 - 131)  ABP: 130/78 (13 Apr 2019 06:00) (122/68 - 186/96)  ABP(mean): 94 (13 Apr 2019 06:00) (82 - 126)  RR: 11 (13 Apr 2019 06:00) (11 - 56)  SpO2: 98% (13 Apr 2019 06:00) (95% - 100%)                          12.0   9.13  )-----------( 362      ( 13 Apr 2019 05:23 )             35.6     04-13    141  |  107  |  18  ----------------------------<  182<H>  3.9   |  23  |  0.73    Ca    9.0      13 Apr 2019 05:23  Phos  3.4     04-13  Mg     2.4     04-13        PHYSICAL EXAM:  Gen: NAD, Intubated on ventilator, sedated on versed  HEENT: PERRL  Lungs: Clear b/l  Heart: S1, S2. NSR  Abd: Soft, NT/ND. +BS  Exts: Pulses 2+ throughout  Neuro: Mild decorticate posturing to LUE to noxious stimulus, localizing on RUE. Withdrawal to noxious stimuli b/l LE. No eye opening. Not following commands. PERRL, +corneals, +gag    DEVICE/DRAIN DRESSING:  L EVD @ 5cmH2O, draining    TUBES/LINES:  [] CVC  [x] A-line  [] Lumbar Drain  [x] Ventriculostomy  [] Other    DIET:  [] NPO  [] Mechanical  [x] Tube feeds    LABS:                         12.0   9.13  )-----------( 362      ( 13 Apr 2019 05:23 )             35.6   04-13    141  |  107  |  18  ----------------------------<  182<H>  3.9   |  23  |  0.73    Ca    9.0      13 Apr 2019 05:23  Phos  3.4     04-13  Mg     2.4     04-13              CAPILLARY BLOOD GLUCOSE      POCT Blood Glucose.: 110 mg/dL (11 Apr 2019 22:33)  POCT Blood Glucose.: 168 mg/dL (11 Apr 2019 14:51)  POCT Blood Glucose.: 160 mg/dL (11 Apr 2019 11:06)  POCT Blood Glucose.: 132 mg/dL (11 Apr 2019 05:36)      Drug Levels: [] N/A  Vancomycin Level, Trough: 6.1 ug/mL (04-11 @ 22:40)    CSF Analysis: [] N/A  RBC Count - Spinal Fluid: 4800 /uL (04-11 @ 08:02)  CSF Lymphocytes: 1 % (04-11 @ 08:02)  Glucose, CSF: 95 mg/dL (04-11 @ 08:02)  Protein, CSF: 121 mg/dL (04-11 @ 08:02)      Allergies    No Known Allergies    Intolerances      MEDICATIONS:  Antibiotics:  piperacillin/tazobactam IVPB. 4.5 Gram(s) IV Intermittent every 6 hours  vancomycin  IVPB 1500 milliGRAM(s) IV Intermittent every 12 hours    Neuro:  acetaminophen    Suspension .. 650 milliGRAM(s) Oral every 6 hours PRN  fentaNYL    Injectable 50 MICROGram(s) IV Push every 2 hours PRN  fentaNYL    Injectable 25 MICROGram(s) IV Push every 2 hours PRN  fentaNYL    Injectable 12.5 MICROGram(s) IV Push every 2 hours PRN  levETIRAcetam  Solution 1000 milliGRAM(s) Oral two times a day  LORazepam   Injectable 4 milliGRAM(s) IV Push every 4 hours PRN  LORazepam   Injectable 2 milliGRAM(s) IV Push every 4 hours PRN  LORazepam   Injectable 1 milliGRAM(s) IV Push every 4 hours PRN  midazolam Infusion 0.02 mG/kG/Hr IV Continuous <Continuous>  ondansetron Injectable 4 milliGRAM(s) IV Push every 6 hours PRN    Anticoagulation:    OTHER:  amLODIPine   Tablet 10 milliGRAM(s) Oral daily  atorvastatin 40 milliGRAM(s) Oral at bedtime  bisacodyl 5 milliGRAM(s) Oral daily PRN  bisacodyl Suppository 10 milliGRAM(s) Rectal daily PRN  dextrose 40% Gel 15 Gram(s) Oral once PRN  dextrose 50% Injectable 12.5 Gram(s) IV Push once  dextrose 50% Injectable 25 Gram(s) IV Push once  dextrose 50% Injectable 25 Gram(s) IV Push once  docusate sodium Liquid 100 milliGRAM(s) Oral two times a day  glucagon  Injectable 1 milliGRAM(s) IntraMuscular once PRN  hydrALAZINE 50 milliGRAM(s) Oral every 8 hours  hydrALAZINE Injectable 10 milliGRAM(s) IV Push every 4 hours PRN  influenza   Vaccine 0.5 milliLiter(s) IntraMuscular once  insulin lispro (HumaLOG) corrective regimen sliding scale   SubCutaneous Before meals and at bedtime  lisinopril 40 milliGRAM(s) Oral daily  metoprolol tartrate 50 milliGRAM(s) Oral every 12 hours  niCARdipine Infusion 5 mG/Hr IV Continuous <Continuous>  pantoprazole   Suspension 40 milliGRAM(s) Oral daily  senna 2 Tablet(s) Oral at bedtime    IVF:  dextrose 5%. 1000 milliLiter(s) IV Continuous <Continuous>    CULTURES:  Culture Results:   Moderate Staphylococcus aureus Susceptibility to follow.  Moderate Haemophilus influenzae Susceptibility to follow.  Accompanied by normal respiratory derek (04-10 @ 11:27)  Culture Results:   Numerous Staphylococcus aureus  Rare Routine respiratory derek present (04-08 @ 20:31)    RADIOLOGY & ADDITIONAL TESTS:      ASSESSMENT:  44y Male w/ ETOH abuse now s/p right thalamic IPH with IVH s/p Emergent Rt EVD placement 4/6/19, s/p Cerebral angiogram neg 4/8/19, new left EVD placed and R EVD removed 4/9/19, now s/p penumbra evacuation of ICH 4/10/19    HEMORRHAGE STROKE  HEMORRHAGE  Handoff  ICH (intracerebral hemorrhage)  ICH (intracerebral hemorrhage)  Craniotomy for intracranial hemorrhage  Evacuation of hematoma of face  Angiogram, carotid and cerebral, bilateral      PLAN:  NEURO:  Neuro checks q1h  Keppra for seizure prophylaxis  Continue versed, fentanyl and ativan PRN agitation  Keep left EVD at 5cmH2O, monitor ICPs and output  .1 clonidine for agitation     CARDIOVASCULAR:   SBP goal <140, Cardene drip PRN  increase hydralazine 100mg q8h  Continue lopressor 50mg BID, norvasc 10mg daily, and lisinopril 40mg daily  Daily labs, electrolyte repletion PRN,  Continue Statin therapy  central line placement     PULMONARY:   Continue ventilator support,  Daily CXR    RENAL:   dc little  Voiding, condom catheter  Normal Na goal    GI:   NGT feeds  Stool softeners PRN  PPI while intubated    ID:  Ceftriax PNA until 4/19  ENDO:   ISS    DVT PROPHYLAXIS:   SCDs, SQL     DISPOSITION: Continue NSICU care,  Full code,  D/w Dr. So and Dr. Wheeler    Assessment:  Present when checked    []  GCS  E   V  M     Heart Failure: []Acute, [] acute on chronic , []chronic  Heart Failure:  [] Diastolic (HFpEF), [] Systolic (HFrEF), []Combined (HFpEF and HFrEF), [] RHF, [] Pulm HTN, [] Other    [] KRISTA, [] ATN, [] AIN, [] other  [] CKD1, [] CKD2, [] CKD 3, [] CKD 4, [] CKD 5, []ESRD    Encephalopathy: [] Metabolic, [] Hepatic, [] toxic, [x] Neurological, [] Other    Abnormal Nutritional Status: [] malnutrition (see nutrition note), [ ]underweight: BMI < 19, [] morbid obesity: BMI >40, [] Cachexia    [] Sepsis  [] hypovolemic shock,[] cardiogenic shock, [] hemorrhagic shock, [] neurogenic shock  [x] Acute Respiratory Failure  [x]Cerebral edema, [x] Brain compression/ herniation,   [] Functional quadriplegia  [] Acute blood loss anemia

## 2019-04-13 NOTE — PROCEDURE NOTE - NSPROCDETAILS_GEN_ALL_CORE
sterile technique, catheter placed/lumen(s) aspirated and flushed/guidewire recovered/sterile dressing applied/ultrasound guidance
all materials/supplies accounted for at end of procedure/location identified, draped/prepped, sterile technique used, needle inserted/introduced/connected to a pressurized flush line/positive blood return obtained via catheter/sutured in place/hemostasis with direct pressure, dressing applied/Seldinger technique

## 2019-04-13 NOTE — PROCEDURE NOTE - NSPOSTCAREGUIDE_GEN_A_CORE
Verbal/written post procedure instructions were given to patient/caregiver
EVD set to 10cm above tragus/Verbal/written post procedure instructions were given to patient/caregiver
Verbal/written post procedure instructions were given to patient/caregiver

## 2019-04-13 NOTE — PROGRESS NOTE ADULT - ASSESSMENT
44y/M with   1.  intracerebral hemorrhage (r basal ganglia / thalamic; small L basal ganglia, R pontine), brain compression, cerebral edema; ICH Score on admission  = GCS Score: 3-4 (2 pts) E1VTM2 (+) IVH = 3  ICH volume 20ml  Estimated 30-day mortality 3 points: 72%  2.  dyslipidemia    PLAN:   NEURO: neurochecks q1h, PRN pain meds with Tylenol / fentanyl PRN; switch sedation from propofol to precedex to RASS of 0 to -1  ICH:  BP control  seizure prophylaxis: as per neurosurgery  EVD: monitor output, keep at 10cm h20  REHAB:  physical therapy evaluation and management    EARLY MOB:   HOB bedrest    PULM:  full vent support; CPAP trial this afternoon if RASS achieved   CARDIO:  SBP goal 100-140mm Hg; cardene drip to SBP goal; lisinopril 40 mg PO daily ; norvasc 10 d, metop 50 bid, increase hydralazine  ENDO:  Blood sugar goals 140-180 mg/dL, continue insulin sliding scale; atorvastatin 40mg PO daily, A1C  4.9  GI:  docusate senna; PPI for GI prophylaxis (intubated)  DIET: continue Jevity at goal  RENAL:  Na goal 140-145  HEM/ONC: Hb stable; given DDAVP and platelet transfusion for aspirin reversal  VTE Prophylaxis: SCDs, SQH tonight if ok with NS  ID: afebrile, no leukocytosis, no ABx, high for aspiration / HAP  Social: will update family; sister freeman, father, common law wife who is pregnant    ATTENDING ATTESTATION:  I was physically present for the key portions of the evaluation and management (E/M) service provided.  I agree with the above history, physical and plan, which I have reviewed and edited where appropriate.    Patient at high risk for neurological deterioration or death due to:  ICU delirium, aspiration PNA, DVT / PE.  Critical care time, excluding procedures: 75 minutes spent on total encounter, more than 50% of the visit was spent counseling and/or coordinating care by the attending physician.     Plan discussed with RN, house staff.

## 2019-04-14 LAB
ANION GAP SERPL CALC-SCNC: 12 MMOL/L — SIGNIFICANT CHANGE UP (ref 5–17)
APPEARANCE UR: CLEAR — SIGNIFICANT CHANGE UP
BILIRUB UR-MCNC: NEGATIVE — SIGNIFICANT CHANGE UP
BUN SERPL-MCNC: 17 MG/DL — SIGNIFICANT CHANGE UP (ref 7–23)
CALCIUM SERPL-MCNC: 8.9 MG/DL — SIGNIFICANT CHANGE UP (ref 8.4–10.5)
CHLORIDE SERPL-SCNC: 106 MMOL/L — SIGNIFICANT CHANGE UP (ref 96–108)
CO2 SERPL-SCNC: 23 MMOL/L — SIGNIFICANT CHANGE UP (ref 22–31)
COLOR SPEC: YELLOW — SIGNIFICANT CHANGE UP
CREAT SERPL-MCNC: 0.68 MG/DL — SIGNIFICANT CHANGE UP (ref 0.5–1.3)
DIFF PNL FLD: NEGATIVE — SIGNIFICANT CHANGE UP
GLUCOSE CSF-MCNC: 104 MG/DL — HIGH (ref 40–70)
GLUCOSE SERPL-MCNC: 156 MG/DL — HIGH (ref 70–99)
GLUCOSE UR QL: NEGATIVE — SIGNIFICANT CHANGE UP
HCT VFR BLD CALC: 36.2 % — LOW (ref 39–50)
HGB BLD-MCNC: 12.2 G/DL — LOW (ref 13–17)
KETONES UR-MCNC: NEGATIVE — SIGNIFICANT CHANGE UP
LEUKOCYTE ESTERASE UR-ACNC: NEGATIVE — SIGNIFICANT CHANGE UP
MAGNESIUM SERPL-MCNC: 2.2 MG/DL — SIGNIFICANT CHANGE UP (ref 1.6–2.6)
MCHC RBC-ENTMCNC: 31.9 PG — SIGNIFICANT CHANGE UP (ref 27–34)
MCHC RBC-ENTMCNC: 33.7 GM/DL — SIGNIFICANT CHANGE UP (ref 32–36)
MCV RBC AUTO: 94.5 FL — SIGNIFICANT CHANGE UP (ref 80–100)
NITRITE UR-MCNC: NEGATIVE — SIGNIFICANT CHANGE UP
NRBC # BLD: 0 /100 WBCS — SIGNIFICANT CHANGE UP (ref 0–0)
PH UR: 6.5 — SIGNIFICANT CHANGE UP (ref 5–8)
PHOSPHATE SERPL-MCNC: 3.1 MG/DL — SIGNIFICANT CHANGE UP (ref 2.5–4.5)
PLATELET # BLD AUTO: 403 K/UL — HIGH (ref 150–400)
POTASSIUM SERPL-MCNC: 4.4 MMOL/L — SIGNIFICANT CHANGE UP (ref 3.5–5.3)
POTASSIUM SERPL-SCNC: 4.4 MMOL/L — SIGNIFICANT CHANGE UP (ref 3.5–5.3)
PROT CSF-MCNC: 29 MG/DL — SIGNIFICANT CHANGE UP (ref 15–45)
PROT UR-MCNC: NEGATIVE MG/DL — SIGNIFICANT CHANGE UP
RBC # BLD: 3.83 M/UL — LOW (ref 4.2–5.8)
RBC # FLD: 11.9 % — SIGNIFICANT CHANGE UP (ref 10.3–14.5)
SODIUM SERPL-SCNC: 141 MMOL/L — SIGNIFICANT CHANGE UP (ref 135–145)
SP GR SPEC: 1.01 — SIGNIFICANT CHANGE UP (ref 1–1.03)
UROBILINOGEN FLD QL: 0.2 E.U./DL — SIGNIFICANT CHANGE UP
WBC # BLD: 12.1 K/UL — HIGH (ref 3.8–10.5)
WBC # FLD AUTO: 12.1 K/UL — HIGH (ref 3.8–10.5)

## 2019-04-14 PROCEDURE — 71045 X-RAY EXAM CHEST 1 VIEW: CPT | Mod: 26

## 2019-04-14 PROCEDURE — 99292 CRITICAL CARE ADDL 30 MIN: CPT | Mod: 24

## 2019-04-14 PROCEDURE — 99291 CRITICAL CARE FIRST HOUR: CPT | Mod: 24

## 2019-04-14 PROCEDURE — 36415 COLL VENOUS BLD VENIPUNCTURE: CPT

## 2019-04-14 RX ORDER — FENTANYL CITRATE 50 UG/ML
75 INJECTION INTRAVENOUS
Qty: 0 | Refills: 0 | Status: DISCONTINUED | OUTPATIENT
Start: 2019-04-14 | End: 2019-04-17

## 2019-04-14 RX ORDER — FENTANYL CITRATE 50 UG/ML
100 INJECTION INTRAVENOUS
Qty: 0 | Refills: 0 | Status: DISCONTINUED | OUTPATIENT
Start: 2019-04-14 | End: 2019-04-17

## 2019-04-14 RX ORDER — FENTANYL CITRATE 50 UG/ML
100 INJECTION INTRAVENOUS ONCE
Qty: 0 | Refills: 0 | Status: DISCONTINUED | OUTPATIENT
Start: 2019-04-14 | End: 2019-04-15

## 2019-04-14 RX ORDER — FENTANYL CITRATE 50 UG/ML
50 INJECTION INTRAVENOUS ONCE
Qty: 0 | Refills: 0 | Status: DISCONTINUED | OUTPATIENT
Start: 2019-04-14 | End: 2019-04-14

## 2019-04-14 RX ORDER — FENTANYL CITRATE 50 UG/ML
50 INJECTION INTRAVENOUS
Qty: 0 | Refills: 0 | Status: DISCONTINUED | OUTPATIENT
Start: 2019-04-14 | End: 2019-04-17

## 2019-04-14 RX ORDER — SODIUM CHLORIDE 9 MG/ML
1000 INJECTION INTRAMUSCULAR; INTRAVENOUS; SUBCUTANEOUS
Qty: 0 | Refills: 0 | Status: DISCONTINUED | OUTPATIENT
Start: 2019-04-14 | End: 2019-04-14

## 2019-04-14 RX ADMIN — Medication 0.1 MILLIGRAM(S): at 17:49

## 2019-04-14 RX ADMIN — DEXMEDETOMIDINE HYDROCHLORIDE IN 0.9% SODIUM CHLORIDE 1.8 MICROGRAM(S)/KG/HR: 4 INJECTION INTRAVENOUS at 22:31

## 2019-04-14 RX ADMIN — LEVETIRACETAM 1000 MILLIGRAM(S): 250 TABLET, FILM COATED ORAL at 17:48

## 2019-04-14 RX ADMIN — Medication 100 MILLIGRAM(S): at 05:32

## 2019-04-14 RX ADMIN — Medication 650 MILLIGRAM(S): at 21:12

## 2019-04-14 RX ADMIN — FENTANYL CITRATE 50 MICROGRAM(S): 50 INJECTION INTRAVENOUS at 11:04

## 2019-04-14 RX ADMIN — FENTANYL CITRATE 50 MICROGRAM(S): 50 INJECTION INTRAVENOUS at 03:28

## 2019-04-14 RX ADMIN — Medication 2 MILLIGRAM(S): at 14:14

## 2019-04-14 RX ADMIN — MIDAZOLAM HYDROCHLORIDE 1.44 MG/KG/HR: 1 INJECTION, SOLUTION INTRAMUSCULAR; INTRAVENOUS at 01:15

## 2019-04-14 RX ADMIN — ATORVASTATIN CALCIUM 40 MILLIGRAM(S): 80 TABLET, FILM COATED ORAL at 21:12

## 2019-04-14 RX ADMIN — DEXMEDETOMIDINE HYDROCHLORIDE IN 0.9% SODIUM CHLORIDE 1.8 MICROGRAM(S)/KG/HR: 4 INJECTION INTRAVENOUS at 17:48

## 2019-04-14 RX ADMIN — DEXMEDETOMIDINE HYDROCHLORIDE IN 0.9% SODIUM CHLORIDE 1.8 MICROGRAM(S)/KG/HR: 4 INJECTION INTRAVENOUS at 08:34

## 2019-04-14 RX ADMIN — DEXMEDETOMIDINE HYDROCHLORIDE IN 0.9% SODIUM CHLORIDE 1.8 MICROGRAM(S)/KG/HR: 4 INJECTION INTRAVENOUS at 20:00

## 2019-04-14 RX ADMIN — FENTANYL CITRATE 50 MICROGRAM(S): 50 INJECTION INTRAVENOUS at 09:35

## 2019-04-14 RX ADMIN — FENTANYL CITRATE 50 MICROGRAM(S): 50 INJECTION INTRAVENOUS at 09:46

## 2019-04-14 RX ADMIN — Medication 2 MILLIGRAM(S): at 09:07

## 2019-04-14 RX ADMIN — DEXMEDETOMIDINE HYDROCHLORIDE IN 0.9% SODIUM CHLORIDE 1.8 MICROGRAM(S)/KG/HR: 4 INJECTION INTRAVENOUS at 06:06

## 2019-04-14 RX ADMIN — DEXMEDETOMIDINE HYDROCHLORIDE IN 0.9% SODIUM CHLORIDE 1.8 MICROGRAM(S)/KG/HR: 4 INJECTION INTRAVENOUS at 11:02

## 2019-04-14 RX ADMIN — FENTANYL CITRATE 50 MICROGRAM(S): 50 INJECTION INTRAVENOUS at 07:53

## 2019-04-14 RX ADMIN — FENTANYL CITRATE 50 MICROGRAM(S): 50 INJECTION INTRAVENOUS at 18:07

## 2019-04-14 RX ADMIN — FENTANYL CITRATE 50 MICROGRAM(S): 50 INJECTION INTRAVENOUS at 12:25

## 2019-04-14 RX ADMIN — Medication 50 MILLIGRAM(S): at 21:12

## 2019-04-14 RX ADMIN — FENTANYL CITRATE 50 MICROGRAM(S): 50 INJECTION INTRAVENOUS at 06:00

## 2019-04-14 RX ADMIN — LISINOPRIL 40 MILLIGRAM(S): 2.5 TABLET ORAL at 05:32

## 2019-04-14 RX ADMIN — Medication 0.1 MILLIGRAM(S): at 05:31

## 2019-04-14 RX ADMIN — FENTANYL CITRATE 50 MICROGRAM(S): 50 INJECTION INTRAVENOUS at 15:04

## 2019-04-14 RX ADMIN — PANTOPRAZOLE SODIUM 40 MILLIGRAM(S): 20 TABLET, DELAYED RELEASE ORAL at 12:16

## 2019-04-14 RX ADMIN — FENTANYL CITRATE 50 MICROGRAM(S): 50 INJECTION INTRAVENOUS at 04:00

## 2019-04-14 RX ADMIN — DEXMEDETOMIDINE HYDROCHLORIDE IN 0.9% SODIUM CHLORIDE 1.8 MICROGRAM(S)/KG/HR: 4 INJECTION INTRAVENOUS at 01:14

## 2019-04-14 RX ADMIN — FENTANYL CITRATE 50 MICROGRAM(S): 50 INJECTION INTRAVENOUS at 15:05

## 2019-04-14 RX ADMIN — FENTANYL CITRATE 50 MICROGRAM(S): 50 INJECTION INTRAVENOUS at 18:23

## 2019-04-14 RX ADMIN — Medication 650 MILLIGRAM(S): at 22:00

## 2019-04-14 RX ADMIN — DEXMEDETOMIDINE HYDROCHLORIDE IN 0.9% SODIUM CHLORIDE 1.8 MICROGRAM(S)/KG/HR: 4 INJECTION INTRAVENOUS at 12:16

## 2019-04-14 RX ADMIN — DEXMEDETOMIDINE HYDROCHLORIDE IN 0.9% SODIUM CHLORIDE 1.8 MICROGRAM(S)/KG/HR: 4 INJECTION INTRAVENOUS at 04:12

## 2019-04-14 RX ADMIN — ENOXAPARIN SODIUM 40 MILLIGRAM(S): 100 INJECTION SUBCUTANEOUS at 21:09

## 2019-04-14 RX ADMIN — LEVETIRACETAM 1000 MILLIGRAM(S): 250 TABLET, FILM COATED ORAL at 05:31

## 2019-04-14 RX ADMIN — Medication 100 MILLIGRAM(S): at 15:04

## 2019-04-14 RX ADMIN — FENTANYL CITRATE 50 MICROGRAM(S): 50 INJECTION INTRAVENOUS at 05:47

## 2019-04-14 RX ADMIN — Medication 50 MILLIGRAM(S): at 09:07

## 2019-04-14 RX ADMIN — DEXMEDETOMIDINE HYDROCHLORIDE IN 0.9% SODIUM CHLORIDE 1.8 MICROGRAM(S)/KG/HR: 4 INJECTION INTRAVENOUS at 15:09

## 2019-04-14 RX ADMIN — AMLODIPINE BESYLATE 10 MILLIGRAM(S): 2.5 TABLET ORAL at 05:30

## 2019-04-14 RX ADMIN — CEFTRIAXONE 100 GRAM(S): 500 INJECTION, POWDER, FOR SOLUTION INTRAMUSCULAR; INTRAVENOUS at 17:49

## 2019-04-14 NOTE — PROCEDURE NOTE - NSSITEPREP_SKIN_A_CORE
chlorhexidine
chlorhexidine
chlorhexidine/Adherence to aseptic technique: hand hygiene prior to donning barriers (gown, gloves), don cap and mask, sterile drape over patient
chlorhexidine
chlorhexidine/Adherence to aseptic technique: hand hygiene prior to donning barriers (gown, gloves), don cap and mask, sterile drape over patient
chlorhexidine

## 2019-04-14 NOTE — PROGRESS NOTE ADULT - SUBJECTIVE AND OBJECTIVE BOX
Hospital Course:   4/7: overnight, patient requiring large amount of cardene and propofol.  Plan today is to transition to precedex, start PO lisinopril and wean of cardene.  Becomes extremely agitated when off sedation.  4/8: Cerebral angio without findings of aneurysm. EVD stopped working, pulled back without improvement. CT Head performed.  4/9: Received ativan 6mg for agitation. EVD stopped draining at 6pm yesterday taken for stat CTH. Dr. So notified and EVD lowered to 5cmH2O without output.   4/10: s/p penumbra evacuation of ICH. Pt seen and examined at bedside postop. Remains intubated, on versed. ICPs stable.  (OR EBL 30cc, received LR 200cc)  4/11: JUSTIN overnight. Remains on versed. Remains intubated. Neuro stable.  4/12: Given ativan overnight, bucking the vent. Remains on versed drip. Neuro stable. ICPs stable. Increase hydralazine to 75 Q8.   4/13 JUSTIN overnight, febrile, urinary retention Straight Cath x 1 = 1L, neuro exam unchanged since yesterday, started Cardene gtt as per Dr. So goal -140  4/14 POD#4 febrile overnight, o/w JUSTIN overnight, EVD@ 5cm H2O, Clonidine started, Ceftriax until 4/19 Staph sputum, Versed/Precedex, straight cathed overnight    PHYSICAL EXAM:  Gen: NAD, Intubated on ventilator, sedated on versed  HEENT: PERRL  Lungs: Clear b/l  Heart: S1, S2. NSR  Abd: Soft, NT/ND. +BS  Exts: Pulses 2+ throughout  Neuro: Mild decorticate posturing to LUE to noxious stimulus, localizing on RUE. Withdrawal to noxious stimuli b/l LE. No eye opening. Not following commands. PERRL, +corneals, +gag    DEVICE/DRAIN DRESSING:  L EVD @ 5cmH2O, draining    TUBES/LINES:  [x] CVC  [x] A-line  [] Lumbar Drain  [x] Ventriculostomy  [] Other    DIET:  [] NPO  [] Mechanical  [x] Tube feeds    LABS:              CAPILLARY BLOOD GLUCOSE      POCT Blood Glucose.: 110 mg/dL (11 Apr 2019 22:33)  POCT Blood Glucose.: 168 mg/dL (11 Apr 2019 14:51)  POCT Blood Glucose.: 160 mg/dL (11 Apr 2019 11:06)  POCT Blood Glucose.: 132 mg/dL (11 Apr 2019 05:36)      Drug Levels: [] N/A  Vancomycin Level, Trough: 6.1 ug/mL (04-11 @ 22:40)    CSF Analysis: [] N/A  RBC Count - Spinal Fluid: 4800 /uL (04-11 @ 08:02)  CSF Lymphocytes: 1 % (04-11 @ 08:02)  Glucose, CSF: 95 mg/dL (04-11 @ 08:02)  Protein, CSF: 121 mg/dL (04-11 @ 08:02)      Allergies    No Known Allergies    Intolerances      MEDICATIONS:  Antibiotics:  piperacillin/tazobactam IVPB. 4.5 Gram(s) IV Intermittent every 6 hours  vancomycin  IVPB 1500 milliGRAM(s) IV Intermittent every 12 hours    Neuro:  acetaminophen    Suspension .. 650 milliGRAM(s) Oral every 6 hours PRN  fentaNYL    Injectable 50 MICROGram(s) IV Push every 2 hours PRN  fentaNYL    Injectable 25 MICROGram(s) IV Push every 2 hours PRN  fentaNYL    Injectable 12.5 MICROGram(s) IV Push every 2 hours PRN  levETIRAcetam  Solution 1000 milliGRAM(s) Oral two times a day  LORazepam   Injectable 4 milliGRAM(s) IV Push every 4 hours PRN  LORazepam   Injectable 2 milliGRAM(s) IV Push every 4 hours PRN  LORazepam   Injectable 1 milliGRAM(s) IV Push every 4 hours PRN  midazolam Infusion 0.02 mG/kG/Hr IV Continuous <Continuous>  ondansetron Injectable 4 milliGRAM(s) IV Push every 6 hours PRN    Anticoagulation:    OTHER:  amLODIPine   Tablet 10 milliGRAM(s) Oral daily  atorvastatin 40 milliGRAM(s) Oral at bedtime  bisacodyl 5 milliGRAM(s) Oral daily PRN  bisacodyl Suppository 10 milliGRAM(s) Rectal daily PRN  dextrose 40% Gel 15 Gram(s) Oral once PRN  dextrose 50% Injectable 12.5 Gram(s) IV Push once  dextrose 50% Injectable 25 Gram(s) IV Push once  dextrose 50% Injectable 25 Gram(s) IV Push once  docusate sodium Liquid 100 milliGRAM(s) Oral two times a day  glucagon  Injectable 1 milliGRAM(s) IntraMuscular once PRN  hydrALAZINE 50 milliGRAM(s) Oral every 8 hours  hydrALAZINE Injectable 10 milliGRAM(s) IV Push every 4 hours PRN  influenza   Vaccine 0.5 milliLiter(s) IntraMuscular once  insulin lispro (HumaLOG) corrective regimen sliding scale   SubCutaneous Before meals and at bedtime  lisinopril 40 milliGRAM(s) Oral daily  metoprolol tartrate 50 milliGRAM(s) Oral every 12 hours  niCARdipine Infusion 5 mG/Hr IV Continuous <Continuous>  pantoprazole   Suspension 40 milliGRAM(s) Oral daily  senna 2 Tablet(s) Oral at bedtime    IVF:  dextrose 5%. 1000 milliLiter(s) IV Continuous <Continuous>    CULTURES:  Culture Results:   Moderate Staphylococcus aureus Susceptibility to follow.  Moderate Haemophilus influenzae Susceptibility to follow.  Accompanied by normal respiratory derek (04-10 @ 11:27)  Culture Results:   Numerous Staphylococcus aureus  Rare Routine respiratory derek present (04-08 @ 20:31)    RADIOLOGY & ADDITIONAL TESTS:      ASSESSMENT:  44y Male w/ ETOH abuse now s/p right thalamic IPH with IVH s/p Emergent Rt EVD placement 4/6/19, s/p Cerebral angiogram neg 4/8/19, new left EVD placed and R EVD removed 4/9/19, now s/p penumbra evacuation of ICH 4/10/19    HEMORRHAGE STROKE  HEMORRHAGE  Handoff  ICH (intracerebral hemorrhage)  ICH (intracerebral hemorrhage)  Craniotomy for intracranial hemorrhage  Evacuation of hematoma of face  Angiogram, carotid and cerebral, bilateral      PLAN:  NEURO:  Neuro checks q1h  Keppra for seizure prophylaxis  Continue versed, fentanyl and ativan PRN agitation  Keep left EVD at 5cmH2O, monitor ICPs and output      CARDIOVASCULAR:   SBP goal <140,   increase hydralazine 100mg q8h  Started Clonidine   Continue lopressor 50mg BID, norvasc 10mg daily, and lisinopril 40mg daily  Daily labs, electrolyte repletion PRN,  Continue Statin therapy  central line placement     PULMONARY:   Continue ventilator support,  Daily CXR    RENAL:   dc little  Voiding, condom catheter, straight Cath PRN  Normal Na goal    GI:   NGT feeds  Stool softeners PRN  PPI while intubated    ID:  Ceftriax PNA until 4/19  ENDO:   ISS    DVT PROPHYLAXIS:   SCDs, SQL     DISPOSITION: Continue NSICU care,  Full code,  D/w Dr. So and Dr. Wheeler    Assessment:  Present when checked    []  GCS  E   V  M     Heart Failure: []Acute, [] acute on chronic , []chronic  Heart Failure:  [] Diastolic (HFpEF), [] Systolic (HFrEF), []Combined (HFpEF and HFrEF), [] RHF, [] Pulm HTN, [] Other    [] KRISTA, [] ATN, [] AIN, [] other  [] CKD1, [] CKD2, [] CKD 3, [] CKD 4, [] CKD 5, []ESRD    Encephalopathy: [] Metabolic, [] Hepatic, [] toxic, [x] Neurological, [] Other    Abnormal Nutritional Status: [] malnutrition (see nutrition note), [ ]underweight: BMI < 19, [] morbid obesity: BMI >40, [] Cachexia    [] Sepsis  [] hypovolemic shock,[] cardiogenic shock, [] hemorrhagic shock, [] neurogenic shock  [x] Acute Respiratory Failure  [x]Cerebral edema, [x] Brain compression/ herniation,   [] Functional quadriplegia  [] Acute blood loss anemia Hospital Course:   4/7: overnight, patient requiring large amount of cardene and propofol.  Plan today is to transition to precedex, start PO lisinopril and wean of cardene.  Becomes extremely agitated when off sedation.  4/8: Cerebral angio without findings of aneurysm. EVD stopped working, pulled back without improvement. CT Head performed.  4/9: Received ativan 6mg for agitation. EVD stopped draining at 6pm yesterday taken for stat CTH. Dr. So notified and EVD lowered to 5cmH2O without output.   4/10: s/p penumbra evacuation of ICH. Pt seen and examined at bedside postop. Remains intubated, on versed. ICPs stable.  (OR EBL 30cc, received LR 200cc)  4/11: JUSTIN overnight. Remains on versed. Remains intubated. Neuro stable.  4/12: Given ativan overnight, bucking the vent. Remains on versed drip. Neuro stable. ICPs stable. Increase hydralazine to 75 Q8.   4/13 JUSTIN overnight, febrile, urinary retention Straight Cath x 1 = 1L, neuro exam unchanged since yesterday, started Cardene gtt as per Dr. So goal -140  4/14 POD#4 febrile overnight, o/w JUSTIN overnight, EVD@ 5cm H2O, Clonidine started, Ceftriax until 4/19 Staph sputum, Versed/Precedex, straight cathed overnight    PHYSICAL EXAM:  Gen: NAD, Intubated on ventilator, sedated on versed  HEENT: PERRL  Lungs: Clear b/l  Heart: S1, S2. NSR  Abd: Soft, NT/ND. +BS  Exts: Pulses 2+ throughout  Neuro: Mild decorticate posturing to LUE to noxious stimulus, localizing on RUE. Withdrawal to noxious stimuli b/l LE. No eye opening. Not following commands. PERRL, +corneals, +gag    DEVICE/DRAIN DRESSING:  L EVD @ 5cmH2O, draining    TUBES/LINES:  [x] CVC  [x] A-line  [] Lumbar Drain  [x] Ventriculostomy  [] Other    DIET:  [] NPO  [] Mechanical  [x] Tube feeds    LABS:              CAPILLARY BLOOD GLUCOSE      POCT Blood Glucose.: 110 mg/dL (11 Apr 2019 22:33)  POCT Blood Glucose.: 168 mg/dL (11 Apr 2019 14:51)  POCT Blood Glucose.: 160 mg/dL (11 Apr 2019 11:06)  POCT Blood Glucose.: 132 mg/dL (11 Apr 2019 05:36)      Drug Levels: [] N/A  Vancomycin Level, Trough: 6.1 ug/mL (04-11 @ 22:40)    CSF Analysis: [] N/A  RBC Count - Spinal Fluid: 4800 /uL (04-11 @ 08:02)  CSF Lymphocytes: 1 % (04-11 @ 08:02)  Glucose, CSF: 95 mg/dL (04-11 @ 08:02)  Protein, CSF: 121 mg/dL (04-11 @ 08:02)      Allergies    No Known Allergies    Intolerances      MEDICATIONS:  Antibiotics:  piperacillin/tazobactam IVPB. 4.5 Gram(s) IV Intermittent every 6 hours  vancomycin  IVPB 1500 milliGRAM(s) IV Intermittent every 12 hours    Neuro:  acetaminophen    Suspension .. 650 milliGRAM(s) Oral every 6 hours PRN  fentaNYL    Injectable 50 MICROGram(s) IV Push every 2 hours PRN  fentaNYL    Injectable 25 MICROGram(s) IV Push every 2 hours PRN  fentaNYL    Injectable 12.5 MICROGram(s) IV Push every 2 hours PRN  levETIRAcetam  Solution 1000 milliGRAM(s) Oral two times a day  LORazepam   Injectable 4 milliGRAM(s) IV Push every 4 hours PRN  LORazepam   Injectable 2 milliGRAM(s) IV Push every 4 hours PRN  LORazepam   Injectable 1 milliGRAM(s) IV Push every 4 hours PRN  midazolam Infusion 0.02 mG/kG/Hr IV Continuous <Continuous>  ondansetron Injectable 4 milliGRAM(s) IV Push every 6 hours PRN    Anticoagulation:    OTHER:  amLODIPine   Tablet 10 milliGRAM(s) Oral daily  atorvastatin 40 milliGRAM(s) Oral at bedtime  bisacodyl 5 milliGRAM(s) Oral daily PRN  bisacodyl Suppository 10 milliGRAM(s) Rectal daily PRN  dextrose 40% Gel 15 Gram(s) Oral once PRN  dextrose 50% Injectable 12.5 Gram(s) IV Push once  dextrose 50% Injectable 25 Gram(s) IV Push once  dextrose 50% Injectable 25 Gram(s) IV Push once  docusate sodium Liquid 100 milliGRAM(s) Oral two times a day  glucagon  Injectable 1 milliGRAM(s) IntraMuscular once PRN  hydrALAZINE 50 milliGRAM(s) Oral every 8 hours  hydrALAZINE Injectable 10 milliGRAM(s) IV Push every 4 hours PRN  influenza   Vaccine 0.5 milliLiter(s) IntraMuscular once  insulin lispro (HumaLOG) corrective regimen sliding scale   SubCutaneous Before meals and at bedtime  lisinopril 40 milliGRAM(s) Oral daily  metoprolol tartrate 50 milliGRAM(s) Oral every 12 hours  niCARdipine Infusion 5 mG/Hr IV Continuous <Continuous>  pantoprazole   Suspension 40 milliGRAM(s) Oral daily  senna 2 Tablet(s) Oral at bedtime    IVF:  dextrose 5%. 1000 milliLiter(s) IV Continuous <Continuous>    CULTURES:  Culture Results:   Moderate Staphylococcus aureus Susceptibility to follow.  Moderate Haemophilus influenzae Susceptibility to follow.  Accompanied by normal respiratory derek (04-10 @ 11:27)  Culture Results:   Numerous Staphylococcus aureus  Rare Routine respiratory derek present (04-08 @ 20:31)    RADIOLOGY & ADDITIONAL TESTS:      ASSESSMENT:  44y Male w/ ETOH abuse now s/p right thalamic IPH with IVH s/p Emergent Rt EVD placement 4/6/19, s/p Cerebral angiogram neg 4/8/19, new left EVD placed and R EVD removed 4/9/19, now s/p penumbra evacuation of ICH 4/10/19    HEMORRHAGE STROKE  HEMORRHAGE  Handoff  ICH (intracerebral hemorrhage)  ICH (intracerebral hemorrhage)  Craniotomy for intracranial hemorrhage  Evacuation of hematoma of face  Angiogram, carotid and cerebral, bilateral      PLAN:  NEURO:  Neuro checks q1h  Keppra for seizure prophylaxis  continue precedex and versed for sedation  Continue fentanyl and ativan PRN agitation  Keep left EVD at 5cmH2O, monitor ICPs and output  Clonidine .1 BID    CARDIOVASCULAR:   SBP goal <140  Continue lopressor 50mg BID, norvasc 10mg daily, and lisinopril 40mg daily, hydralazine 100mg Q8h  Daily labs, electrolyte repletion PRN,  Continue Statin therapy  Right IJ in place    PULMONARY:   Continue ventilator support,  Daily CXR    RENAL:   dc little  Voiding, condom catheter, straight Cath PRN  Normal Na goal    GI:   NGT feeds  Stool softeners PRN  PPI while intubated    ID:  Ceftriax PNA until 4/19  ENDO:   ISS    DVT PROPHYLAXIS:   SCDs, SQL     DISPOSITION: Continue NSICU care,  Full code,  D/w Dr. So and Dr. Wheeler    Assessment:  Present when checked    []  GCS  E   V  M     Heart Failure: []Acute, [] acute on chronic , []chronic  Heart Failure:  [] Diastolic (HFpEF), [] Systolic (HFrEF), []Combined (HFpEF and HFrEF), [] RHF, [] Pulm HTN, [] Other    [] KRSITA, [] ATN, [] AIN, [] other  [] CKD1, [] CKD2, [] CKD 3, [] CKD 4, [] CKD 5, []ESRD    Encephalopathy: [] Metabolic, [] Hepatic, [] toxic, [x] Neurological, [] Other    Abnormal Nutritional Status: [] malnutrition (see nutrition note), [ ]underweight: BMI < 19, [] morbid obesity: BMI >40, [] Cachexia    [] Sepsis  [] hypovolemic shock,[] cardiogenic shock, [] hemorrhagic shock, [] neurogenic shock  [x] Acute Respiratory Failure  [x]Cerebral edema, [x] Brain compression/ herniation,   [] Functional quadriplegia  [] Acute blood loss anemia

## 2019-04-14 NOTE — PROCEDURE NOTE - NSPROCNAME_GEN_A_CORE
Arterial Puncture/Cannulation
Central Line Insertion
General

## 2019-04-14 NOTE — PROCEDURE NOTE - GENERAL PROCEDURE DETAILS
Measured NGT at xihpoid process (55cm), placed NGT, secured with tape, obtained radiographic evidence confirming proper positioning.
The patient was prepped with ChloraPrep and Betadine. Incision and tunneling tract were infiltrated with 1% Xylocaine with epinephrine. He did receive antibiotics post procedure. He was draped in a sterile manner.
Buritrol port of right EVD was prepped and cleaned in sterile fashion. Sterile gloves were then donned and a 10cc syringe was used to obtain 7cc of CSF for lab sampling. No complications.
Left forearm was prepped using aseptic technique after application of tourniquet for blood culture acquisition. Aerobic followed by anaerobic blood culture set obtained totaling 8cc/bottle. Pressure held over puncture site with bandaid applied.
See below

## 2019-04-14 NOTE — PROGRESS NOTE ADULT - SUBJECTIVE AND OBJECTIVE BOX
NEUROCRITICAL CARE PROGRESS NOTE    ARPAN RUANO   MRN-[id number]  Summary:  Vancomycin Level, Trough: 6.1 ug/mL (04-11 @ 22:40)  /  HPI:  History obtained by patient's girlfriend and chart from [place], patient is unresponsive:     43 y/o male with no significant PMHx presents to [place] with AMS, left side weakness and numbness. Per girlfriend, [place] when he developed acute onset of left facial numbness and LUE and numbness around 6:30PM. When get got home he developed AMS, dysarthria, and weakness in the LUE and LLE. At [place] ED patient had several episodes of emesis and was hypertensive to SBP in the 200's. Work up revealed right thalamic hemorrhage on CT head. Patient not on any anticoagulation use per girlfriend. Of note, patient took Excedrin for headache at home. Patient transferred to [facility] for further management. (06 Apr 2019 00:04)      S/Overnight events:  sedated, intubated, on vent, central line inserted yesterday.     EVD:    CSF :    ICPs:      Vital Signs Last 24 Hrs  T(C): 38.1 (14 Apr 2019 08:00), Max: 38.6 (13 Apr 2019 17:00)  T(F): 100.6 (14 Apr 2019 08:00), Max: 101.4 (13 Apr 2019 17:00)  HR: 72 (14 Apr 2019 08:00) (60 - 76)  BP: 137/83 (14 Apr 2019 08:00) (124/80 - 155/93)  BP(mean): 102 (14 Apr 2019 08:00) (1 - 120)  RR: 18 (14 Apr 2019 08:00) (15 - 30)  SpO2: 97% (14 Apr 2019 08:00) (90% - 100%)    Mode: AC/ CMV (Assist Control/ Continuous Mandatory Ventilation), RR (machine): 12, TV (machine): 400, FiO2: 40, PEEP: 5, ITime: 1, MAP: 7.6, PIP: 20    I&O's Detail    13 Apr 2019 07:01  -  14 Apr 2019 07:00  --------------------------------------------------------  IN:    dexmedetomidine Infusion: 399.2 mL    Enteral Tube Flush: 180 mL    midazolam Infusion: 147.4 mL    niCARdipine Infusion: 16 mL    ns in tub fed  [id number]: 1407 mL  Total IN: 2149.6 mL    OUT:    External Ventricular Device: 262 mL    Intermittent Catheterization - Urethral: 1075 mL    Rectal Tube: 400 mL  Total OUT: 1737 mL    Total NET: 412.6 mL      14 Apr 2019 07:01  -  14 Apr 2019 09:09  --------------------------------------------------------  IN:    dexmedetomidine Infusion: 17 mL    midazolam Infusion: 3 mL    ns in tub fed  [id number]: 67 mL  Total IN: 87 mL    OUT:    External Ventricular Device: 17 mL  Total OUT: 17 mL    Total NET: 70 mL          LABS:                        12.2   12.10 )-----------( 403      ( 14 Apr 2019 05:20 )             36.2     04-14    141  |  106  |  17  ----------------------------<  156<H>  4.4   |  23  |  0.68    Ca    8.9      14 Apr 2019 05:20  Phos  3.1     04-14  Mg     2.2     04-14        Urinalysis Basic - ( 13 Apr 2019 07:03 )    Color: x / Appearance: x / SG: x / pH: x  Gluc: x / Ketone: x  / Bili: x / Urobili: x   Blood: x / Protein: x / Nitrite: x   Leuk Esterase: x / RBC: Many /HPF / WBC < 5 /HPF   Sq Epi: x / Non Sq Epi: 0-5 /HPF / Bacteria: Present /HPF          CAPILLARY BLOOD GLUCOSE      POCT Blood Glucose.: 101 mg/dL (14 Apr 2019 05:37)  POCT Blood Glucose.: 135 mg/dL (13 Apr 2019 21:16)  POCT Blood Glucose.: 134 mg/dL (13 Apr 2019 17:10)  POCT Blood Glucose.: 106 mg/dL (13 Apr 2019 10:37)      Drug Levels: [] N/A  Vancomycin Level, Trough: 6.1 ug/mL (04-11 @ 22:40)    CSF Analysis: [] N/A      Allergies    No Known Allergies    Intolerances      MEDICATIONS:  Antibiotics:  cefTRIAXone   IVPB 1 Gram(s) IV Intermittent every 24 hours    Neuro:  acetaminophen    Suspension .. 650 milliGRAM(s) Oral every 6 hours PRN  dexmedetomidine Infusion 0.1 MICROgram(s)/kG/Hr IV Continuous <Continuous>  fentaNYL    Injectable 50 MICROGram(s) IV Push every 2 hours PRN  fentaNYL    Injectable 25 MICROGram(s) IV Push every 2 hours PRN  fentaNYL    Injectable 12.5 MICROGram(s) IV Push every 2 hours PRN  levETIRAcetam  Solution 1000 milliGRAM(s) Oral two times a day  LORazepam   Injectable 4 milliGRAM(s) IV Push every 4 hours PRN  LORazepam   Injectable 2 milliGRAM(s) IV Push every 4 hours PRN  LORazepam   Injectable 1 milliGRAM(s) IV Push every 4 hours PRN  midazolam Infusion 0.02 mG/kG/Hr IV Continuous <Continuous>  ondansetron Injectable 4 milliGRAM(s) IV Push every 6 hours PRN    Anticoagulation:  enoxaparin Injectable 40 milliGRAM(s) SubCutaneous at bedtime    OTHER:  amLODIPine   Tablet 10 milliGRAM(s) Oral daily  atorvastatin 40 milliGRAM(s) Oral at bedtime  cloNIDine 0.1 milliGRAM(s) Oral daily  dextrose 40% Gel 15 Gram(s) Oral once PRN  dextrose 50% Injectable 12.5 Gram(s) IV Push once  dextrose 50% Injectable 25 Gram(s) IV Push once  dextrose 50% Injectable 25 Gram(s) IV Push once  glucagon  Injectable 1 milliGRAM(s) IntraMuscular once PRN  hydrALAZINE 100 milliGRAM(s) Oral every 8 hours  hydrALAZINE Injectable 10 milliGRAM(s) IV Push every 4 hours PRN  influenza   Vaccine 0.5 milliLiter(s) IntraMuscular once  insulin lispro (HumaLOG) corrective regimen sliding scale   SubCutaneous Before meals and at bedtime  lisinopril 40 milliGRAM(s) Oral daily  metoprolol tartrate 50 milliGRAM(s) Oral every 12 hours  pantoprazole   Suspension 40 milliGRAM(s) Oral daily  senna 2 Tablet(s) Oral at bedtime    IVF:  dextrose 5%. 1000 milliLiter(s) IV Continuous <Continuous>    CULTURES:  Culture Results:   No growth to date. (04-11 @ 08:11)  Culture Results:   Moderate Staphylococcus aureus  Moderate Haemophilus influenzae  Accompanied by normal respiratory derek (04-10 @ 11:27) NEUROCRITICAL CARE PROGRESS NOTE    ARPAN RUANO   MRN-[id number]  Summary:  Vancomycin Level, Trough: 6.1 ug/mL (04-11 @ 22:40)  /  HPI:  History obtained by patient's girlfriend and chart from [place], patient is unresponsive:     43 y/o male with no significant PMHx presents to [place] with AMS, left side weakness and numbness. Per [place], [place] when he developed acute onset of left facial numbness and LUE and numbness around 6:30PM. When get got home he developed AMS, dysarthria, and weakness in the LUE and LLE. At [place] ED patient had several episodes of emesis and was hypertensive to SBP in the 200's. Work up revealed right thalamic hemorrhage on CT head. Patient not on any anticoagulation use per girlfriend. Of note, patient took Excedrin for headache at home. Patient transferred to [facility] for further management. (06 Apr 2019 00:04)      S/Overnight events:  sedated, intubated, on vent, central line inserted yesterday. versed @ 3 mg /hr this AM, RR 30, agitated, fent 50 mcg given and calmed down    EVD:  5  CSF :  262  ICPs:  15    Vital Signs Last 24 Hrs  T(C): 38.1 (14 Apr 2019 08:00), Max: 38.6 (13 Apr 2019 17:00)  T(F): 100.6 (14 Apr 2019 08:00), Max: 101.4 (13 Apr 2019 17:00)  HR: 72 (14 Apr 2019 08:00) (60 - 76)  BP: 137/83 (14 Apr 2019 08:00) (124/80 - 155/93)  BP(mean): 102 (14 Apr 2019 08:00) (1 - 120)  RR: 18 (14 Apr 2019 08:00) (15 - 30)  SpO2: 97% (14 Apr 2019 08:00) (90% - 100%)    Mode: AC/ CMV (Assist Control/ Continuous Mandatory Ventilation), RR (machine): 12, TV (machine): 400, FiO2: 40, PEEP: 5, ITime: 1, MAP: 7.6, PIP: 20    I&O's Detail    13 Apr 2019 07:01  -  14 Apr 2019 07:00  --------------------------------------------------------  IN:    dexmedetomidine Infusion: 399.2 mL    Enteral Tube Flush: 180 mL    midazolam Infusion: 147.4 mL    niCARdipine Infusion: 16 mL    ns in tub fed  [id number]: 1407 mL  Total IN: 2149.6 mL    OUT:    External Ventricular Device: 262 mL    Intermittent Catheterization - Urethral: 1075 mL    Rectal Tube: 400 mL  Total OUT: 1737 mL    Total NET: 412.6 mL      14 Apr 2019 07:01  -  14 Apr 2019 09:09  --------------------------------------------------------  IN:    dexmedetomidine Infusion: 17 mL    midazolam Infusion: 3 mL    ns in tub fed  [id number]: 67 mL  Total IN: 87 mL    OUT:    External Ventricular Device: 17 mL  Total OUT: 17 mL    Total NET: 70 mL          LABS:                        12.2   12.10 )-----------( 403      ( 14 Apr 2019 05:20 )             36.2     04-14    141  |  106  |  17  ----------------------------<  156<H>  4.4   |  23  |  0.68    Ca    8.9      14 Apr 2019 05:20  Phos  3.1     04-14  Mg     2.2     04-14        Urinalysis Basic - ( 13 Apr 2019 07:03 )    Color: x / Appearance: x / SG: x / pH: x  Gluc: x / Ketone: x  / Bili: x / Urobili: x   Blood: x / Protein: x / Nitrite: x   Leuk Esterase: x / RBC: Many /HPF / WBC < 5 /HPF   Sq Epi: x / Non Sq Epi: 0-5 /HPF / Bacteria: Present /HPF      CAPILLARY BLOOD GLUCOSE      POCT Blood Glucose.: 101 mg/dL (14 Apr 2019 05:37)  POCT Blood Glucose.: 135 mg/dL (13 Apr 2019 21:16)  POCT Blood Glucose.: 134 mg/dL (13 Apr 2019 17:10)  POCT Blood Glucose.: 106 mg/dL (13 Apr 2019 10:37)      Drug Levels: [] N/A  Vancomycin Level, Trough: 6.1 ug/mL (04-11 @ 22:40)    CSF Analysis: [] N/A      Allergies    No Known Allergies    Intolerances      MEDICATIONS:  Antibiotics:  cefTRIAXone   IVPB 1 Gram(s) IV Intermittent every 24 hours    Neuro:  acetaminophen    Suspension .. 650 milliGRAM(s) Oral every 6 hours PRN  dexmedetomidine Infusion 0.1 MICROgram(s)/kG/Hr IV Continuous <Continuous>  fentaNYL    Injectable 50 MICROGram(s) IV Push every 2 hours PRN  fentaNYL    Injectable 25 MICROGram(s) IV Push every 2 hours PRN  fentaNYL    Injectable 12.5 MICROGram(s) IV Push every 2 hours PRN  levETIRAcetam  Solution 1000 milliGRAM(s) Oral two times a day  LORazepam   Injectable 4 milliGRAM(s) IV Push every 4 hours PRN  LORazepam   Injectable 2 milliGRAM(s) IV Push every 4 hours PRN  LORazepam   Injectable 1 milliGRAM(s) IV Push every 4 hours PRN  midazolam Infusion 0.02 mG/kG/Hr IV Continuous <Continuous>  ondansetron Injectable 4 milliGRAM(s) IV Push every 6 hours PRN    Anticoagulation:  enoxaparin Injectable 40 milliGRAM(s) SubCutaneous at bedtime    OTHER:  amLODIPine   Tablet 10 milliGRAM(s) Oral daily  atorvastatin 40 milliGRAM(s) Oral at bedtime  cloNIDine 0.1 milliGRAM(s) Oral daily  dextrose 40% Gel 15 Gram(s) Oral once PRN  dextrose 50% Injectable 12.5 Gram(s) IV Push once  dextrose 50% Injectable 25 Gram(s) IV Push once  dextrose 50% Injectable 25 Gram(s) IV Push once  glucagon  Injectable 1 milliGRAM(s) IntraMuscular once PRN  hydrALAZINE 100 milliGRAM(s) Oral every 8 hours  hydrALAZINE Injectable 10 milliGRAM(s) IV Push every 4 hours PRN  influenza   Vaccine 0.5 milliLiter(s) IntraMuscular once  insulin lispro (HumaLOG) corrective regimen sliding scale   SubCutaneous Before meals and at bedtime  lisinopril 40 milliGRAM(s) Oral daily  metoprolol tartrate 50 milliGRAM(s) Oral every 12 hours  pantoprazole   Suspension 40 milliGRAM(s) Oral daily  senna 2 Tablet(s) Oral at bedtime    IVF:  dextrose 5%. 1000 milliLiter(s) IV Continuous <Continuous>    CULTURES:  Culture Results:   No growth to date. (04-11 @ 08:11)  Culture Results:   Moderate Staphylococcus aureus  Moderate Haemophilus influenzae  Accompanied by normal respiratory derek (04-10 @ 11:27)

## 2019-04-14 NOTE — PROCEDURE NOTE - PROCEDURE DATE TIME, MLM
13-Apr-2019 12:05
14-Apr-2019 22:23
06-Apr-2019 04:58
09-Apr-2019 12:35
06-Apr-2019 04:03
08-Apr-2019 16:37
14-Apr-2019 22:20

## 2019-04-14 NOTE — PROGRESS NOTE ADULT - ASSESSMENT
44y/M with   1.  intracerebral hemorrhage (r basal ganglia / thalamic; small L basal ganglia, R pontine), brain compression, cerebral edema; ICH Score on admission  = GCS Score: 3-4 (2 pts) E1VTM2 (+) IVH = 3  ICH volume 20ml  Estimated 30-day mortality 3 points: 72%  2.  dyslipidemia    PLAN:   NEURO: neurochecks q1h, PRN pain meds with Tylenol / fentanyl PRN; switch sedation from propofol to precedex to RASS of 0 to -1  ICH:  BP control  seizure prophylaxis: as per neurosurgery  EVD: monitor output, keep at 10cm h20  REHAB:  physical therapy evaluation and management    EARLY MOB:   HOB bedrest    PULM:  full vent support; CPAP trial this afternoon if RASS achieved   CARDIO:  SBP goal 100-140mm Hg; cardene drip to SBP goal; lisinopril 40 mg PO daily ; norvasc 10 d, metop 50 bid, increase hydralazine  ENDO:  Blood sugar goals 140-180 mg/dL, continue insulin sliding scale; atorvastatin 40mg PO daily, A1C  4.9  GI:  docusate senna; PPI for GI prophylaxis (intubated)  DIET: continue Jevity at goal  RENAL:  Na goal 140-145  HEM/ONC: Hb stable; given DDAVP and platelet transfusion for aspirin reversal  VTE Prophylaxis: SCDs, SQH tonight if ok with NS  ID: afebrile, no leukocytosis, no ABx, high for aspiration / HAP  Social: will update family; sister freeman, father, common law wife who is pregnant    ATTENDING ATTESTATION:  I was physically present for the key portions of the evaluation and management (E/M) service provided.  I agree with the above history, physical and plan, which I have reviewed and edited where appropriate.    Patient at high risk for neurological deterioration or death due to:  ICU delirium, aspiration PNA, DVT / PE.  Critical care time, excluding procedures: 75 minutes spent on total encounter, more than 50% of the visit was spent counseling and/or coordinating care by the attending physician.     Plan discussed with RN, house staff. 44y/M with   1.  intracerebral hemorrhage (r basal ganglia / thalamic; small L basal ganglia, R pontine), brain compression, cerebral edema; ICH Score on admission  = GCS Score: 3-4 (2 pts) E1VTM2 (+) IVH = 3  ICH volume 20ml  Estimated 30-day mortality 3 points: 72%  2.  dyslipidemia    PLAN:   NEURO: neurochecks q1h, PRN pain meds with Tylenol / fentanyl PRN; switch sedation from propofol to precedex to RASS of 0 to -1  ICH:  BP control  seizure prophylaxis: as per neurosurgery  EVD: monitor output, keep at 5 cm h20  REHAB:  physical therapy evaluation and management    EARLY MOB:   HOB bedrest    PULM:  full vent support; CPAP trial this afternoon if RASS achieved   CARDIO:  SBP goal 100-140mm Hg; cardene drip to SBP goal; lisinopril 40 mg PO daily ; norvasc 10 d, metop 50 bid, hydralazine 100 q8, increase clonidine 0.1 bid  ENDO:  Blood sugar goals 140-180 mg/dL, continue insulin sliding scale; atorvastatin 40mg PO daily, A1C  4.9  GI:  docusate senna; PPI for GI prophylaxis (intubated)  DIET: continue Jevity at goal  RENAL:  Na goal 140-145  HEM/ONC: Hb stable;   VTE Prophylaxis: SCDs, SQH   ID: afebrile, no leukocytosis, no ABx, high for aspiration / HAP  Social: will update family; partner first in line for consent    ATTENDING ATTESTATION:  I was physically present for the key portions of the evaluation and management (E/M) service provided.  I agree with the above history, physical and plan, which I have reviewed and edited where appropriate.    Patient at high risk for neurological deterioration or death due to:  ICU delirium, aspiration PNA, DVT / PE.  Critical care time, excluding procedures: 75 minutes spent on total encounter, more than 50% of the visit was spent counseling and/or coordinating care by the attending physician.     Plan discussed with RN, house staff.

## 2019-04-14 NOTE — PROCEDURE NOTE - GENERAL INDICATIONS
Need for oral meds and feeds
Fever work-up
Fever work-up
ICP monitoring and draining of IVH
fever work-up
IV placement under US guidance

## 2019-04-14 NOTE — PROCEDURE NOTE - GENERAL PROCEDURE NAME
Blood Culture
Blood Culture for fever work-up
NG tube placement
Placement of left frontal EVD
Right frontal EVD placement
IV placement under US guidance
CSF collection for studies

## 2019-04-15 LAB
ANION GAP SERPL CALC-SCNC: 11 MMOL/L — SIGNIFICANT CHANGE UP (ref 5–17)
APPEARANCE CSF: SIGNIFICANT CHANGE UP
APPEARANCE SPUN FLD: ABNORMAL
BACTERIAL AG PNL SER: 0 % — SIGNIFICANT CHANGE UP
BASE EXCESS BLDA CALC-SCNC: 0.8 MMOL/L — SIGNIFICANT CHANGE UP (ref -2–3)
BUN SERPL-MCNC: 21 MG/DL — SIGNIFICANT CHANGE UP (ref 7–23)
CALCIUM SERPL-MCNC: 9 MG/DL — SIGNIFICANT CHANGE UP (ref 8.4–10.5)
CHLORIDE SERPL-SCNC: 103 MMOL/L — SIGNIFICANT CHANGE UP (ref 96–108)
CO2 SERPL-SCNC: 23 MMOL/L — SIGNIFICANT CHANGE UP (ref 22–31)
COLOR CSF: ABNORMAL
CREAT SERPL-MCNC: 0.84 MG/DL — SIGNIFICANT CHANGE UP (ref 0.5–1.3)
CSF COMMENTS: SIGNIFICANT CHANGE UP
EOSINOPHIL # CSF: 0 % — SIGNIFICANT CHANGE UP
GLUCOSE SERPL-MCNC: 134 MG/DL — HIGH (ref 70–99)
HCO3 BLDA-SCNC: 26 MMOL/L — SIGNIFICANT CHANGE UP (ref 21–28)
HCT VFR BLD CALC: 34.8 % — LOW (ref 39–50)
HGB BLD-MCNC: 11.8 G/DL — LOW (ref 13–17)
LYMPHOCYTES # CSF: 0 % — LOW (ref 40–80)
MAGNESIUM SERPL-MCNC: 2.2 MG/DL — SIGNIFICANT CHANGE UP (ref 1.6–2.6)
MCHC RBC-ENTMCNC: 32.5 PG — SIGNIFICANT CHANGE UP (ref 27–34)
MCHC RBC-ENTMCNC: 33.9 GM/DL — SIGNIFICANT CHANGE UP (ref 32–36)
MCV RBC AUTO: 95.9 FL — SIGNIFICANT CHANGE UP (ref 80–100)
MONOS+MACROS NFR CSF: 0 % — LOW (ref 15–45)
NEUTROPHILS # CSF: 0 % — SIGNIFICANT CHANGE UP (ref 0–6)
NRBC # BLD: 0 /100 WBCS — SIGNIFICANT CHANGE UP (ref 0–0)
NRBC NFR CSF: 0 % — SIGNIFICANT CHANGE UP (ref 0–0)
NRBC NFR CSF: 0 /UL — SIGNIFICANT CHANGE UP (ref 0–5)
OTHER CELLS CSF MANUAL: 0 % — SIGNIFICANT CHANGE UP
PCO2 BLDA: 46 MMHG — SIGNIFICANT CHANGE UP (ref 35–48)
PH BLDA: 7.38 — SIGNIFICANT CHANGE UP (ref 7.35–7.45)
PHOSPHATE SERPL-MCNC: 3.9 MG/DL — SIGNIFICANT CHANGE UP (ref 2.5–4.5)
PLATELET # BLD AUTO: 436 K/UL — HIGH (ref 150–400)
PO2 BLDA: 80 MMHG — LOW (ref 83–108)
POTASSIUM SERPL-MCNC: 4.6 MMOL/L — SIGNIFICANT CHANGE UP (ref 3.5–5.3)
POTASSIUM SERPL-SCNC: 4.6 MMOL/L — SIGNIFICANT CHANGE UP (ref 3.5–5.3)
RBC # BLD: 3.63 M/UL — LOW (ref 4.2–5.8)
RBC # CSF: 4833 /UL — HIGH (ref 0–0)
RBC # FLD: 11.9 % — SIGNIFICANT CHANGE UP (ref 10.3–14.5)
SAO2 % BLDA: 96 % — SIGNIFICANT CHANGE UP (ref 95–100)
SODIUM SERPL-SCNC: 137 MMOL/L — SIGNIFICANT CHANGE UP (ref 135–145)
TUBE TYPE: SIGNIFICANT CHANGE UP
WBC # BLD: 12.29 K/UL — HIGH (ref 3.8–10.5)
WBC # FLD AUTO: 12.29 K/UL — HIGH (ref 3.8–10.5)

## 2019-04-15 PROCEDURE — 99232 SBSQ HOSP IP/OBS MODERATE 35: CPT

## 2019-04-15 PROCEDURE — 43246 EGD PLACE GASTROSTOMY TUBE: CPT | Mod: GC

## 2019-04-15 PROCEDURE — 99291 CRITICAL CARE FIRST HOUR: CPT | Mod: 24

## 2019-04-15 PROCEDURE — 31600 PLANNED TRACHEOSTOMY: CPT | Mod: GC

## 2019-04-15 PROCEDURE — 31622 DX BRONCHOSCOPE/WASH: CPT | Mod: GC

## 2019-04-15 PROCEDURE — 71045 X-RAY EXAM CHEST 1 VIEW: CPT | Mod: 26

## 2019-04-15 RX ORDER — FENTANYL CITRATE 50 UG/ML
100 INJECTION INTRAVENOUS ONCE
Qty: 0 | Refills: 0 | Status: DISCONTINUED | OUTPATIENT
Start: 2019-04-15 | End: 2019-04-15

## 2019-04-15 RX ORDER — MIDAZOLAM HYDROCHLORIDE 1 MG/ML
2 INJECTION, SOLUTION INTRAMUSCULAR; INTRAVENOUS ONCE
Qty: 0 | Refills: 0 | Status: DISCONTINUED | OUTPATIENT
Start: 2019-04-15 | End: 2019-04-15

## 2019-04-15 RX ORDER — SODIUM CHLORIDE 9 MG/ML
1000 INJECTION INTRAMUSCULAR; INTRAVENOUS; SUBCUTANEOUS
Qty: 0 | Refills: 0 | Status: DISCONTINUED | OUTPATIENT
Start: 2019-04-15 | End: 2019-04-15

## 2019-04-15 RX ORDER — CHLORHEXIDINE GLUCONATE 213 G/1000ML
1 SOLUTION TOPICAL
Qty: 0 | Refills: 0 | Status: DISCONTINUED | OUTPATIENT
Start: 2019-04-15 | End: 2019-04-21

## 2019-04-15 RX ORDER — SODIUM CHLORIDE 9 MG/ML
1000 INJECTION INTRAMUSCULAR; INTRAVENOUS; SUBCUTANEOUS
Qty: 0 | Refills: 0 | Status: DISCONTINUED | OUTPATIENT
Start: 2019-04-15 | End: 2019-04-16

## 2019-04-15 RX ORDER — MIDAZOLAM HYDROCHLORIDE 1 MG/ML
0.02 INJECTION, SOLUTION INTRAMUSCULAR; INTRAVENOUS
Qty: 100 | Refills: 0 | Status: DISCONTINUED | OUTPATIENT
Start: 2019-04-15 | End: 2019-04-17

## 2019-04-15 RX ORDER — CISATRACURIUM BESYLATE 2 MG/ML
10 INJECTION INTRAVENOUS ONCE
Qty: 0 | Refills: 0 | Status: COMPLETED | OUTPATIENT
Start: 2019-04-15 | End: 2019-04-15

## 2019-04-15 RX ORDER — PROPOFOL 10 MG/ML
10 INJECTION, EMULSION INTRAVENOUS ONCE
Qty: 0 | Refills: 0 | Status: COMPLETED | OUTPATIENT
Start: 2019-04-15 | End: 2019-04-15

## 2019-04-15 RX ADMIN — Medication 100 MILLIGRAM(S): at 14:47

## 2019-04-15 RX ADMIN — Medication 50 MILLIGRAM(S): at 10:04

## 2019-04-15 RX ADMIN — Medication 100 MILLIGRAM(S): at 05:14

## 2019-04-15 RX ADMIN — FENTANYL CITRATE 75 MICROGRAM(S): 50 INJECTION INTRAVENOUS at 07:30

## 2019-04-15 RX ADMIN — MIDAZOLAM HYDROCHLORIDE 2 MILLIGRAM(S): 1 INJECTION, SOLUTION INTRAMUSCULAR; INTRAVENOUS at 18:00

## 2019-04-15 RX ADMIN — AMLODIPINE BESYLATE 10 MILLIGRAM(S): 2.5 TABLET ORAL at 06:56

## 2019-04-15 RX ADMIN — PROPOFOL 10 MILLIGRAM(S): 10 INJECTION, EMULSION INTRAVENOUS at 18:00

## 2019-04-15 RX ADMIN — CEFTRIAXONE 100 GRAM(S): 500 INJECTION, POWDER, FOR SOLUTION INTRAMUSCULAR; INTRAVENOUS at 16:50

## 2019-04-15 RX ADMIN — LISINOPRIL 40 MILLIGRAM(S): 2.5 TABLET ORAL at 06:00

## 2019-04-15 RX ADMIN — LEVETIRACETAM 1000 MILLIGRAM(S): 250 TABLET, FILM COATED ORAL at 05:14

## 2019-04-15 RX ADMIN — FENTANYL CITRATE 100 MICROGRAM(S): 50 INJECTION INTRAVENOUS at 18:50

## 2019-04-15 RX ADMIN — CISATRACURIUM BESYLATE 10 MILLIGRAM(S): 2 INJECTION INTRAVENOUS at 18:49

## 2019-04-15 RX ADMIN — ATORVASTATIN CALCIUM 40 MILLIGRAM(S): 80 TABLET, FILM COATED ORAL at 21:08

## 2019-04-15 RX ADMIN — Medication 2 MILLIGRAM(S): at 21:23

## 2019-04-15 RX ADMIN — SODIUM CHLORIDE 100 MILLILITER(S): 9 INJECTION INTRAMUSCULAR; INTRAVENOUS; SUBCUTANEOUS at 07:51

## 2019-04-15 RX ADMIN — Medication 2 MILLIGRAM(S): at 08:30

## 2019-04-15 RX ADMIN — Medication 0.1 MILLIGRAM(S): at 05:14

## 2019-04-15 RX ADMIN — Medication 100 MILLIGRAM(S): at 22:04

## 2019-04-15 RX ADMIN — Medication 0.1 MILLIGRAM(S): at 21:08

## 2019-04-15 RX ADMIN — DEXMEDETOMIDINE HYDROCHLORIDE IN 0.9% SODIUM CHLORIDE 1.8 MICROGRAM(S)/KG/HR: 4 INJECTION INTRAVENOUS at 00:30

## 2019-04-15 RX ADMIN — FENTANYL CITRATE 50 MICROGRAM(S): 50 INJECTION INTRAVENOUS at 04:00

## 2019-04-15 RX ADMIN — FENTANYL CITRATE 100 MICROGRAM(S): 50 INJECTION INTRAVENOUS at 18:00

## 2019-04-15 RX ADMIN — LEVETIRACETAM 1000 MILLIGRAM(S): 250 TABLET, FILM COATED ORAL at 18:58

## 2019-04-15 RX ADMIN — PANTOPRAZOLE SODIUM 40 MILLIGRAM(S): 20 TABLET, DELAYED RELEASE ORAL at 11:26

## 2019-04-15 RX ADMIN — DEXMEDETOMIDINE HYDROCHLORIDE IN 0.9% SODIUM CHLORIDE 1.8 MICROGRAM(S)/KG/HR: 4 INJECTION INTRAVENOUS at 06:10

## 2019-04-15 RX ADMIN — Medication 10 MILLIGRAM(S): at 12:36

## 2019-04-15 RX ADMIN — Medication 2 MILLIGRAM(S): at 15:54

## 2019-04-15 RX ADMIN — FENTANYL CITRATE 75 MICROGRAM(S): 50 INJECTION INTRAVENOUS at 06:56

## 2019-04-15 RX ADMIN — FENTANYL CITRATE 50 MICROGRAM(S): 50 INJECTION INTRAVENOUS at 23:21

## 2019-04-15 RX ADMIN — Medication 50 MILLIGRAM(S): at 21:08

## 2019-04-15 RX ADMIN — FENTANYL CITRATE 100 MICROGRAM(S): 50 INJECTION INTRAVENOUS at 18:49

## 2019-04-15 RX ADMIN — FENTANYL CITRATE 50 MICROGRAM(S): 50 INJECTION INTRAVENOUS at 03:06

## 2019-04-15 RX ADMIN — ENOXAPARIN SODIUM 40 MILLIGRAM(S): 100 INJECTION SUBCUTANEOUS at 21:24

## 2019-04-15 NOTE — PROGRESS NOTE ADULT - SUBJECTIVE AND OBJECTIVE BOX
Team 4 Surgery Post-Op Note, PCN:     Pre-Op Dx:   Procedure: Open tracheostomy  Bronchoscopy, adult  EGD, with PEG  Craniotomy for intracranial hemorrhage  Evacuation of hematoma of face  Angiogram, carotid and cerebral, bilateral    Surgeon: Monster    Subjective:  Tracheostomy in place and under sedation.    Vital Signs Last 24 Hrs  T(C): 37.8 (15 Apr 2019 17:03), Max: 38.6 (2019 23:00)  T(F): 100.1 (15 Apr 2019 17:03), Max: 101.5 (2019 23:00)  HR: 80 (15 Apr 2019 21:11) (64 - 96)  BP: 125/71 (15 Apr 2019 20:00) (94/49 - 162/80)  BP(mean): 93 (15 Apr 2019 20:00) (68 - 126)  RR: 25 (15 Apr 2019 21:11) (12 - 36)  SpO2: 97% (15 Apr 2019 21:11) (94% - 100%)    Physical Exam:  General: NAD  Pulmonary: Tracheostomy, on ventilator; tracheostomy site clean, soft, no concern for hematoma  Cardiovascular: NSR  Abdominal: soft, non-distended; PEG tube site clean and intact    LABS:                      11.8   12.29 )-----------( 436      ( 15 Apr 2019 05:30 )             34.8     04-15  137  |  103  |  21  ----------------------------<  134<H>  4.6   |  23  |  0.84    Ca    9.0      15 Apr 2019 05:30  Phos  3.9     04-15  Mg     2.2     04-15    TPro  7.3  /  Alb  2.9<L>  /  TBili  0.2  /  DBili  <0.2  /  AST  36  /  ALT  38  /  AlkPhos  81  04-15    CAPILLARY BLOOD GLUCOSE  POCT Blood Glucose.: 103 mg/dL (15 Apr 2019 20:55)  POCT Blood Glucose.: 89 mg/dL (15 Apr 2019 16:32)  POCT Blood Glucose.: 104 mg/dL (15 Apr 2019 12:06)  POCT Blood Glucose.: 106 mg/dL (15 Apr 2019 05:09)    Urinalysis Basic - ( 2019 23:11 )  Color: Yellow / Appearance: Clear / S.015 / pH: x  Gluc: x / Ketone: NEGATIVE  / Bili: Negative / Urobili: 0.2 E.U./dL   Blood: x / Protein: NEGATIVE mg/dL / Nitrite: NEGATIVE   Leuk Esterase: NEGATIVE / RBC: x / WBC x   Sq Epi: x / Non Sq Epi: x / Bacteria: x      LIVER FUNCTIONS - ( 15 Apr 2019 05:30 )  Alb: 2.9 g/dL / Pro: 7.3 g/dL / ALK PHOS: 81 U/L / ALT: 38 U/L / AST: 36 U/L / GGT: x           Assessment:44y Male s/p above procedure    Plan:  OK to use PEG for medications tonight  May start feeds through PEG tomorrow  Remaining care per primary team

## 2019-04-15 NOTE — PROGRESS NOTE ADULT - SUBJECTIVE AND OBJECTIVE BOX
=================================  NEUROCRITICAL CARE ATTENDING NOTE  =================================    ARPAN RUANO   MRN-[id number]  Summary:  44y/M with no PMH presented with L UE weakness / numbness, onset at 630 p.m.; then developed dysarthria, L UE/LE weakness.  He was brought to [place] ED, n/v, SBP 200s, more altered, intubated.  Imaging showed R thalamic hemorrhage.  Transferred to [facility].    COURSE IN THE HOSPITAL:   Admitted, given DDAVP / platelets; EVD inserted, improved   04/15 fever overnight    Past Medical History:   Allergies:  No Known Allergies  Home meds:     PHYSICAL EXAMINATION  T(C): 37.7 (04-15 @ 07:00), Max: 38.8 ( @ 21:10) HR: 68 (04-15 @ 07:00) (61 - 78) BP: 123/76 (04-15 @ 07:00) (94/49 - 184/99) RR: 12 (04-15 @ 07:00) (12 - 36) SpO2: 99% (04-15 @ 07:00) (90s% - 100%)    NEUROLOGIC EXAMINATION:  Patient is rousable, does not open eyes, follows commands, pupils 3mm reactive, moves R UE/LE spontaneously, L UE/LE withdraws  GENERAL: intubated AC 40 400 12 +5  EENT:  anicteric  CARDIOVASCULAR: (+) S1 S2, normal rate and regular rhythm  PULMONARY: clear to auscultation bilaterally; min secretions  ABDOMEN: soft, nontender with normoactive bowel sounds  EXTREMITIES: no edema  SKIN: no rash    LABS:  CAPILLARY BLOOD GLUCOSE 106 128 112 125             (12.1)   11.8 (12.2)  12.29 )-----------( 436      ( 15 Apr 2019 05:30 )             34.8     137  |  103  |  21  ----------------------------<  134<H>  4.6   |  23  |  0.84    Ca    9.0      15 Apr 2019 05:30  Phos  3.9     04-15  Mg     2.2     04-15    04-14 @ 07:01  -  04-15 @ 07:00  IN: 2150.9 mL / OUT: 1546 mL / NET: 604.9 mL    LDL = 113 ()   HDL = 70 ()  TG = 85 ()  TSH = 0.733 ()    Bacteriology:   UA NEG   CSF NGTD  04/10 Sputum MSSA H influenza       Culture - Sputum (collected )  Gram Stain ():    No epithelial cells    Numerous White blood cells    Numerous Gram positive cocci in pairs  Final Report (04-10):    Numerous Staphylococcus aureus    Rare Routine respiratory derek present  Organism: Staphylococcus aureus (04-10)  Organism: Staphylococcus aureus (04-10)    Sensitivities:      -  Cefazolin: S <=4      -  Clindamycin: S <=0.25      -  Erythromycin: S <=0.25      -  Linezolid: S 2      -  Oxacillin: S <=0.25      -  Penicillin: R >8      -  RIF- Rifampin: S <=1 Should not be used as monotherapy      -  Trimethoprim/Sulfamethoxazole: S <=0.5/9.5      -  Vancomycin: S 2      Method Type: BRANDON    Culture - Blood (collected )  Final Report ():    No growth at 5 days.    Culture - Blood (collected )  Final Report ():    No growth at 5 days.      CSF studies:  EEG:  Neuroimagin/06 12:48 p.m. CT head:  s/p EVD, decrease in size of ventricles, R ICH unchanged with IV extension, punctate hyperdensity R BG R conner R thalamus, unchanged,    2:11 a.m. CT head:  large R basal ganglia / thalamic ICH, small L basal ganglia / R pontine acute hemorrhage; c/w hypertensive ICH; extension into R lateral ventricle; MLS, mass effect, no HCP  Other imaging:    MEDICATIONS: levetiracetam 1G IV q12h docusate 100 PO BID pantoprazole 40 daily senna 2mg PO HS atorvastatin 40mg PO HS mod ISS bisacodyl PRN supp PRN fentanyl PRN   MEDICATIONS: SQL 40 HS ceftriaxone 1g IV q24h levetiracetam 1G PO BID amlodipine 10mg PO daily clonidine 0.1mg pO q12h lisinopril 40mg PO daily metoprolol 50mg PO q12h pantporazole 40 daily atorvastatin 40mg PO HS mod ISS peridex fentanyl 50/75 q2h PRN ativan PRN   dexmed midazolam     IV FLUIDS: NS@100  DRIPS: dexmedetomidine (1.2) midazolam (0.07 mg/kg/hr)  DIET: NPO, Jevity   Lines: [place] rectal (300)  Drains:   EVD @ 5cm H20, 271 cc/24h ICP 1-7  Wounds:    CODE STATUS:  Full Code                       GOALS OF CARE:  aggressive                      DISPOSITION:  ICU  ICH Score on admission  = GCS Score: 3-4 (2 pts) E1VTM2 (+) IVH = 3  ICH volume 20ml  Estimated 30-day mortality 3 points: 72% =================================  NEUROCRITICAL CARE ATTENDING NOTE  =================================    ARPAN RUANO   MRN-[id number]  Summary:  44y/M with no PMH presented with L UE weakness / numbness, onset at 630 p.m.; then developed dysarthria, L UE/LE weakness.  He was brought to [place] ED, n/v, SBP 200s, more altered, intubated.  Imaging showed R thalamic hemorrhage.  Transferred to [facility].    COURSE IN THE HOSPITAL:   Admitted, given DDAVP / platelets; EVD inserted, improved   04/15 fever overnight    Past Medical History:   Allergies:  No Known Allergies  Home meds:     PHYSICAL EXAMINATION  T(C): 37.7 (04-15 @ 07:00), Max: 38.8 ( @ 21:10) HR: 68 (04-15 @ 07:00) (61 - 78) BP: 123/76 (04-15 @ 07:00) (94/49 - 184/99) RR: 12 (04-15 @ 07:00) (12 - 36) SpO2: 99% (04-15 @ 07:00) (90s% - 100%)    NEUROLOGIC EXAMINATION:  Patient is rousable, does not open eyes, follows commands, pupils 3mm reactive, moves R UE/LE spontaneously, L UE/LE withdraws  GENERAL: intubated AC 40 400 12 +5  EENT:  anicteric  CARDIOVASCULAR: (+) S1 S2, normal rate and regular rhythm  PULMONARY: clear to auscultation bilaterally; min secretions  ABDOMEN: soft, nontender with normoactive bowel sounds  EXTREMITIES: no edema  SKIN: no rash    LABS:  CAPILLARY BLOOD GLUCOSE 106 128 112 125             (12.1)   11.8 (12.2)  12.29 )-----------( 436      ( 15 Apr 2019 05:30 )             34.8     137  |  103  |  21  ----------------------------<  134<H>  4.6   |  23  |  0.84    Ca    9.0      15 Apr 2019 05:30  Phos  3.9     -15  Mg     2.2     04-15    04-14 @ 07:01  -  04-15 @ 07:00  IN: 2150.9 mL / OUT: 1546 mL / NET: 604.9 mL    LDL = 113 ()   HDL = 70 ()  TG = 85 ()  TSH = 0.733 ()    Bacteriology:   UA NEG   CSF NGTD  04/10 Sputum MSSA H influenza   sputum culture numerous MSSA   Blood CS NG5D x2 (final)    CSF studies:    L   *** [id number] WBC0 *** %N0 %L0     L3.4   *** [id number] WBC37 *** %N35 %L1     EEG:  Neuroimagin/06 12:48 p.m. CT head:  s/p EVD, decrease in size of ventricles, R ICH unchanged with IV extension, punctate hyperdensity R BG R conner R thalamus, unchanged,    2:11 a.m. CT head:  large R basal ganglia / thalamic ICH, small L basal ganglia / R pontine acute hemorrhage; c/w hypertensive ICH; extension into R lateral ventricle; MLS, mass effect, no HCP  Other imaging:    MEDICATIONS: SQL 40 HS ceftriaxone 1g IV q24h levetiracetam 1G PO BID amlodipine 10mg PO daily clonidine 0.1mg pO q12h lisinopril 40mg PO daily metoprolol 50mg PO q12h pantoprazole 40 daily atorvastatin 40mg PO HS mod ISS peridex fentanyl 50/75 q2h PRN ativan PRN     IV FLUIDS: NS@100  DRIPS: dexmedetomidine (1.2) midazolam (0.07 mg/kg/hr)  DIET: NPO, Jevity   Lines: [place] rectal (300)  Drains:   EVD @ 5cm H20, 271 cc/24h ICP 1-7  Wounds:    CODE STATUS:  Full Code                       GOALS OF CARE:  aggressive                      DISPOSITION:  ICU  ICH Score on admission  = GCS Score: 3-4 (2 pts) E1VTM2 (+) IVH = 3  ICH volume 20ml  Estimated 30-day mortality 3 points: 72%

## 2019-04-15 NOTE — PROGRESS NOTE ADULT - SUBJECTIVE AND OBJECTIVE BOX
Neurology Stroke Progress Note    INTERVAL HPI/OVERNIGHT EVENTS:  Febrile  Patient seen and examined.   Right gaze deviation. Some spontaneous movement of right leg    MEDICATIONS  (STANDING):  amLODIPine   Tablet 10 milliGRAM(s) Oral daily  atorvastatin 40 milliGRAM(s) Oral at bedtime  cefTRIAXone   IVPB 1 Gram(s) IV Intermittent every 24 hours  chlorhexidine 2% Cloths 1 Application(s) Topical <User Schedule>  cloNIDine 0.1 milliGRAM(s) Oral every 12 hours  dexmedetomidine Infusion 0.1 MICROgram(s)/kG/Hr (1.8 mL/Hr) IV Continuous <Continuous>  dextrose 5%. 1000 milliLiter(s) (50 mL/Hr) IV Continuous <Continuous>  dextrose 50% Injectable 12.5 Gram(s) IV Push once  dextrose 50% Injectable 25 Gram(s) IV Push once  dextrose 50% Injectable 25 Gram(s) IV Push once  enoxaparin Injectable 40 milliGRAM(s) SubCutaneous at bedtime  fentaNYL    Injectable 100 MICROGram(s) IV Push once  hydrALAZINE 100 milliGRAM(s) Oral every 8 hours  influenza   Vaccine 0.5 milliLiter(s) IntraMuscular once  insulin lispro (HumaLOG) corrective regimen sliding scale   SubCutaneous Before meals and at bedtime  levETIRAcetam  Solution 1000 milliGRAM(s) Oral two times a day  lisinopril 40 milliGRAM(s) Oral daily  metoprolol tartrate 50 milliGRAM(s) Oral every 12 hours  midazolam Infusion 0.02 mG/kG/Hr (1.44 mL/Hr) IV Continuous <Continuous>  pantoprazole   Suspension 40 milliGRAM(s) Oral daily  sodium chloride 0.9%. 1000 milliLiter(s) (75 mL/Hr) IV Continuous <Continuous>    MEDICATIONS  (PRN):  acetaminophen    Suspension .. 650 milliGRAM(s) Oral every 6 hours PRN Temp greater or equal to 38.5C (101.3F)  dextrose 40% Gel 15 Gram(s) Oral once PRN Blood Glucose LESS THAN 70 milliGRAM(s)/deciliter  fentaNYL    Injectable 100 MICROGram(s) IV Push every 2 hours PRN Severe Pain (7 - 10)  fentaNYL    Injectable 75 MICROGram(s) IV Push every 2 hours PRN Moderate Pain (4 - 6)  fentaNYL    Injectable 50 MICROGram(s) IV Push every 2 hours PRN Mild Pain (1 - 3)  glucagon  Injectable 1 milliGRAM(s) IntraMuscular once PRN Glucose LESS THAN 70 milligrams/deciliter  hydrALAZINE Injectable 10 milliGRAM(s) IV Push every 4 hours PRN BP>140  LORazepam   Injectable 4 milliGRAM(s) IV Push every 4 hours PRN RASS 4  LORazepam   Injectable 2 milliGRAM(s) IV Push every 4 hours PRN RASS 3  LORazepam   Injectable 1 milliGRAM(s) IV Push every 4 hours PRN RASS 2  ondansetron Injectable 4 milliGRAM(s) IV Push every 6 hours PRN Nausea and/or Vomiting      Allergies    No Known Allergies    Intolerances        ROS: As per HPI, otherwise negative    Vital Signs Last 24 Hrs  T(C): 37.6 (15 Apr 2019 09:26), Max: 38.8 (2019 21:10)  T(F): 99.6 (15 Apr 2019 09:26), Max: 101.8 (2019 21:10)  HR: 80 (15 Apr 2019 13:00) (64 - 80)  BP: 162/80 (15 Apr 2019 13:00) (94/49 - 162/80)  BP(mean): 106 (15 Apr 2019 13:00) (68 - 126)  RR: 23 (15 Apr 2019 13:00) (12 - 36)  SpO2: 100% (15 Apr 2019 13:00) (94% - 100%)    Physical exam:    Neurologic:  Mental status: stuporous, intubated, not following commands  Cranial nerves:   II: visual fields are full to confrontation. pupils equal but minimally reactive  III, IV, VI: right gaze deviation  Motor: Normal bulk and tone, spontaneous movement of right leg, minimal movement of right arm, no movement of left side.  Sensation: decreased sensation of left side  Coordination: did not cooperate  Reflexes: downgoing toes bilaterally  Gait: deferred        LABS:                        11.8   12.29 )-----------( 436      ( 15 Apr 2019 05:30 )             34.8     04-15    137  |  103  |  21  ----------------------------<  134<H>  4.6   |  23  |  0.84    Ca    9.0      15 Apr 2019 05:30  Phos  3.9     04-15  Mg     2.2     04-15    TPro  7.3  /  Alb  2.9<L>  /  TBili  0.2  /  DBili  <0.2  /  AST  36  /  ALT  38  /  AlkPhos  81  04-15      Urinalysis Basic - ( 2019 23:11 )    Color: Yellow / Appearance: Clear / S.015 / pH: x  Gluc: x / Ketone: NEGATIVE  / Bili: Negative / Urobili: 0.2 E.U./dL   Blood: x / Protein: NEGATIVE mg/dL / Nitrite: NEGATIVE   Leuk Esterase: NEGATIVE / RBC: x / WBC x   Sq Epi: x / Non Sq Epi: x / Bacteria: x        RADIOLOGY & ADDITIONAL TESTS:  < from: CT Head No Cont (04.10.19 @ 18:27) >  IMPRESSION: Patient status post interval right parietal craniotomy for   evacuation of right thalamic hematoma with postsurgical changes.    < end of copied text >        Assessment and Plan  44 year-old male presents with ICH with IVH extension probably secondary to hypertension, though previously undiagnosed. S/p EVD placement , then s/p right craniotomy for evacuation of right thalamic hematoma on 4/10. Under neurosurgery.       1)Secondary stroke prevention  -No antiplatelet as patient has ICH  -Atorvastatin 40    2) Stroke risk factors  -likely HTN    3) Further workup   -Care per neurosurgery    DVT prophylaxis   -SCDs Neurology Stroke Progress Note    INTERVAL HPI/OVERNIGHT EVENTS:  Febrile  Patient seen and examined.   Right gaze deviation. Some spontaneous movement of right fingers and leg    MEDICATIONS  (STANDING):  amLODIPine   Tablet 10 milliGRAM(s) Oral daily  atorvastatin 40 milliGRAM(s) Oral at bedtime  cefTRIAXone   IVPB 1 Gram(s) IV Intermittent every 24 hours  chlorhexidine 2% Cloths 1 Application(s) Topical <User Schedule>  cloNIDine 0.1 milliGRAM(s) Oral every 12 hours  dexmedetomidine Infusion 0.1 MICROgram(s)/kG/Hr (1.8 mL/Hr) IV Continuous <Continuous>  dextrose 5%. 1000 milliLiter(s) (50 mL/Hr) IV Continuous <Continuous>  dextrose 50% Injectable 12.5 Gram(s) IV Push once  dextrose 50% Injectable 25 Gram(s) IV Push once  dextrose 50% Injectable 25 Gram(s) IV Push once  enoxaparin Injectable 40 milliGRAM(s) SubCutaneous at bedtime  fentaNYL    Injectable 100 MICROGram(s) IV Push once  hydrALAZINE 100 milliGRAM(s) Oral every 8 hours  influenza   Vaccine 0.5 milliLiter(s) IntraMuscular once  insulin lispro (HumaLOG) corrective regimen sliding scale   SubCutaneous Before meals and at bedtime  levETIRAcetam  Solution 1000 milliGRAM(s) Oral two times a day  lisinopril 40 milliGRAM(s) Oral daily  metoprolol tartrate 50 milliGRAM(s) Oral every 12 hours  midazolam Infusion 0.02 mG/kG/Hr (1.44 mL/Hr) IV Continuous <Continuous>  pantoprazole   Suspension 40 milliGRAM(s) Oral daily  sodium chloride 0.9%. 1000 milliLiter(s) (75 mL/Hr) IV Continuous <Continuous>    MEDICATIONS  (PRN):  acetaminophen    Suspension .. 650 milliGRAM(s) Oral every 6 hours PRN Temp greater or equal to 38.5C (101.3F)  dextrose 40% Gel 15 Gram(s) Oral once PRN Blood Glucose LESS THAN 70 milliGRAM(s)/deciliter  fentaNYL    Injectable 100 MICROGram(s) IV Push every 2 hours PRN Severe Pain (7 - 10)  fentaNYL    Injectable 75 MICROGram(s) IV Push every 2 hours PRN Moderate Pain (4 - 6)  fentaNYL    Injectable 50 MICROGram(s) IV Push every 2 hours PRN Mild Pain (1 - 3)  glucagon  Injectable 1 milliGRAM(s) IntraMuscular once PRN Glucose LESS THAN 70 milligrams/deciliter  hydrALAZINE Injectable 10 milliGRAM(s) IV Push every 4 hours PRN BP>140  LORazepam   Injectable 4 milliGRAM(s) IV Push every 4 hours PRN RASS 4  LORazepam   Injectable 2 milliGRAM(s) IV Push every 4 hours PRN RASS 3  LORazepam   Injectable 1 milliGRAM(s) IV Push every 4 hours PRN RASS 2  ondansetron Injectable 4 milliGRAM(s) IV Push every 6 hours PRN Nausea and/or Vomiting      Allergies  No Known Allergies    ROS: As per HPI, otherwise negative    Vital Signs Last 24 Hrs  T(C): 37.6 (15 Apr 2019 09:26), Max: 38.8 (2019 21:10)  T(F): 99.6 (15 Apr 2019 09:26), Max: 101.8 (2019 21:10)  HR: 80 (15 Apr 2019 13:00) (64 - 80)  BP: 162/80 (15 Apr 2019 13:00) (94/49 - 162/80)  BP(mean): 106 (15 Apr 2019 13:00) (68 - 126)  RR: 23 (15 Apr 2019 13:00) (12 - 36)  SpO2: 100% (15 Apr 2019 13:00) (94% - 100%)    Physical exam:    Neurologic:  Mental status: stuporous, intubated, not following commands  Cranial nerves:   II: visual fields are full to confrontation. pupils equal but minimally reactive  III, IV, VI: right gaze deviation  Motor: Normal bulk and tone, spontaneous movement of right leg, minimal movement of right fingers and arm, no movement of left side.  Sensation: decreased sensation of left side  Coordination: did not cooperate  Reflexes: downgoing toes bilaterally  Gait: deferred        LABS:                        11.8   12.29 )-----------( 436      ( 15 Apr 2019 05:30 )             34.8     04-15    137  |  103  |  21  ----------------------------<  134<H>  4.6   |  23  |  0.84    Ca    9.0      15 Apr 2019 05:30  Phos  3.9     04-15  Mg     2.2     04-15    TPro  7.3  /  Alb  2.9<L>  /  TBili  0.2  /  DBili  <0.2  /  AST  36  /  ALT  38  /  AlkPhos  81  04-15      Urinalysis Basic - ( 2019 23:11 )    Color: Yellow / Appearance: Clear / S.015 / pH: x  Gluc: x / Ketone: NEGATIVE  / Bili: Negative / Urobili: 0.2 E.U./dL   Blood: x / Protein: NEGATIVE mg/dL / Nitrite: NEGATIVE   Leuk Esterase: NEGATIVE / RBC: x / WBC x   Sq Epi: x / Non Sq Epi: x / Bacteria: x        RADIOLOGY & ADDITIONAL TESTS:  CT Head No Cont (04.10.19 @ 18:27) >  IMPRESSION: Patient status post interval right parietal craniotomy for   evacuation of right thalamic hematoma with postsurgical changes.      Assessment and Plan  44 year-old male presents with ICH with IVH extension probably secondary to hypertension, though previously undiagnosed. S/p EVD placement , then s/p right craniotomy for evacuation of right thalamic hematoma on 4/10. Under neurosurgery.       1)Secondary stroke prevention  -No antiplatelet as patient has ICH  -Atorvastatin 40mg    2) Stroke risk factors  -likely HTN - on multiple agents, as per neurosurgery    3) Further workup   -Care per neurosurgery    DVT prophylaxis   -SCDs and Lovenox

## 2019-04-15 NOTE — BRIEF OPERATIVE NOTE - OPERATION/FINDINGS
Indication: Percutaneous Tracheostomy placement        Preop medication: Nimbex, Propofol, Versed, Fentanyl    Findings:  Patient was difficult to sedate preop. After adequate anesthesia, the Bronchoscope inserted through ETT. Airway evaluation revealed Sharp Breanna. MING and LLL evaluated minimal secretions. RUL, RML, RLL evaluated also with minimal to no secretions. Mucosa throughout bilateral airways was erythematous.     Under bronchoscopic visualization, percutaneous tracheostomy performed. Finder needle, followed by dilator seen entering trachea without puncturing posterior wall. Wire found to be bent and had to be replaced during procedure. ET tube was re-advanced and patient briefly ventilated and procedure resumed. Once tracheostomy placed, bronchoscope passed through tracheostomy and confirmed proper placement, minimal bleeding seen.     Specimens: None    EBL: minimal

## 2019-04-15 NOTE — CHART NOTE - NSCHARTNOTEFT_GEN_A_CORE
Pre-Bronchoscopy Tuberculosis Risk Screening Tool  To reduce the risk for airborne transmission of Mycobacterium tuberculosis, this assessment form must be completed prior to bronchoscopy procedures being performed outside of a negative pressure environment.    Procedure Date: __04/15/2019_____  Health Care Provider Name: ___Dolores You_______  Form Completed By: ____Osorio Jacobsen________  Reason for the Bronchoscopy: _____Trach_______  Planned Location for the Procedure:  [ ] Emergency Department     [X] Intensive Care Unit     [ ] Operating Room     Other: ___________________    Risk Assessment  I. I have personally evaluated this patient for Mycobacterium tuberculosis and I determined the following risk:  [X] No Risk for TB     [ ] Low risk or TB     [ ] Significant risk for TB    II. Additional Findings: ____None__________    III. Based on the Determined Risk for TB the following Action(s) are Suggested:  1. If there are no risk factors for TB infection proceed with the procedure.  2. If there is low risk or significant risk for TB infection the following recommendations should be followed:            a. Perform the procedure in a negative pressure room, with N95 respirator.            b. If not feasible to move the patient or defer the procedure:                    i. Use a single-bedded room low traffic area to perform the bronchoscopy procedure.                   ii. Place a portable high-efficiency particulate air (HEPA) filter in the space prior to starting the procedure and keep closed.                       Refer to the INF.1132 titled “Tuberculosis Control Strategy Plan” for additional information.                  iii. All Health Care Providers within the procedure room shall wear an N95 respirator.            c. Documentation of the tuberculosis risk assessment should be included within the patient’s medical record.

## 2019-04-15 NOTE — BRIEF OPERATIVE NOTE - NSICDXBRIEFPROCEDURE_GEN_ALL_CORE_FT
PROCEDURES:  Craniotomy for intracranial hemorrhage 10-Apr-2019 18:07:39  Dat Luciano
PROCEDURES:  Open tracheostomy 15-Apr-2019 18:28:42  Darling Mathew  Bronchoscopy, adult 15-Apr-2019 18:28:31  Darling Mathew
PROCEDURES:  Open tracheostomy 15-Apr-2019 18:28:42  Darling Mathew  Bronchoscopy, adult 15-Apr-2019 18:28:31  Darling Mathew  EGD, with PEG 15-Apr-2019 18:28:13  Darling Mathew
PROCEDURES:  Angiogram, carotid and cerebral, bilateral 08-Apr-2019 11:22:49  Asaf Kulkarni

## 2019-04-15 NOTE — PROGRESS NOTE ADULT - ASSESSMENT
44y/M with   1.  intracerebral hemorrhage (r basal ganglia / thalamic; small L basal ganglia, R pontine), brain compression, cerebral edema; s/p penumbra   2.  dyslipidemia    PLAN:   NEURO: neurochecks q1h, PRN pain meds with Tylenol / fentanyl PRN; continue midazolam / precedex   ICH:  BP control  seizure prophylaxis: as per neurosurgery; decrease levetiracetam to 500 BID if ok with NS  EVD: monitor output, keep at 5 cm H20 - will start challenging drain mid week if ok with NS  REHAB:  physical therapy evaluation and management    EARLY MOB:   HOB     PULM:  full vent support; trach today  CARDIO:  SBP goal 100-140mm Hg; cardene drip to SBP goal; start lisinopril 40 mg PO daily, amlodipine, lopressor 50 BID, clonidine 0.1 BID  ENDO:  Blood sugar goals 140-180 mg/dL, continue insulin sliding scale; atorvastatin 40mg PO daily, A1C  4.9; checK CMP  GI:  docusate senna; PPI for GI prophylaxis (intubated)  DIET: NPO  RENAL:  Na goal 135-145, decrease NS to 75cc/hr  HEM/ONC: Hb stable; given DDAVP and platelet transfusion for aspirin reversal  VTE Prophylaxis: SCDs, SQL  ID: afebrile, no leukocytosis, ceftriaxone (last day 04/19)  Social: will update family; sister freeman (surrogate decision maker), father, common law wife who is pregnant    ATTENDING ATTESTATION:  I was physically present for the key portions of the evaluation and management (E/M) service provided.  I agree with the above history, physical and plan, which I have reviewed and edited where appropriate.    Patient at high risk for neurological deterioration or death due to:  ICU delirium, aspiration PNA, DVT / PE.  Critical care time, excluding procedures: 60 minutes spent on total encounter, more than 50% of the visit was spent counseling and/or coordinating care by the attending physician.     Plan discussed with RN, house staff. 44y/M with   1.  intracerebral hemorrhage (R basal ganglia / thalamic; small L basal ganglia, R pontine), brain compression, cerebral edema; s/p R crani, endoscopic evacuation of ICH (04/10/2019, Dr. So)  2.  dyslipidemia    PLAN:   NEURO: neurochecks q1h, PRN pain meds with Tylenol / fentanyl PRN; continue midazolam / precedex   ICH:  BP control; no aneurysm on formal angio (04/08)  seizure prophylaxis: as per neurosurgery; decrease levetiracetam to 500 BID if ok with NS  EVD: monitor output, keep at 5 cm H20 - will start challenging drain mid week if ok with NS  REHAB:  physical therapy evaluation and management    EARLY MOB:   HOB     PULM:  full vent support; trach today  CARDIO:  SBP goal 100-140mm Hg; Cardene drip to SBP goal; lisinopril 40 mg PO daily, amlodipine, lopressor 50 BID, clonidine 0.1 BID  ENDO:  Blood sugar goals 140-180 mg/dL, continue insulin sliding scale; atorvastatin 40mg PO daily, A1C  4.9; check CMP  GI:  docusate senna; PPI for GI prophylaxis (intubated)  DIET: NPO  RENAL:  Na goal 135-145, decrease NS to 75cc/hr  HEM/ONC: Hb stable; given DDAVP and platelet transfusion for aspirin reversal  VTE Prophylaxis: SCDs, SQL  ID: afebrile, no leukocytosis, ceftriaxone (last day 04/19)  Social: will update family; sister freeman (surrogate decision maker), father, common law wife who is pregnant    ATTENDING ATTESTATION:  I was physically present for the key portions of the evaluation and management (E/M) service provided.  I agree with the above history, physical and plan, which I have reviewed and edited where appropriate.    Patient at high risk for neurological deterioration or death due to:  ICU delirium, aspiration PNA, DVT / PE.  Critical care time, excluding procedures: 60 minutes spent on total encounter, more than 50% of the visit was spent counseling and/or coordinating care by the attending physician.     Plan discussed with RN, house staff.

## 2019-04-15 NOTE — BRIEF OPERATIVE NOTE - NSICDXBRIEFOPLAUNCH_GEN_ALL_CORE
<--- Click to Launch ICDx for PreOp, PostOp and Procedure

## 2019-04-15 NOTE — PROGRESS NOTE ADULT - SUBJECTIVE AND OBJECTIVE BOX
Hospital Course:   : overnight, patient requiring large amount of cardene and propofol.  Plan today is to transition to precedex, start PO lisinopril and wean of cardene.  Becomes extremely agitated when off sedation.  : Cerebral angio without findings of aneurysm. EVD stopped working, pulled back without improvement. CT Head performed.  : Received ativan 6mg for agitation. EVD stopped draining at 6pm yesterday taken for stat CTH. Dr. So notified and EVD lowered to 5cmH2O without output.   4/10: s/p penumbra evacuation of ICH. Pt seen and examined at bedside postop. Remains intubated, on versed. ICPs stable.  (OR EBL 30cc, received LR 200cc)  : JUSTIN overnight. Remains on versed. Remains intubated. Neuro stable.  : Given ativan overnight, bucking the vent. Remains on versed drip. Neuro stable. ICPs stable. Increase hydralazine to 75 Q8.    JUSTIN overnight, febrile, urinary retention Straight Cath x 1 = 1L, neuro exam unchanged since yesterday, started Cardene gtt as per Dr. So goal -140   POD#4 febrile overnight, o/w JUSTIN overnight, EVD@ 5cm H2O, Clonidine started, Ceftriax until  Staph sputum, Versed/Precedex, straight cathed overnight  4/15: ICP stable. neuro stable. Remains on versed/precedex    DEVICE/DRAIN DRESSING:  L EVD @ 5cmH2O, draining    TUBES/LINES:  [x] CVC  [x] A-line  [] Lumbar Drain  [x] Ventriculostomy  [] Other    DIET:  [] NPO  [] Mechanical  [x] Tube feeds        Vital Signs Last 24 Hrs  T(C): 37.8 (15 Apr 2019 04:00), Max: 38.8 (2019 21:10)  T(F): 100 (15 Apr 2019 04:00), Max: 101.8 (2019 21:10)  HR: 70 (15 Apr 2019 06:00) (61 - 78)  BP: 119/68 (15 Apr 2019 06:00) (94/49 - 184/99)  BP(mean): 89 (15 Apr 2019 06:00) (68 - 128)  RR: 17 (15 Apr 2019 06:00) (12 - 36)  SpO2: 100% (15 Apr 2019 06:00) (86% - 100%)    I&O's Detail    2019 07:  -  2019 07:00  --------------------------------------------------------  IN:    dexmedetomidine Infusion: 399.2 mL    Enteral Tube Flush: 180 mL    midazolam Infusion: 147.4 mL    niCARdipine Infusion: 16 mL    ns in tub fed  [id number]: 1407 mL  Total IN: 2149.6 mL    OUT:    External Ventricular Device: 262 mL    Intermittent Catheterization - Urethral: 1075 mL    Rectal Tube: 400 mL  Total OUT: 1737 mL    Total NET: 412.6 mL      2019 07:  -  15 Apr 2019 06:22  --------------------------------------------------------  IN:    dexmedetomidine Infusion: 422.8 mL    Enteral Tube Flush: 60 mL    midazolam Infusion: 99.1 mL    ns in tub fed  [id number]: 1548 mL  Total IN: 2129.9 mL    OUT:    External Ventricular Device: 262 mL    Incontinent per Condom Catheter: 875 mL    Rectal Tube: 300 mL  Total OUT: 1437 mL    Total NET: 692.9 mL        I&O's Summary    2019 07:  -  2019 07:00  --------------------------------------------------------  IN: 2149.6 mL / OUT: 1737 mL / NET: 412.6 mL    2019 07:  -  15 Apr 2019 06:22  --------------------------------------------------------  IN: 2129.9 mL / OUT: 1437 mL / NET: 692.9 mL      PHYSICAL EXAM:  Gen: NAD, Intubated on ventilator, sedated on versed  HEENT: PERRL  Lungs: Clear b/l  Heart: S1, S2. NSR  Abd: Soft, NT/ND. +BS  Exts: Pulses 2+ throughout  Neuro: Mild decorticate posturing to LUE to noxious stimulus, localizing on RUE. Withdrawal to noxious stimuli b/l LE. No eye opening. Not following commands. PERRL, +corneals, +gag    DEVICE/DRAIN DRESSING:  L EVD @ 5cmH2O, draining    TUBES/LINES:  [x] CVC  [x] A-line  [] Lumbar Drain  [x] Ventriculostomy  [] Other    DIET:  [] NPO  [] Mechanical  [x] Tube feeds    LABS:                        11.8   12.29 )-----------( 436      ( 15 Apr 2019 05:30 )             34.8     04-15    137  |  103  |  21  ----------------------------<  134<H>  4.6   |  23  |  0.84    Ca    9.0      15 Apr 2019 05:30  Phos  3.9     04-15  Mg     2.2     04-15        Urinalysis Basic - ( 2019 23:11 )    Color: Yellow / Appearance: Clear / S.015 / pH: x  Gluc: x / Ketone: NEGATIVE  / Bili: Negative / Urobili: 0.2 E.U./dL   Blood: x / Protein: NEGATIVE mg/dL / Nitrite: NEGATIVE   Leuk Esterase: NEGATIVE / RBC: x / WBC x   Sq Epi: x / Non Sq Epi: x / Bacteria: x          CAPILLARY BLOOD GLUCOSE      POCT Blood Glucose.: 106 mg/dL (15 Apr 2019 05:09)  POCT Blood Glucose.: 128 mg/dL (2019 21:12)  POCT Blood Glucose.: 112 mg/dL (2019 17:04)  POCT Blood Glucose.: 125 mg/dL (2019 11:27)      Drug Levels: [] N/A  Vancomycin Level, Trough: 6.1 ug/mL ( @ 22:40)    CSF Analysis: [] N/A  RBC Count - Spinal Fluid: 4833 /uL ( @ 23:03)  CSF Lymphocytes: 0 % ( @ 23:03)  Glucose, CSF: 104 mg/dL ( @ 23:03)  Protein, CSF: 29 mg/dL ( @ 23:03)      Allergies    No Known Allergies    Intolerances      MEDICATIONS:  Antibiotics:  cefTRIAXone   IVPB 1 Gram(s) IV Intermittent every 24 hours    Neuro:  acetaminophen    Suspension .. 650 milliGRAM(s) Oral every 6 hours PRN  dexmedetomidine Infusion 0.1 MICROgram(s)/kG/Hr IV Continuous <Continuous>  fentaNYL    Injectable 100 MICROGram(s) IV Push once  fentaNYL    Injectable 100 MICROGram(s) IV Push every 2 hours PRN  fentaNYL    Injectable 75 MICROGram(s) IV Push every 2 hours PRN  fentaNYL    Injectable 50 MICROGram(s) IV Push every 2 hours PRN  levETIRAcetam  Solution 1000 milliGRAM(s) Oral two times a day  LORazepam   Injectable 4 milliGRAM(s) IV Push every 4 hours PRN  LORazepam   Injectable 2 milliGRAM(s) IV Push every 4 hours PRN  LORazepam   Injectable 1 milliGRAM(s) IV Push every 4 hours PRN  midazolam Infusion 0.02 mG/kG/Hr IV Continuous <Continuous>  ondansetron Injectable 4 milliGRAM(s) IV Push every 6 hours PRN    Anticoagulation:  enoxaparin Injectable 40 milliGRAM(s) SubCutaneous at bedtime    OTHER:  amLODIPine   Tablet 10 milliGRAM(s) Oral daily  atorvastatin 40 milliGRAM(s) Oral at bedtime  cloNIDine 0.1 milliGRAM(s) Oral every 12 hours  dextrose 40% Gel 15 Gram(s) Oral once PRN  dextrose 50% Injectable 12.5 Gram(s) IV Push once  dextrose 50% Injectable 25 Gram(s) IV Push once  dextrose 50% Injectable 25 Gram(s) IV Push once  glucagon  Injectable 1 milliGRAM(s) IntraMuscular once PRN  hydrALAZINE 100 milliGRAM(s) Oral every 8 hours  hydrALAZINE Injectable 10 milliGRAM(s) IV Push every 4 hours PRN  influenza   Vaccine 0.5 milliLiter(s) IntraMuscular once  insulin lispro (HumaLOG) corrective regimen sliding scale   SubCutaneous Before meals and at bedtime  lisinopril 40 milliGRAM(s) Oral daily  metoprolol tartrate 50 milliGRAM(s) Oral every 12 hours  pantoprazole   Suspension 40 milliGRAM(s) Oral daily    IVF:  dextrose 5%. 1000 milliLiter(s) IV Continuous <Continuous>  sodium chloride 0.9%. 1000 milliLiter(s) IV Continuous <Continuous>    CULTURES:  Culture Results:   No growth to date. ( @ 08:11)  Culture Results:   Moderate Staphylococcus aureus  Moderate Haemophilus influenzae  Accompanied by normal respiratory derek (04-10 @ 11:27)    RADIOLOGY & ADDITIONAL TESTS:      ASSESSMENT:  44y Male w/ ETOH abuse now s/p right thalamic IPH with IVH s/p Emergent Rt EVD placement 19, s/p Cerebral angiogram neg 19, new left EVD placed and R EVD removed 19, now s/p penumbra evacuation of ICH 4/10/19    HEMORRHAGE STROKE  HEMORRHAGE  Handoff  ICH (intracerebral hemorrhage)  ICH (intracerebral hemorrhage)  Craniotomy for intracranial hemorrhage  Evacuation of hematoma of face  Angiogram, carotid and cerebral, bilateral      PLAN:  NEURO:  Neuro checks q1h  Keppra for seizure prophylaxis  continue precedex and versed for sedation  Continue fentanyl and ativan PRN agitation  Keep left EVD at 5cmH2O, monitor ICPs and output  Clonidine .1 BID    CARDIOVASCULAR:   SBP goal <140  Continue lopressor 50mg BID, norvasc 10mg daily, and lisinopril 40mg daily, hydralazine 100mg Q8h  Daily labs, electrolyte repletion PRN,  Continue Statin therapy  Right IJ in place    PULMONARY:   Continue ventilator support,  Daily CXR    RENAL:   Voiding, condom catheter, straight Cath PRN  Normal Na goal    GI:   NGT feeds  Stool softeners PRN  PPI while intubated    ID:  Ceftriax PNA until     ENDO:   ISS    DVT PROPHYLAXIS:   SCDs, SQL     DISPOSITION: Continue NSICU care,  Full code,  D/w Dr. So and Dr. Schultz    Assessment:  Present when checked    []  GCS  E   V  M     Heart Failure: []Acute, [] acute on chronic , []chronic  Heart Failure:  [] Diastolic (HFpEF), [] Systolic (HFrEF), []Combined (HFpEF and HFrEF), [] RHF, [] Pulm HTN, [] Other    [] KRISTA, [] ATN, [] AIN, [] other  [] CKD1, [] CKD2, [] CKD 3, [] CKD 4, [] CKD 5, []ESRD    Encephalopathy: [] Metabolic, [] Hepatic, [] toxic, [x] Neurological, [] Other    Abnormal Nutritional Status: [] malnutrition (see nutrition note), [ ]underweight: BMI < 19, [] morbid obesity: BMI >40, [] Cachexia    [] Sepsis  [] hypovolemic shock,[] cardiogenic shock, [] hemorrhagic shock, [] neurogenic shock  [x] Acute Respiratory Failure  [x]Cerebral edema, [x] Brain compression/ herniation,   [] Functional quadriplegia  [] Acute blood loss anemia Hospital Course:   : overnight, patient requiring large amount of cardene and propofol.  Plan today is to transition to precedex, start PO lisinopril and wean of cardene.  Becomes extremely agitated when off sedation.  : Cerebral angio without findings of aneurysm. EVD stopped working, pulled back without improvement. CT Head performed.  : Received ativan 6mg for agitation. EVD stopped draining at 6pm yesterday taken for stat CTH. Dr. So notified and EVD lowered to 5cmH2O without output.   4/10: s/p penumbra evacuation of ICH. Pt seen and examined at bedside postop. Remains intubated, on versed. ICPs stable.  (OR EBL 30cc, received LR 200cc)  : JUSTIN overnight. Remains on versed. Remains intubated. Neuro stable.  : Given ativan overnight, bucking the vent. Remains on versed drip. Neuro stable. ICPs stable. Increase hydralazine to 75 Q8.    JUSTIN overnight, febrile, urinary retention Straight Cath x 1 = 1L, neuro exam unchanged since yesterday, started Cardene gtt as per Dr. So goal -140   POD#4 febrile overnight, o/w JUSTIN overnight, EVD@ 5cm H2O, Clonidine started, Ceftriax until  Staph sputum, Versed/Precedex, straight cathed overnight  4/15: ICP stable. neuro stable. Remains on versed/precedex    DEVICE/DRAIN DRESSING:  L EVD @ 5cmH2O, draining    TUBES/LINES:  [x] CVC  [x] A-line  [] Lumbar Drain  [x] Ventriculostomy  [] Other    DIET:  [] NPO  [] Mechanical  [x] Tube feeds        Vital Signs Last 24 Hrs  T(C): 37.8 (15 Apr 2019 04:00), Max: 38.8 (2019 21:10)  T(F): 100 (15 Apr 2019 04:00), Max: 101.8 (2019 21:10)  HR: 70 (15 Apr 2019 06:00) (61 - 78)  BP: 119/68 (15 Apr 2019 06:00) (94/49 - 184/99)  BP(mean): 89 (15 Apr 2019 06:00) (68 - 128)  RR: 17 (15 Apr 2019 06:00) (12 - 36)  SpO2: 100% (15 Apr 2019 06:00) (86% - 100%)    I&O's Detail    2019 07:  -  2019 07:00  --------------------------------------------------------  IN:    dexmedetomidine Infusion: 399.2 mL    Enteral Tube Flush: 180 mL    midazolam Infusion: 147.4 mL    niCARdipine Infusion: 16 mL    ns in tub fed  [id number]: 1407 mL  Total IN: 2149.6 mL    OUT:    External Ventricular Device: 262 mL    Intermittent Catheterization - Urethral: 1075 mL    Rectal Tube: 400 mL  Total OUT: 1737 mL    Total NET: 412.6 mL      2019 07:  -  15 Apr 2019 06:22  --------------------------------------------------------  IN:    dexmedetomidine Infusion: 422.8 mL    Enteral Tube Flush: 60 mL    midazolam Infusion: 99.1 mL    ns in tub fed  [id number]: 1548 mL  Total IN: 2129.9 mL    OUT:    External Ventricular Device: 262 mL    Incontinent per Condom Catheter: 875 mL    Rectal Tube: 300 mL  Total OUT: 1437 mL    Total NET: 692.9 mL        I&O's Summary    2019 07:  -  2019 07:00  --------------------------------------------------------  IN: 2149.6 mL / OUT: 1737 mL / NET: 412.6 mL    2019 07:  -  15 Apr 2019 06:22  --------------------------------------------------------  IN: 2129.9 mL / OUT: 1437 mL / NET: 692.9 mL      PHYSICAL EXAM:  Gen: NAD, Intubated on ventilator, sedated on versed  HEENT: PERRL  Lungs: Clear b/l  Heart: S1, S2. NSR  Abd: Soft, NT/ND. +BS  Exts: Pulses 2+ throughout  Neuro: Mild decorticate posturing to LUE to noxious stimulus, localizing on RUE. Withdrawal to noxious stimuli b/l LE. No eye opening. Not following commands. PERRL, +corneals, +gag    DEVICE/DRAIN DRESSING:  L EVD @ 5cmH2O, draining    TUBES/LINES:  [x] CVC  [x] A-line  [] Lumbar Drain  [x] Ventriculostomy  [] Other    DIET:  [] NPO  [] Mechanical  [x] Tube feeds    LABS:                        11.8   12.29 )-----------( 436      ( 15 Apr 2019 05:30 )             34.8     04-15    137  |  103  |  21  ----------------------------<  134<H>  4.6   |  23  |  0.84    Ca    9.0      15 Apr 2019 05:30  Phos  3.9     04-15  Mg     2.2     04-15        Urinalysis Basic - ( 2019 23:11 )    Color: Yellow / Appearance: Clear / S.015 / pH: x  Gluc: x / Ketone: NEGATIVE  / Bili: Negative / Urobili: 0.2 E.U./dL   Blood: x / Protein: NEGATIVE mg/dL / Nitrite: NEGATIVE   Leuk Esterase: NEGATIVE / RBC: x / WBC x   Sq Epi: x / Non Sq Epi: x / Bacteria: x          CAPILLARY BLOOD GLUCOSE      POCT Blood Glucose.: 106 mg/dL (15 Apr 2019 05:09)  POCT Blood Glucose.: 128 mg/dL (2019 21:12)  POCT Blood Glucose.: 112 mg/dL (2019 17:04)  POCT Blood Glucose.: 125 mg/dL (2019 11:27)      Drug Levels: [] N/A  Vancomycin Level, Trough: 6.1 ug/mL ( @ 22:40)    CSF Analysis: [] N/A  RBC Count - Spinal Fluid: 4833 /uL ( @ 23:03)  CSF Lymphocytes: 0 % ( @ 23:03)  Glucose, CSF: 104 mg/dL ( @ 23:03)  Protein, CSF: 29 mg/dL ( @ 23:03)      Allergies    No Known Allergies    Intolerances      MEDICATIONS:  Antibiotics:  cefTRIAXone   IVPB 1 Gram(s) IV Intermittent every 24 hours    Neuro:  acetaminophen    Suspension .. 650 milliGRAM(s) Oral every 6 hours PRN  dexmedetomidine Infusion 0.1 MICROgram(s)/kG/Hr IV Continuous <Continuous>  fentaNYL    Injectable 100 MICROGram(s) IV Push once  fentaNYL    Injectable 100 MICROGram(s) IV Push every 2 hours PRN  fentaNYL    Injectable 75 MICROGram(s) IV Push every 2 hours PRN  fentaNYL    Injectable 50 MICROGram(s) IV Push every 2 hours PRN  levETIRAcetam  Solution 1000 milliGRAM(s) Oral two times a day  LORazepam   Injectable 4 milliGRAM(s) IV Push every 4 hours PRN  LORazepam   Injectable 2 milliGRAM(s) IV Push every 4 hours PRN  LORazepam   Injectable 1 milliGRAM(s) IV Push every 4 hours PRN  midazolam Infusion 0.02 mG/kG/Hr IV Continuous <Continuous>  ondansetron Injectable 4 milliGRAM(s) IV Push every 6 hours PRN    Anticoagulation:  enoxaparin Injectable 40 milliGRAM(s) SubCutaneous at bedtime    OTHER:  amLODIPine   Tablet 10 milliGRAM(s) Oral daily  atorvastatin 40 milliGRAM(s) Oral at bedtime  cloNIDine 0.1 milliGRAM(s) Oral every 12 hours  dextrose 40% Gel 15 Gram(s) Oral once PRN  dextrose 50% Injectable 12.5 Gram(s) IV Push once  dextrose 50% Injectable 25 Gram(s) IV Push once  dextrose 50% Injectable 25 Gram(s) IV Push once  glucagon  Injectable 1 milliGRAM(s) IntraMuscular once PRN  hydrALAZINE 100 milliGRAM(s) Oral every 8 hours  hydrALAZINE Injectable 10 milliGRAM(s) IV Push every 4 hours PRN  influenza   Vaccine 0.5 milliLiter(s) IntraMuscular once  insulin lispro (HumaLOG) corrective regimen sliding scale   SubCutaneous Before meals and at bedtime  lisinopril 40 milliGRAM(s) Oral daily  metoprolol tartrate 50 milliGRAM(s) Oral every 12 hours  pantoprazole   Suspension 40 milliGRAM(s) Oral daily    IVF:  dextrose 5%. 1000 milliLiter(s) IV Continuous <Continuous>  sodium chloride 0.9%. 1000 milliLiter(s) IV Continuous <Continuous>    CULTURES:  Culture Results:   No growth to date. ( @ 08:11)  Culture Results:   Moderate Staphylococcus aureus  Moderate Haemophilus influenzae  Accompanied by normal respiratory derek (04-10 @ 11:27)    RADIOLOGY & ADDITIONAL TESTS:      ASSESSMENT:  44y Male w/ ETOH abuse now s/p right thalamic IPH with IVH s/p Emergent Rt EVD placement 19, s/p Cerebral angiogram neg 19, new left EVD placed and R EVD removed 19, now s/p penumbra evacuation of ICH 4/10/19    HEMORRHAGE STROKE  HEMORRHAGE  Handoff  ICH (intracerebral hemorrhage)  ICH (intracerebral hemorrhage)  Craniotomy for intracranial hemorrhage  Evacuation of hematoma of face  Angiogram, carotid and cerebral, bilateral      PLAN:  NEURO:  Neuro checks q1h  Keppra for seizure prophylaxis  continue precedex and versed for sedation  Continue fentanyl and ativan PRN agitation  Keep left EVD at 5cmH2O, monitor ICPs and output  Clonidine .1 BID    CARDIOVASCULAR:   SBP goal <140  Continue lopressor 50mg BID, norvasc 10mg daily, and lisinopril 40mg daily, hydralazine 100mg Q8h  Daily labs, electrolyte repletion PRN,  Continue Statin therapy  Right IJ in place    PULMONARY:   Continue ventilator support, plan for trach today  Daily CXR    RENAL:   Voiding, condom catheter, straight Cath PRN  Normal Na goal    GI:   NGT feeds  held, peg today  Stool softeners PRN  PPI while intubated  f/u LFTs    ID:  Ceftriax PNA until     ENDO:   ISS    DVT PROPHYLAXIS:   SCDs, SQL     DISPOSITION: Continue NSICU care,  Full code,  D/w Dr. oS and Dr. Schultz    Assessment:  Present when checked    []  GCS  E   V  M     Heart Failure: []Acute, [] acute on chronic , []chronic  Heart Failure:  [] Diastolic (HFpEF), [] Systolic (HFrEF), []Combined (HFpEF and HFrEF), [] RHF, [] Pulm HTN, [] Other    [] KRISTA, [] ATN, [] AIN, [] other  [] CKD1, [] CKD2, [] CKD 3, [] CKD 4, [] CKD 5, []ESRD    Encephalopathy: [] Metabolic, [] Hepatic, [] toxic, [x] Neurological, [] Other    Abnormal Nutritional Status: [] malnutrition (see nutrition note), [ ]underweight: BMI < 19, [] morbid obesity: BMI >40, [] Cachexia    [] Sepsis  [] hypovolemic shock,[] cardiogenic shock, [] hemorrhagic shock, [] neurogenic shock  [x] Acute Respiratory Failure  [x]Cerebral edema, [x] Brain compression/ herniation,   [] Functional quadriplegia  [] Acute blood loss anemia Hospital Course:   : overnight, patient requiring large amount of cardene and propofol.  Plan today is to transition to precedex, start PO lisinopril and wean of cardene.  Becomes extremely agitated when off sedation.  : Cerebral angio without findings of aneurysm. EVD stopped working, pulled back without improvement. CT Head performed.  : Received ativan 6mg for agitation. EVD stopped draining at 6pm yesterday taken for stat CTH. Dr. So notified and EVD lowered to 5cmH2O without output.   4/10: s/p penumbra evacuation of ICH. Pt seen and examined at bedside postop. Remains intubated, on versed. ICPs stable.  (OR EBL 30cc, received LR 200cc)  : JUSTIN overnight. Remains on versed. Remains intubated. Neuro stable.  : Given ativan overnight, bucking the vent. Remains on versed drip. Neuro stable. ICPs stable. Increase hydralazine to 75 Q8.    JUSTIN overnight, febrile, urinary retention Straight Cath x 1 = 1L, neuro exam unchanged since yesterday, started Cardene gtt as per Dr. So goal -140   POD#4 febrile overnight, o/w JUSTIN overnight, EVD@ 5cm H2O, Clonidine started, Ceftriax until  Staph sputum, Versed/Precedex, straight cathed overnight  4/15: ICP stable. neuro stable. Remains on versed/precedex    DEVICE/DRAIN DRESSING:  L EVD @ 5cmH2O, draining    TUBES/LINES:  [x] CVC  [x] A-line  [] Lumbar Drain  [x] Ventriculostomy  [] Other    DIET:  [] NPO  [] Mechanical  [x] Tube feeds        Vital Signs Last 24 Hrs  T(C): 37.8 (15 Apr 2019 04:00), Max: 38.8 (2019 21:10)  T(F): 100 (15 Apr 2019 04:00), Max: 101.8 (2019 21:10)  HR: 70 (15 Apr 2019 06:00) (61 - 78)  BP: 119/68 (15 Apr 2019 06:00) (94/49 - 184/99)  BP(mean): 89 (15 Apr 2019 06:00) (68 - 128)  RR: 17 (15 Apr 2019 06:00) (12 - 36)  SpO2: 100% (15 Apr 2019 06:00) (86% - 100%)    I&O's Detail    2019 07:  -  2019 07:00  --------------------------------------------------------  IN:    dexmedetomidine Infusion: 399.2 mL    Enteral Tube Flush: 180 mL    midazolam Infusion: 147.4 mL    niCARdipine Infusion: 16 mL    ns in tub fed  [id number]: 1407 mL  Total IN: 2149.6 mL    OUT:    External Ventricular Device: 262 mL    Intermittent Catheterization - Urethral: 1075 mL    Rectal Tube: 400 mL  Total OUT: 1737 mL    Total NET: 412.6 mL      2019 07:  -  15 Apr 2019 06:22  --------------------------------------------------------  IN:    dexmedetomidine Infusion: 422.8 mL    Enteral Tube Flush: 60 mL    midazolam Infusion: 99.1 mL    ns in tub fed  [id number]: 1548 mL  Total IN: 2129.9 mL    OUT:    External Ventricular Device: 262 mL    Incontinent per Condom Catheter: 875 mL    Rectal Tube: 300 mL  Total OUT: 1437 mL    Total NET: 692.9 mL        I&O's Summary    2019 07:  -  2019 07:00  --------------------------------------------------------  IN: 2149.6 mL / OUT: 1737 mL / NET: 412.6 mL    2019 07:  -  15 Apr 2019 06:22  --------------------------------------------------------  IN: 2129.9 mL / OUT: 1437 mL / NET: 692.9 mL      PHYSICAL EXAM:  Gen: NAD, Intubated on ventilator, sedated on versed  HEENT: PERRL  Lungs: Clear b/l  Heart: S1, S2. NSR  Abd: Soft, NT/ND. +BS  Exts: Pulses 2+ throughout  Neuro: localizing on RUE. Withdrawal to noxious stimuli b/l LE, LUE. No eye opening. following commands intermittently (shows 2 fingers and wiggles toes on right,) PERRL, +corneals, +gag    DEVICE/DRAIN DRESSING:  L EVD @ 5cmH2O, draining    TUBES/LINES:  [x] CVC  [x] A-line  [] Lumbar Drain  [x] Ventriculostomy  [] Other    DIET:  [] NPO  [] Mechanical  [x] Tube feeds    LABS:                        11.8   12.29 )-----------( 436      ( 15 Apr 2019 05:30 )             34.8     04-15    137  |  103  |  21  ----------------------------<  134<H>  4.6   |  23  |  0.84    Ca    9.0      15 Apr 2019 05:30  Phos  3.9     04-15  Mg     2.2     04-15        Urinalysis Basic - ( 2019 23:11 )    Color: Yellow / Appearance: Clear / S.015 / pH: x  Gluc: x / Ketone: NEGATIVE  / Bili: Negative / Urobili: 0.2 E.U./dL   Blood: x / Protein: NEGATIVE mg/dL / Nitrite: NEGATIVE   Leuk Esterase: NEGATIVE / RBC: x / WBC x   Sq Epi: x / Non Sq Epi: x / Bacteria: x          CAPILLARY BLOOD GLUCOSE      POCT Blood Glucose.: 106 mg/dL (15 Apr 2019 05:09)  POCT Blood Glucose.: 128 mg/dL (2019 21:12)  POCT Blood Glucose.: 112 mg/dL (2019 17:04)  POCT Blood Glucose.: 125 mg/dL (2019 11:27)      Drug Levels: [] N/A  Vancomycin Level, Trough: 6.1 ug/mL ( @ 22:40)    CSF Analysis: [] N/A  RBC Count - Spinal Fluid: 4833 /uL ( @ 23:03)  CSF Lymphocytes: 0 % ( @ 23:03)  Glucose, CSF: 104 mg/dL ( @ 23:03)  Protein, CSF: 29 mg/dL ( @ 23:03)      Allergies    No Known Allergies    Intolerances      MEDICATIONS:  Antibiotics:  cefTRIAXone   IVPB 1 Gram(s) IV Intermittent every 24 hours    Neuro:  acetaminophen    Suspension .. 650 milliGRAM(s) Oral every 6 hours PRN  dexmedetomidine Infusion 0.1 MICROgram(s)/kG/Hr IV Continuous <Continuous>  fentaNYL    Injectable 100 MICROGram(s) IV Push once  fentaNYL    Injectable 100 MICROGram(s) IV Push every 2 hours PRN  fentaNYL    Injectable 75 MICROGram(s) IV Push every 2 hours PRN  fentaNYL    Injectable 50 MICROGram(s) IV Push every 2 hours PRN  levETIRAcetam  Solution 1000 milliGRAM(s) Oral two times a day  LORazepam   Injectable 4 milliGRAM(s) IV Push every 4 hours PRN  LORazepam   Injectable 2 milliGRAM(s) IV Push every 4 hours PRN  LORazepam   Injectable 1 milliGRAM(s) IV Push every 4 hours PRN  midazolam Infusion 0.02 mG/kG/Hr IV Continuous <Continuous>  ondansetron Injectable 4 milliGRAM(s) IV Push every 6 hours PRN    Anticoagulation:  enoxaparin Injectable 40 milliGRAM(s) SubCutaneous at bedtime    OTHER:  amLODIPine   Tablet 10 milliGRAM(s) Oral daily  atorvastatin 40 milliGRAM(s) Oral at bedtime  cloNIDine 0.1 milliGRAM(s) Oral every 12 hours  dextrose 40% Gel 15 Gram(s) Oral once PRN  dextrose 50% Injectable 12.5 Gram(s) IV Push once  dextrose 50% Injectable 25 Gram(s) IV Push once  dextrose 50% Injectable 25 Gram(s) IV Push once  glucagon  Injectable 1 milliGRAM(s) IntraMuscular once PRN  hydrALAZINE 100 milliGRAM(s) Oral every 8 hours  hydrALAZINE Injectable 10 milliGRAM(s) IV Push every 4 hours PRN  influenza   Vaccine 0.5 milliLiter(s) IntraMuscular once  insulin lispro (HumaLOG) corrective regimen sliding scale   SubCutaneous Before meals and at bedtime  lisinopril 40 milliGRAM(s) Oral daily  metoprolol tartrate 50 milliGRAM(s) Oral every 12 hours  pantoprazole   Suspension 40 milliGRAM(s) Oral daily    IVF:  dextrose 5%. 1000 milliLiter(s) IV Continuous <Continuous>  sodium chloride 0.9%. 1000 milliLiter(s) IV Continuous <Continuous>    CULTURES:  Culture Results:   No growth to date. ( @ 08:11)  Culture Results:   Moderate Staphylococcus aureus  Moderate Haemophilus influenzae  Accompanied by normal respiratory derek (04-10 @ 11:27)    RADIOLOGY & ADDITIONAL TESTS:      ASSESSMENT:  44y Male w/ ETOH abuse now s/p right thalamic IPH with IVH s/p Emergent Rt EVD placement 19, s/p Cerebral angiogram neg 19, new left EVD placed and R EVD removed 19, now s/p penumbra evacuation of ICH 4/10/19    HEMORRHAGE STROKE  HEMORRHAGE  Handoff  ICH (intracerebral hemorrhage)  ICH (intracerebral hemorrhage)  Craniotomy for intracranial hemorrhage  Evacuation of hematoma of face  Angiogram, carotid and cerebral, bilateral      PLAN:  NEURO:  Neuro checks q1h  Keppra for seizure prophylaxis  continue precedex and versed for sedation  Continue fentanyl and ativan PRN agitation  Keep left EVD at 5cmH2O, monitor ICPs and output  Clonidine .1 BID    CARDIOVASCULAR:   SBP goal <140  Continue lopressor 50mg BID, norvasc 10mg daily, and lisinopril 40mg daily, hydralazine 100mg Q8h  Daily labs, electrolyte repletion PRN,  Continue Statin therapy  Right IJ in place    PULMONARY:   Continue ventilator support, plan for trach today  Daily CXR    RENAL:   Voiding, condom catheter, straight Cath PRN  Normal Na goal    GI:   NGT feeds  held, peg today  Stool softeners PRN  PPI while intubated  f/u LFTs    ID:  Ceftriax PNA until     ENDO:   ISS    DVT PROPHYLAXIS:   SCDs, SQL     DISPOSITION: Continue NSICU care,  Full code,  D/w Dr. So and Dr. Schultz    Assessment:  Present when checked    []  GCS  E   V  M     Heart Failure: []Acute, [] acute on chronic , []chronic  Heart Failure:  [] Diastolic (HFpEF), [] Systolic (HFrEF), []Combined (HFpEF and HFrEF), [] RHF, [] Pulm HTN, [] Other    [] KRISTA, [] ATN, [] AIN, [] other  [] CKD1, [] CKD2, [] CKD 3, [] CKD 4, [] CKD 5, []ESRD    Encephalopathy: [] Metabolic, [] Hepatic, [] toxic, [x] Neurological, [] Other    Abnormal Nutritional Status: [] malnutrition (see nutrition note), [ ]underweight: BMI < 19, [] morbid obesity: BMI >40, [] Cachexia    [] Sepsis  [] hypovolemic shock,[] cardiogenic shock, [] hemorrhagic shock, [] neurogenic shock  [x] Acute Respiratory Failure  [x]Cerebral edema, [x] Brain compression/ herniation,   [] Functional quadriplegia  [] Acute blood loss anemia

## 2019-04-15 NOTE — BRIEF OPERATIVE NOTE - NSICDXBRIEFPOSTOP_GEN_ALL_CORE_FT
POST-OP DIAGNOSIS:  ICH (intracerebral hemorrhage) 08-Apr-2019 11:23:24 right thalamic hemorrhage with IVH extension Asaf Kulkarni
POST-OP DIAGNOSIS:  ICH (intracerebral hemorrhage) 08-Apr-2019 11:23:24 right thalamic hemorrhage with IVH extension Asaf Kulkarni

## 2019-04-15 NOTE — CHART NOTE - NSCHARTNOTEFT_GEN_A_CORE
MTB PRE-BRONCHOSCOPY RISK ASSESSMENT  ------------------------------------------------------------    Procedure Date:     Provider Name:     Reason for Bronchoscopy:    Location of Procedure (check one):   [  ] Endoscopy  [  ] Emergency Department  [  ] Intensive Care Unit  [  ] Operating Room  [  ] Other: _________    RISK ASSESSMENT  I. Patient symptoms (check all that apply): >/ 3 of these = SIGNIFICANT RISK for TB  [  ] Coughing > 2 to 3 weeks                 [  ] Unexplained fever >/ 2 weeks   [  ] Unusual weakness or fatigue  [  ] Unexplained weight loss > 10lbs.  [  ] Hemoptysis                                   [  ] Unusual or night sweating  [ x ] NON-APPLICABLE    II. TB history (Check all that apply): >/ 1 of these = SIGNIFICANT RISK for TB  [  ] Sputum smear/culture (+) for acid fast bacilli (AFB)                           [  ] Abnormal CXR or CT suggestive of TB; date(s) & description __________  [  ] Positive TB skin or blood test; date: __________                               [  ] On medication for latent TB or disease; list medication(s) ____________  [  ] TB diagnosed in the past; year treated: _______                                [  ] Inadequately treated TB  [  ] Current close contact of a person known or suspected to have TB  [  x] NON-APPLICABLE    III. Additional Risk factors for TB (Check all that apply): Consider these in relation to symptoms and history  [  ] Person has conditions placing them at higher risk for TB disease (i.e. immunosuppressive therapy)  [  ] Person has lived in a country for 3 months or more where TB is common  [  ] Person lives in a high risk environment for TB (i.e. long term care, health care worker, incarcerated, homeless)  [  ] Person injects illicit drugs  [  ] Person is HIV positive or at high risk for HIV    ****************************************************************  BASED ON THE TB RISK ASSESSMENT ABOVE, THE PATIENT'S RISK FOR TB IS:                       [ x ] LOW RISK FOR TB            [  ] SIGNIFICANT RISK FOR TB  ****************************************************************    IV. Based on the Determined Risk for TB, the following Action(s) are Recommended:  [  ] Low risk for TB infection --> Proceed with the diagnostic procedure    [  ] Significant risk for TB infection:  1. Perform the procedure in a negative pressure room, with appropriate personal protective equipment (PPE) for healthcare personnel (i.e. N95 respirator)    2. If it is not feasible to move the patient or defer the procedure:    a. Use a single-bedded room in a low traffic area to perform the bronchoscopy procedure    b. Place a portable high-efficiency particulate air (HEPA) filter in the space prior to starting the procedure and keep the door closed. Refer to Infection Control policy titled "Tuberculosis Control Strategy Plan" for additional information.    c. All healthcare personnel in the procedure room shall wear and N95 disposible respirator.    3. Documentation of the tuberculosis risk assessment is to be included in the patient's medical record.

## 2019-04-15 NOTE — BRIEF OPERATIVE NOTE - NSICDXBRIEFPREOP_GEN_ALL_CORE_FT
PRE-OP DIAGNOSIS:  ICH (intracerebral hemorrhage) 08-Apr-2019 11:23:03 right Thalamic hemorrhage with IVH extension Asaf Kulkarni
PRE-OP DIAGNOSIS:  ICH (intracerebral hemorrhage) 08-Apr-2019 11:23:03 right Thalamic hemorrhage with IVH extension Asaf Kulkarni

## 2019-04-15 NOTE — BRIEF OPERATIVE NOTE - OPERATION/FINDINGS
The patient was placed supine with a roll under the shoulders to hyperextend the neck.  Tracheal landmarks were identified.  The procedural site was prepped and draped, and the patient sedated. A 1.5 cm transverse skin incision was made at the planned needle insertion site, 2 superficial cutaneous bleeders noted on entry and tied off. A14 g needle was inserted through a tracheal interspace in the midline. On beginning the wire was noted to be bent and therefore another Trach kit was opened and new wire reinserted and threaded into the trachea with its position documented with the bronchscope followed by dilation to the track with a Rhino dilator, a tracheostomy tube was inserted over the obturator, the wire and obturator was removed, the trach tube position confirmed with bronchoscopy, and the cuff inflated.  The endotracheal tube cuff was deflated and the endotracheal tube removed.  The tracheostomy tube was secured with 2-0 suture.    PEG placed endoscopically after transillumination for localization. Position verified by repeat endoscopy. No evidence of bleeding, trauma, or hematoma.    Recommendation: Use PEG for meds today and for feeds tomorrow.

## 2019-04-16 LAB
ANION GAP SERPL CALC-SCNC: 11 MMOL/L — SIGNIFICANT CHANGE UP (ref 5–17)
BUN SERPL-MCNC: 16 MG/DL — SIGNIFICANT CHANGE UP (ref 7–23)
CALCIUM SERPL-MCNC: 8.8 MG/DL — SIGNIFICANT CHANGE UP (ref 8.4–10.5)
CHLORIDE SERPL-SCNC: 99 MMOL/L — SIGNIFICANT CHANGE UP (ref 96–108)
CO2 SERPL-SCNC: 24 MMOL/L — SIGNIFICANT CHANGE UP (ref 22–31)
CREAT SERPL-MCNC: 0.68 MG/DL — SIGNIFICANT CHANGE UP (ref 0.5–1.3)
GLUCOSE SERPL-MCNC: 132 MG/DL — HIGH (ref 70–99)
GRAM STN FLD: SIGNIFICANT CHANGE UP
HCT VFR BLD CALC: 33.9 % — LOW (ref 39–50)
HGB BLD-MCNC: 11.6 G/DL — LOW (ref 13–17)
MAGNESIUM SERPL-MCNC: 2 MG/DL — SIGNIFICANT CHANGE UP (ref 1.6–2.6)
MCHC RBC-ENTMCNC: 32.2 PG — SIGNIFICANT CHANGE UP (ref 27–34)
MCHC RBC-ENTMCNC: 34.2 GM/DL — SIGNIFICANT CHANGE UP (ref 32–36)
MCV RBC AUTO: 94.2 FL — SIGNIFICANT CHANGE UP (ref 80–100)
NRBC # BLD: 0 /100 WBCS — SIGNIFICANT CHANGE UP (ref 0–0)
PHOSPHATE SERPL-MCNC: 3.4 MG/DL — SIGNIFICANT CHANGE UP (ref 2.5–4.5)
PLATELET # BLD AUTO: 480 K/UL — HIGH (ref 150–400)
POTASSIUM SERPL-MCNC: 4 MMOL/L — SIGNIFICANT CHANGE UP (ref 3.5–5.3)
POTASSIUM SERPL-SCNC: 4 MMOL/L — SIGNIFICANT CHANGE UP (ref 3.5–5.3)
RBC # BLD: 3.6 M/UL — LOW (ref 4.2–5.8)
RBC # FLD: 11.9 % — SIGNIFICANT CHANGE UP (ref 10.3–14.5)
SODIUM SERPL-SCNC: 134 MMOL/L — LOW (ref 135–145)
SPECIMEN SOURCE: SIGNIFICANT CHANGE UP
WBC # BLD: 14.16 K/UL — HIGH (ref 3.8–10.5)
WBC # FLD AUTO: 14.16 K/UL — HIGH (ref 3.8–10.5)

## 2019-04-16 PROCEDURE — 71045 X-RAY EXAM CHEST 1 VIEW: CPT | Mod: 26

## 2019-04-16 PROCEDURE — 99291 CRITICAL CARE FIRST HOUR: CPT | Mod: 24

## 2019-04-16 PROCEDURE — 70450 CT HEAD/BRAIN W/O DYE: CPT | Mod: 26

## 2019-04-16 RX ORDER — ACETAMINOPHEN 500 MG
1000 TABLET ORAL ONCE
Qty: 0 | Refills: 0 | Status: COMPLETED | OUTPATIENT
Start: 2019-04-16 | End: 2019-04-16

## 2019-04-16 RX ADMIN — Medication 50 MILLIGRAM(S): at 22:37

## 2019-04-16 RX ADMIN — AMLODIPINE BESYLATE 10 MILLIGRAM(S): 2.5 TABLET ORAL at 06:06

## 2019-04-16 RX ADMIN — Medication 0.1 MILLIGRAM(S): at 22:37

## 2019-04-16 RX ADMIN — FENTANYL CITRATE 75 MICROGRAM(S): 50 INJECTION INTRAVENOUS at 05:15

## 2019-04-16 RX ADMIN — Medication 400 MILLIGRAM(S): at 23:00

## 2019-04-16 RX ADMIN — ATORVASTATIN CALCIUM 40 MILLIGRAM(S): 80 TABLET, FILM COATED ORAL at 22:36

## 2019-04-16 RX ADMIN — MIDAZOLAM HYDROCHLORIDE 1.44 MG/KG/HR: 1 INJECTION, SOLUTION INTRAMUSCULAR; INTRAVENOUS at 08:02

## 2019-04-16 RX ADMIN — FENTANYL CITRATE 75 MICROGRAM(S): 50 INJECTION INTRAVENOUS at 01:43

## 2019-04-16 RX ADMIN — Medication 0.1 MILLIGRAM(S): at 09:24

## 2019-04-16 RX ADMIN — Medication 100 MILLIGRAM(S): at 06:06

## 2019-04-16 RX ADMIN — FENTANYL CITRATE 50 MICROGRAM(S): 50 INJECTION INTRAVENOUS at 00:00

## 2019-04-16 RX ADMIN — CEFTRIAXONE 100 GRAM(S): 500 INJECTION, POWDER, FOR SOLUTION INTRAMUSCULAR; INTRAVENOUS at 17:56

## 2019-04-16 RX ADMIN — LEVETIRACETAM 1000 MILLIGRAM(S): 250 TABLET, FILM COATED ORAL at 06:07

## 2019-04-16 RX ADMIN — FENTANYL CITRATE 75 MICROGRAM(S): 50 INJECTION INTRAVENOUS at 04:50

## 2019-04-16 RX ADMIN — LEVETIRACETAM 1000 MILLIGRAM(S): 250 TABLET, FILM COATED ORAL at 18:03

## 2019-04-16 RX ADMIN — DEXMEDETOMIDINE HYDROCHLORIDE IN 0.9% SODIUM CHLORIDE 1.8 MICROGRAM(S)/KG/HR: 4 INJECTION INTRAVENOUS at 05:17

## 2019-04-16 RX ADMIN — DEXMEDETOMIDINE HYDROCHLORIDE IN 0.9% SODIUM CHLORIDE 1.8 MICROGRAM(S)/KG/HR: 4 INJECTION INTRAVENOUS at 01:48

## 2019-04-16 RX ADMIN — PANTOPRAZOLE SODIUM 40 MILLIGRAM(S): 20 TABLET, DELAYED RELEASE ORAL at 13:02

## 2019-04-16 RX ADMIN — DEXMEDETOMIDINE HYDROCHLORIDE IN 0.9% SODIUM CHLORIDE 1.8 MICROGRAM(S)/KG/HR: 4 INJECTION INTRAVENOUS at 13:46

## 2019-04-16 RX ADMIN — Medication 50 MILLIGRAM(S): at 09:24

## 2019-04-16 RX ADMIN — ENOXAPARIN SODIUM 40 MILLIGRAM(S): 100 INJECTION SUBCUTANEOUS at 22:37

## 2019-04-16 RX ADMIN — CHLORHEXIDINE GLUCONATE 1 APPLICATION(S): 213 SOLUTION TOPICAL at 06:08

## 2019-04-16 RX ADMIN — LISINOPRIL 40 MILLIGRAM(S): 2.5 TABLET ORAL at 06:06

## 2019-04-16 RX ADMIN — FENTANYL CITRATE 75 MICROGRAM(S): 50 INJECTION INTRAVENOUS at 02:00

## 2019-04-16 RX ADMIN — Medication 100 MILLIGRAM(S): at 13:46

## 2019-04-16 RX ADMIN — Medication 10 MILLIGRAM(S): at 01:18

## 2019-04-16 RX ADMIN — Medication 100 MILLIGRAM(S): at 22:36

## 2019-04-16 NOTE — CHART NOTE - NSCHARTNOTEFT_GEN_A_CORE
Admitting Diagnosis:   Patient is a 44y old  Male who presents with a chief complaint of right thalamic hemorrhage (16 Apr 2019 07:32)      PAST MEDICAL & SURGICAL HISTORY:      Current Nutrition Order: NPO +EN (Jevity 1.2 @67ml/hr x24hrs, +Prostat x1/day)     PO Intake: NPO    GI Issues: +watery stool 4/15, no residuals noted per EMR     Pain: Unable to assess at this time     Skin Integrity: 1+ generalized edema 4/15, +surgical incision    Labs:   04-16    134<L>  |  99  |  16  ----------------------------<  132<H>  4.0   |  24  |  0.68    Ca    8.8      16 Apr 2019 05:58  Phos  3.4     04-16  Mg     2.0     04-16    TPro  7.3  /  Alb  2.9<L>  /  TBili  0.2  /  DBili  <0.2  /  AST  36  /  ALT  38  /  AlkPhos  81  04-15    CAPILLARY BLOOD GLUCOSE      POCT Blood Glucose.: 124 mg/dL (16 Apr 2019 11:12)  POCT Blood Glucose.: 102 mg/dL (16 Apr 2019 05:42)  POCT Blood Glucose.: 103 mg/dL (15 Apr 2019 20:55)  POCT Blood Glucose.: 89 mg/dL (15 Apr 2019 16:32)      Medications:  MEDICATIONS  (STANDING):  amLODIPine   Tablet 10 milliGRAM(s) Oral daily  atorvastatin 40 milliGRAM(s) Oral at bedtime  cefTRIAXone   IVPB 1 Gram(s) IV Intermittent every 24 hours  chlorhexidine 2% Cloths 1 Application(s) Topical <User Schedule>  cloNIDine 0.1 milliGRAM(s) Oral every 12 hours  dexmedetomidine Infusion 0.1 MICROgram(s)/kG/Hr (1.8 mL/Hr) IV Continuous <Continuous>  dextrose 5%. 1000 milliLiter(s) (50 mL/Hr) IV Continuous <Continuous>  dextrose 50% Injectable 12.5 Gram(s) IV Push once  dextrose 50% Injectable 25 Gram(s) IV Push once  dextrose 50% Injectable 25 Gram(s) IV Push once  enoxaparin Injectable 40 milliGRAM(s) SubCutaneous at bedtime  hydrALAZINE 100 milliGRAM(s) Oral every 8 hours  influenza   Vaccine 0.5 milliLiter(s) IntraMuscular once  insulin lispro (HumaLOG) corrective regimen sliding scale   SubCutaneous Before meals and at bedtime  levETIRAcetam  Solution 1000 milliGRAM(s) Enteral Tube two times a day  lisinopril 40 milliGRAM(s) Oral daily  metoprolol tartrate 50 milliGRAM(s) Oral every 12 hours  midazolam Infusion 0.02 mG/kG/Hr (1.44 mL/Hr) IV Continuous <Continuous>  pantoprazole   Suspension 40 milliGRAM(s) Oral daily  sodium chloride 0.9%. 1000 milliLiter(s) (75 mL/Hr) IV Continuous <Continuous>    MEDICATIONS  (PRN):  acetaminophen    Suspension .. 650 milliGRAM(s) Oral every 6 hours PRN Temp greater or equal to 38.5C (101.3F)  dextrose 40% Gel 15 Gram(s) Oral once PRN Blood Glucose LESS THAN 70 milliGRAM(s)/deciliter  fentaNYL    Injectable 100 MICROGram(s) IV Push every 2 hours PRN Severe Pain (7 - 10)  fentaNYL    Injectable 75 MICROGram(s) IV Push every 2 hours PRN Moderate Pain (4 - 6)  fentaNYL    Injectable 50 MICROGram(s) IV Push every 2 hours PRN Mild Pain (1 - 3)  glucagon  Injectable 1 milliGRAM(s) IntraMuscular once PRN Glucose LESS THAN 70 milligrams/deciliter  hydrALAZINE Injectable 10 milliGRAM(s) IV Push every 4 hours PRN BP>140  LORazepam   Injectable 4 milliGRAM(s) IV Push every 4 hours PRN RASS 4  LORazepam   Injectable 2 milliGRAM(s) IV Push every 4 hours PRN RASS 3  LORazepam   Injectable 1 milliGRAM(s) IV Push every 4 hours PRN RASS 2  ondansetron Injectable 4 milliGRAM(s) IV Push every 6 hours PRN Nausea and/or Vomiting      Weight:  72kg- admit wt     Weight Change: No new wts to assess    Nutrition Focused Physical Exam: Completed [   ]  Not Pertinent [ x  ]    Estimated energy needs:   Ht (4/6): 175.3cm, Wt (4/8 per family): 72.7kg IBW: 160lb +/-10%, %IBW: 100%, BMI: 23.6kg/m2   IBW used to calculate energy needs as pt vented.   1045-5235 kcal (25-30 kcal/kg)  102-116 gm (1.4-1.6 gm/kg)  5735-4108 ml (25-30 ml/kg)    Subjective:   Pt seen for follow up. 45yo M w/ ETOH abuse now s/p right thalamic IPH with IVH s/p Emergent Rt EVD placement 4/6/19, s/p Cerebral angiogram neg 4/8/19, new left EVD placed and R EVD removed 4/9/19, now s/p penumbra evacuation of ICH 4/10/19. S/P tracheostomy, bronchoscopy, and EGD w/ PEG 4/15. Pt remains intubated, vent mode: VC/AC, and sedated (midazolam @4ml/hr, precedex @12.6ml/hr), MAP 77mmHg. TF on hold (S/P PEG 4/15, plan to resume 6pm tonight per surgery). Per RN, pt previously tolerated TF well, noted w/ watery stool 4/15, however RN notes stool softeners being used. If loose stool persists, consider add Metamucil. RD to monitor closely and f/u per high risk protocol.    Previous Nutrition Diagnosis:  Increased protein needs RT increased demand for nutrients AEB pt vented    Active [ x  ]  Resolved [   ]    If resolved, new PES:     Goal:  Pt to meet >75% EER     Recommendations:  1. As medically feasible, recommend resume EN: Jevity 1.2 via PEG @ 67ml/hr x24 hrs + Prostat x1/day, providing 1608mlTV, 2030kcal, 104gms pro, and 1298ml water. Additional water to meet needs per team (pt receiving IVF). Monitor for s/s intolerance, maintain aspiration precautions at all times. Per surgery: Please start feeds at 6pm, can start at a rate of 20ml/hr, check residual and advance rate every 4 hours till goal is met.   2. Monitor lytes and replete PRN.   3. Monitor stool. If loose stool persists, consider add metamucil.   4. Updated wts.     Education: N/A, pt vented    Risk Level: High [ x  ] Moderate [   ] Low [   ]

## 2019-04-16 NOTE — PROGRESS NOTE ADULT - SUBJECTIVE AND OBJECTIVE BOX
Subjective:  tracheostomy site with some blood in the tubing, changed overnight by the nurse, no abdominal pain      Vital Signs Last 24 Hrs  T(C): 37.5 (2019 06:13), Max: 37.8 (15 Apr 2019 14:44)  T(F): 99.5 (2019 06:13), Max: 100.1 (15 Apr 2019 17:03)  HR: 82 (2019 07:00) (68 - 96)  BP: 151/92 (2019 07:00) (113/60 - 162/80)  BP(mean): 120 (2019 07:00) (80 - 126)  RR: 22 (2019 07:00) (15 - 25)  SpO2: 100% (2019 07:00) (94% - 100%)    Physical Exam:  General: NAD  Pulmonary: Tracheostomy, on ventilator; tracheostomy site with small amount of blood around, soft, no concern for hematoma  Cardiovascular: NSR  Abdominal: soft, non-distended; PEG tube site clean and intact    LABS:                        11.6   14.16 )-----------( 480      ( 2019 05:58 )             33.9     04-16    134<L>  |  99  |  16  ----------------------------<  132<H>  4.0   |  24  |  0.68    Ca    8.8      2019 05:58  Phos  3.4     04-16  Mg     2.0     04-16    TPro  7.3  /  Alb  2.9<L>  /  TBili  0.2  /  DBili  <0.2  /  AST  36  /  ALT  38  /  AlkPhos  81  04-15      CAPILLARY BLOOD GLUCOSE      POCT Blood Glucose.: 102 mg/dL (2019 05:42)  POCT Blood Glucose.: 103 mg/dL (15 Apr 2019 20:55)  POCT Blood Glucose.: 89 mg/dL (15 Apr 2019 16:32)  POCT Blood Glucose.: 104 mg/dL (15 Apr 2019 12:06)    Urinalysis Basic - ( 2019 23:11 )    Color: Yellow / Appearance: Clear / S.015 / pH: x  Gluc: x / Ketone: NEGATIVE  / Bili: Negative / Urobili: 0.2 E.U./dL   Blood: x / Protein: NEGATIVE mg/dL / Nitrite: NEGATIVE   Leuk Esterase: NEGATIVE / RBC: x / WBC x   Sq Epi: x / Non Sq Epi: x / Bacteria: x      LIVER FUNCTIONS - ( 15 Apr 2019 05:30 )  Alb: 2.9 g/dL / Pro: 7.3 g/dL / ALK PHOS: 81 U/L / ALT: 38 U/L / AST: 36 U/L / GGT: x

## 2019-04-16 NOTE — PROGRESS NOTE ADULT - ASSESSMENT
44y Male w/ ETOH abuse now s/p right thalamic IPH with IVH s/p Emergent Rt EVD placement 4/6/19, s/p Cerebral angiogram neg 4/8/19, new left EVD placed and R EVD removed 4/9/19, now s/p penumbra evacuation of ICH 4/10/19, Day 1 S/p Trach and PEG, some blood was noticed in the Vent tubing and was changed, no evidence of bleeding when seen this AM    Recs:  - Trach in place, change dressing as needed, vent management per primary team  - Can get meds through the PEG  - Please start feeds at 6pm, can start at a rate of 20ml/hr, check residual and advance rate every 4 hours till goal is met  - Please keep abdominal binder to protect PEG from inadvertent dislodgment

## 2019-04-16 NOTE — PROGRESS NOTE ADULT - ASSESSMENT
44y/M with   1.  intracerebral hemorrhage (R basal ganglia / thalamic; small L basal ganglia, R pontine), brain compression, cerebral edema; s/p R crani, endoscopic evacuation of ICH (04/10/2019, Dr. So)  2.  dyslipidemia    PLAN:   NEURO: neurochecks q1h, PRN pain meds with Tylenol / fentanyl PRN; continue midazolam / precedex to RASS 0 to -1  ICH:  BP control; no aneurysm on formal angio (04/08)  seizure prophylaxis: as per neurosurgery  EVD: monitor output, increase to 10 cm H20; CT this morning - stable  REHAB:  physical therapy evaluation and management    EARLY MOB:   HOB     PULM:  s/p trach, CPAP wean, no extubation   CARDIO:  SBP goal 100-140mm Hg; Cardene drip to SBP goal; lisinopril 40 mg PO daily, amlodipine, lopressor 50 BID, clonidine 0.1 BID  ENDO:  Blood sugar goals 140-180 mg/dL, continue insulin sliding scale; atorvastatin 40mg PO daily, A1C  4.9  GI:  docusate senna; PPI for GI prophylaxis (intubated)  DIET: restart Jevity at 6 p.m.  RENAL:  Na goal 135-145, d/c IVF once Jevity at goal  HEM/ONC: Hb stable; given DDAVP and platelet transfusion for aspirin reversal  VTE Prophylaxis: SCDs, SQL  ID: afebrile, no leukocytosis, ceftriaxone (last day 04/19)  Social: will update family; sister freeman (surrogate decision maker), father, common law wife who is pregnant    ATTENDING ATTESTATION:  I was physically present for the key portions of the evaluation and management (E/M) service provided.  I agree with the above history, physical and plan, which I have reviewed and edited where appropriate.    Patient at high risk for neurological deterioration or death due to:  ICU delirium, aspiration PNA, DVT / PE.  Critical care time, excluding procedures: 60 minutes spent on total encounter, more than 50% of the visit was spent counseling and/or coordinating care by the attending physician.     Plan discussed with RN, house staff.

## 2019-04-16 NOTE — PROGRESS NOTE ADULT - SUBJECTIVE AND OBJECTIVE BOX
=================================  NEUROCRITICAL CARE ATTENDING NOTE  =================================    ARPAN RUANO   MRN-[id number]  Summary:  44y/M with no PMH presented with L UE weakness / numbness, onset at 630 p.m.; then developed dysarthria, L UE/LE weakness.  He was brought to [place] ED, n/v, SBP 200s, more altered, intubated.  Imaging showed R thalamic hemorrhage.  Transferred to [facility].    COURSE IN THE HOSPITAL:   Admitted, given DDAVP / platelets; EVD inserted, improved   04/15 fever overnight    Past Medical History:   Allergies:  No Known Allergies  Home meds:     PHYSICAL EXAMINATION  T(C): 37.8 ( @ 09:08), Max: 37.8 (04-15 @ 14:44) HR: 68 ( @ 11:00) (68 - 96) BP: 110/59 ( @ 09:00) (110/59 - 162/80) RR: 18 ( @ 11:00) (15 - 25) SpO2: 99% ( @ 11:00) (94% - 100%)  NEUROLOGIC EXAMINATION:  Patient is rousable, does not open eyes, follows commands, pupils 3mm reactive, moves R UE/LE spontaneously, L UE/LE withdraws  GENERAL: intubated AC 50 400 12 +5  EENT:  anicteric  CARDIOVASCULAR: (+) S1 S2, normal rate and regular rhythm  PULMONARY: clear to auscultation bilaterally; min secretions  ABDOMEN: soft, nontender with normoactive bowel sounds; PEG site clean  EXTREMITIES: no edema  SKIN: no rash    LABS:  CAPILLARY BLOOD GLUCOSE 124 102 103 89 104             (11)       11.6   14.16 )-----------( 480      ( 2019 05:58 )             33.9     134<L>  |  99  |  16  ----------------------------<  132<H>  4.0   |  24  |  0.68    Ca    8.8      2019 05:58  Phos  3.4       Mg     2.0         TPro  7.3  /  Alb  2.9<L>  /  TBili  0.2  /  DBili  <0.2  /  AST  36  /  ALT  38  /  AlkPhos  81  04-15    04-15 @ 07:01  -   @ 07:00  IN: 2375.6 mL / OUT: 3323 mL / NET: -947.4 mL    LDL = 113 ()   HDL = 70 ()  TG = 85 ()  TSH = 0.733 ()    Bacteriology:   UA NEG   CSF NGTD  04/10 Sputum MSSA H influenza   sputum culture numerous MSSA   Blood CS NG5D x2 (final)    CSF studies:    L   *** [id number] WBC0 *** %N0 %L0     L3.4   *** [id number] WBC37 *** %N35 %L1     EEG:  Neuroimagin/06 12:48 p.m. CT head:  s/p EVD, decrease in size of ventricles, R ICH unchanged with IV extension, punctate hyperdensity R BG R conner R thalamus, unchanged,    2:11 a.m. CT head:  large R basal ganglia / thalamic ICH, small L basal ganglia / R pontine acute hemorrhage; c/w hypertensive ICH; extension into R lateral ventricle; MLS, mass effect, no HCP  Other imaging:    MEDICATIONS: SQL 40 HS ceftriaxone 1g IV q24h levetiracetam 1G PO BID amlodipine 10mg PO daily clonidine 0.1mg pO q12h lisinopril 40mg PO daily metoprolol 50mg PO q12h pantoprazole 40 daily atorvastatin 40mg PO HS mod ISS peridex fentanyl 50/75 q2h PRN ativan PRN     MEDICATIONS: SQL 40 HS ceftriaxone 1G IV q24h levetiracetam 1G PO BID   dexmedetomidine midazola  enoxaparin Injectable 40 SubCutaneous at bedtime  cefTRIAXone   IVPB 1 IV Intermittent every 24 hours  dexmedetomidine Infusion 0.1 IV Continuous <Continuous>  levETIRAcetam  Solution 1000 Enteral Tube two times a day  midazolam Infusion 0.02 IV Continuous <Continuous>  amLODIPine   Tablet 10 milliGRAM(s) Oral daily  cloNIDine 0.1 milliGRAM(s) Oral every 12 hours  hydrALAZINE 100 milliGRAM(s) Oral every 8 hours  lisinopril 40 milliGRAM(s) Oral daily  metoprolol tartrate 50 milliGRAM(s) Oral every 12 hours  pantoprazole   Suspension 40 Oral daily  atorvastatin 40 Oral at bedtime  dextrose 50% Injectable 12.5 IV Push once  dextrose 50% Injectable 25 IV Push once  dextrose 50% Injectable 25 IV Push once  insulin lispro (HumaLOG) corrective regimen sliding scale  SubCutaneous Before meals and at bedtime  chlorhexidine 2% Cloths 1 Topical <User Schedule>  dextrose 5%. 1000 IV Continuous <Continuous>  influenza   Vaccine 0.5 IntraMuscular once  sodium chloride 0.9%. 1000 IV Continuous <Continuous>  acetaminophen    Suspension .. 650 Oral every 6 hours PRN  dextrose 40% Gel 15 Oral once PRN  fentaNYL    Injectable 100 IV Push every 2 hours PRN  fentaNYL    Injectable 75 IV Push every 2 hours PRN  fentaNYL    Injectable 50 IV Push every 2 hours PRN  glucagon  Injectable 1 IntraMuscular once PRN  hydrALAZINE Injectable 10 IV Push every 4 hours PRN  LORazepam   Injectable 4 IV Push every 4 hours PRN  LORazepam   Injectable 2 IV Push every 4 hours PRN  LORazepam   Injectable 1 IV Push every 4 hours PRN  ondansetron Injectable 4 IV Push every 6 hours PRN  dexmedetomidine Infusion 0.1 MICROgram(s)/kG/Hr (1.8 mL/Hr) IV Continuous <Continuous>  midazolam Infusion 0.02 mG/kG/Hr (1.44 mL/Hr) IV Continuous <Continuous>      IV FLUIDS: NS@75  DRIPS: dexmedetomidine (0.7) midazolam (0.07 mg/kg/hr)  DIET: NPO, Jevity   Lines: [place] rectal  (300)  Drains:   EVD @ 5cm H20, 273 cc/24h ICP 1-17  Wounds:    CODE STATUS:  Full Code                       GOALS OF CARE:  aggressive                      DISPOSITION:  ICU  ICH Score on admission  = GCS Score: 3-4 (2 pts) E1VTM2 (+) IVH = 3  ICH volume 20ml  Estimated 30-day mortality 3 points: 72%3

## 2019-04-16 NOTE — PROGRESS NOTE ADULT - SUBJECTIVE AND OBJECTIVE BOX
HPI:  History obtained by patient's girlfriend and chart from [facility], patient is unresponsive:     43 y/o male with no significant PMHx presents to [facility] with AMS, left side weakness and numbness. Per girlfriend, [place] when he developed acute onset of left facial numbness and LUE and numbness around 6:30PM. When get got home he developed AMS, dysarthria, and weakness in the LUE and LLE. At [facility] ED patient had several episodes of emesis and was hypertensive to SBP in the 200's. Work up revealed right thalamic hemorrhage on CT head. Patient not on any anticoagulation use per girlfriend. Of note, patient took Excedrin for headache at home. Patient transferred to [facility] for further management. (2019 00:04)      Hospital Course:   Hospital Course:   : overnight, patient requiring large amount of cardene and propofol.  Plan today is to transition to precedex, start PO lisinopril and wean of cardene.  Becomes extremely agitated when off sedation.  : Cerebral angio without findings of aneurysm. EVD stopped working, pulled back without improvement. CT Head performed.  : Received ativan 6mg for agitation. EVD stopped draining at 6pm yesterday taken for stat CTH. Dr. So notified and EVD lowered to 5cmH2O without output.   4/10: s/p penumbra evacuation of ICH. Pt seen and examined at bedside postop. Remains intubated, on versed. ICPs stable.  (OR EBL 30cc, received LR 200cc)  : JUSTIN overnight. Remains on versed. Remains intubated. Neuro stable.  : Given ativan overnight, bucking the vent. Remains on versed drip. Neuro stable. ICPs stable. Increase hydralazine to 75 Q8.    JUSTIN overnight, febrile, urinary retention Straight Cath x 1 = 1L, neuro exam unchanged since yesterday, started Cardene gtt as per Dr. So goal -140   POD#4 febrile overnight, o/w JUSTIN overnight, EVD@ 5cm H2O, Clonidine started, Ceftriax until  Staph sputum, Versed/Precedex, straight cathed overnight  4/15: ICP stable. neuro stable. Remains on versed/precedex  : Tolerating CPAP, CTH performed, decreased vent size, EVD raised to 10 cmH2O neuro stable     Vital Signs Last 24 Hrs  T(C): 37.8 (2019 18:32), Max: 38 (2019 14:15)  T(F): 100 (2019 18:32), Max: 100.4 (2019 14:15)  HR: 80 (2019 21:00) (66 - 83)  BP: 110/59 (2019 09:00) (110/59 - 156/91)  BP(mean): 81 (2019 09:00) (81 - 120)  RR: 20 (2019 21:00) (15 - 26)  SpO2: 99% (2019 21:00) (95% - 100%)    I&O's Detail    15 Apr 2019 07:01  -  2019 07:00  --------------------------------------------------------  IN:    dexmedetomidine Infusion: 308.6 mL    IV PiggyBack: 50 mL    midazolam Infusion: 90 mL    midazolam Infusion: 35 mL    ns in tub fed  [id number]: 67 mL    sodium chloride 0.9%: 100 mL    sodium chloride 0.9%: 1725 mL  Total IN: 2375.6 mL    OUT:    External Ventricular Device: 273 mL    Incontinent per Condom Catheter: 2750 mL    Rectal Tube: 300 mL  Total OUT: 3323 mL    Total NET: -947.4 mL      2019 07:01  -  2019 21:29  --------------------------------------------------------  IN:    dexmedetomidine Infusion: 82.6 mL    midazolam Infusion: 20 mL    ns in tub fed  [id number]: 268 mL    sodium chloride 0.9%: 750 mL  Total IN: 1120.6 mL    OUT:    External Ventricular Device: 156 mL    Incontinent per Condom Catheter: 1000 mL  Total OUT: 1156 mL    Total NET: -35.4 mL        I&O's Summary    15 Apr 2019 07:  -  2019 07:00  --------------------------------------------------------  IN: 2375.6 mL / OUT: 3323 mL / NET: -947.4 mL    2019 07:  -  2019 21:29  --------------------------------------------------------  IN: 1120.6 mL / OUT: 1156 mL / NET: -35.4 mL        PHYSICAL EXAM:  Gen: NAD, Intubated on ventilator, on touch of versed and precedex   HEENT: PERRL  Lungs: Clear b/l  Heart: S1, S2. NSR  Abd: Soft, NT/ND. +BS  Exts: Pulses 2+ throughout  Neuro: localizing on RUE. Withdrawal to noxious stimuli b/l LE, LUE. No eye opening. following commands intermittently (shows 2 fingers and wiggles toes on right,) PERRL, +corneals, +gag  Incision/Wound: c/d/i     DEVICE/DRAIN DRESSING:  EVD @ 53plF3B     TUBES/LINES:  [x] CVC  [] A-line  [] Lumbar Drain  [x] Ventriculostomy  [x] Other: trach/PEG     DIET:  [] NPO  [] Mechanical  [] Tube feeds    LABS:                        11.6   14.16 )-----------( 480      ( 2019 05:58 )             33.9     04-16    134<L>  |  99  |  16  ----------------------------<  132<H>  4.0   |  24  |  0.68    Ca    8.8      2019 05:58  Phos  3.4     04-16  Mg     2.0     04-16    TPro  7.3  /  Alb  2.9<L>  /  TBili  0.2  /  DBili  <0.2  /  AST  36  /  ALT  38  /  AlkPhos  81  04-15      Urinalysis Basic - ( 2019 23:11 )    Color: Yellow / Appearance: Clear / S.015 / pH: x  Gluc: x / Ketone: NEGATIVE  / Bili: Negative / Urobili: 0.2 E.U./dL   Blood: x / Protein: NEGATIVE mg/dL / Nitrite: NEGATIVE   Leuk Esterase: NEGATIVE / RBC: x / WBC x   Sq Epi: x / Non Sq Epi: x / Bacteria: x          CAPILLARY BLOOD GLUCOSE      POCT Blood Glucose.: 110 mg/dL (2019 20:49)  POCT Blood Glucose.: 110 mg/dL (2019 16:19)  POCT Blood Glucose.: 124 mg/dL (2019 11:12)  POCT Blood Glucose.: 102 mg/dL (2019 05:42)      Drug Levels: [] N/A  Vancomycin Level, Trough: 6.1 ug/mL ( @ 22:40)    CSF Analysis: [] N/A      Allergies    No Known Allergies    Intolerances      MEDICATIONS:  Antibiotics:  cefTRIAXone   IVPB 1 Gram(s) IV Intermittent every 24 hours    Neuro:  acetaminophen    Suspension .. 650 milliGRAM(s) Oral every 6 hours PRN  dexmedetomidine Infusion 0.1 MICROgram(s)/kG/Hr IV Continuous <Continuous>  fentaNYL    Injectable 100 MICROGram(s) IV Push every 2 hours PRN  fentaNYL    Injectable 75 MICROGram(s) IV Push every 2 hours PRN  fentaNYL    Injectable 50 MICROGram(s) IV Push every 2 hours PRN  levETIRAcetam  Solution 1000 milliGRAM(s) Enteral Tube two times a day  LORazepam   Injectable 4 milliGRAM(s) IV Push every 4 hours PRN  LORazepam   Injectable 2 milliGRAM(s) IV Push every 4 hours PRN  LORazepam   Injectable 1 milliGRAM(s) IV Push every 4 hours PRN  midazolam Infusion 0.02 mG/kG/Hr IV Continuous <Continuous>  ondansetron Injectable 4 milliGRAM(s) IV Push every 6 hours PRN    Anticoagulation:  enoxaparin Injectable 40 milliGRAM(s) SubCutaneous at bedtime    OTHER:  amLODIPine   Tablet 10 milliGRAM(s) Oral daily  atorvastatin 40 milliGRAM(s) Oral at bedtime  chlorhexidine 2% Cloths 1 Application(s) Topical <User Schedule>  cloNIDine 0.1 milliGRAM(s) Oral every 12 hours  dextrose 40% Gel 15 Gram(s) Oral once PRN  dextrose 50% Injectable 12.5 Gram(s) IV Push once  dextrose 50% Injectable 25 Gram(s) IV Push once  dextrose 50% Injectable 25 Gram(s) IV Push once  glucagon  Injectable 1 milliGRAM(s) IntraMuscular once PRN  hydrALAZINE 100 milliGRAM(s) Oral every 8 hours  hydrALAZINE Injectable 10 milliGRAM(s) IV Push every 4 hours PRN  influenza   Vaccine 0.5 milliLiter(s) IntraMuscular once  insulin lispro (HumaLOG) corrective regimen sliding scale   SubCutaneous Before meals and at bedtime  lisinopril 40 milliGRAM(s) Oral daily  metoprolol tartrate 50 milliGRAM(s) Oral every 12 hours  pantoprazole   Suspension 40 milliGRAM(s) Oral daily    IVF:  dextrose 5%. 1000 milliLiter(s) IV Continuous <Continuous>    CULTURES:  Culture Results:   No growth to date (04-15 @ 08:14)  Culture Results:   No growth at 1 day. ( @ 22:29)    RADIOLOGY & ADDITIONAL TESTS:      ASSESSMENT:  44y Male w/ ETOH abuse now s/p right thalamic IPH with IVH s/p Emergent Rt EVD placement 19, s/p Cerebral angiogram neg 19, new left EVD placed and R EVD removed 19, now s/p penumbra evacuation of ICH 4/10/19    HEMORRHAGE STROKE  HEMORRHAGE  Handoff  ICH (intracerebral hemorrhage)  ICH (intracerebral hemorrhage)  Open tracheostomy  Bronchoscopy, adult  EGD, with PEG  Craniotomy for intracranial hemorrhage  Evacuation of hematoma of face  Angiogram, carotid and cerebral, bilateral      Plan:   PLAN:  NEURO:  Neuro checks q1h  Keppra for seizure prophylaxis  continue precedex and wean versed    Continue fentanyl and ativan PRN agitation  Keep left EVD at [id number], monitor ICPs and output  CTH reviewed with Dr. Judah Guzman .1 BID    CARDIOVASCULAR:   SBP goal <140  Continue lopressor 50mg BID, norvasc 10mg daily, and lisinopril 40mg daily, hydralazine 100mg Q8h  Daily labs, electrolyte repletion PRN,  Continue Statin therapy  Right IJ in place    PULMONARY:   Continue ventilator support, plan for trach today  Daily CXR    RENAL:   Voiding, condom catheter, straight Cath PRN  Normal Na goal    GI:   NGT feeds  held, s/p Peg  Stool softeners PRN  PPI while intubated  f/u LFTs    ID:  Ceftriax PNA until     ENDO:   ISS    DVT PROPHYLAXIS:   SCDs, SQL     DISPOSITION: Continue NSICU care,  Full code,  D/w Dr. So and Dr. Schultz    Assessment:  Present when checked    []  GCS  E   V  M     Heart Failure: []Acute, [] acute on chronic , []chronic  Heart Failure:  [] Diastolic (HFpEF), [] Systolic (HFrEF), []Combined (HFpEF and HFrEF), [] RHF, [] Pulm HTN, [] Other    [] KRISTA, [] ATN, [] AIN, [] other  [] CKD1, [] CKD2, [] CKD 3, [] CKD 4, [] CKD 5, []ESRD    Encephalopathy: [] Metabolic, [] Hepatic, [] toxic, [x] Neurological, [] Other    Abnormal Nutritional Status: [] malnutrition (see nutrition note), [ ]underweight: BMI < 19, [] morbid obesity: BMI >40, [] Cachexia    [] Sepsis  [] hypovolemic shock,[] cardiogenic shock, [] hemorrhagic shock, [] neurogenic shock  [x] Acute Respiratory Failure  [x]Cerebral edema, [x] Brain compression/ herniation,   [] Functional quadriplegia  [] Acute blood loss anemia

## 2019-04-17 LAB
ANION GAP SERPL CALC-SCNC: 10 MMOL/L — SIGNIFICANT CHANGE UP (ref 5–17)
ANION GAP SERPL CALC-SCNC: 10 MMOL/L — SIGNIFICANT CHANGE UP (ref 5–17)
BUN SERPL-MCNC: 18 MG/DL — SIGNIFICANT CHANGE UP (ref 7–23)
BUN SERPL-MCNC: 19 MG/DL — SIGNIFICANT CHANGE UP (ref 7–23)
CALCIUM SERPL-MCNC: 8.6 MG/DL — SIGNIFICANT CHANGE UP (ref 8.4–10.5)
CALCIUM SERPL-MCNC: 8.6 MG/DL — SIGNIFICANT CHANGE UP (ref 8.4–10.5)
CHLORIDE SERPL-SCNC: 101 MMOL/L — SIGNIFICANT CHANGE UP (ref 96–108)
CHLORIDE SERPL-SCNC: 102 MMOL/L — SIGNIFICANT CHANGE UP (ref 96–108)
CO2 SERPL-SCNC: 23 MMOL/L — SIGNIFICANT CHANGE UP (ref 22–31)
CO2 SERPL-SCNC: 24 MMOL/L — SIGNIFICANT CHANGE UP (ref 22–31)
CREAT SERPL-MCNC: 0.73 MG/DL — SIGNIFICANT CHANGE UP (ref 0.5–1.3)
CREAT SERPL-MCNC: 0.76 MG/DL — SIGNIFICANT CHANGE UP (ref 0.5–1.3)
GLUCOSE SERPL-MCNC: 129 MG/DL — HIGH (ref 70–99)
GLUCOSE SERPL-MCNC: 154 MG/DL — HIGH (ref 70–99)
HCT VFR BLD CALC: 30.7 % — LOW (ref 39–50)
HGB BLD-MCNC: 10.6 G/DL — LOW (ref 13–17)
MAGNESIUM SERPL-MCNC: 2.2 MG/DL — SIGNIFICANT CHANGE UP (ref 1.6–2.6)
MCHC RBC-ENTMCNC: 32.5 PG — SIGNIFICANT CHANGE UP (ref 27–34)
MCHC RBC-ENTMCNC: 34.5 GM/DL — SIGNIFICANT CHANGE UP (ref 32–36)
MCV RBC AUTO: 94.2 FL — SIGNIFICANT CHANGE UP (ref 80–100)
NRBC # BLD: 0 /100 WBCS — SIGNIFICANT CHANGE UP (ref 0–0)
PHOSPHATE SERPL-MCNC: 3.2 MG/DL — SIGNIFICANT CHANGE UP (ref 2.5–4.5)
PLATELET # BLD AUTO: 465 K/UL — HIGH (ref 150–400)
POTASSIUM SERPL-MCNC: 4 MMOL/L — SIGNIFICANT CHANGE UP (ref 3.5–5.3)
POTASSIUM SERPL-MCNC: 4.4 MMOL/L — SIGNIFICANT CHANGE UP (ref 3.5–5.3)
POTASSIUM SERPL-SCNC: 4 MMOL/L — SIGNIFICANT CHANGE UP (ref 3.5–5.3)
POTASSIUM SERPL-SCNC: 4.4 MMOL/L — SIGNIFICANT CHANGE UP (ref 3.5–5.3)
RBC # BLD: 3.26 M/UL — LOW (ref 4.2–5.8)
RBC # FLD: 11.7 % — SIGNIFICANT CHANGE UP (ref 10.3–14.5)
SODIUM SERPL-SCNC: 134 MMOL/L — LOW (ref 135–145)
SODIUM SERPL-SCNC: 136 MMOL/L — SIGNIFICANT CHANGE UP (ref 135–145)
WBC # BLD: 10.16 K/UL — SIGNIFICANT CHANGE UP (ref 3.8–10.5)
WBC # FLD AUTO: 10.16 K/UL — SIGNIFICANT CHANGE UP (ref 3.8–10.5)

## 2019-04-17 PROCEDURE — 71045 X-RAY EXAM CHEST 1 VIEW: CPT | Mod: 26

## 2019-04-17 PROCEDURE — 99291 CRITICAL CARE FIRST HOUR: CPT | Mod: 24

## 2019-04-17 PROCEDURE — 70450 CT HEAD/BRAIN W/O DYE: CPT | Mod: 26

## 2019-04-17 RX ORDER — METOPROLOL TARTRATE 50 MG
75 TABLET ORAL EVERY 12 HOURS
Qty: 0 | Refills: 0 | Status: DISCONTINUED | OUTPATIENT
Start: 2019-04-17 | End: 2019-04-20

## 2019-04-17 RX ORDER — FENTANYL CITRATE 50 UG/ML
50 INJECTION INTRAVENOUS
Qty: 0 | Refills: 0 | Status: DISCONTINUED | OUTPATIENT
Start: 2019-04-17 | End: 2019-04-19

## 2019-04-17 RX ORDER — FENTANYL CITRATE 50 UG/ML
25 INJECTION INTRAVENOUS
Qty: 0 | Refills: 0 | Status: DISCONTINUED | OUTPATIENT
Start: 2019-04-17 | End: 2019-04-19

## 2019-04-17 RX ORDER — SODIUM CHLORIDE 9 MG/ML
2 INJECTION INTRAMUSCULAR; INTRAVENOUS; SUBCUTANEOUS EVERY 6 HOURS
Qty: 0 | Refills: 0 | Status: DISCONTINUED | OUTPATIENT
Start: 2019-04-17 | End: 2019-04-20

## 2019-04-17 RX ADMIN — SODIUM CHLORIDE 2 GRAM(S): 9 INJECTION INTRAMUSCULAR; INTRAVENOUS; SUBCUTANEOUS at 11:50

## 2019-04-17 RX ADMIN — DEXMEDETOMIDINE HYDROCHLORIDE IN 0.9% SODIUM CHLORIDE 1.8 MICROGRAM(S)/KG/HR: 4 INJECTION INTRAVENOUS at 11:51

## 2019-04-17 RX ADMIN — Medication 650 MILLIGRAM(S): at 11:52

## 2019-04-17 RX ADMIN — Medication 100 MILLIGRAM(S): at 21:42

## 2019-04-17 RX ADMIN — SODIUM CHLORIDE 2 GRAM(S): 9 INJECTION INTRAMUSCULAR; INTRAVENOUS; SUBCUTANEOUS at 06:47

## 2019-04-17 RX ADMIN — Medication 100 MILLIGRAM(S): at 06:44

## 2019-04-17 RX ADMIN — LEVETIRACETAM 1000 MILLIGRAM(S): 250 TABLET, FILM COATED ORAL at 17:09

## 2019-04-17 RX ADMIN — ATORVASTATIN CALCIUM 40 MILLIGRAM(S): 80 TABLET, FILM COATED ORAL at 21:41

## 2019-04-17 RX ADMIN — Medication 75 MILLIGRAM(S): at 17:13

## 2019-04-17 RX ADMIN — FENTANYL CITRATE 75 MICROGRAM(S): 50 INJECTION INTRAVENOUS at 10:08

## 2019-04-17 RX ADMIN — LISINOPRIL 40 MILLIGRAM(S): 2.5 TABLET ORAL at 06:45

## 2019-04-17 RX ADMIN — CHLORHEXIDINE GLUCONATE 1 APPLICATION(S): 213 SOLUTION TOPICAL at 06:37

## 2019-04-17 RX ADMIN — Medication 10 MILLIGRAM(S): at 16:15

## 2019-04-17 RX ADMIN — AMLODIPINE BESYLATE 10 MILLIGRAM(S): 2.5 TABLET ORAL at 06:44

## 2019-04-17 RX ADMIN — Medication 650 MILLIGRAM(S): at 21:00

## 2019-04-17 RX ADMIN — DEXMEDETOMIDINE HYDROCHLORIDE IN 0.9% SODIUM CHLORIDE 1.8 MICROGRAM(S)/KG/HR: 4 INJECTION INTRAVENOUS at 13:52

## 2019-04-17 RX ADMIN — Medication 650 MILLIGRAM(S): at 16:17

## 2019-04-17 RX ADMIN — ENOXAPARIN SODIUM 40 MILLIGRAM(S): 100 INJECTION SUBCUTANEOUS at 21:42

## 2019-04-17 RX ADMIN — PANTOPRAZOLE SODIUM 40 MILLIGRAM(S): 20 TABLET, DELAYED RELEASE ORAL at 11:50

## 2019-04-17 RX ADMIN — FENTANYL CITRATE 75 MICROGRAM(S): 50 INJECTION INTRAVENOUS at 08:36

## 2019-04-17 RX ADMIN — Medication 0.1 MILLIGRAM(S): at 06:44

## 2019-04-17 RX ADMIN — DEXMEDETOMIDINE HYDROCHLORIDE IN 0.9% SODIUM CHLORIDE 1.8 MICROGRAM(S)/KG/HR: 4 INJECTION INTRAVENOUS at 16:17

## 2019-04-17 RX ADMIN — Medication 50 MILLIGRAM(S): at 11:50

## 2019-04-17 RX ADMIN — SODIUM CHLORIDE 2 GRAM(S): 9 INJECTION INTRAMUSCULAR; INTRAVENOUS; SUBCUTANEOUS at 17:09

## 2019-04-17 RX ADMIN — FENTANYL CITRATE 50 MICROGRAM(S): 50 INJECTION INTRAVENOUS at 22:00

## 2019-04-17 RX ADMIN — CEFTRIAXONE 100 GRAM(S): 500 INJECTION, POWDER, FOR SOLUTION INTRAMUSCULAR; INTRAVENOUS at 17:11

## 2019-04-17 RX ADMIN — LEVETIRACETAM 1000 MILLIGRAM(S): 250 TABLET, FILM COATED ORAL at 07:48

## 2019-04-17 RX ADMIN — FENTANYL CITRATE 50 MICROGRAM(S): 50 INJECTION INTRAVENOUS at 21:41

## 2019-04-17 RX ADMIN — Medication 650 MILLIGRAM(S): at 20:05

## 2019-04-17 RX ADMIN — Medication 100 MILLIGRAM(S): at 13:53

## 2019-04-17 RX ADMIN — SODIUM CHLORIDE 2 GRAM(S): 9 INJECTION INTRAMUSCULAR; INTRAVENOUS; SUBCUTANEOUS at 23:28

## 2019-04-17 RX ADMIN — Medication 0.2 MILLIGRAM(S): at 17:14

## 2019-04-17 RX ADMIN — DEXMEDETOMIDINE HYDROCHLORIDE IN 0.9% SODIUM CHLORIDE 1.8 MICROGRAM(S)/KG/HR: 4 INJECTION INTRAVENOUS at 05:59

## 2019-04-17 RX ADMIN — Medication 1000 MILLIGRAM(S): at 02:44

## 2019-04-17 NOTE — PROGRESS NOTE ADULT - ASSESSMENT
44y/M with   1.  intracerebral hemorrhage (R basal ganglia / thalamic; small L basal ganglia, R pontine), brain compression, cerebral edema; s/p R crani, endoscopic evacuation of ICH (04/10/2019, Dr. So)  2.  dyslipidemia    PLAN:   NEURO: neurochecks q1h, PRN pain meds with Tylenol / fentanyl PRN; continue midazolam / precedex to RASS 0 to -1  ICH:  BP control; no aneurysm on formal angio (04/08)  seizure prophylaxis: as per neurosurgery  EVD: monitor output, increase to 10 cm H20; CT this morning - stable  REHAB:  physical therapy evaluation and management    EARLY MOB:   HOB     PULM:  s/p trach, CPAP wean, no extubation   CARDIO:  SBP goal 100-140mm Hg; Cardene drip to SBP goal; lisinopril 40 mg PO daily, amlodipine, lopressor 50 BID, clonidine 0.1 BID  ENDO:  Blood sugar goals 140-180 mg/dL, continue insulin sliding scale; atorvastatin 40mg PO daily, A1C  4.9  GI:  docusate senna; PPI for GI prophylaxis (intubated)  DIET: restart Jevity at 6 p.m.  RENAL:  Na goal 135-145, d/c IVF once Jevity at goal  HEM/ONC: Hb stable; given DDAVP and platelet transfusion for aspirin reversal  VTE Prophylaxis: SCDs, SQL  ID: afebrile, no leukocytosis, ceftriaxone (last day 04/19)  Social: will update family; sister freeman (surrogate decision maker), father, common law wife who is pregnant    ATTENDING ATTESTATION:  I was physically present for the key portions of the evaluation and management (E/M) service provided.  I agree with the above history, physical and plan, which I have reviewed and edited where appropriate.    Patient at high risk for neurological deterioration or death due to:  ICU delirium, aspiration PNA, DVT / PE.  Critical care time, excluding procedures: 60 minutes spent on total encounter, more than 50% of the visit was spent counseling and/or coordinating care by the attending physician.     Plan discussed with RN, house staff. 44y/M with   1.  intracerebral hemorrhage (R basal ganglia / thalamic; small L basal ganglia, R pontine), brain compression, cerebral edema; s/p R crani, endoscopic evacuation of ICH (04/10/2019, Dr. So)  2.  dyslipidemia    PLAN:   NEURO: neurochecks q1h, PRN pain meds with Tylenol / fentanyl PRN; off midazolam, continue precedex RASS 0 to -1  ICH:  BP control; no aneurysm on formal angio (04/08)  seizure prophylaxis: as per neurosurgery  EVD: monitor output, increase to 15 cm H20  REHAB:  physical therapy evaluation and management    EARLY MOB:   HOB     PULM:  s/p trach, continue CPAP wean 8/5   CARDIO:  SBP goal 100-160mm Hg if ok with NS; cont lisinopril 40 mg PO daily, amlodipine, lopressor 50 BID, clonidine 0.1 BID; hydrazaline 100 q8h  ENDO:  Blood sugar goals 140-180 mg/dL, continue insulin sliding scale; atorvastatin 40mg PO daily, A1C  4.9  GI:  docusate senna; PPI for GI prophylaxis   DIET: Jevity continue tube feeds  RENAL:  Na goal 135-145, d/c IVF once Jevity at goal  HEM/ONC: Hb stable; given DDAVP and platelet transfusion for aspirin reversal  VTE Prophylaxis: SCDs, SQL  ID: afebrile, no leukocytosis, ceftriaxone (last day 04/19); panculture q3d if fever spikes  Social: will update family; sister freeman (surrogate decision maker), father, common law wife who is pregnant    ATTENDING ATTESTATION:  I was physically present for the key portions of the evaluation and management (E/M) service provided.  I agree with the above history, physical and plan, which I have reviewed and edited where appropriate.    Patient at high risk for neurological deterioration or death due to:  ICU delirium, aspiration PNA, DVT / PE.  Critical care time, excluding procedures: 60 minutes spent on total encounter, more than 50% of the visit was spent counseling and/or coordinating care by the attending physician.     Plan discussed with RN, house staff.

## 2019-04-17 NOTE — PROGRESS NOTE ADULT - SUBJECTIVE AND OBJECTIVE BOX
Subjective:  Afebrile, no abdominal pain, minimal residual through the PEG, tolerating feeds at goal, minimal amount of blood in trach and vent tubing    Vital Signs Last 24 Hrs  T(C): 37.4 (17 Apr 2019 05:26), Max: 38.3 (16 Apr 2019 22:47)  T(F): 99.3 (17 Apr 2019 05:26), Max: 100.9 (16 Apr 2019 22:47)  HR: 69 (17 Apr 2019 05:47) (64 - 83)  BP: 110/59 (16 Apr 2019 09:00) (110/59 - 151/92)  BP(mean): 81 (16 Apr 2019 09:00) (81 - 120)  RR: 14 (17 Apr 2019 05:00) (14 - 26)  SpO2: 99% (17 Apr 2019 05:47) (97% - 100%)    Physical Exam:  General: resting comfortably in bed  Pulmonary: Nonlabored breathing, no respiratory distress, Trach in place with minimal amount of bleeding  Abdominal:   PEG in place, feeds at goal, no surrounding erythema or swelling  Soft, ND,NT  LABS:                        10.6   10.16 )-----------( 465      ( 17 Apr 2019 06:17 )             30.7     04-17    134<L>  |  101  |  18  ----------------------------<  129<H>  4.4   |  23  |  0.73    Ca    8.6      17 Apr 2019 00:46  Phos  3.4     04-16  Mg     2.0     04-16        CAPILLARY BLOOD GLUCOSE      POCT Blood Glucose.: 110 mg/dL (16 Apr 2019 20:49)  POCT Blood Glucose.: 110 mg/dL (16 Apr 2019 16:19)  POCT Blood Glucose.: 124 mg/dL (16 Apr 2019 11:12)

## 2019-04-17 NOTE — PROGRESS NOTE ADULT - SUBJECTIVE AND OBJECTIVE BOX
=================================  NEUROCRITICAL CARE ATTENDING NOTE  =================================    ARPAN RUANO   MRN-[id number]  Summary:  44y/M with no PMH presented with L UE weakness / numbness, onset at 630 p.m.; then developed dysarthria, L UE/LE weakness.  He was brought to [place] ED, n/v, SBP 200s, more altered, intubated.  Imaging showed R thalamic hemorrhage.  Transferred to [facility].    COURSE IN THE HOSPITAL:   Admitted, given DDAVP / platelets; EVD inserted, improved   04/15 fever overnight   TAmax 38.3    Past Medical History:   Allergies:  No Known Allergies  Home meds:     PHYSICAL EXAMINATION  T(C): 37.4 ( @ 05:26), Max: 38.3 ( @ 22:47) HR: 74 ( @ 07:00) (64 - 80) BP: 110/59 ( @ 09:00) (110/59 - 110/59) RR: 18 ( @ 07:00) (14 - 26) SpO2: 96% ( @ 07:00) (96% - 100%)  NEUROLOGIC EXAMINATION:  Patient is rousable, does not open eyes, follows commands, pupils 3mm reactive, moves R UE/LE spontaneously, L UE/LE withdraws  GENERAL: intubated AC 50 400 12 +5  EENT:  anicteric  CARDIOVASCULAR: (+) S1 S2, normal rate and regular rhythm  PULMONARY: clear to auscultation bilaterally; min secretions  ABDOMEN: soft, nontender with normoactive bowel sounds; PEG site clean  EXTREMITIES: no edema  SKIN: no rash    LABS:  CAPILLARY BLOOD GLUCOSE 110 110 124               10.6   10.16 )-----------( 465      ( 2019 06:17 )             30.7     136  |  102  |  19  ----------------------------<  154<H>  4.0   |  24  |  0.76    Ca    8.6      2019 06:17  Phos  3.2       Mg     2.2     16 @ 07:01  -   @ 07:00  IN: 1944.1 mL / OUT: 2408 mL / NET: -463.9 mL    LDL = 113 ()   HDL = 70 ()  TG = 85 ()  TSH = 0.733 ()    Bacteriology:   UA NEG  04/15 CSF NGTD   Blood CS NG2d x1   CSF NGTD  04/10 Sputum MSSA H influenza S Ceftri   sputum culture numerous MSSA   Blood CS NG5D x2 (final)    CSF studies:    L   *** [id number] WBC0 *** %N0 %L0     L3.4   *** [id number] WBC37 *** %N35 %L1     EEG:  Neuroimagin/06 12:48 p.m. CT head:  s/p EVD, decrease in size of ventricles, R ICH unchanged with IV extension, punctate hyperdensity R BG R conner R thalamus, unchanged,    2:11 a.m. CT head:  large R basal ganglia / thalamic ICH, small L basal ganglia / R pontine acute hemorrhage; c/w hypertensive ICH; extension into R lateral ventricle; MLS, mass effect, no HCP  Other imaging:    MEDICATIONS: SQL 40 HS ceftriaxone 1g IV q24h levetiracetam 1G PO BID amlodipine 10mg PO daily clonidine 0.1mg pO q12h lisinopril 40mg PO daily metoprolol 50mg PO q12h pantoprazole 40 daily atorvastatin 40mg PO HS mod ISS peridex fentanyl 50/75 q2h PRN ativan PRN   MEDICATIONS: SQL 40 HS ceftriaxone 1g IV q24h levetiracetam 1G PO BID amlodipine 10mg PO daily clonidine 0.1mg PO q12h hydralazine 100mg PO q8h lisinopril 40mg PO daily metoprolol 50mg PO q12h atorvastatin 40mg PO HS mod ISS salt 2g PO q6h fentanyl PRN     IV FLUIDS: NS@75  DRIPS: dexmedetomidine (0.7) midazolam (0.07 mg/kg/hr)  DIET: NPO, Jevity   Lines: Lizabeth [place] rectal  (300)  Drains:   EVD @ 5cm H20, 273 cc/24h ICP 1-17  Wounds:    CODE STATUS:  Full Code                       GOALS OF CARE:  aggressive                      DISPOSITION:  ICU  ICH Score on admission  = GCS Score: 3-4 (2 pts) E1VTM2 (+) IVH = 3  ICH volume 20ml  Estimated 30-day mortality 3 points: 72%3 =================================  NEUROCRITICAL CARE ATTENDING NOTE  =================================    ARPAN RUANO   MRN-[id number]  Summary:  44y/M with no PMH presented with L UE weakness / numbness, onset at 630 p.m.; then developed dysarthria, L UE/LE weakness.  He was brought to [place] ED, n/v, SBP 200s, more altered, intubated.  Imaging showed R thalamic hemorrhage.  Transferred to [facility].    COURSE IN THE HOSPITAL:   Admitted, given DDAVP / platelets; EVD inserted, improved   04/15 fever overnight   Tmax 38.3    Past Medical History:   Allergies:  No Known Allergies  Home meds:     PHYSICAL EXAMINATION  T(C): 37.4 ( @ 05:26), Max: 38.3 ( @ 22:47) HR: 74 ( @ 07:00) (64 - 80) BP: 110/59 ( @ 09:00) (110/59 - 110/59) RR: 18 ( @ 07:00) (14 - 26) SpO2: 96% ( @ 07:00) (96% - 100%)  NEUROLOGIC EXAMINATION:  Patient is awake, alert, following commands, pupils 3mm reactive, moves R UE/LE spontaneously, L UE extends to noxious and L LE withdraws  GENERAL: intubated AC 50 400 12 +5  EENT:  anicteric  CARDIOVASCULAR: (+) S1 S2, normal rate and regular rhythm  PULMONARY: clear to auscultation bilaterally; min secretions  ABDOMEN: soft, nontender with normoactive bowel sounds; PEG site clean  EXTREMITIES: no edema  SKIN: no rash    LABS:  CAPILLARY BLOOD GLUCOSE 110 110 124    (14)      10.6   10.16 )-----------( 465      ( 2019 06:17 )             30.7     136  |  102  |  19  ----------------------------<  154<H>  4.0   |  24  |  0.76    Ca    8.6      2019 06:17  Phos  3.2       Mg     2.2     16 @ 07:01  -   @ 07:00  IN: 1944.1 mL / OUT: 2408 mL / NET: -463.9 mL    LDL = 113 ()   HDL = 70 ()  TG = 85 ()  TSH = 0.733 ()    Bacteriology:   UA NEG  04/15 CSF NGTD   Blood CS NG2d x1   CSF NGTD  04/10 Sputum MSSA H influenza S Ceftri   sputum culture numerous MSSA   Blood CS NG5D x2 (final)    CSF studies:    L   *** [id number] WBC0 *** %N0 %L0     L3.4   *** [id number] WBC37 *** %N35 %L1     EEG:  Neuroimagin/06 12:48 p.m. CT head:  s/p EVD, decrease in size of ventricles, R ICH unchanged with IV extension, punctate hyperdensity R BG R conner R thalamus, unchanged,    2:11 a.m. CT head:  large R basal ganglia / thalamic ICH, small L basal ganglia / R pontine acute hemorrhage; c/w hypertensive ICH; extension into R lateral ventricle; MLS, mass effect, no HCP  Other imaging:    MEDICATIONS: SQL 40 HS ceftriaxone 1g IV q24h levetiracetam 1G PO BID amlodipine 10mg PO daily clonidine 0.1mg PO q12h hydralazine 100mg PO q8h lisinopril 40mg PO daily metoprolol 50mg PO q12h atorvastatin 40mg PO HS mod ISS salt 2g PO q6h fentanyl PRN  pantoprazole     IV FLUIDS: IVL  DRIPS: dexmedetomidine (0.7) midazolam (0.07 mg/kg/hr)  DIET: NPO, Jevity   Lines: Lizabeth [place] rectal  (300)  Drains:   EVD @ 10cm H20, 208 cc/24h ICP 0-10  Wounds:    CODE STATUS:  Full Code                       GOALS OF CARE:  aggressive                      DISPOSITION:  ICU  ICH Score on admission  = GCS Score: 3-4 (2 pts) E1VTM2 (+) IVH = 3  ICH volume 20ml  Estimated 30-day mortality 3 points: 72%3

## 2019-04-17 NOTE — PROGRESS NOTE ADULT - SUBJECTIVE AND OBJECTIVE BOX
S/Overnight events:    EVD raised to 10  CTH stable    Hospital Course:   4/7: overnight, patient requiring large amount of cardene and propofol.  Plan today is to transition to precedex, start PO lisinopril and wean of cardene.  Becomes extremely agitated when off sedation.  4/8: Cerebral angio without findings of aneurysm. EVD stopped working, pulled back without improvement. CT Head performed.  4/9: Received ativan 6mg for agitation. EVD stopped draining at 6pm yesterday taken for stat CTH. Dr. So notified and EVD lowered to 5cmH2O without output.   4/10: s/p penumbra evacuation of ICH. Pt seen and examined at bedside postop. Remains intubated, on versed. ICPs stable.  (OR EBL 30cc, received LR 200cc)  4/11: JUSTIN overnight. Remains on versed. Remains intubated. Neuro stable.  4/12: Given ativan overnight, bucking the vent. Remains on versed drip. Neuro stable. ICPs stable. Increase hydralazine to 75 Q8.   4/13 JUSTIN overnight, febrile, urinary retention Straight Cath x 1 = 1L, neuro exam unchanged since yesterday, started Cardene gtt as per Dr. So goal -140  4/14 POD#4 febrile overnight, o/w JUSTIN overnight, EVD@ 5cm H2O, Clonidine started, Ceftriax until 4/19 Staph sputum, Versed/Precedex, straight cathed overnight  4/15: ICP stable. neuro stable. Remains on versed/precedex  4/16: Tolerating CPAP, CTH performed, decreased vent size, EVD raised to 10 cmH2O neuro stable   4/17:        Vital Signs Last 24 Hrs  T(C): 38.1 (17 Apr 2019 00:36), Max: 38.3 (16 Apr 2019 22:47)  T(F): 100.6 (17 Apr 2019 00:36), Max: 100.9 (16 Apr 2019 22:47)  HR: 72 (16 Apr 2019 23:00) (66 - 83)  BP: 110/59 (16 Apr 2019 09:00) (110/59 - 151/92)  BP(mean): 81 (16 Apr 2019 09:00) (81 - 120)  RR: 17 (16 Apr 2019 23:00) (15 - 26)  SpO2: 99% (16 Apr 2019 23:00) (95% - 100%)    I&O's Detail    15 Apr 2019 07:01  -  16 Apr 2019 07:00  --------------------------------------------------------  IN:    dexmedetomidine Infusion: 308.6 mL    IV PiggyBack: 50 mL    midazolam Infusion: 90 mL    midazolam Infusion: 35 mL    ns in tub fed  ydylld42: 67 mL    sodium chloride 0.9%: 100 mL    sodium chloride 0.9%: 1725 mL  Total IN: 2375.6 mL    OUT:    External Ventricular Device: 273 mL    Incontinent per Condom Catheter: 2750 mL    Rectal Tube: 300 mL  Total OUT: 3323 mL    Total NET: -947.4 mL      16 Apr 2019 07:01  -  17 Apr 2019 00:37  --------------------------------------------------------  IN:    dexmedetomidine Infusion: 82.6 mL    midazolam Infusion: 20 mL    ns in tub fed  snames15: 268 mL    sodium chloride 0.9%: 750 mL  Total IN: 1120.6 mL    OUT:    External Ventricular Device: 156 mL    Incontinent per Condom Catheter: 1000 mL  Total OUT: 1156 mL    Total NET: -35.4 mL        I&O's Summary    15 Apr 2019 07:01  -  16 Apr 2019 07:00  --------------------------------------------------------  IN: 2375.6 mL / OUT: 3323 mL / NET: -947.4 mL    16 Apr 2019 07:01  -  17 Apr 2019 00:37  --------------------------------------------------------  IN: 1120.6 mL / OUT: 1156 mL / NET: -35.4 mL        PHYSICAL EXAM:  Neurological:  intubated, EVD site c/d/i  PERRL  Clear b/l  S1, S2. NSR  Soft, NT/ND. +BS  Pulses 2+ throughout  Neuro: localizing on RUE. Withdrawal to noxious stimuli b/l LE, LUE. No eye opening. following commands intermittently (shows 2 fingers and wiggles toes on right,) PERRL, +corneals, +gag    TUBES/LINES:  [x] CVC  [x] A-line  [] Lumbar Drain  [x] Ventriculostomy  [] Other    DIET:  [] NPO  [] Mechanical  [x] Tube feeds    LABS:                        11.6   14.16 )-----------( 480      ( 16 Apr 2019 05:58 )             33.9     04-16    134<L>  |  99  |  16  ----------------------------<  132<H>  4.0   |  24  |  0.68    Ca    8.8      16 Apr 2019 05:58  Phos  3.4     04-16  Mg     2.0     04-16    TPro  7.3  /  Alb  2.9<L>  /  TBili  0.2  /  DBili  <0.2  /  AST  36  /  ALT  38  /  AlkPhos  81  04-15            CAPILLARY BLOOD GLUCOSE      POCT Blood Glucose.: 110 mg/dL (16 Apr 2019 20:49)  POCT Blood Glucose.: 110 mg/dL (16 Apr 2019 16:19)  POCT Blood Glucose.: 124 mg/dL (16 Apr 2019 11:12)  POCT Blood Glucose.: 102 mg/dL (16 Apr 2019 05:42)      Drug Levels: [] N/A  Vancomycin Level, Trough: 6.1 ug/mL (04-11 @ 22:40)    CSF Analysis: [] N/A      Allergies    No Known Allergies    Intolerances      MEDICATIONS:  Antibiotics:  cefTRIAXone   IVPB 1 Gram(s) IV Intermittent every 24 hours    Neuro:  acetaminophen    Suspension .. 650 milliGRAM(s) Oral every 6 hours PRN  acetaminophen  IVPB .. 1000 milliGRAM(s) IV Intermittent once  dexmedetomidine Infusion 0.1 MICROgram(s)/kG/Hr IV Continuous <Continuous>  fentaNYL    Injectable 100 MICROGram(s) IV Push every 2 hours PRN  fentaNYL    Injectable 75 MICROGram(s) IV Push every 2 hours PRN  fentaNYL    Injectable 50 MICROGram(s) IV Push every 2 hours PRN  levETIRAcetam  Solution 1000 milliGRAM(s) Enteral Tube two times a day  LORazepam   Injectable 4 milliGRAM(s) IV Push every 4 hours PRN  LORazepam   Injectable 2 milliGRAM(s) IV Push every 4 hours PRN  LORazepam   Injectable 1 milliGRAM(s) IV Push every 4 hours PRN  midazolam Infusion 0.02 mG/kG/Hr IV Continuous <Continuous>  ondansetron Injectable 4 milliGRAM(s) IV Push every 6 hours PRN    Anticoagulation:  enoxaparin Injectable 40 milliGRAM(s) SubCutaneous at bedtime    OTHER:  amLODIPine   Tablet 10 milliGRAM(s) Oral daily  atorvastatin 40 milliGRAM(s) Oral at bedtime  chlorhexidine 2% Cloths 1 Application(s) Topical <User Schedule>  cloNIDine 0.1 milliGRAM(s) Oral every 12 hours  dextrose 40% Gel 15 Gram(s) Oral once PRN  dextrose 50% Injectable 12.5 Gram(s) IV Push once  dextrose 50% Injectable 25 Gram(s) IV Push once  dextrose 50% Injectable 25 Gram(s) IV Push once  glucagon  Injectable 1 milliGRAM(s) IntraMuscular once PRN  hydrALAZINE 100 milliGRAM(s) Oral every 8 hours  hydrALAZINE Injectable 10 milliGRAM(s) IV Push every 4 hours PRN  influenza   Vaccine 0.5 milliLiter(s) IntraMuscular once  insulin lispro (HumaLOG) corrective regimen sliding scale   SubCutaneous Before meals and at bedtime  lisinopril 40 milliGRAM(s) Oral daily  metoprolol tartrate 50 milliGRAM(s) Oral every 12 hours  pantoprazole   Suspension 40 milliGRAM(s) Oral daily    IVF:  dextrose 5%. 1000 milliLiter(s) IV Continuous <Continuous>  sodium chloride 2 Gram(s) Oral every 6 hours    CULTURES:  Culture Results:   No growth to date (04-15 @ 08:14)  Culture Results:   No growth at 2 days. (04-14 @ 22:29)    RADIOLOGY & ADDITIONAL TESTS:  < from: CT Head No Cont (04.16.19 @ 12:04) >  IMPRESSION:   1. Interval development of slightly greater amounts of hemorrhage in the   right thalamic region and in the right temporal and inferior parietal   subcortical white matter.  2. Less IVH and no change in left catheter position with slight decrease   in ventricular size.  3. Expected evolution in intraventricular clot at the level of right   frontal horn and diminished hemorrhage along the prior right   periventricular catheter tract.  4. Greater amount of subcutaneous soft tissue swelling extrinsic to right   parietal craniotomy.    < end of copied text >      ASSESSMENT:  44y Male w/ ETOH abuse now s/p right thalamic IPH with IVH s/p Emergent Rt EVD placement 4/6/19, s/p Cerebral angiogram neg 4/8/19, new left EVD placed and R EVD removed 4/9/19, now s/p penumbra evacuation of ICH 4/10/19    HEMORRHAGE STROKE  HEMORRHAGE  Handoff  ICH (intracerebral hemorrhage)  ICH (intracerebral hemorrhage)  Open tracheostomy  Bronchoscopy, adult  EGD, with PEG  Craniotomy for intracranial hemorrhage  Evacuation of hematoma of face  Angiogram, carotid and cerebral, bilateral      PLAN:  NEURO:    CARDIOVASCULAR:    PULMONARY:    RENAL:    GI:    HEME:    ID:    ENDO:    DVT PROPHYLAXIS:  [] Venodynes                                [] Heparin/Lovenox    FALL RISK:  [] Low Risk                                    [] Impulsive    DISPOSITION:       Assessment:  Present when checked    []  GCS  E   V  M     Heart Failure: []Acute, [] acute on chronic , []chronic  Heart Failure:  [] Diastolic (HFpEF), [] Systolic (HFrEF), []Combined (HFpEF and HFrEF), [] RHF, [] Pulm HTN, [] Other    [] KRISTA, [] ATN, [] AIN, [] other  [] CKD1, [] CKD2, [] CKD 3, [] CKD 4, [] CKD 5, []ESRD    Encephalopathy: [] Metabolic, [] Hepatic, [] toxic, [] Neurological, [] Other    Abnormal Nurtitional Status: [] malnurtition (see nutrition note), [ ]underweight: BMI < 19, [] morbid obesity: BMI >40, [] Cachexia    [] Sepsis  [] hypovolemic shock,[] cardiogenic shock, [] hemorrhagic shock, [] neuogenic shock  [] Acute Respiratory Failure  []Cerebral edema, [] Brain compression/ herniation,   [] Functional quadriplegia  [] Acute blood loss anemia S/Overnight events:    EVD raised to 10  CTH stable    Hospital Course:   4/7: overnight, patient requiring large amount of cardene and propofol.  Plan today is to transition to precedex, start PO lisinopril and wean of cardene.  Becomes extremely agitated when off sedation.  4/8: Cerebral angio without findings of aneurysm. EVD stopped working, pulled back without improvement. CT Head performed.  4/9: Received ativan 6mg for agitation. EVD stopped draining at 6pm yesterday taken for stat CTH. Dr. So notified and EVD lowered to 5cmH2O without output.   4/10: s/p penumbra evacuation of ICH. Pt seen and examined at bedside postop. Remains intubated, on versed. ICPs stable.  (OR EBL 30cc, received LR 200cc)  4/11: JUSTIN overnight. Remains on versed. Remains intubated. Neuro stable.  4/12: Given ativan overnight, bucking the vent. Remains on versed drip. Neuro stable. ICPs stable. Increase hydralazine to 75 Q8.   4/13 JUSTIN overnight, febrile, urinary retention Straight Cath x 1 = 1L, neuro exam unchanged since yesterday, started Cardene gtt as per Dr. So goal -140  4/14 POD#4 febrile overnight, o/w JUSTIN overnight, EVD@ 5cm H2O, Clonidine started, Ceftriax until 4/19 Staph sputum, Versed/Precedex, straight cathed overnight  4/15: ICP stable. neuro stable. Remains on versed/precedex  4/16: Tolerating CPAP, CTH performed, decreased vent size, EVD raised to 10 cmH2O neuro stable   4/17:        Vital Signs Last 24 Hrs  T(C): 38.1 (17 Apr 2019 00:36), Max: 38.3 (16 Apr 2019 22:47)  T(F): 100.6 (17 Apr 2019 00:36), Max: 100.9 (16 Apr 2019 22:47)  HR: 72 (16 Apr 2019 23:00) (66 - 83)  BP: 110/59 (16 Apr 2019 09:00) (110/59 - 151/92)  BP(mean): 81 (16 Apr 2019 09:00) (81 - 120)  RR: 17 (16 Apr 2019 23:00) (15 - 26)  SpO2: 99% (16 Apr 2019 23:00) (95% - 100%)    I&O's Detail    15 Apr 2019 07:01  -  16 Apr 2019 07:00  --------------------------------------------------------  IN:    dexmedetomidine Infusion: 308.6 mL    IV PiggyBack: 50 mL    midazolam Infusion: 90 mL    midazolam Infusion: 35 mL    ns in tub fed  [id number]: 67 mL    sodium chloride 0.9%: 100 mL    sodium chloride 0.9%: 1725 mL  Total IN: 2375.6 mL    OUT:    External Ventricular Device: 273 mL    Incontinent per Condom Catheter: 2750 mL    Rectal Tube: 300 mL  Total OUT: 3323 mL    Total NET: -947.4 mL      16 Apr 2019 07:01  -  17 Apr 2019 00:37  --------------------------------------------------------  IN:    dexmedetomidine Infusion: 82.6 mL    midazolam Infusion: 20 mL    ns in tub fed  [id number]: 268 mL    sodium chloride 0.9%: 750 mL  Total IN: 1120.6 mL    OUT:    External Ventricular Device: 156 mL    Incontinent per Condom Catheter: 1000 mL  Total OUT: 1156 mL    Total NET: -35.4 mL        I&O's Summary    15 Apr 2019 07:01  -  16 Apr 2019 07:00  --------------------------------------------------------  IN: 2375.6 mL / OUT: 3323 mL / NET: -947.4 mL    16 Apr 2019 07:01  -  17 Apr 2019 00:37  --------------------------------------------------------  IN: 1120.6 mL / OUT: 1156 mL / NET: -35.4 mL        PHYSICAL EXAM:  Neurological:  intubated, EVD site c/d/i  PERRL  Clear b/l  S1, S2. NSR  Soft, NT/ND. +BS  Pulses 2+ throughout  Neuro: localizing on RUE. Withdrawal to noxious stimuli b/l LE, LUE. No eye opening. following commands intermittently (shows 2 fingers and wiggles toes on right,) PERRL, +corneals, +gag    TUBES/LINES:  [x] CVC  [x] A-line  [] Lumbar Drain  [x] Ventriculostomy  [] Other    DIET:  [] NPO  [] Mechanical  [x] Tube feeds    LABS:                        11.6   14.16 )-----------( 480      ( 16 Apr 2019 05:58 )             33.9     04-16    134<L>  |  99  |  16  ----------------------------<  132<H>  4.0   |  24  |  0.68    Ca    8.8      16 Apr 2019 05:58  Phos  3.4     04-16  Mg     2.0     04-16    TPro  7.3  /  Alb  2.9<L>  /  TBili  0.2  /  DBili  <0.2  /  AST  36  /  ALT  38  /  AlkPhos  81  04-15            CAPILLARY BLOOD GLUCOSE      POCT Blood Glucose.: 110 mg/dL (16 Apr 2019 20:49)  POCT Blood Glucose.: 110 mg/dL (16 Apr 2019 16:19)  POCT Blood Glucose.: 124 mg/dL (16 Apr 2019 11:12)  POCT Blood Glucose.: 102 mg/dL (16 Apr 2019 05:42)      Drug Levels: [] N/A  Vancomycin Level, Trough: 6.1 ug/mL (04-11 @ 22:40)    CSF Analysis: [] N/A      Allergies    No Known Allergies    Intolerances      MEDICATIONS:  Antibiotics:  cefTRIAXone   IVPB 1 Gram(s) IV Intermittent every 24 hours    Neuro:  acetaminophen    Suspension .. 650 milliGRAM(s) Oral every 6 hours PRN  acetaminophen  IVPB .. 1000 milliGRAM(s) IV Intermittent once  dexmedetomidine Infusion 0.1 MICROgram(s)/kG/Hr IV Continuous <Continuous>  fentaNYL    Injectable 100 MICROGram(s) IV Push every 2 hours PRN  fentaNYL    Injectable 75 MICROGram(s) IV Push every 2 hours PRN  fentaNYL    Injectable 50 MICROGram(s) IV Push every 2 hours PRN  levETIRAcetam  Solution 1000 milliGRAM(s) Enteral Tube two times a day  LORazepam   Injectable 4 milliGRAM(s) IV Push every 4 hours PRN  LORazepam   Injectable 2 milliGRAM(s) IV Push every 4 hours PRN  LORazepam   Injectable 1 milliGRAM(s) IV Push every 4 hours PRN  midazolam Infusion 0.02 mG/kG/Hr IV Continuous <Continuous>  ondansetron Injectable 4 milliGRAM(s) IV Push every 6 hours PRN    Anticoagulation:  enoxaparin Injectable 40 milliGRAM(s) SubCutaneous at bedtime    OTHER:  amLODIPine   Tablet 10 milliGRAM(s) Oral daily  atorvastatin 40 milliGRAM(s) Oral at bedtime  chlorhexidine 2% Cloths 1 Application(s) Topical <User Schedule>  cloNIDine 0.1 milliGRAM(s) Oral every 12 hours  dextrose 40% Gel 15 Gram(s) Oral once PRN  dextrose 50% Injectable 12.5 Gram(s) IV Push once  dextrose 50% Injectable 25 Gram(s) IV Push once  dextrose 50% Injectable 25 Gram(s) IV Push once  glucagon  Injectable 1 milliGRAM(s) IntraMuscular once PRN  hydrALAZINE 100 milliGRAM(s) Oral every 8 hours  hydrALAZINE Injectable 10 milliGRAM(s) IV Push every 4 hours PRN  influenza   Vaccine 0.5 milliLiter(s) IntraMuscular once  insulin lispro (HumaLOG) corrective regimen sliding scale   SubCutaneous Before meals and at bedtime  lisinopril 40 milliGRAM(s) Oral daily  metoprolol tartrate 50 milliGRAM(s) Oral every 12 hours  pantoprazole   Suspension 40 milliGRAM(s) Oral daily    IVF:  dextrose 5%. 1000 milliLiter(s) IV Continuous <Continuous>  sodium chloride 2 Gram(s) Oral every 6 hours    CULTURES:  Culture Results:   No growth to date (04-15 @ 08:14)  Culture Results:   No growth at 2 days. (04-14 @ 22:29)    RADIOLOGY & ADDITIONAL TESTS:  < from: CT Head No Cont (04.16.19 @ 12:04) >  IMPRESSION:   1. Interval development of slightly greater amounts of hemorrhage in the   right thalamic region and in the right temporal and inferior parietal   subcortical white matter.  2. Less IVH and no change in left catheter position with slight decrease   in ventricular size.  3. Expected evolution in intraventricular clot at the level of right   frontal horn and diminished hemorrhage along the prior right   periventricular catheter tract.  4. Greater amount of subcutaneous soft tissue swelling extrinsic to right   parietal craniotomy.    < end of copied text >      ASSESSMENT:  44y Male w/ ETOH abuse now s/p right thalamic IPH with IVH s/p Emergent Rt EVD placement 4/6/19, s/p Cerebral angiogram neg 4/8/19, new left EVD placed and R EVD removed 4/9/19, now s/p penumbra evacuation of ICH 4/10/19    HEMORRHAGE STROKE  HEMORRHAGE  Handoff  ICH (intracerebral hemorrhage)  ICH (intracerebral hemorrhage)  Open tracheostomy  Bronchoscopy, adult  EGD, with PEG  Craniotomy for intracranial hemorrhage  Evacuation of hematoma of face  Angiogram, carotid and cerebral, bilateral      PLAN:  NEURO:  Neuro checks q1h  Keppra for seizure prophylaxis  continue precedex and wean versed    Continue fentanyl and ativan PRN agitation  Keep left EVD at [id number], monitor ICPs and output  CTH reviewed with Dr. So   Clonidine .1 BID    CARDIOVASCULAR:   SBP goal <140  Continue lopressor 50mg BID, norvasc 10mg daily, and lisinopril 40mg daily, hydralazine 100mg Q8h  Daily labs, electrolyte repletion PRN,  Continue Statin therapy  Right IJ in place    PULMONARY:   Continue ventilator support, wean to trach collar  Daily CXR    RENAL:   Voiding, condom catheter, straight Cath PRN  Normal Na goal    GI:   s/p Peg  Stool softeners PRN  PPI while intubated    ID:  Ceftriax PNA until 4/19    ENDO:   ISS    DVT PROPHYLAXIS:   SCDs, SQL     DISPOSITION: Continue NSICU care,  Full code,  D/w Dr. So and Dr. Schultz    DISPOSITION:       Assessment:  Present when checked    []  GCS  E   V  M     Heart Failure: []Acute, [] acute on chronic , []chronic  Heart Failure:  [] Diastolic (HFpEF), [] Systolic (HFrEF), []Combined (HFpEF and HFrEF), [] RHF, [] Pulm HTN, [] Other    [] KRISTA, [] ATN, [] AIN, [] other  [] CKD1, [] CKD2, [] CKD 3, [] CKD 4, [] CKD 5, []ESRD    Encephalopathy: [] Metabolic, [] Hepatic, [] toxic, [x] Neurological, [] Other    Abnormal Nurtitional Status: [] malnurtition (see nutrition note), [ ]underweight: BMI < 19, [] morbid obesity: BMI >40, [] Cachexia    [] Sepsis  [] hypovolemic shock,[] cardiogenic shock, [] hemorrhagic shock, [] neuogenic shock  [x] Acute Respiratory Failure  [x]Cerebral edema, [] Brain compression/ herniation,   [] Functional quadriplegia  [] Acute blood loss anemia

## 2019-04-17 NOTE — PROGRESS NOTE ADULT - ASSESSMENT
44y Male w/ ETOH abuse now s/p right thalamic IPH with IVH s/p Emergent Rt EVD placement 4/6/19, s/p Cerebral angiogram neg 4/8/19, new left EVD placed and R EVD removed 4/9/19, now s/p penumbra evacuation of ICH 4/10/19, Day 2 S/p Trach and PEG, tolerating feeds at goal    Recs:  - No acute surgical intervention  - Trach in place, change dressing as needed, vent management per primary team  - Tolerating feeds at goal  - Please keep abdominal binder to protect PEG from inadvertent dislodgment   - Will discuss with  and sign off

## 2019-04-18 LAB
ANION GAP SERPL CALC-SCNC: 12 MMOL/L — SIGNIFICANT CHANGE UP (ref 5–17)
APPEARANCE UR: CLEAR — SIGNIFICANT CHANGE UP
BILIRUB UR-MCNC: NEGATIVE — SIGNIFICANT CHANGE UP
BUN SERPL-MCNC: 15 MG/DL — SIGNIFICANT CHANGE UP (ref 7–23)
CALCIUM SERPL-MCNC: 8.8 MG/DL — SIGNIFICANT CHANGE UP (ref 8.4–10.5)
CHLORIDE SERPL-SCNC: 103 MMOL/L — SIGNIFICANT CHANGE UP (ref 96–108)
CO2 SERPL-SCNC: 22 MMOL/L — SIGNIFICANT CHANGE UP (ref 22–31)
COLOR SPEC: YELLOW — SIGNIFICANT CHANGE UP
CREAT SERPL-MCNC: 0.71 MG/DL — SIGNIFICANT CHANGE UP (ref 0.5–1.3)
DIFF PNL FLD: NEGATIVE — SIGNIFICANT CHANGE UP
GLUCOSE SERPL-MCNC: 148 MG/DL — HIGH (ref 70–99)
GLUCOSE UR QL: NEGATIVE — SIGNIFICANT CHANGE UP
HCT VFR BLD CALC: 32.5 % — LOW (ref 39–50)
HGB BLD-MCNC: 11 G/DL — LOW (ref 13–17)
KETONES UR-MCNC: NEGATIVE — SIGNIFICANT CHANGE UP
LEUKOCYTE ESTERASE UR-ACNC: NEGATIVE — SIGNIFICANT CHANGE UP
MAGNESIUM SERPL-MCNC: 2.1 MG/DL — SIGNIFICANT CHANGE UP (ref 1.6–2.6)
MCHC RBC-ENTMCNC: 31.7 PG — SIGNIFICANT CHANGE UP (ref 27–34)
MCHC RBC-ENTMCNC: 33.8 GM/DL — SIGNIFICANT CHANGE UP (ref 32–36)
MCV RBC AUTO: 93.7 FL — SIGNIFICANT CHANGE UP (ref 80–100)
NITRITE UR-MCNC: NEGATIVE — SIGNIFICANT CHANGE UP
NRBC # BLD: 0 /100 WBCS — SIGNIFICANT CHANGE UP (ref 0–0)
PH UR: 6 — SIGNIFICANT CHANGE UP (ref 5–8)
PHOSPHATE SERPL-MCNC: 2.6 MG/DL — SIGNIFICANT CHANGE UP (ref 2.5–4.5)
PLATELET # BLD AUTO: 546 K/UL — HIGH (ref 150–400)
POTASSIUM SERPL-MCNC: 4.3 MMOL/L — SIGNIFICANT CHANGE UP (ref 3.5–5.3)
POTASSIUM SERPL-SCNC: 4.3 MMOL/L — SIGNIFICANT CHANGE UP (ref 3.5–5.3)
PROT UR-MCNC: ABNORMAL MG/DL
RBC # BLD: 3.47 M/UL — LOW (ref 4.2–5.8)
RBC # FLD: 11.9 % — SIGNIFICANT CHANGE UP (ref 10.3–14.5)
SODIUM SERPL-SCNC: 137 MMOL/L — SIGNIFICANT CHANGE UP (ref 135–145)
SP GR SPEC: 1.02 — SIGNIFICANT CHANGE UP (ref 1–1.03)
UROBILINOGEN FLD QL: 0.2 E.U./DL — SIGNIFICANT CHANGE UP
WBC # BLD: 14.06 K/UL — HIGH (ref 3.8–10.5)
WBC # FLD AUTO: 14.06 K/UL — HIGH (ref 3.8–10.5)

## 2019-04-18 PROCEDURE — 71045 X-RAY EXAM CHEST 1 VIEW: CPT | Mod: 26

## 2019-04-18 PROCEDURE — 99291 CRITICAL CARE FIRST HOUR: CPT | Mod: 24

## 2019-04-18 RX ORDER — CEFTRIAXONE 500 MG/1
2 INJECTION, POWDER, FOR SOLUTION INTRAMUSCULAR; INTRAVENOUS EVERY 24 HOURS
Qty: 0 | Refills: 0 | Status: DISCONTINUED | OUTPATIENT
Start: 2019-04-18 | End: 2019-04-23

## 2019-04-18 RX ADMIN — FENTANYL CITRATE 50 MICROGRAM(S): 50 INJECTION INTRAVENOUS at 14:02

## 2019-04-18 RX ADMIN — Medication 100 MILLIGRAM(S): at 21:41

## 2019-04-18 RX ADMIN — AMLODIPINE BESYLATE 10 MILLIGRAM(S): 2.5 TABLET ORAL at 05:42

## 2019-04-18 RX ADMIN — Medication 650 MILLIGRAM(S): at 07:21

## 2019-04-18 RX ADMIN — DEXMEDETOMIDINE HYDROCHLORIDE IN 0.9% SODIUM CHLORIDE 1.8 MICROGRAM(S)/KG/HR: 4 INJECTION INTRAVENOUS at 04:16

## 2019-04-18 RX ADMIN — PANTOPRAZOLE SODIUM 40 MILLIGRAM(S): 20 TABLET, DELAYED RELEASE ORAL at 12:47

## 2019-04-18 RX ADMIN — ATORVASTATIN CALCIUM 40 MILLIGRAM(S): 80 TABLET, FILM COATED ORAL at 21:41

## 2019-04-18 RX ADMIN — Medication 10 MILLIGRAM(S): at 17:00

## 2019-04-18 RX ADMIN — Medication 2: at 23:07

## 2019-04-18 RX ADMIN — FENTANYL CITRATE 50 MICROGRAM(S): 50 INJECTION INTRAVENOUS at 06:00

## 2019-04-18 RX ADMIN — Medication 100 MILLIGRAM(S): at 05:41

## 2019-04-18 RX ADMIN — Medication 75 MILLIGRAM(S): at 05:41

## 2019-04-18 RX ADMIN — SODIUM CHLORIDE 2 GRAM(S): 9 INJECTION INTRAMUSCULAR; INTRAVENOUS; SUBCUTANEOUS at 23:08

## 2019-04-18 RX ADMIN — SODIUM CHLORIDE 2 GRAM(S): 9 INJECTION INTRAMUSCULAR; INTRAVENOUS; SUBCUTANEOUS at 05:41

## 2019-04-18 RX ADMIN — Medication 100 MILLIGRAM(S): at 15:00

## 2019-04-18 RX ADMIN — Medication 0.2 MILLIGRAM(S): at 17:26

## 2019-04-18 RX ADMIN — SODIUM CHLORIDE 2 GRAM(S): 9 INJECTION INTRAMUSCULAR; INTRAVENOUS; SUBCUTANEOUS at 12:47

## 2019-04-18 RX ADMIN — Medication 0.2 MILLIGRAM(S): at 05:41

## 2019-04-18 RX ADMIN — LISINOPRIL 40 MILLIGRAM(S): 2.5 TABLET ORAL at 05:41

## 2019-04-18 RX ADMIN — Medication 75 MILLIGRAM(S): at 17:26

## 2019-04-18 RX ADMIN — FENTANYL CITRATE 50 MICROGRAM(S): 50 INJECTION INTRAVENOUS at 05:40

## 2019-04-18 RX ADMIN — CHLORHEXIDINE GLUCONATE 1 APPLICATION(S): 213 SOLUTION TOPICAL at 05:42

## 2019-04-18 RX ADMIN — DEXMEDETOMIDINE HYDROCHLORIDE IN 0.9% SODIUM CHLORIDE 1.8 MICROGRAM(S)/KG/HR: 4 INJECTION INTRAVENOUS at 23:08

## 2019-04-18 RX ADMIN — FENTANYL CITRATE 50 MICROGRAM(S): 50 INJECTION INTRAVENOUS at 12:46

## 2019-04-18 RX ADMIN — DEXMEDETOMIDINE HYDROCHLORIDE IN 0.9% SODIUM CHLORIDE 1.8 MICROGRAM(S)/KG/HR: 4 INJECTION INTRAVENOUS at 09:39

## 2019-04-18 RX ADMIN — DEXMEDETOMIDINE HYDROCHLORIDE IN 0.9% SODIUM CHLORIDE 1.8 MICROGRAM(S)/KG/HR: 4 INJECTION INTRAVENOUS at 15:01

## 2019-04-18 RX ADMIN — ENOXAPARIN SODIUM 40 MILLIGRAM(S): 100 INJECTION SUBCUTANEOUS at 21:41

## 2019-04-18 RX ADMIN — DEXMEDETOMIDINE HYDROCHLORIDE IN 0.9% SODIUM CHLORIDE 1.8 MICROGRAM(S)/KG/HR: 4 INJECTION INTRAVENOUS at 17:27

## 2019-04-18 RX ADMIN — CEFTRIAXONE 100 GRAM(S): 500 INJECTION, POWDER, FOR SOLUTION INTRAMUSCULAR; INTRAVENOUS at 09:39

## 2019-04-18 RX ADMIN — Medication 2 MILLIGRAM(S): at 15:09

## 2019-04-18 RX ADMIN — LEVETIRACETAM 1000 MILLIGRAM(S): 250 TABLET, FILM COATED ORAL at 05:41

## 2019-04-18 RX ADMIN — LEVETIRACETAM 1000 MILLIGRAM(S): 250 TABLET, FILM COATED ORAL at 17:28

## 2019-04-18 RX ADMIN — DEXMEDETOMIDINE HYDROCHLORIDE IN 0.9% SODIUM CHLORIDE 1.8 MICROGRAM(S)/KG/HR: 4 INJECTION INTRAVENOUS at 00:28

## 2019-04-18 RX ADMIN — Medication 650 MILLIGRAM(S): at 08:54

## 2019-04-18 RX ADMIN — Medication 10 MILLIGRAM(S): at 13:20

## 2019-04-18 RX ADMIN — SODIUM CHLORIDE 2 GRAM(S): 9 INJECTION INTRAMUSCULAR; INTRAVENOUS; SUBCUTANEOUS at 17:24

## 2019-04-18 NOTE — PROGRESS NOTE ADULT - ASSESSMENT
44y/M with   1.  intracerebral hemorrhage (R basal ganglia / thalamic; small L basal ganglia, R pontine), brain compression, cerebral edema; s/p R crani, endoscopic evacuation of ICH (04/10/2019, Dr. So)  2.  dyslipidemia    PLAN:   NEURO: neurochecks q1h, PRN pain meds with Tylenol / fentanyl PRN; continue precedex RASS 0 to -1  ICH:  BP control; no aneurysm on formal angio (04/08)  seizure prophylaxis: as per neurosurgery  EVD: monitor output, CT this morning then clamp today   REHAB:  physical therapy evaluation and management    EARLY MOB:   HOB up     PULM:  s/p trach, CPAP wean to trach collar  CARDIO:  SBP goal 100-160mm Hg, cont lisinopril 40 mg PO daily, amlodipine, lopressor 75 BID, clonidine 0.2 BID; hydrazaline 100 q8h  ENDO:  Blood sugar goals 140-180 mg/dL, continue insulin sliding scale; atorvastatin 40mg PO daily, A1C  4.9  GI:  docusate senna; PPI for GI prophylaxis   DIET: Jevity continue tube feeds  RENAL:  Na goal 135-145, IVL; continue salt tabs  HEM/ONC: Hb stable; given DDAVP and platelet transfusion for aspirin reversal  VTE Prophylaxis: SCDs, SQL  ID: afebrile, no leukocytosis, increase ceftriaxone 2G daily (last day 04/19); panculture q3d if fever spikes  Social: will update family; sister freeman (surrogate decision maker), father, common law wife who is pregnant    ATTENDING ATTESTATION:  I was physically present for the key portions of the evaluation and management (E/M) service provided.  I agree with the above history, physical and plan, which I have reviewed and edited where appropriate.    Patient at high risk for neurological deterioration or death due to:  ICU delirium, aspiration PNA, DVT / PE.  Critical care time, excluding procedures: 60 minutes spent on total encounter, more than 50% of the visit was spent counseling and/or coordinating care by the attending physician.     Plan discussed with RN, house staff.

## 2019-04-18 NOTE — PROGRESS NOTE ADULT - SUBJECTIVE AND OBJECTIVE BOX
=================================  NEUROCRITICAL CARE ATTENDING NOTE  =================================    ARPAN RUANO   MRN-[id number]  Summary:  44y/M with no PMH presented with L UE weakness / numbness, onset at 630 p.m.; then developed dysarthria, L UE/LE weakness.  He was brought to [place] ED, n/v, SBP 200s, more altered, intubated.  Imaging showed R thalamic hemorrhage.  Transferred to [facility].    COURSE IN THE HOSPITAL:   Admitted, given DDAVP / platelets; EVD inserted, improved   04/15 fever overnight   Tmax 38.3       Past Medical History:   Allergies:  No Known Allergies  Home meds:     PHYSICAL EXAMINATION  T(C): 38.4 ( @ 05:50), Max: 38.4 ( @ 05:50) HR: 70 ( @ 07:00) (62 - 90) BP: 120/81 ( @ 06:00) (116/62 - 157/97) RR: 17 ( @ 07:00) (12 - 27) SpO2: 97% ( @ 07:00) (95% - 99%)  NEUROLOGIC EXAMINATION:  Patient opens eyes spontaneously, following commands, pupils 3mm reactive, downward gaze; moves R UE/LE spontaneously, L UE extends to noxious and L LE withdraws  GENERAL: intubated CPAP 10/5  EENT:  anicteric  CARDIOVASCULAR: (+) S1 S2, normal rate and regular rhythm  PULMONARY: clear to auscultation bilaterally; mod secretions  ABDOMEN: soft, nontender with normoactive bowel sounds; PEG site clean  EXTREMITIES: no edema  SKIN: no rash    LABS:  CAPILLARY BLOOD GLUCOSE 144 126 130     (10)     11.0   14.06 )-----------( 546      ( 2019 05:34 )             32.5     137  |  103  |  15  ----------------------------<  148<H>  4.3   |  22  |  0.71    Ca    8.8      2019 05:34  Phos  2.6     -  Mg     2.1      @ 07:01  -   @ 07:00  IN: 1974.8 mL / OUT: 3115 mL / NET: -1140.2 mL    LDL = 113 ()   HDL = 70 ()  TG = 85 ()  TSH = 0.733 ()    Bacteriology:   UA NEG  04/15 CSF NGTD   Blood CS NG3D x1   CSF NGTD  04/10 Sputum MSSA H influenza S Ceftri   sputum culture numerous MSSA   Blood CS NG5D x2 (final)    CSF studies:    L   *** [id number] WBC0 *** %N0 %L0     L3.4   *** [id number] WBC37 *** %N35 %L1     EEG:  Neuroimagin/06 12:48 p.m. CT head:  s/p EVD, decrease in size of ventricles, R ICH unchanged with IV extension, punctate hyperdensity R BG R conner R thalamus, unchanged,    2:11 a.m. CT head:  large R basal ganglia / thalamic ICH, small L basal ganglia / R pontine acute hemorrhage; c/w hypertensive ICH; extension into R lateral ventricle; MLS, mass effect, no HCP  Other imaging:    MEDICATIONS: SQL 40 HS ceftriaxone 1g q24h levetiracetam 1G PO BID amlodipine 10mg PO daily clonidine 0.2mg PO q23h hydralazine 100mg PO q8h lisinopril 40mg PO daily metoprolol 75 mg PO q12h atorvastatin 40mg PO HS mod ISS salt 2g PO q6h ativan PRN     IV FLUIDS: IVL  DRIPS: dexmedetomidine (0.8)   DIET: Jevity at 67cc/hr  Lines: Lizabeth Watson rectal  Drains:   EVD @ 15cm H20, 15 cc/24h ICP 1-16  Wounds:    CODE STATUS:  Full Code                       GOALS OF CARE:  aggressive                      DISPOSITION:  ICU  ICH Score on admission  = GCS Score: 3-4 (2 pts) E1VTM2 (+) IVH = 3  ICH volume 20ml  Estimated 30-day mortality 3 points: 72%3

## 2019-04-18 NOTE — PROGRESS NOTE ADULT - SUBJECTIVE AND OBJECTIVE BOX
Hospital Course:   4/7: overnight, patient requiring large amount of cardene and propofol.  Plan today is to transition to precedex, start PO lisinopril and wean of cardene.  Becomes extremely agitated when off sedation.  4/8: Cerebral angio without findings of aneurysm. EVD stopped working, pulled back without improvement. CT Head performed.  4/9: Received ativan 6mg for agitation. EVD stopped draining at 6pm yesterday taken for stat CTH. Dr. So notified and EVD lowered to 5cmH2O without output.   4/10: s/p penumbra evacuation of ICH. Pt seen and examined at bedside postop. Remains intubated, on versed. ICPs stable.  (OR EBL 30cc, received LR 200cc)  4/11: JUSTIN overnight. Remains on versed. Remains intubated. Neuro stable.  4/12: Given ativan overnight, bucking the vent. Remains on versed drip. Neuro stable. ICPs stable. Increase hydralazine to 75 Q8.   4/13 JUSTIN overnight, febrile, urinary retention Straight Cath x 1 = 1L, neuro exam unchanged since yesterday, started Cardene gtt as per Dr. So goal -140  4/14 POD#4 febrile overnight, o/w JUSTIN overnight, EVD@ 5cm H2O, Clonidine started, Ceftriax until 4/19 Staph sputum, Versed/Precedex, straight cathed overnight  4/15: ICP stable. neuro stable. Remains on versed/precedex  4/16: Tolerating CPAP, CTH performed, decreased vent size, EVD raised to 10 cmH2O neuro stable   4/18:  JUSTIN overnight, EVD @15cm H2O, CPAP overnight, Ceftriax until 4/19 PNA, Precedex PRN comfort, rectal tube - diarrhea      PHYSICAL EXAM:  Neurological:  intubated, EVD site c/d/i  PERRL  Clear b/l  S1, S2. NSR  Soft, NT/ND. +BS  Pulses 2+ throughout  Neuro: localizing on RUE. Withdrawal to noxious stimuli b/l LE, LUE. No eye opening. following commands intermittently (shows 2 fingers and wiggles toes on right,) PERRL, +corneals, +gag    TUBES/LINES:  [x] CVC  [x] A-line  [] Lumbar Drain  [x] Ventriculostomy  [] Other    DIET:  [] NPO  [] Mechanical  [x] Tube feeds    LABS:        CAPILLARY BLOOD GLUCOSE      POCT Blood Glucose.: 110 mg/dL (16 Apr 2019 20:49)  POCT Blood Glucose.: 110 mg/dL (16 Apr 2019 16:19)  POCT Blood Glucose.: 124 mg/dL (16 Apr 2019 11:12)  POCT Blood Glucose.: 102 mg/dL (16 Apr 2019 05:42)      Drug Levels: [] N/A  Vancomycin Level, Trough: 6.1 ug/mL (04-11 @ 22:40)    CSF Analysis: [] N/A      Allergies    No Known Allergies    Intolerances      MEDICATIONS:  Antibiotics:  cefTRIAXone   IVPB 1 Gram(s) IV Intermittent every 24 hours    Neuro:  acetaminophen    Suspension .. 650 milliGRAM(s) Oral every 6 hours PRN  acetaminophen  IVPB .. 1000 milliGRAM(s) IV Intermittent once  dexmedetomidine Infusion 0.1 MICROgram(s)/kG/Hr IV Continuous <Continuous>  fentaNYL    Injectable 100 MICROGram(s) IV Push every 2 hours PRN  fentaNYL    Injectable 75 MICROGram(s) IV Push every 2 hours PRN  fentaNYL    Injectable 50 MICROGram(s) IV Push every 2 hours PRN  levETIRAcetam  Solution 1000 milliGRAM(s) Enteral Tube two times a day  LORazepam   Injectable 4 milliGRAM(s) IV Push every 4 hours PRN  LORazepam   Injectable 2 milliGRAM(s) IV Push every 4 hours PRN  LORazepam   Injectable 1 milliGRAM(s) IV Push every 4 hours PRN  midazolam Infusion 0.02 mG/kG/Hr IV Continuous <Continuous>  ondansetron Injectable 4 milliGRAM(s) IV Push every 6 hours PRN    Anticoagulation:  enoxaparin Injectable 40 milliGRAM(s) SubCutaneous at bedtime    OTHER:  amLODIPine   Tablet 10 milliGRAM(s) Oral daily  atorvastatin 40 milliGRAM(s) Oral at bedtime  chlorhexidine 2% Cloths 1 Application(s) Topical <User Schedule>  cloNIDine 0.1 milliGRAM(s) Oral every 12 hours  dextrose 40% Gel 15 Gram(s) Oral once PRN  dextrose 50% Injectable 12.5 Gram(s) IV Push once  dextrose 50% Injectable 25 Gram(s) IV Push once  dextrose 50% Injectable 25 Gram(s) IV Push once  glucagon  Injectable 1 milliGRAM(s) IntraMuscular once PRN  hydrALAZINE 100 milliGRAM(s) Oral every 8 hours  hydrALAZINE Injectable 10 milliGRAM(s) IV Push every 4 hours PRN  influenza   Vaccine 0.5 milliLiter(s) IntraMuscular once  insulin lispro (HumaLOG) corrective regimen sliding scale   SubCutaneous Before meals and at bedtime  lisinopril 40 milliGRAM(s) Oral daily  metoprolol tartrate 50 milliGRAM(s) Oral every 12 hours  pantoprazole   Suspension 40 milliGRAM(s) Oral daily    IVF:  dextrose 5%. 1000 milliLiter(s) IV Continuous <Continuous>  sodium chloride 2 Gram(s) Oral every 6 hours    CULTURES:  Culture Results:   No growth to date (04-15 @ 08:14)  Culture Results:   No growth at 2 days. (04-14 @ 22:29)    RADIOLOGY & ADDITIONAL TESTS:  < from: CT Head No Cont (04.16.19 @ 12:04) >  IMPRESSION:   1. Interval development of slightly greater amounts of hemorrhage in the   right thalamic region and in the right temporal and inferior parietal   subcortical white matter.  2. Less IVH and no change in left catheter position with slight decrease   in ventricular size.  3. Expected evolution in intraventricular clot at the level of right   frontal horn and diminished hemorrhage along the prior right   periventricular catheter tract.  4. Greater amount of subcutaneous soft tissue swelling extrinsic to right   parietal craniotomy.    < end of copied text >      ASSESSMENT:  44y Male w/ ETOH abuse now s/p right thalamic IPH with IVH s/p Emergent Rt EVD placement 4/6/19, s/p Cerebral angiogram neg 4/8/19, new left EVD placed and R EVD removed 4/9/19, now s/p penumbra evacuation of ICH 4/10/19    HEMORRHAGE STROKE  HEMORRHAGE  Handoff  ICH (intracerebral hemorrhage)  ICH (intracerebral hemorrhage)  Open tracheostomy  Bronchoscopy, adult  EGD, with PEG  Craniotomy for intracranial hemorrhage  Evacuation of hematoma of face  Angiogram, carotid and cerebral, bilateral      PLAN:  NEURO:  Neuro checks q1h  Keppra for seizure prophylaxis  continue precedex and wean versed    Continue fentanyl and ativan PRN agitation  Keep left EVD at [id number], monitor ICPs and output  CTH reviewed with Dr. Judah Guzman .1 BID    CARDIOVASCULAR:   SBP goal <140  Continue lopressor 50mg BID, norvasc 10mg daily, and lisinopril 40mg daily, hydralazine 100mg Q8h  Daily labs, electrolyte repletion PRN,  Continue Statin therapy  Right IJ in place    PULMONARY:   Continue ventilator support, wean to trach collar  Daily CXR    RENAL:   Voiding, condom catheter, straight Cath PRN  Normal Na goal    GI:   s/p Peg  Rectal tube  Hold Stool softeners for now due to loose stool      ID:  Ceftriax PNA until 4/19    ENDO:   ISS    DVT PROPHYLAXIS:   SCDs, SQL     DISPOSITION: Continue NSICU care,  Full code,  D/w Dr. So and Dr. Schultz    DISPOSITION:       Assessment:  Present when checked    []  GCS  E   V  M     Heart Failure: []Acute, [] acute on chronic , []chronic  Heart Failure:  [] Diastolic (HFpEF), [] Systolic (HFrEF), []Combined (HFpEF and HFrEF), [] RHF, [] Pulm HTN, [] Other    [] KRISTA, [] ATN, [] AIN, [] other  [] CKD1, [] CKD2, [] CKD 3, [] CKD 4, [] CKD 5, []ESRD    Encephalopathy: [] Metabolic, [] Hepatic, [] toxic, [x] Neurological, [] Other    Abnormal Nurtitional Status: [] malnurtition (see nutrition note), [ ]underweight: BMI < 19, [] morbid obesity: BMI >40, [] Cachexia    [] Sepsis  [] hypovolemic shock,[] cardiogenic shock, [] hemorrhagic shock, [] neuogenic shock  [x] Acute Respiratory Failure  [x]Cerebral edema, [] Brain compression/ herniation,   [] Functional quadriplegia  [] Acute blood loss anemia o/n events; spiking     Hospital Course:   4/7: overnight, patient requiring large amount of cardene and propofol.  Plan today is to transition to precedex, start PO lisinopril and wean of cardene.  Becomes extremely agitated when off sedation.  4/8: Cerebral angio without findings of aneurysm. EVD stopped working, pulled back without improvement. CT Head performed.  4/9: Received ativan 6mg for agitation. EVD stopped draining at 6pm yesterday taken for stat CTH. Dr. So notified and EVD lowered to 5cmH2O without output.   4/10: s/p penumbra evacuation of ICH. Pt seen and examined at bedside postop. Remains intubated, on versed. ICPs stable.  (OR EBL 30cc, received LR 200cc)  4/11: JUSTIN overnight. Remains on versed. Remains intubated. Neuro stable.  4/12: Given ativan overnight, bucking the vent. Remains on versed drip. Neuro stable. ICPs stable. Increase hydralazine to 75 Q8.   4/13 JUSTIN overnight, febrile, urinary retention Straight Cath x 1 = 1L, neuro exam unchanged since yesterday, started Cardene gtt as per Dr. So goal -140  4/14 POD#4 febrile overnight, o/w JUSTIN overnight, EVD@ 5cm H2O, Clonidine started, Ceftriax until 4/19 Staph sputum, Versed/Precedex, straight cathed overnight  4/15: ICP stable. neuro stable. Remains on versed/precedex  4/16: Tolerating CPAP, CTH performed, decreased vent size, EVD raised to 10 cmH2O neuro stable   4/18:  JUSTIN overnight, EVD @15cm H2O, CPAP overnight, Ceftriax until 4/19 PNA, Precedex PRN comfort, rectal tube - diarrhea; fever spike     PHYSICAL EXAM:  Neurological:  trached, EVD site c/d/i  PERRL  Clear b/l  S1, S2. NSR  Soft, NT/ND. +BS  Pulses 2+ throughout  Neuro: RUE spontaneous. R LE, withdrawal LLE and extensor posture LUE. No eye opening. following commands intermittently (shows 2 fingers and wiggles toes on right,) PERRL, +corneals, +gag    TUBES/LINES:  [x] CVC  [x] A-line  [] Lumbar Drain  [x] Ventriculostomy  [] Other    DIET:  [] NPO  [] Mechanical  [x] Tube feeds    LABS:        CAPILLARY BLOOD GLUCOSE      POCT Blood Glucose.: 110 mg/dL (16 Apr 2019 20:49)  POCT Blood Glucose.: 110 mg/dL (16 Apr 2019 16:19)  POCT Blood Glucose.: 124 mg/dL (16 Apr 2019 11:12)  POCT Blood Glucose.: 102 mg/dL (16 Apr 2019 05:42)      Drug Levels: [] N/A  Vancomycin Level, Trough: 6.1 ug/mL (04-11 @ 22:40)    CSF Analysis: [] N/A      Allergies    No Known Allergies    Intolerances      MEDICATIONS:  Antibiotics:  cefTRIAXone   IVPB 1 Gram(s) IV Intermittent every 24 hours    Neuro:  acetaminophen    Suspension .. 650 milliGRAM(s) Oral every 6 hours PRN  acetaminophen  IVPB .. 1000 milliGRAM(s) IV Intermittent once  dexmedetomidine Infusion 0.1 MICROgram(s)/kG/Hr IV Continuous <Continuous>  fentaNYL    Injectable 100 MICROGram(s) IV Push every 2 hours PRN  fentaNYL    Injectable 75 MICROGram(s) IV Push every 2 hours PRN  fentaNYL    Injectable 50 MICROGram(s) IV Push every 2 hours PRN  levETIRAcetam  Solution 1000 milliGRAM(s) Enteral Tube two times a day  LORazepam   Injectable 4 milliGRAM(s) IV Push every 4 hours PRN  LORazepam   Injectable 2 milliGRAM(s) IV Push every 4 hours PRN  LORazepam   Injectable 1 milliGRAM(s) IV Push every 4 hours PRN  midazolam Infusion 0.02 mG/kG/Hr IV Continuous <Continuous>  ondansetron Injectable 4 milliGRAM(s) IV Push every 6 hours PRN    Anticoagulation:  enoxaparin Injectable 40 milliGRAM(s) SubCutaneous at bedtime    OTHER:  amLODIPine   Tablet 10 milliGRAM(s) Oral daily  atorvastatin 40 milliGRAM(s) Oral at bedtime  chlorhexidine 2% Cloths 1 Application(s) Topical <User Schedule>  cloNIDine 0.1 milliGRAM(s) Oral every 12 hours  dextrose 40% Gel 15 Gram(s) Oral once PRN  dextrose 50% Injectable 12.5 Gram(s) IV Push once  dextrose 50% Injectable 25 Gram(s) IV Push once  dextrose 50% Injectable 25 Gram(s) IV Push once  glucagon  Injectable 1 milliGRAM(s) IntraMuscular once PRN  hydrALAZINE 100 milliGRAM(s) Oral every 8 hours  hydrALAZINE Injectable 10 milliGRAM(s) IV Push every 4 hours PRN  influenza   Vaccine 0.5 milliLiter(s) IntraMuscular once  insulin lispro (HumaLOG) corrective regimen sliding scale   SubCutaneous Before meals and at bedtime  lisinopril 40 milliGRAM(s) Oral daily  metoprolol tartrate 50 milliGRAM(s) Oral every 12 hours  pantoprazole   Suspension 40 milliGRAM(s) Oral daily    IVF:  dextrose 5%. 1000 milliLiter(s) IV Continuous <Continuous>  sodium chloride 2 Gram(s) Oral every 6 hours    CULTURES:  Culture Results:   No growth to date (04-15 @ 08:14)  Culture Results:   No growth at 2 days. (04-14 @ 22:29)    RADIOLOGY & ADDITIONAL TESTS:  < from: CT Head No Cont (04.16.19 @ 12:04) >  IMPRESSION:   1. Interval development of slightly greater amounts of hemorrhage in the   right thalamic region and in the right temporal and inferior parietal   subcortical white matter.  2. Less IVH and no change in left catheter position with slight decrease   in ventricular size.  3. Expected evolution in intraventricular clot at the level of right   frontal horn and diminished hemorrhage along the prior right   periventricular catheter tract.  4. Greater amount of subcutaneous soft tissue swelling extrinsic to right   parietal craniotomy.    < end of copied text >      ASSESSMENT:  44y Male w/ ETOH abuse now s/p right thalamic IPH with IVH s/p Emergent Rt EVD placement 4/6/19, s/p Cerebral angiogram neg 4/8/19, new left EVD placed and R EVD removed 4/9/19, now s/p penumbra evacuation of ICH 4/10/19    HEMORRHAGE STROKE  HEMORRHAGE  Handoff  ICH (intracerebral hemorrhage)  ICH (intracerebral hemorrhage)  Open tracheostomy  Bronchoscopy, adult  EGD, with PEG  Craniotomy for intracranial hemorrhage  Evacuation of hematoma of face  Angiogram, carotid and cerebral, bilateral      PLAN:  NEURO:  Neuro checks q1h  Keppra for seizure prophylaxis  wean precedex and off versed  Continue fentanyl and ativan PRN agitation  Keep left EVD at [id number], monitor ICPs and output  CTH reviewed today  Clonidine .1 BID    CARDIOVASCULAR:   SBP goal <160  Continue lopressor 50mg BID, norvasc 10mg daily, and lisinopril 40mg daily, hydralazine 100mg Q8h  Daily labs, electrolyte repletion PRN,  Continue Statin therapy  Right IJ in place    PULMONARY:   Continue ventilator support, wean to trach collar  Daily CXR    RENAL:   Voiding, condom catheter, straight Cath PRN  Normal Na goal    GI:   s/p Peg  dc Rectal tubel      ID:  Ceftriax PNA until 4/19  panculture today     ENDO:   ISS    DVT PROPHYLAXIS:   SCDs, SQL     DISPOSITION: Continue NSICU care,  Full code,  D/w Dr. So and Dr. Schultz    DISPOSITION:       Assessment:  Present when checked    []  GCS  E   V  M     Heart Failure: []Acute, [] acute on chronic , []chronic  Heart Failure:  [] Diastolic (HFpEF), [] Systolic (HFrEF), []Combined (HFpEF and HFrEF), [] RHF, [] Pulm HTN, [] Other    [] KRISTA, [] ATN, [] AIN, [] other  [] CKD1, [] CKD2, [] CKD 3, [] CKD 4, [] CKD 5, []ESRD    Encephalopathy: [] Metabolic, [] Hepatic, [] toxic, [x] Neurological, [] Other    Abnormal Nurtitional Status: [] malnurtition (see nutrition note), [ ]underweight: BMI < 19, [] morbid obesity: BMI >40, [] Cachexia    [] Sepsis  [] hypovolemic shock,[] cardiogenic shock, [] hemorrhagic shock, [] neuogenic shock  [x] Acute Respiratory Failure  [x]Cerebral edema, [] Brain compression/ herniation,   [] Functional quadriplegia  [] Acute blood loss anemia

## 2019-04-19 LAB
ANION GAP SERPL CALC-SCNC: 12 MMOL/L — SIGNIFICANT CHANGE UP (ref 5–17)
BUN SERPL-MCNC: 19 MG/DL — SIGNIFICANT CHANGE UP (ref 7–23)
CALCIUM SERPL-MCNC: 9 MG/DL — SIGNIFICANT CHANGE UP (ref 8.4–10.5)
CHLORIDE SERPL-SCNC: 103 MMOL/L — SIGNIFICANT CHANGE UP (ref 96–108)
CO2 SERPL-SCNC: 23 MMOL/L — SIGNIFICANT CHANGE UP (ref 22–31)
CREAT SERPL-MCNC: 0.78 MG/DL — SIGNIFICANT CHANGE UP (ref 0.5–1.3)
CULTURE RESULTS: SIGNIFICANT CHANGE UP
GLUCOSE SERPL-MCNC: 140 MG/DL — HIGH (ref 70–99)
HCT VFR BLD CALC: 31.5 % — LOW (ref 39–50)
HGB BLD-MCNC: 10.7 G/DL — LOW (ref 13–17)
MAGNESIUM SERPL-MCNC: 2.3 MG/DL — SIGNIFICANT CHANGE UP (ref 1.6–2.6)
MCHC RBC-ENTMCNC: 32.6 PG — SIGNIFICANT CHANGE UP (ref 27–34)
MCHC RBC-ENTMCNC: 34 GM/DL — SIGNIFICANT CHANGE UP (ref 32–36)
MCV RBC AUTO: 96 FL — SIGNIFICANT CHANGE UP (ref 80–100)
NRBC # BLD: 0 /100 WBCS — SIGNIFICANT CHANGE UP (ref 0–0)
PHOSPHATE SERPL-MCNC: 3.9 MG/DL — SIGNIFICANT CHANGE UP (ref 2.5–4.5)
PLATELET # BLD AUTO: 542 K/UL — HIGH (ref 150–400)
POTASSIUM SERPL-MCNC: 4.2 MMOL/L — SIGNIFICANT CHANGE UP (ref 3.5–5.3)
POTASSIUM SERPL-SCNC: 4.2 MMOL/L — SIGNIFICANT CHANGE UP (ref 3.5–5.3)
RBC # BLD: 3.28 M/UL — LOW (ref 4.2–5.8)
RBC # FLD: 11.9 % — SIGNIFICANT CHANGE UP (ref 10.3–14.5)
SODIUM SERPL-SCNC: 138 MMOL/L — SIGNIFICANT CHANGE UP (ref 135–145)
SPECIMEN SOURCE: SIGNIFICANT CHANGE UP
WBC # BLD: 12.24 K/UL — HIGH (ref 3.8–10.5)
WBC # FLD AUTO: 12.24 K/UL — HIGH (ref 3.8–10.5)

## 2019-04-19 PROCEDURE — 93976 VASCULAR STUDY: CPT | Mod: 26

## 2019-04-19 PROCEDURE — 99291 CRITICAL CARE FIRST HOUR: CPT | Mod: 24

## 2019-04-19 PROCEDURE — 70450 CT HEAD/BRAIN W/O DYE: CPT | Mod: 26

## 2019-04-19 PROCEDURE — 76770 US EXAM ABDO BACK WALL COMP: CPT | Mod: 26,59

## 2019-04-19 PROCEDURE — 71045 X-RAY EXAM CHEST 1 VIEW: CPT | Mod: 26

## 2019-04-19 RX ORDER — ACETAMINOPHEN 500 MG
1000 TABLET ORAL ONCE
Qty: 0 | Refills: 0 | Status: COMPLETED | OUTPATIENT
Start: 2019-04-19 | End: 2019-04-19

## 2019-04-19 RX ADMIN — SODIUM CHLORIDE 2 GRAM(S): 9 INJECTION INTRAMUSCULAR; INTRAVENOUS; SUBCUTANEOUS at 14:52

## 2019-04-19 RX ADMIN — Medication 100 MILLIGRAM(S): at 14:52

## 2019-04-19 RX ADMIN — Medication 100 MILLIGRAM(S): at 06:46

## 2019-04-19 RX ADMIN — Medication 2 MILLIGRAM(S): at 03:52

## 2019-04-19 RX ADMIN — AMLODIPINE BESYLATE 10 MILLIGRAM(S): 2.5 TABLET ORAL at 06:45

## 2019-04-19 RX ADMIN — Medication 100 MILLIGRAM(S): at 22:47

## 2019-04-19 RX ADMIN — Medication 400 MILLIGRAM(S): at 20:00

## 2019-04-19 RX ADMIN — ATORVASTATIN CALCIUM 40 MILLIGRAM(S): 80 TABLET, FILM COATED ORAL at 22:47

## 2019-04-19 RX ADMIN — Medication 10 MILLIGRAM(S): at 15:34

## 2019-04-19 RX ADMIN — Medication 1 MILLIGRAM(S): at 09:30

## 2019-04-19 RX ADMIN — ENOXAPARIN SODIUM 40 MILLIGRAM(S): 100 INJECTION SUBCUTANEOUS at 22:47

## 2019-04-19 RX ADMIN — DEXMEDETOMIDINE HYDROCHLORIDE IN 0.9% SODIUM CHLORIDE 1.8 MICROGRAM(S)/KG/HR: 4 INJECTION INTRAVENOUS at 06:43

## 2019-04-19 RX ADMIN — CHLORHEXIDINE GLUCONATE 1 APPLICATION(S): 213 SOLUTION TOPICAL at 06:46

## 2019-04-19 RX ADMIN — LISINOPRIL 40 MILLIGRAM(S): 2.5 TABLET ORAL at 06:46

## 2019-04-19 RX ADMIN — SODIUM CHLORIDE 2 GRAM(S): 9 INJECTION INTRAMUSCULAR; INTRAVENOUS; SUBCUTANEOUS at 19:17

## 2019-04-19 RX ADMIN — SODIUM CHLORIDE 2 GRAM(S): 9 INJECTION INTRAMUSCULAR; INTRAVENOUS; SUBCUTANEOUS at 06:44

## 2019-04-19 RX ADMIN — LEVETIRACETAM 1000 MILLIGRAM(S): 250 TABLET, FILM COATED ORAL at 19:18

## 2019-04-19 RX ADMIN — FENTANYL CITRATE 50 MICROGRAM(S): 50 INJECTION INTRAVENOUS at 03:00

## 2019-04-19 RX ADMIN — Medication 1000 MILLIGRAM(S): at 21:00

## 2019-04-19 RX ADMIN — Medication 75 MILLIGRAM(S): at 10:33

## 2019-04-19 RX ADMIN — CEFTRIAXONE 100 GRAM(S): 500 INJECTION, POWDER, FOR SOLUTION INTRAMUSCULAR; INTRAVENOUS at 10:31

## 2019-04-19 RX ADMIN — DEXMEDETOMIDINE HYDROCHLORIDE IN 0.9% SODIUM CHLORIDE 1.8 MICROGRAM(S)/KG/HR: 4 INJECTION INTRAVENOUS at 15:34

## 2019-04-19 RX ADMIN — FENTANYL CITRATE 50 MICROGRAM(S): 50 INJECTION INTRAVENOUS at 02:46

## 2019-04-19 RX ADMIN — LEVETIRACETAM 1000 MILLIGRAM(S): 250 TABLET, FILM COATED ORAL at 06:44

## 2019-04-19 RX ADMIN — Medication 0.2 MILLIGRAM(S): at 22:47

## 2019-04-19 RX ADMIN — Medication 75 MILLIGRAM(S): at 19:19

## 2019-04-19 RX ADMIN — Medication 0.2 MILLIGRAM(S): at 06:44

## 2019-04-19 RX ADMIN — SODIUM CHLORIDE 2 GRAM(S): 9 INJECTION INTRAMUSCULAR; INTRAVENOUS; SUBCUTANEOUS at 23:55

## 2019-04-19 RX ADMIN — Medication 1 MILLIGRAM(S): at 13:04

## 2019-04-19 NOTE — PHYSICAL THERAPY INITIAL EVALUATION ADULT - PERTINENT HX OF CURRENT PROBLEM, REHAB EVAL
44y Male w/ ETOH abuse now s/p right thalamic IPH with IVH s/p Emergent Rt EVD placement 4/6/19, s/p Cerebral angiogram neg 4/8/19, new left EVD placed and R EVD removed 4/9/19, now s/p penumbra evacuation of ICH 4/10/19

## 2019-04-19 NOTE — OCCUPATIONAL THERAPY INITIAL EVALUATION ADULT - NS ASR FOLLOW COMMAND OT EVAL
attempting to mouth X 1 rep with cues, not intelligible; nodding yes/no inconsistently; somnolent, decreased attention to task requiring repeated cues, right gaze preference/50% of the time

## 2019-04-19 NOTE — OCCUPATIONAL THERAPY INITIAL EVALUATION ADULT - DIAGNOSIS, OT EVAL
Impaired functional mobility and activities of daily living 2/2 decreased cognition, left sided weakness, left neglect, decreased postural control, balance and endurance

## 2019-04-19 NOTE — OCCUPATIONAL THERAPY INITIAL EVALUATION ADULT - SITTING BALANCE: STATIC
dangling at EOB with maxA for head control (significantly decreased active neck extension), min/modA for trunk control 2/2 posterior lean/poor balance

## 2019-04-19 NOTE — PHYSICAL THERAPY INITIAL EVALUATION ADULT - BALANCE DISTURBANCE, IDENTIFIED IMPAIRMENT CONTRIBUTE, REHAB EVAL
impaired coordination/abnormal muscle tone/decreased strength/impaired motor control/impaired postural control

## 2019-04-19 NOTE — PHYSICAL THERAPY INITIAL EVALUATION ADULT - IMPAIRMENTS FOUND, PT EVAL
gross motor/joint integrity and mobility/aerobic capacity/endurance/gait, locomotion, and balance/neuromotor development and sensory integration/fine motor/muscle strength/poor safety awareness/posture

## 2019-04-19 NOTE — PHYSICAL THERAPY INITIAL EVALUATION ADULT - MANUAL MUSCLE TESTING RESULTS, REHAB EVAL
LLE: trace toe movement, 0/5 ankle DF, 0/5 knee extension, 0/5 hip flexion. LLE abd/add 2+/5 w/ reciprocal inhibition. RLE 5/5 throughout

## 2019-04-19 NOTE — OCCUPATIONAL THERAPY INITIAL EVALUATION ADULT - PLANNED THERAPY INTERVENTIONS, OT EVAL
ROM/cognitive, visual perceptual/fine motor coordination training/neuromuscular re-education/balance training/motor coordination training/parent/caregiver training.../transfer training/ADL retraining/IADL retraining/bed mobility training/strengthening

## 2019-04-19 NOTE — PHYSICAL THERAPY INITIAL EVALUATION ADULT - PLANNED THERAPY INTERVENTIONS, PT EVAL
balance training/motor coordination training/strengthening/bed mobility training/gait training/neuromuscular re-education/postural re-education/transfer training

## 2019-04-19 NOTE — PROGRESS NOTE ADULT - SUBJECTIVE AND OBJECTIVE BOX
HPI:  History obtained by patient's girlfriend and chart from [place], patient is unresponsive:     43 y/o male with no significant PMHx presents to [place] with AMS, left side weakness and numbness. Per girlfriend, [place] work when he developed acute onset of left facial numbness and LUE and numbness around 6:30PM. When get got home he developed AMS, dysarthria, and weakness in the LUE and LLE. At [place] ED patient had several episodes of emesis and was hypertensive to SBP in the 200's. Work up revealed right thalamic hemorrhage on CT head. Patient not on any anticoagulation use per girlfriend. Of note, patient took Excedrin for headache at home. Patient transferred to [facility] for further management. (06 Apr 2019 00:04)       Hospital Course:   4/7: overnight, patient requiring large amount of cardene and propofol.  Plan today is to transition to precedex, start PO lisinopril and wean of cardene.  Becomes extremely agitated when off sedation.  4/8: Cerebral angio without findings of aneurysm. EVD stopped working, pulled back without improvement. CT Head performed.  4/9: Received ativan 6mg for agitation. EVD stopped draining at 6pm yesterday taken for stat CTH. Dr. So notified and EVD lowered to 5cmH2O without output.   4/10: s/p penumbra evacuation of ICH. Pt seen and examined at bedside postop. Remains intubated, on versed. ICPs stable.  (OR EBL 30cc, received LR 200cc)  4/11: JUSTIN overnight. Remains on versed. Remains intubated. Neuro stable.  4/12: Given ativan overnight, bucking the vent. Remains on versed drip. Neuro stable. ICPs stable. Increase hydralazine to 75 Q8.   4/13 JUSTIN overnight, febrile, urinary retention Straight Cath x 1 = 1L, neuro exam unchanged since yesterday, started Cardene gtt as per Dr. So goal -140  4/14 POD#4 febrile overnight, o/w JUSTIN overnight, EVD@ 5cm H2O, Clonidine started, Ceftriax until 4/19 Staph sputum, Versed/Precedex, straight cathed overnight  4/15: ICP stable. neuro stable. Remains on versed/precedex  4/16: Tolerating CPAP, CTH performed, decreased vent size, EVD raised to 10 cmH2O neuro stable   4/18:  JUSTIN overnight, EVD @15cm H2O, CPAP overnight, Ceftriax until 4/19 PNA, Precedex PRN comfort, rectal tube - diarrhea; fever spike   4/19 EVD clamped since 4/18, ICPs fluctuate, HCT today, R IJ, Ceftraix until 4/19, Versed gtt, neuro exam is improving, Duplex Renal artery and Metanephrine & Catecholamine Urine collection 24hrs for high BP work-up    PHYSICAL EXAM:  Neurological:  trached, EVD site c/d/i  PERRL  Clear b/l  S1, S2. NSR  Soft, NT/ND. +BS  Pulses 2+ throughout  Neuro: RUE spontaneous. R LE, withdrawal LLE and extensor posture LUE. No eye opening. following commands intermittently (shows 2 fingers and wiggles toes on right,) PERRL, +corneals, +gag    TUBES/LINES:  [x] CVC  [x] A-line  [] Lumbar Drain  [x] Ventriculostomy  [] Other    DIET:  [] NPO  [] Mechanical  [x] Tube feeds    LABS:              CAPILLARY BLOOD GLUCOSE      POCT Blood Glucose.: 110 mg/dL (16 Apr 2019 20:49)  POCT Blood Glucose.: 110 mg/dL (16 Apr 2019 16:19)  POCT Blood Glucose.: 124 mg/dL (16 Apr 2019 11:12)  POCT Blood Glucose.: 102 mg/dL (16 Apr 2019 05:42)      Drug Levels: [] N/A  Vancomycin Level, Trough: 6.1 ug/mL (04-11 @ 22:40)    CSF Analysis: [] N/A      Allergies    No Known Allergies    Intolerances      MEDICATIONS:  Antibiotics:  cefTRIAXone   IVPB 1 Gram(s) IV Intermittent every 24 hours    Neuro:  acetaminophen    Suspension .. 650 milliGRAM(s) Oral every 6 hours PRN  acetaminophen  IVPB .. 1000 milliGRAM(s) IV Intermittent once  dexmedetomidine Infusion 0.1 MICROgram(s)/kG/Hr IV Continuous <Continuous>  fentaNYL    Injectable 100 MICROGram(s) IV Push every 2 hours PRN  fentaNYL    Injectable 75 MICROGram(s) IV Push every 2 hours PRN  fentaNYL    Injectable 50 MICROGram(s) IV Push every 2 hours PRN  levETIRAcetam  Solution 1000 milliGRAM(s) Enteral Tube two times a day  LORazepam   Injectable 4 milliGRAM(s) IV Push every 4 hours PRN  LORazepam   Injectable 2 milliGRAM(s) IV Push every 4 hours PRN  LORazepam   Injectable 1 milliGRAM(s) IV Push every 4 hours PRN  midazolam Infusion 0.02 mG/kG/Hr IV Continuous <Continuous>  ondansetron Injectable 4 milliGRAM(s) IV Push every 6 hours PRN    Anticoagulation:  enoxaparin Injectable 40 milliGRAM(s) SubCutaneous at bedtime    OTHER:  amLODIPine   Tablet 10 milliGRAM(s) Oral daily  atorvastatin 40 milliGRAM(s) Oral at bedtime  chlorhexidine 2% Cloths 1 Application(s) Topical <User Schedule>  cloNIDine 0.1 milliGRAM(s) Oral every 12 hours  dextrose 40% Gel 15 Gram(s) Oral once PRN  dextrose 50% Injectable 12.5 Gram(s) IV Push once  dextrose 50% Injectable 25 Gram(s) IV Push once  dextrose 50% Injectable 25 Gram(s) IV Push once  glucagon  Injectable 1 milliGRAM(s) IntraMuscular once PRN  hydrALAZINE 100 milliGRAM(s) Oral every 8 hours  hydrALAZINE Injectable 10 milliGRAM(s) IV Push every 4 hours PRN  influenza   Vaccine 0.5 milliLiter(s) IntraMuscular once  insulin lispro (HumaLOG) corrective regimen sliding scale   SubCutaneous Before meals and at bedtime  lisinopril 40 milliGRAM(s) Oral daily  metoprolol tartrate 50 milliGRAM(s) Oral every 12 hours  pantoprazole   Suspension 40 milliGRAM(s) Oral daily    IVF:  dextrose 5%. 1000 milliLiter(s) IV Continuous <Continuous>  sodium chloride 2 Gram(s) Oral every 6 hours    CULTURES:  Culture Results:   No growth to date (04-15 @ 08:14)  Culture Results:   No growth at 2 days. (04-14 @ 22:29)    RADIOLOGY & ADDITIONAL TESTS:  < from: CT Head No Cont (04.16.19 @ 12:04) >  IMPRESSION:   1. Interval development of slightly greater amounts of hemorrhage in the   right thalamic region and in the right temporal and inferior parietal   subcortical white matter.  2. Less IVH and no change in left catheter position with slight decrease   in ventricular size.  3. Expected evolution in intraventricular clot at the level of right   frontal horn and diminished hemorrhage along the prior right   periventricular catheter tract.  4. Greater amount of subcutaneous soft tissue swelling extrinsic to right   parietal craniotomy.    < end of copied text >      ASSESSMENT:  44y Male w/ ETOH abuse now s/p right thalamic IPH with IVH s/p Emergent Rt EVD placement 4/6/19, s/p Cerebral angiogram neg 4/8/19, new left EVD placed and R EVD removed 4/9/19, now s/p penumbra evacuation of ICH 4/10/19    HEMORRHAGE STROKE  HEMORRHAGE  Handoff  ICH (intracerebral hemorrhage)  ICH (intracerebral hemorrhage)  Open tracheostomy  Bronchoscopy, adult  EGD, with PEG  Craniotomy for intracranial hemorrhage  Evacuation of hematoma of face  Angiogram, carotid and cerebral, bilateral      PLAN:  NEURO:  Neuro checks q1h  Keppra for seizure prophylaxis  wean precedex and off versed  Continue fentanyl and ativan PRN agitation  Keep left EVD at 12icS6L, monitor ICPs and output  CTH today  Clonidine .1 BID    CARDIOVASCULAR:   SBP goal <160  Continue lopressor 50mg BID, norvasc 10mg daily, and lisinopril 40mg daily, hydralazine 100mg Q8h  Daily labs, electrolyte repletion PRN,  Continue Statin therapy  Right IJ in place    PULMONARY:   Continue ventilator support, wean to trach collar  Daily CXR    RENAL:   Voiding, condom catheter, straight Cath PRN  Normal Na goal  Duplex Renal artery for hypertension work-up  Metanephrine and Catecholamine Urine collection 24hrs for hypertension work-up    GI:   s/p Peg      ID:  Ceftriax PNA until 4/19      ENDO:   ISS    DVT PROPHYLAXIS:   SCDs, SQL     DISPOSITION: Continue NSICU care,  Full code,  D/w Dr. So and Dr. Schultz      Assessment:  Present when checked    []  GCS  E   V  M     Heart Failure: []Acute, [] acute on chronic , []chronic  Heart Failure:  [] Diastolic (HFpEF), [] Systolic (HFrEF), []Combined (HFpEF and HFrEF), [] RHF, [] Pulm HTN, [] Other    [] KRSITA, [] ATN, [] AIN, [] other  [] CKD1, [] CKD2, [] CKD 3, [] CKD 4, [] CKD 5, []ESRD    Encephalopathy: [] Metabolic, [] Hepatic, [] toxic, [x] Neurological, [] Other    Abnormal Nurtitional Status: [] malnurtition (see nutrition note), [ ]underweight: BMI < 19, [] morbid obesity: BMI >40, [] Cachexia    [] Sepsis  [] hypovolemic shock,[] cardiogenic shock, [] hemorrhagic shock, [] neuogenic shock  [x] Acute Respiratory Failure  [x]Cerebral edema, [] Brain compression/ herniation,   [] Functional quadriplegia  [] Acute blood loss anemia

## 2019-04-19 NOTE — OCCUPATIONAL THERAPY INITIAL EVALUATION ADULT - BALANCE DISTURBANCE, IDENTIFIED IMPAIRMENT CONTRIBUTE, REHAB EVAL
abnormal muscle tone/decreased ROM/impaired postural control/impaired sensory feedback/decreased strength

## 2019-04-19 NOTE — OCCUPATIONAL THERAPY INITIAL EVALUATION ADULT - IMPAIRMENTS CONTRIBUTING IMPAIRED BED MOBILITY, REHAB EVAL
decreased ROM/abnormal muscle tone/impaired sensory feedback/decreased strength/impaired postural control/cognition/impaired motor control

## 2019-04-19 NOTE — PROGRESS NOTE ADULT - SUBJECTIVE AND OBJECTIVE BOX
=================================  NEUROCRITICAL CARE ATTENDING NOTE  =================================    ARPAN RUANO   MRN-[id number]  Summary:  44y/M with no PMH presented with L UE weakness / numbness, onset at 630 p.m.; then developed dysarthria, L UE/LE weakness.  He was brought to [place] ED, n/v, SBP 200s, more altered, intubated.  Imaging showed R thalamic hemorrhage.  Transferred to [facility].    COURSE IN THE HOSPITAL:   Admitted, given DDAVP / platelets; EVD inserted, improved   04/15 fever overnight   Tmax 38.3 remained on trach collar   No significant events overnight, remained on trach collar tmax 38.1    Past Medical History:   Allergies:  No Known Allergies  Home meds:     PHYSICAL EXAMINATION  T(C): 36.4 ( @ 09:38), Max: 38.1 ( @ 22:01) HR: 64 ( @ 07:00) (60 - 92) BP: 114/63 ( @ 06:47) (114/63 - 168/91) RR: 18 ( @ 07:00) (17 - 29) SpO2: 97% ( @ 07:00) (94% - 100%)  NEUROLOGIC EXAMINATION:  Patient opens eyes spontaneously, following commands, pupils 3mm reactive, downward gaze; moves R UE/LE spontaneously, L UE extends to noxious and L LE withdraws  GENERAL: trach collar 40%  EENT:  anicteric  CARDIOVASCULAR: (+) S1 S2, normal rate and regular rhythm  PULMONARY: clear to auscultation bilaterally; copious secretions q1h suctioning  ABDOMEN: soft, nontender with normoactive bowel sounds; PEG site clean  EXTREMITIES: no edema  SKIN: no rash    LABS:  CAPILLARY BLOOD GLUCOSE 114 165 141 125    (14)   10.7   12.24 )-----------( 542      ( 2019 05:43 )             31.5     138  |  103  |  19  ----------------------------<  140<H>  4.2   |  23  |  0.78    Ca    9.0      2019 05:43  Phos  3.9       Mg     2.3      @ 07:01  -   @ 07:00  IN: 2078.4 mL / OUT: 1350 mL / NET: 728.4 mL    LDL = 113 ()   HDL = 70 ()  TG = 85 ()  TSH = 0.733 ()    Bacteriology:   UA NEG   Blood CS NG12h x2   04/15 CSF NGTD   Blood CS NG3D x1   CSF NGTD  04/10 Sputum MSSA H influenza S Ceftri   sputum culture numerous MSSA   Blood CS NG5D x2 (final)    CSF studies:    L   *** [id number] WBC0 *** %N0 %L0     L3.4   *** [id number] WBC37 *** %N35 %L1     EEG:  Neuroimagin/06 12:48 p.m. CT head:  s/p EVD, decrease in size of ventricles, R ICH unchanged with IV extension, punctate hyperdensity R BG R conner R thalamus, unchanged,    2:11 a.m. CT head:  large R basal ganglia / thalamic ICH, small L basal ganglia / R pontine acute hemorrhage; c/w hypertensive ICH; extension into R lateral ventricle; MLS, mass effect, no HCP  Other imaging:    MEDICATIONS: SQL 40 HS ceftriaxone 2G IV q24h levetiracetam 1G PO BID amlodipine 10mg PO daily clonidine 0.2mg PO q12h hydralazine 100mg PO q8h lisinopril 40mgPO dailhy metoprolol 75mg PO BID pantoprazole 40 daily atorvastatin 40mg PO HS mod ISS peridex  fentanyl PRN ativan PRN     IV FLUIDS: IVL  DRIPS: dexmedetomidine (0.8)   DIET: Jevity at 67cc/hr  Lines: [place]   Drains:   EVD @ clamped, 0 cc/24h ICP as high as 37  Wounds:    CODE STATUS:  Full Code                       GOALS OF CARE:  aggressive                      DISPOSITION:  ICU  ICH Score on admission  = GCS Score: 3-4 (2 pts) E1VTM2 (+) IVH = 3  ICH volume 20ml  Estimated 30-day mortality 3 points: 72%3

## 2019-04-19 NOTE — OCCUPATIONAL THERAPY INITIAL EVALUATION ADULT - MANUAL MUSCLE TESTING RESULTS, REHAB EVAL
grossly assessed due to/decreased commands following despite max cues - no AROM noted left upper extremity despite associated reactions/posturing elbow extension; right upper extremity shoulder flexion >2-/5, elbow flex/ext >3-/5, hand  5/5

## 2019-04-19 NOTE — OCCUPATIONAL THERAPY INITIAL EVALUATION ADULT - GENERAL OBSERVATIONS, REHAB EVAL
Patient cleared for Occupational Therapy by Dr. Schultz and REED Taveras, patient received supine in semi-hamilton position in non-acute distress. Patient's friend Compa entering room upon beginning of OT/PT eval with agreement of patient (nodding yes). +Tele, +IV, +radial a-line, +TC FiO2 35%, +PEG (feeds held throughout), +EVD (clamped by REED Taveras throughout session), +[place] catheter, +rectal probe, +cooling blanket, + bilateral sequential compression devices, + bilateral z-flex boots. No sign of discomfort. Patient cleared for Occupational Therapy by Dr. Schultz and REED Taveras, patient received supine in semi-hamilton position in non-acute distress. Patient's friend Compa entering room upon beginning of OT/PT eval with agreement of patient (nodding yes). +Tele, +IV, +radial a-line, +TC FiO2 35%, +cranial incision C/D/I, +PEG (feeds held throughout), +EVD (clamped by REED Taveras throughout session), +[place] catheter, +rectal probe, +cooling blanket, + bilateral sequential compression devices, + bilateral z-flex boots. No sign of discomfort.

## 2019-04-19 NOTE — PHYSICAL THERAPY INITIAL EVALUATION ADULT - CRITERIA FOR SKILLED THERAPEUTIC INTERVENTIONS
functional limitations in following categories/therapy frequency/impairments found/rehab potential/anticipated discharge recommendation/risk reduction/prevention

## 2019-04-19 NOTE — PHYSICAL THERAPY INITIAL EVALUATION ADULT - GENERAL OBSERVATIONS, REHAB EVAL
pt received semi-supine in bed +trach w/ humidification, +tele, +[place], +PEG, +A line, + pt received semi-supine in bed +trach w/ humidification, +tele, +[place], +PEG, +A line, +cooling blanket, +rectal probe, +EVD, +cranial incisions C/D/I, in NAD

## 2019-04-19 NOTE — CHART NOTE - NSCHARTNOTEFT_GEN_A_CORE
Admitting Diagnosis:   Patient is a 44y old  Male who presents with a chief complaint of right thalamic hemorrhage (19 Apr 2019 05:44)    PAST MEDICAL & SURGICAL HISTORY:    Current Nutrition Order: Jevity 1.2  @ 67 mL/hr x24 hours, Prostat SF 1x/day (1608 mL TV, 2030 kcal, 104 gm pro, 1298 mL free water, 161% RDI vitamin/mineral)    PO Intake: N/A as pt on EN    GI Issues: WDL, last BM 4/18 per flow sheet    Pain: Unable to assess at this time 2/2 pt's medical status    Skin Integrity: no pressure related injuries    Labs:   04-19    138  |  103  |  19  ----------------------------<  140<H>  4.2   |  23  |  0.78    Ca    9.0      19 Apr 2019 05:43  Phos  3.9     04-19  Mg     2.3     04-19    CAPILLARY BLOOD GLUCOSE    POCT Blood Glucose.: 133 mg/dL (19 Apr 2019 11:38)  POCT Blood Glucose.: 114 mg/dL (19 Apr 2019 05:58)  POCT Blood Glucose.: 165 mg/dL (18 Apr 2019 22:46)  POCT Blood Glucose.: 141 mg/dL (18 Apr 2019 16:17)    Medications:  MEDICATIONS  (STANDING):  amLODIPine   Tablet 10 milliGRAM(s) Oral daily  atorvastatin 40 milliGRAM(s) Oral at bedtime  cefTRIAXone   IVPB 2 Gram(s) IV Intermittent every 24 hours  chlorhexidine 2% Cloths 1 Application(s) Topical <User Schedule>  cloNIDine 0.2 milliGRAM(s) Oral every 12 hours  dexmedetomidine Infusion 0.1 MICROgram(s)/kG/Hr (1.8 mL/Hr) IV Continuous <Continuous>  dextrose 5%. 1000 milliLiter(s) (50 mL/Hr) IV Continuous <Continuous>  dextrose 50% Injectable 12.5 Gram(s) IV Push once  dextrose 50% Injectable 25 Gram(s) IV Push once  dextrose 50% Injectable 25 Gram(s) IV Push once  enoxaparin Injectable 40 milliGRAM(s) SubCutaneous at bedtime  hydrALAZINE 100 milliGRAM(s) Oral every 8 hours  influenza   Vaccine 0.5 milliLiter(s) IntraMuscular once  insulin lispro (HumaLOG) corrective regimen sliding scale   SubCutaneous Before meals and at bedtime  levETIRAcetam  Solution 1000 milliGRAM(s) Enteral Tube two times a day  lisinopril 40 milliGRAM(s) Oral daily  metoprolol tartrate 75 milliGRAM(s) Oral every 12 hours  sodium chloride 2 Gram(s) Oral every 6 hours    MEDICATIONS  (PRN):  acetaminophen    Suspension .. 650 milliGRAM(s) Oral every 6 hours PRN Temp greater or equal to 38.5C (101.3F)  dextrose 40% Gel 15 Gram(s) Oral once PRN Blood Glucose LESS THAN 70 milliGRAM(s)/deciliter  glucagon  Injectable 1 milliGRAM(s) IntraMuscular once PRN Glucose LESS THAN 70 milligrams/deciliter  hydrALAZINE Injectable 10 milliGRAM(s) IV Push every 4 hours PRN BP>140  LORazepam   Injectable 4 milliGRAM(s) IV Push every 4 hours PRN RASS 4  ondansetron Injectable 4 milliGRAM(s) IV Push every 6 hours PRN Nausea and/or Vomiting    Weight:  No updated weights    Weight Change:   Please obtain current weight and weekly weights for further assessment    Nutrition Focused Physical Exam: Completed [   ]  Not Pertinent [ x  ]    Estimated energy needs:   Ht (4/6): 175.3cm, Wt (4/8 per family): 72.7kg IBW: 160lb +/-10%, %IBW: 100%, BMI: 23.6kg/m2   IBW used to calculate energy needs as pt vented.   0345-8920 kcal (25-30 kcal/kg)  102-116 gm (1.4-1.6 gm/kg)  0121-2788 ml (25-30 ml/kg)    Subjective:   45yo M w/ ETOH abuse now s/p right thalamic IPH with IVH s/p Emergent Rt EVD placement 4/6/19, s/p Cerebral angiogram neg 4/8/19, new left EVD placed and R EVD removed 4/9/19, now s/p penumbra evacuation of ICH 4/10/19. S/P tracheostomy, bronchoscopy, and EGD w/ PEG 4/15. CPAP wean to trach colla. TF recently on hold as plan for CAT scan this AM, pt previously tolerating TF at goal per RN. Rectal tube d/c'd. Recommend resume TF at goal when appropriate and follow volume based feeding protocol. RD to monitor closely and f/u per high risk protocol.    Previous Nutrition Diagnosis:  Increased protein needs RT increased demand for nutrients AEB pt vented    Active [ X ]  Resolved [   ]    If resolved, new PES:     Goal:  Pt to meet >75% EER via EN.    Recommendations:  1. Recommend continue Jevity 1.2 @ 67 mL/hr, Prostat SF 1x/day (1608 mL TV, 2030 kcal, 104 gm pro, 1298 mL free water, 161% RDI vitamin/mineral). Continue volume based feeding protocol & monitor for signs of intolerance.   2. Monitor labs, obtain current weight and trend weekly weights.    Education: N/A, pt vented    Risk Level: High [ X  ] Moderate [   ] Low [   ]

## 2019-04-19 NOTE — PHYSICAL THERAPY INITIAL EVALUATION ADULT - IMPAIRMENTS CONTRIBUTING IMPAIRED BED MOBILITY, REHAB EVAL
impaired balance/cognition/abnormal muscle tone/decreased strength/impaired postural control/impaired motor control

## 2019-04-19 NOTE — OCCUPATIONAL THERAPY INITIAL EVALUATION ADULT - PERTINENT HX OF CURRENT PROBLEM, REHAB EVAL
44y Male w/ ETOH abuse now s/p right thalamic IPH with IVH s/p Emergent Rt EVD placement 4/6/19, s/p Cerebral angiogram neg 4/8/19, new left EVD placed and R EVD removed 4/9/19, now s/p penumbra evacuation of ICH 4/10/19.

## 2019-04-19 NOTE — PROGRESS NOTE ADULT - ASSESSMENT
44y/M with   1.  intracerebral hemorrhage (R basal ganglia / thalamic; small L basal ganglia, R pontine), brain compression, cerebral edema; s/p R crani, endoscopic evacuation of ICH (04/10/2019, Dr. So)  2.  dyslipidemia    PLAN:   NEURO: neurochecks q1h, PRN pain meds with Tylenol / fentanyl PRN; continue precedex RASS 0 to -1  ICH:  BP control; no aneurysm on formal angio (04/08)  seizure prophylaxis: as per neurosurgery  EVD: monitor output, CT this morning, possibly remove EVD  REHAB:  physical therapy evaluation and management    EARLY MOB:   HOB up     PULM:  s/p trach, trach collar, chest PT, pulmo toilette; CXR tomorrow  CARDIO:  SBP goal 100-160mm Hg, cont lisinopril 40 mg PO daily, amlodipine, lopressor 75 BID, clonidine 0.2 BID; hydrazaline 100 q8h; work-up for secondary hypertension  ENDO:  Blood sugar goals 140-180 mg/dL, continue insulin sliding scale; atorvastatin 40mg PO daily, A1C  4.9  GI:  docusate senna; d/c PPI   DIET: Jevity continue tube feeds  RENAL:  Na goal 135-145, IVL; continue salt tabs  HEM/ONC: Hb stable; given DDAVP and platelet transfusion for aspirin reversal  VTE Prophylaxis: SCDs, SQL  ID: afebrile, no leukocytosis, ceftriaxone 2G daily (last day 04/23); panculture q3d if fever spikes  Social: will update family; sister freeman (surrogate decision maker), father, common law wife who is pregnant    ATTENDING ATTESTATION:  I was physically present for the key portions of the evaluation and management (E/M) service provided.  I agree with the above history, physical and plan, which I have reviewed and edited where appropriate.    Patient at high risk for neurological deterioration or death due to:  ICU delirium, aspiration PNA, DVT / PE.  Critical care time, excluding procedures: 60 minutes spent on total encounter, more than 50% of the visit was spent counseling and/or coordinating care by the attending physician.     Plan discussed with RN, house staff.

## 2019-04-19 NOTE — OCCUPATIONAL THERAPY INITIAL EVALUATION ADULT - VISUAL ASSESSMENT: TRACKING
unable to fully assess formally however patient noted to be able to inconsistently visually track OT/PT to R>L with occasional multidirectional nystagmus

## 2019-04-19 NOTE — PHYSICAL THERAPY INITIAL EVALUATION ADULT - DID THE PATIENT HAVE SURGERY?
yes/Open tracheostomy, Bronchoscopy, EGD, with PEG-- 4/15/19, right crani for endoscopic evac of iph 4/10/19

## 2019-04-20 LAB
ANION GAP SERPL CALC-SCNC: 13 MMOL/L — SIGNIFICANT CHANGE UP (ref 5–17)
BUN SERPL-MCNC: 17 MG/DL — SIGNIFICANT CHANGE UP (ref 7–23)
CALCIUM SERPL-MCNC: 9.7 MG/DL — SIGNIFICANT CHANGE UP (ref 8.4–10.5)
CHLORIDE SERPL-SCNC: 102 MMOL/L — SIGNIFICANT CHANGE UP (ref 96–108)
CO2 SERPL-SCNC: 25 MMOL/L — SIGNIFICANT CHANGE UP (ref 22–31)
CREAT SERPL-MCNC: 0.76 MG/DL — SIGNIFICANT CHANGE UP (ref 0.5–1.3)
GLUCOSE SERPL-MCNC: 146 MG/DL — HIGH (ref 70–99)
HCT VFR BLD CALC: 36.2 % — LOW (ref 39–50)
HGB BLD-MCNC: 12.1 G/DL — LOW (ref 13–17)
MAGNESIUM SERPL-MCNC: 2.4 MG/DL — SIGNIFICANT CHANGE UP (ref 1.6–2.6)
MCHC RBC-ENTMCNC: 32.4 PG — SIGNIFICANT CHANGE UP (ref 27–34)
MCHC RBC-ENTMCNC: 33.4 GM/DL — SIGNIFICANT CHANGE UP (ref 32–36)
MCV RBC AUTO: 96.8 FL — SIGNIFICANT CHANGE UP (ref 80–100)
NRBC # BLD: 0 /100 WBCS — SIGNIFICANT CHANGE UP (ref 0–0)
PHOSPHATE SERPL-MCNC: 4 MG/DL — SIGNIFICANT CHANGE UP (ref 2.5–4.5)
PLATELET # BLD AUTO: 636 K/UL — HIGH (ref 150–400)
POTASSIUM SERPL-MCNC: 4.2 MMOL/L — SIGNIFICANT CHANGE UP (ref 3.5–5.3)
POTASSIUM SERPL-SCNC: 4.2 MMOL/L — SIGNIFICANT CHANGE UP (ref 3.5–5.3)
RBC # BLD: 3.74 M/UL — LOW (ref 4.2–5.8)
RBC # FLD: 11.8 % — SIGNIFICANT CHANGE UP (ref 10.3–14.5)
SODIUM SERPL-SCNC: 140 MMOL/L — SIGNIFICANT CHANGE UP (ref 135–145)
WBC # BLD: 12.31 K/UL — HIGH (ref 3.8–10.5)
WBC # FLD AUTO: 12.31 K/UL — HIGH (ref 3.8–10.5)

## 2019-04-20 PROCEDURE — 71045 X-RAY EXAM CHEST 1 VIEW: CPT | Mod: 26

## 2019-04-20 PROCEDURE — 99291 CRITICAL CARE FIRST HOUR: CPT | Mod: 24

## 2019-04-20 RX ORDER — HYDRALAZINE HCL 50 MG
10 TABLET ORAL EVERY 4 HOURS
Qty: 0 | Refills: 0 | Status: DISCONTINUED | OUTPATIENT
Start: 2019-04-20 | End: 2019-04-28

## 2019-04-20 RX ORDER — METOPROLOL TARTRATE 50 MG
100 TABLET ORAL EVERY 12 HOURS
Qty: 0 | Refills: 0 | Status: DISCONTINUED | OUTPATIENT
Start: 2019-04-20 | End: 2019-05-04

## 2019-04-20 RX ORDER — SODIUM CHLORIDE 9 MG/ML
2 INJECTION INTRAMUSCULAR; INTRAVENOUS; SUBCUTANEOUS EVERY 12 HOURS
Qty: 0 | Refills: 0 | Status: DISCONTINUED | OUTPATIENT
Start: 2019-04-20 | End: 2019-04-24

## 2019-04-20 RX ORDER — QUETIAPINE FUMARATE 200 MG/1
12.5 TABLET, FILM COATED ORAL DAILY
Qty: 0 | Refills: 0 | Status: DISCONTINUED | OUTPATIENT
Start: 2019-04-20 | End: 2019-04-21

## 2019-04-20 RX ADMIN — Medication 0.2 MILLIGRAM(S): at 06:18

## 2019-04-20 RX ADMIN — DEXMEDETOMIDINE HYDROCHLORIDE IN 0.9% SODIUM CHLORIDE 1.8 MICROGRAM(S)/KG/HR: 4 INJECTION INTRAVENOUS at 19:00

## 2019-04-20 RX ADMIN — DEXMEDETOMIDINE HYDROCHLORIDE IN 0.9% SODIUM CHLORIDE 1.8 MICROGRAM(S)/KG/HR: 4 INJECTION INTRAVENOUS at 04:52

## 2019-04-20 RX ADMIN — CHLORHEXIDINE GLUCONATE 1 APPLICATION(S): 213 SOLUTION TOPICAL at 06:19

## 2019-04-20 RX ADMIN — DEXMEDETOMIDINE HYDROCHLORIDE IN 0.9% SODIUM CHLORIDE 1.8 MICROGRAM(S)/KG/HR: 4 INJECTION INTRAVENOUS at 14:20

## 2019-04-20 RX ADMIN — ENOXAPARIN SODIUM 40 MILLIGRAM(S): 100 INJECTION SUBCUTANEOUS at 21:33

## 2019-04-20 RX ADMIN — LEVETIRACETAM 1000 MILLIGRAM(S): 250 TABLET, FILM COATED ORAL at 06:20

## 2019-04-20 RX ADMIN — Medication 10 MILLIGRAM(S): at 10:30

## 2019-04-20 RX ADMIN — LISINOPRIL 40 MILLIGRAM(S): 2.5 TABLET ORAL at 06:19

## 2019-04-20 RX ADMIN — Medication 100 MILLIGRAM(S): at 21:32

## 2019-04-20 RX ADMIN — Medication 0.2 MILLIGRAM(S): at 14:08

## 2019-04-20 RX ADMIN — SODIUM CHLORIDE 2 GRAM(S): 9 INJECTION INTRAMUSCULAR; INTRAVENOUS; SUBCUTANEOUS at 17:07

## 2019-04-20 RX ADMIN — DEXMEDETOMIDINE HYDROCHLORIDE IN 0.9% SODIUM CHLORIDE 1.8 MICROGRAM(S)/KG/HR: 4 INJECTION INTRAVENOUS at 09:28

## 2019-04-20 RX ADMIN — DEXMEDETOMIDINE HYDROCHLORIDE IN 0.9% SODIUM CHLORIDE 1.8 MICROGRAM(S)/KG/HR: 4 INJECTION INTRAVENOUS at 22:56

## 2019-04-20 RX ADMIN — QUETIAPINE FUMARATE 12.5 MILLIGRAM(S): 200 TABLET, FILM COATED ORAL at 11:46

## 2019-04-20 RX ADMIN — Medication 75 MILLIGRAM(S): at 06:19

## 2019-04-20 RX ADMIN — CEFTRIAXONE 100 GRAM(S): 500 INJECTION, POWDER, FOR SOLUTION INTRAMUSCULAR; INTRAVENOUS at 09:00

## 2019-04-20 RX ADMIN — Medication 100 MILLIGRAM(S): at 17:07

## 2019-04-20 RX ADMIN — SODIUM CHLORIDE 2 GRAM(S): 9 INJECTION INTRAMUSCULAR; INTRAVENOUS; SUBCUTANEOUS at 06:18

## 2019-04-20 RX ADMIN — Medication 100 MILLIGRAM(S): at 14:08

## 2019-04-20 RX ADMIN — Medication 100 MILLIGRAM(S): at 06:19

## 2019-04-20 RX ADMIN — AMLODIPINE BESYLATE 10 MILLIGRAM(S): 2.5 TABLET ORAL at 06:18

## 2019-04-20 RX ADMIN — ATORVASTATIN CALCIUM 40 MILLIGRAM(S): 80 TABLET, FILM COATED ORAL at 21:33

## 2019-04-20 RX ADMIN — DEXMEDETOMIDINE HYDROCHLORIDE IN 0.9% SODIUM CHLORIDE 1.8 MICROGRAM(S)/KG/HR: 4 INJECTION INTRAVENOUS at 00:00

## 2019-04-20 RX ADMIN — LEVETIRACETAM 1000 MILLIGRAM(S): 250 TABLET, FILM COATED ORAL at 17:08

## 2019-04-20 RX ADMIN — Medication 0.2 MILLIGRAM(S): at 21:32

## 2019-04-20 NOTE — PROGRESS NOTE ADULT - SUBJECTIVE AND OBJECTIVE BOX
=================================  NEUROCRITICAL CARE ATTENDING NOTE  =================================    ARPAN RUANO   MRN-[id number]  Summary:  44y/M with no PMH presented with L UE weakness / numbness, onset at 630 p.m.; then developed dysarthria, L UE/LE weakness.  He was brought to [place] ED, n/v, SBP 200s, more altered, intubated.  Imaging showed R thalamic hemorrhage.  Transferred to [facility].    COURSE IN THE HOSPITAL:   Admitted, given DDAVP / platelets; EVD inserted, improved   04/15 fever overnight   Tmax 38.3 remained on trach collar   No significant events overnight, remained on trach collar tmax 38.1; EVD removed      Past Medical History:   Allergies:  No Known Allergies  Home meds:     PHYSICAL EXAMINATION  T(C): 37.7 (- @ 06:07), Max: 38.1 ( @ 17:10) HR: 74 (- @ 07:00) (66 - 92) BP: 154/102 (- @ 07:00) (123/88 - 166/93) RR: 22 (- @ 07:00) (18 - 27) SpO2: 100% ( @ 07:00) (95% - 100%)  NEUROLOGIC EXAMINATION:  Patient opens eyes spontaneously, following commands, pupils 3mm reactive, downward gaze; moves R UE/LE spontaneously, L UE extends to noxious and L LE withdraws  GENERAL: trach collar 40%  EENT:  anicteric  CARDIOVASCULAR: (+) S1 S2, normal rate and regular rhythm  PULMONARY: clear to auscultation bilaterally; copious secretions q1h suctioning  ABDOMEN: soft, nontender with normoactive bowel sounds; PEG site clean  EXTREMITIES: no edema  SKIN: no rash    LABS:  CAPILLARY BLOOD GLUCOSE 120 118 142 133               12.1   12.31 )-----------( 636      ( 2019 05:13 )             36.2     140  |  102  |  17  ----------------------------<  146<H>  4.2   |  25  |  0.76    Ca    9.7      2019 05:13  Phos  4.0     04-20  Mg     2.4      @ 07:01  -   @ 07:00  IN: 1754.6 mL / OUT: 2750 mL / NET: -995.4 mL    LDL = 113 ()   HDL = 70 ()  TG = 85 ()  TSH = 0.733 ()    Bacteriology:   UA NEG   Blood CS NG1D x2   04/15 CSF NGTD   Blood CS NG5D x1   CSF NGTD  04/10 Sputum MSSA H influenza S Ceftri   sputum culture numerous MSSA   Blood CS NG5D x2 (final)    CSF studies:    L   *** [id number] WBC0 *** %N0 %L0     L3.4   *** [id number] WBC37 *** %N35 %L1     EEG:  Neuroimagin/19 CT head: L frontal EVD, no change in size, no new foci of hemorrhage resolving hemorrhages, IVH unchanged    12:48 p.m. CT head:  s/p EVD, decrease in size of ventricles, R ICH unchanged with IV extension, punctate hyperdense R BG R conner R thalamus, unchanged,    2:11 a.m. CT head:  large R basal ganglia / thalamic ICH, small L basal ganglia / R pontine acute hemorrhage; c/w hypertensive ICH; extension into R lateral ventricle; MLS, mass effect, no HCP  Other imagin/19 KUB ultrasound: normal renal ultrasound, no significant stenosis    MEDICATIONS: SQL 40 HS ceftriaxone 2G IV q24h levetiracetam 1G PO BID amlodipine 10mg PO daily clonidine 0.2mg PO q8h hydralazine 100mg PO q8h lisinopril 40mg PO daily metoprolol 100gm PO q12h atorvastatin 40mg PO HS mod ISS peridex salt g PO q6h ativan PRN      IV FLUIDS: IVL  DRIPS: dexmedetomidine (0.8)   DIET: Jevity at 67cc/hr  Lines: Lizabeth Watson   Drains:   EVD @ clamped, 0 cc/24h ICP as high as 37  Wounds:    CODE STATUS:  Full Code                       GOALS OF CARE:  aggressive                      DISPOSITION:  ICU  ICH Score on admission  = GCS Score: 3-4 (2 pts) E1VTM2 (+) IVH = 3  ICH volume 20ml  Estimated 30-day mortality 3 points: 72%3 =================================  NEUROCRITICAL CARE ATTENDING NOTE  =================================    ARPAN RUANO   MRN-[id number]  Summary:  44y/M with no PMH presented with L UE weakness / numbness, onset at 630 p.m.; then developed dysarthria, L UE/LE weakness.  He was brought to [place] ED, n/v, SBP 200s, more altered, intubated.  Imaging showed R thalamic hemorrhage.  Transferred to [facility].    COURSE IN THE HOSPITAL:   Admitted, given DDAVP / platelets; EVD inserted, improved   04/15 fever overnight   Tmax 38.3 remained on trach collar   No significant events overnight, remained on trach collar tmax 38.1; EVD removed   minor bleeding in gracy hole site, stapled overnight    Past Medical History:   Allergies:  No Known Allergies  Home meds:     PHYSICAL EXAMINATION  T(C): 37.7 (- @ 06:07), Max: 38.1 (- @ 17:10) HR: 74 (- @ 07:00) (66 - 92) BP: 154/102 (- @ 07:00) (123/88 - 166/93) RR: 22 (- @ 07:00) (18 - 27) SpO2: 100% ( @ 07:00) (95% - 100%)  NEUROLOGIC EXAMINATION:  Patient opens eyes spontaneously, following commands, pupils 3mm reactive, downward gaze; moves R UE/LE spontaneously, L UE extends to noxious and L LE withdraws  GENERAL: trach collar 40%  EENT:  anicteric  CARDIOVASCULAR: (+) S1 S2, normal rate and regular rhythm  PULMONARY: clear to auscultation bilaterally; copious secretions q1h suctioning  ABDOMEN: soft, nontender with normoactive bowel sounds; PEG site clean  EXTREMITIES: no edema  SKIN: no rash    LABS:  CAPILLARY BLOOD GLUCOSE 120 118 142 133               12.1   12.31 )-----------( 636      ( 2019 05:13 )             36.2     140  |  102  |  17  ----------------------------<  146<H>  4.2   |  25  |  0.76    Ca    9.7      2019 05:13  Phos  4.0     04-20  Mg     2.4      @ 07:01  -   @ 07:00  IN: 1754.6 mL / OUT: 2750 mL / NET: -995.4 mL    LDL = 113 ()   HDL = 70 ()  TG = 85 ()  TSH = 0.733 ()    Bacteriology:   UA NEG   Blood CS NG1D x2   04/15 CSF NGTD   Blood CS NG5D x1   CSF NGTD  04/10 Sputum MSSA H influenza S Ceftri   sputum culture numerous MSSA   Blood CS NG5D x2 (final)    CSF studies:    L   *** [id number] WBC0 *** %N0 %L0     L3.4   *** [id number] WBC37 *** %N35 %L1     EEG:  Neuroimagin/19 CT head: L frontal EVD, no change in size, no new foci of hemorrhage resolving hemorrhages, IVH unchanged    12:48 p.m. CT head:  s/p EVD, decrease in size of ventricles, R ICH unchanged with IV extension, punctate hyperdense R BG R conner R thalamus, unchanged,    2:11 a.m. CT head:  large R basal ganglia / thalamic ICH, small L basal ganglia / R pontine acute hemorrhage; c/w hypertensive ICH; extension into R lateral ventricle; MLS, mass effect, no HCP  Other imagin/19 KUB ultrasound: normal renal ultrasound, no significant stenosis    MEDICATIONS: SQL 40 HS ceftriaxone 2G IV q24h levetiracetam 1G PO BID amlodipine 10mg PO daily clonidine 0.2mg PO q8h hydralazine 100mg PO q8h lisinopril 40mg PO daily metoprolol 100gm PO q12h atorvastatin 40mg PO HS mod ISS peridex salt g PO q6h ativan PRN      IV FLUIDS: IVL  DRIPS: dexmedetomidine (0.8)   DIET: Jevity at 67cc/hr  Lines: [place]   Drains:   EVD removed  Wounds:    CODE STATUS:  Full Code                       GOALS OF CARE:  aggressive                      DISPOSITION:  ICU  ICH Score on admission  = GCS Score: 3-4 (2 pts) E1VTM2 (+) IVH = 3  ICH volume 20ml  Estimated 30-day mortality 3 points: 72%3

## 2019-04-20 NOTE — PROGRESS NOTE ADULT - ASSESSMENT
44y/M with   1.  intracerebral hemorrhage (R basal ganglia / thalamic; small L basal ganglia, R pontine), brain compression, cerebral edema; s/p R crani, endoscopic evacuation of ICH (04/10/2019, Dr. So)  2.  dyslipidemia    PLAN:   NEURO: neurochecks q1h, PRN pain meds with Tylenol / fentanyl PRN; continue precedex RASS 0 to -1  ICH:  BP control; no aneurysm on formal angio (04/08)  seizure prophylaxis: as per neurosurgery  EVD: monitor output, CT this morning, possibly remove EVD  REHAB:  physical therapy evaluation and management    EARLY MOB:   HOB up     PULM:  s/p trach, trach collar, chest PT, pulmo toilette; CXR tomorrow  CARDIO:  SBP goal 100-160mm Hg, cont lisinopril 40 mg PO daily, amlodipine, lopressor 75 BID, clonidine 0.2 BID; hydrazaline 100 q8h; work-up for secondary hypertension  ENDO:  Blood sugar goals 140-180 mg/dL, continue insulin sliding scale; atorvastatin 40mg PO daily, A1C  4.9  GI:  docusate senna; d/c PPI   DIET: Jevity continue tube feeds  RENAL:  Na goal 135-145, IVL; continue salt tabs  HEM/ONC: Hb stable; given DDAVP and platelet transfusion for aspirin reversal  VTE Prophylaxis: SCDs, SQL  ID: afebrile, no leukocytosis, ceftriaxone 2G daily (last day 04/23); panculture q3d if fever spikes  Social: will update family; sister freeman (surrogate decision maker), father, common law wife who is pregnant    ATTENDING ATTESTATION:  I was physically present for the key portions of the evaluation and management (E/M) service provided.  I agree with the above history, physical and plan, which I have reviewed and edited where appropriate.    Patient at high risk for neurological deterioration or death due to:  ICU delirium, aspiration PNA, DVT / PE.  Critical care time, excluding procedures: 60 minutes spent on total encounter, more than 50% of the visit was spent counseling and/or coordinating care by the attending physician.     Plan discussed with RN, house staff. 44y/M with   1.  intracerebral hemorrhage (R basal ganglia / thalamic; small L basal ganglia, R pontine), brain compression, cerebral edema; s/p R crani, endoscopic evacuation of ICH (04/10/2019, Dr. So)  2.  dyslipidemia    PLAN:   NEURO: neurochecks q2h, PRN pain meds with Tylenol; taper precedex to off  ICH:  BP control; no aneurysm on formal angio (04/08)  seizure prophylaxis: as per neurosurgery  EVD: removed   agitation: start seroquel 12.5 daily  REHAB:  physical therapy evaluation and management    EARLY MOB:   HOB up     PULM:  s/p trach, trach collar, chest PT, pulmo toilette; CXR this morning  CARDIO:  SBP goal 100-160mm Hg, cont lisinopril 40 mg PO daily, amlodipine, lopressor 75 BID, clonidine 0.2 BID; hydrazaline 100 q8h; work-up for secondary hypertension; EKG to check QTc  ENDO:  Blood sugar goals 140-180 mg/dL, continue insulin sliding scale; atorvastatin 40mg PO daily, A1C  4.9  GI:  docusate senna  DIET: Jevity continue tube feeds  RENAL:  Na goal 135-145, IVL; decrease salt tabs to 2g q12h  HEM/ONC: Hb stable; given DDAVP and platelet transfusion for aspirin reversal  VTE Prophylaxis: SCDs, SQL  ID: afebrile, no leukocytosis, ceftriaxone 2G daily (last day 04/23); panculture q3d if fever spikes  Social: will update family; sister freeman (surrogate decision maker), father, common law wife who is pregnant    ATTENDING ATTESTATION:  I was physically present for the key portions of the evaluation and management (E/M) service provided.  I agree with the above history, physical and plan, which I have reviewed and edited where appropriate.    Patient at high risk for neurological deterioration or death due to:  ICU delirium, aspiration PNA, DVT / PE.  Critical care time, excluding procedures: 60 minutes spent on total encounter, more than 50% of the visit was spent counseling and/or coordinating care by the attending physician.     Plan discussed with RN, house staff.

## 2019-04-20 NOTE — PROGRESS NOTE ADULT - SUBJECTIVE AND OBJECTIVE BOX
HPI:  History obtained by patient's girlfriend and chart from [facility], patient is unresponsive:     43 y/o male with no significant PMHx presents to [facility] with AMS, left side weakness and numbness. Per girlfriend, [place] when he developed acute onset of left facial numbness and LUE and numbness around 6:30PM. When get got home he developed AMS, dysarthria, and weakness in the LUE and LLE. At [facility] ED patient had several episodes of emesis and was hypertensive to SBP in the 200's. Work up revealed right thalamic hemorrhage on CT head. Patient not on any anticoagulation use per girlfriend. Of note, patient took Excedrin for headache at home. Patient transferred to [facility] for further management. (2019 00:04)       Hospital Course:   : overnight, patient requiring large amount of cardene and propofol.  Plan today is to transition to precedex, start PO lisinopril and wean of cardene.  Becomes extremely agitated when off sedation.  : Cerebral angio without findings of aneurysm. EVD stopped working, pulled back without improvement. CT Head performed.  : Received ativan 6mg for agitation. EVD stopped draining at 6pm yesterday taken for stat CTH. Dr. So notified and EVD lowered to 5cmH2O without output.   4/10: s/p penumbra evacuation of ICH. Pt seen and examined at bedside postop. Remains intubated, on versed. ICPs stable.  (OR EBL 30cc, received LR 200cc)  : JUSTIN overnight. Remains on versed. Remains intubated. Neuro stable.  : Given ativan overnight, bucking the vent. Remains on versed drip. Neuro stable. ICPs stable. Increase hydralazine to 75 Q8.    JUSTIN overnight, febrile, urinary retention Straight Cath x 1 = 1L, neuro exam unchanged since yesterday, started Cardene gtt as per Dr. So goal -140   POD#4 febrile overnight, o/w JUSTIN overnight, EVD@ 5cm H2O, Clonidine started, Ceftriax until  Staph sputum, Versed/Precedex, straight cathed overnight  4/15: ICP stable. neuro stable. Remains on versed/precedex  : Tolerating CPAP, CTH performed, decreased vent size, EVD raised to 10 cmH2O neuro stable   :  JUSTIN overnight, EVD @15cm H2O, CPAP overnight, Ceftriax until  PNA, Precedex PRN comfort, rectal tube - diarrhea; fever spike    EVD clamped since , ICPs fluctuate, HCT today, R IJ, Ceftraix until , Versed gtt, neuro exam is improving, Duplex Renal artery and Metanephrine & Catecholamine Urine collection 24hrs for high BP work-up  : EVD removed yesterday. Neuro stable. On precedex    Vital Signs Last 24 Hrs  T(C): 37.4 (2019 00:34), Max: 38.1 (2019 17:10)  T(F): 99.4 (2019 00:34), Max: 100.6 (2019 17:10)  HR: 76 (2019 00:00) (60 - 92)  BP: 139/87 (2019 00:00) (114/63 - 147/90)  BP(mean): 108 (2019 00:00) (82 - 112)  RR: 25 (2019 00:00) (17 - 27)  SpO2: 99% (2019 00:00) (94% - 100%)    I&O's Detail    2019 07:01  -  2019 07:00  --------------------------------------------------------  IN:    dexmedetomidine Infusion: 340.4 mL    Enteral Tube Flush: 130 mL    ns in tub fed  [id number]: 1608 mL  Total IN: 2078.4 mL    OUT:    Incontinent per Condom Catheter: 1250 mL    Rectal Tube: 100 mL  Total OUT: 1350 mL    Total NET: 728.4 mL      2019 07:01  -  2019 01:23  --------------------------------------------------------  IN:    dexmedetomidine Infusion: 230.6 mL    IV PiggyBack: 100 mL    ns in tub fed  [id number]: 871 mL  Total IN: 1201.6 mL    OUT:    Incontinent per Condom Catheter: 2250 mL  Total OUT: 2250 mL    Total NET: -1048.4 mL        I&O's Summary    2019 07:  -  2019 07:00  --------------------------------------------------------  IN: 2078.4 mL / OUT: 1350 mL / NET: 728.4 mL    2019 07:01  -  2019 01:23  --------------------------------------------------------  IN: 1201.6 mL / OUT: 2250 mL / NET: -1048.4 mL        PHYSICAL EXAM:  Trached, EVD site c/d/i  PERRL, tracking with eyes  lungs CTA  Soft, NT/ND. +BS  Pulses 2+ throughout  Neuro: Moving RUE/RLE spontaneously, withdrawal LLE and LUE. following commands intermittently (shows 2 fingers and wiggles toes on right,) PERRL, +corneals, +gag    TUBES/LINES:  [x] CVC  [x] A-line  [] Lumbar Drain  [] Ventriculostomy  [] Other    DIET:  [] NPO  [] Mechanical  [x] Tube feeds      LABS:                        10.7   12.24 )-----------( 542      ( 2019 05:43 )             31.5     04-19    138  |  103  |  19  ----------------------------<  140<H>  4.2   |  23  |  0.78    Ca    9.0      2019 05:43  Phos  3.9     04-19  Mg     2.3     -19        Urinalysis Basic - ( 2019 15:27 )    Color: Yellow / Appearance: Clear / S.020 / pH: x  Gluc: x / Ketone: NEGATIVE  / Bili: Negative / Urobili: 0.2 E.U./dL   Blood: x / Protein: Trace mg/dL / Nitrite: NEGATIVE   Leuk Esterase: NEGATIVE / RBC: < 5 /HPF / WBC < 5 /HPF   Sq Epi: x / Non Sq Epi: 0-5 /HPF / Bacteria: Present /HPF          CAPILLARY BLOOD GLUCOSE      POCT Blood Glucose.: 118 mg/dL (2019 21:35)  POCT Blood Glucose.: 142 mg/dL (2019 16:26)  POCT Blood Glucose.: 133 mg/dL (2019 11:38)  POCT Blood Glucose.: 114 mg/dL (2019 05:58)      Drug Levels: [] N/A    CSF Analysis: [] N/A      Allergies    No Known Allergies    Intolerances      MEDICATIONS:  Antibiotics:  cefTRIAXone   IVPB 2 Gram(s) IV Intermittent every 24 hours    Neuro:  acetaminophen    Suspension .. 650 milliGRAM(s) Oral every 6 hours PRN  dexmedetomidine Infusion 0.1 MICROgram(s)/kG/Hr IV Continuous <Continuous>  levETIRAcetam  Solution 1000 milliGRAM(s) Enteral Tube two times a day  LORazepam   Injectable 4 milliGRAM(s) IV Push every 4 hours PRN  ondansetron Injectable 4 milliGRAM(s) IV Push every 6 hours PRN    Anticoagulation:  enoxaparin Injectable 40 milliGRAM(s) SubCutaneous at bedtime    OTHER:  amLODIPine   Tablet 10 milliGRAM(s) Oral daily  atorvastatin 40 milliGRAM(s) Oral at bedtime  chlorhexidine 2% Cloths 1 Application(s) Topical <User Schedule>  cloNIDine 0.2 milliGRAM(s) Oral every 8 hours  dextrose 40% Gel 15 Gram(s) Oral once PRN  dextrose 50% Injectable 12.5 Gram(s) IV Push once  dextrose 50% Injectable 25 Gram(s) IV Push once  dextrose 50% Injectable 25 Gram(s) IV Push once  glucagon  Injectable 1 milliGRAM(s) IntraMuscular once PRN  hydrALAZINE 100 milliGRAM(s) Oral every 8 hours  hydrALAZINE Injectable 10 milliGRAM(s) IV Push every 4 hours PRN  influenza   Vaccine 0.5 milliLiter(s) IntraMuscular once  insulin lispro (HumaLOG) corrective regimen sliding scale   SubCutaneous Before meals and at bedtime  lisinopril 40 milliGRAM(s) Oral daily  metoprolol tartrate 75 milliGRAM(s) Oral every 12 hours    IVF:  dextrose 5%. 1000 milliLiter(s) IV Continuous <Continuous>  sodium chloride 2 Gram(s) Oral every 6 hours    CULTURES:  Culture Results:   No growth at 1 day. ( @ 15:05)  Culture Results:   No growth at 1 day. ( @ 15:05)    RADIOLOGY & ADDITIONAL TESTS:      ASSESSMENT:  44y Male w/ ETOH abuse now s/p right thalamic IPH with IVH s/p Emergent Rt EVD placement 19, s/p Cerebral angiogram neg 19, new left EVD placed and R EVD removed 19, now s/p penumbra evacuation of ICH 4/10/19, EVD removed 19    HEMORRHAGE STROKE  HEMORRHAGE  Handoff  ICH (intracerebral hemorrhage)  ICH (intracerebral hemorrhage)  Open tracheostomy  Bronchoscopy, adult  EGD, with PEG  Craniotomy for intracranial hemorrhage  Evacuation of hematoma of face  Angiogram, carotid and cerebral, bilateral      PPLAN:  NEURO:  Neuro checks q1h  Keppra for seizure prophylaxis  wean precedex  Continue fentanyl and ativan PRN agitation    CARDIOVASCULAR:   SBP goal <160  Continue lopressor 50mg BID, norvasc 10mg daily, and lisinopril 40mg daily, hydralazine 100mg Q8h  Clonidine .2 q8h  Daily labs, electrolyte repletion PRN,  Continue Statin therapy  Right IJ in place    PULMONARY:   trach collar  Daily CXR    RENAL:   Voiding, condom catheter, straight Cath PRN  Normal Na goal  Duplex Renal artery for hypertension work-up  Metanephrine and Catecholamine Urine collection 24hrs for hypertension work-up    GI:   s/p Peg    ID:  Ceftriax PNA until     ENDO:   ISS    DVT PROPHYLAXIS:   SCDs, SQL     DISPOSITION: Continue NSICU care,  Full code,  D/w Dr. So and Dr. Schultz      Assessment:  Present when checked    []  GCS  E   V  M     Heart Failure: []Acute, [] acute on chronic , []chronic  Heart Failure:  [] Diastolic (HFpEF), [] Systolic (HFrEF), []Combined (HFpEF and HFrEF), [] RHF, [] Pulm HTN, [] Other    [] KRISTA, [] ATN, [] AIN, [] other  [] CKD1, [] CKD2, [] CKD 3, [] CKD 4, [] CKD 5, []ESRD    Encephalopathy: [] Metabolic, [] Hepatic, [] toxic, [x] Neurological, [] Other    Abnormal Nurtitional Status: [] malnurtition (see nutrition note), [ ]underweight: BMI < 19, [] morbid obesity: BMI >40, [] Cachexia    [] Sepsis  [] hypovolemic shock,[] cardiogenic shock, [] hemorrhagic shock, [] neuogenic shock  [x] Acute Respiratory Failure  [x]Cerebral edema, [] Brain compression/ herniation,   [] Functional quadriplegia  [] Acute blood loss anemia

## 2019-04-21 LAB
ANION GAP SERPL CALC-SCNC: 15 MMOL/L — SIGNIFICANT CHANGE UP (ref 5–17)
BUN SERPL-MCNC: 20 MG/DL — SIGNIFICANT CHANGE UP (ref 7–23)
CALCIUM SERPL-MCNC: 9.9 MG/DL — SIGNIFICANT CHANGE UP (ref 8.4–10.5)
CHLORIDE SERPL-SCNC: 101 MMOL/L — SIGNIFICANT CHANGE UP (ref 96–108)
CO2 SERPL-SCNC: 22 MMOL/L — SIGNIFICANT CHANGE UP (ref 22–31)
CREAT SERPL-MCNC: 0.8 MG/DL — SIGNIFICANT CHANGE UP (ref 0.5–1.3)
GLUCOSE SERPL-MCNC: 152 MG/DL — HIGH (ref 70–99)
HCT VFR BLD CALC: 38.3 % — LOW (ref 39–50)
HGB BLD-MCNC: 12.8 G/DL — LOW (ref 13–17)
MAGNESIUM SERPL-MCNC: 2.3 MG/DL — SIGNIFICANT CHANGE UP (ref 1.6–2.6)
MCHC RBC-ENTMCNC: 32.1 PG — SIGNIFICANT CHANGE UP (ref 27–34)
MCHC RBC-ENTMCNC: 33.4 GM/DL — SIGNIFICANT CHANGE UP (ref 32–36)
MCV RBC AUTO: 96 FL — SIGNIFICANT CHANGE UP (ref 80–100)
NRBC # BLD: 0 /100 WBCS — SIGNIFICANT CHANGE UP (ref 0–0)
PHOSPHATE SERPL-MCNC: 4 MG/DL — SIGNIFICANT CHANGE UP (ref 2.5–4.5)
PLATELET # BLD AUTO: 664 K/UL — HIGH (ref 150–400)
POTASSIUM SERPL-MCNC: 4.8 MMOL/L — SIGNIFICANT CHANGE UP (ref 3.5–5.3)
POTASSIUM SERPL-SCNC: 4.8 MMOL/L — SIGNIFICANT CHANGE UP (ref 3.5–5.3)
RBC # BLD: 3.99 M/UL — LOW (ref 4.2–5.8)
RBC # FLD: 11.8 % — SIGNIFICANT CHANGE UP (ref 10.3–14.5)
SODIUM SERPL-SCNC: 138 MMOL/L — SIGNIFICANT CHANGE UP (ref 135–145)
WBC # BLD: 12.66 K/UL — HIGH (ref 3.8–10.5)
WBC # FLD AUTO: 12.66 K/UL — HIGH (ref 3.8–10.5)

## 2019-04-21 PROCEDURE — 71045 X-RAY EXAM CHEST 1 VIEW: CPT | Mod: 26

## 2019-04-21 PROCEDURE — 99291 CRITICAL CARE FIRST HOUR: CPT | Mod: 24

## 2019-04-21 RX ORDER — ACETAMINOPHEN 500 MG
650 TABLET ORAL EVERY 6 HOURS
Qty: 0 | Refills: 0 | Status: DISCONTINUED | OUTPATIENT
Start: 2019-04-21 | End: 2019-05-04

## 2019-04-21 RX ORDER — QUETIAPINE FUMARATE 200 MG/1
25 TABLET, FILM COATED ORAL DAILY
Qty: 0 | Refills: 0 | Status: DISCONTINUED | OUTPATIENT
Start: 2019-04-21 | End: 2019-04-23

## 2019-04-21 RX ORDER — QUETIAPINE FUMARATE 200 MG/1
25 TABLET, FILM COATED ORAL DAILY
Qty: 0 | Refills: 0 | Status: DISCONTINUED | OUTPATIENT
Start: 2019-04-21 | End: 2019-04-21

## 2019-04-21 RX ADMIN — ENOXAPARIN SODIUM 40 MILLIGRAM(S): 100 INJECTION SUBCUTANEOUS at 21:55

## 2019-04-21 RX ADMIN — Medication 100 MILLIGRAM(S): at 13:25

## 2019-04-21 RX ADMIN — Medication 650 MILLIGRAM(S): at 23:35

## 2019-04-21 RX ADMIN — DEXMEDETOMIDINE HYDROCHLORIDE IN 0.9% SODIUM CHLORIDE 1.8 MICROGRAM(S)/KG/HR: 4 INJECTION INTRAVENOUS at 04:31

## 2019-04-21 RX ADMIN — LEVETIRACETAM 1000 MILLIGRAM(S): 250 TABLET, FILM COATED ORAL at 05:56

## 2019-04-21 RX ADMIN — Medication 0.2 MILLIGRAM(S): at 05:56

## 2019-04-21 RX ADMIN — ATORVASTATIN CALCIUM 40 MILLIGRAM(S): 80 TABLET, FILM COATED ORAL at 21:54

## 2019-04-21 RX ADMIN — CEFTRIAXONE 100 GRAM(S): 500 INJECTION, POWDER, FOR SOLUTION INTRAMUSCULAR; INTRAVENOUS at 09:07

## 2019-04-21 RX ADMIN — QUETIAPINE FUMARATE 25 MILLIGRAM(S): 200 TABLET, FILM COATED ORAL at 11:52

## 2019-04-21 RX ADMIN — Medication 100 MILLIGRAM(S): at 05:55

## 2019-04-21 RX ADMIN — Medication 100 MILLIGRAM(S): at 21:55

## 2019-04-21 RX ADMIN — LISINOPRIL 40 MILLIGRAM(S): 2.5 TABLET ORAL at 05:56

## 2019-04-21 RX ADMIN — SODIUM CHLORIDE 2 GRAM(S): 9 INJECTION INTRAMUSCULAR; INTRAVENOUS; SUBCUTANEOUS at 05:55

## 2019-04-21 RX ADMIN — Medication 100 MILLIGRAM(S): at 18:44

## 2019-04-21 RX ADMIN — LEVETIRACETAM 1000 MILLIGRAM(S): 250 TABLET, FILM COATED ORAL at 18:45

## 2019-04-21 RX ADMIN — SODIUM CHLORIDE 2 GRAM(S): 9 INJECTION INTRAMUSCULAR; INTRAVENOUS; SUBCUTANEOUS at 18:44

## 2019-04-21 RX ADMIN — CHLORHEXIDINE GLUCONATE 1 APPLICATION(S): 213 SOLUTION TOPICAL at 05:56

## 2019-04-21 RX ADMIN — Medication 100 MILLIGRAM(S): at 05:56

## 2019-04-21 RX ADMIN — AMLODIPINE BESYLATE 10 MILLIGRAM(S): 2.5 TABLET ORAL at 05:56

## 2019-04-21 RX ADMIN — Medication 0.2 MILLIGRAM(S): at 21:55

## 2019-04-21 RX ADMIN — Medication 0.2 MILLIGRAM(S): at 13:25

## 2019-04-21 RX ADMIN — DEXMEDETOMIDINE HYDROCHLORIDE IN 0.9% SODIUM CHLORIDE 1.8 MICROGRAM(S)/KG/HR: 4 INJECTION INTRAVENOUS at 08:35

## 2019-04-21 NOTE — PROGRESS NOTE ADULT - SUBJECTIVE AND OBJECTIVE BOX
=================================  NEUROCRITICAL CARE ATTENDING NOTE  =================================    ARPAN RUANO   MRN-[id number]  Summary:  44y/M with no PMH presented with L UE weakness / numbness, onset at 630 p.m.; then developed dysarthria, L UE/LE weakness.  He was brought to [place] ED, n/v, SBP 200s, more altered, intubated.  Imaging showed R thalamic hemorrhage.  Transferred to [facility].    COURSE IN THE HOSPITAL:   Admitted, given DDAVP / platelets; EVD inserted, improved   04/15 fever overnight   Tmax 38.3 remained on trach collar   No significant events overnight, remained on trach collar tmax 38.1; EVD removed   minor bleeding in gracy hole site, stapled overnight    Past Medical History:   Allergies:  No Known Allergies  Home meds:     PHYSICAL EXAMINATION  T(C): 37.7 ( @ 06:00), Max: 37.7 (- @ 06:00) HR: 72 ( @ 07:00) (62 - 88) BP: 126/81 (- @ 07:00) (114/75 - 167/111) RR: 22 ( @ 07:00) (15 - 25) SpO2: 99% ( @ 07:00) (97% - 100%)  NEUROLOGIC EXAMINATION:  Patient opens eyes spontaneously, following commands, pupils 3mm reactive, downward gaze; moves R UE/LE spontaneously, L UE extends to noxious and L LE withdraws  GENERAL: trach collar 40%  EENT:  anicteric  CARDIOVASCULAR: (+) S1 S2, normal rate and regular rhythm  PULMONARY: clear to auscultation bilaterally; copious secretions q1h suctioning  ABDOMEN: soft, nontender with normoactive bowel sounds; PEG site clean  EXTREMITIES: no edema  SKIN: no rash    LABS:  CAPILLARY BLOOD GLUCOSE 147 131 145 117                         12.8   12.66 )-----------( 664      ( 2019 05:24 )             38.3     138  |  101  |  20  ----------------------------<  152<H>  4.8   |  22  |  0.80    Ca    9.9      2019 05:24  Phos  4.0     -  Mg     2.3      @ 07:01  -   @ 07:00  IN: 1991 mL / OUT: 1051 mL / NET: 940 mL    LDL = 113 ()   HDL = 70 ()  TG = 85 ()  TSH = 0.733 ()    Bacteriology:   UA NEG   Blood CS NG2D x2   04/15 CSF NGTD   Blood CS NG5D x1 (final)   CSF NGTD  04/10 Sputum MSSA H influenza S Ceftri   sputum culture numerous MSSA   Blood CS NG5D x2 (final)    CSF studies:    L   *** [id number] WBC0 *** %N0 %L0     L3.4   *** [id number] WBC37 *** %N35 %L1     EEG:  Neuroimagin/19 CT head: L frontal EVD, no change in size, no new foci of hemorrhage resolving hemorrhages, IVH unchanged    12:48 p.m. CT head:  s/p EVD, decrease in size of ventricles, R ICH unchanged with IV extension, punctate hyperdense R BG R conner R thalamus, unchanged,    2:11 a.m. CT head:  large R basal ganglia / thalamic ICH, small L basal ganglia / R pontine acute hemorrhage; c/w hypertensive ICH; extension into R lateral ventricle; MLS, mass effect, no HCP  Other imagin/19 KUB ultrasound: normal renal ultrasound, no significant stenosis    MEDICATIONS: SQL 40 HS ceftriaxone 2G IV q24h levetiracetam 1G PO BID amlodipine 10mg PO daily clonidine 0.2mg PO q8h hydralazine 100mg PO q8h lisinopril 40mg PO daily metoprolol 100gm PO q12h atorvastatin 40mg PO HS mod ISS peridex salt g PO q6h ativan PRN   MEDICATIONS: SQL 40 HS ceftriaxone 2g IV q24h levetiracetam 1G PO BID quetiapine 12.5mg PO daily amlodipine 10mg PO daily clonidine 0.2mg PO q8h hydralazine 100mg PO q8h lisinopril 40mg PO daily metoprolol 100mg PO q12h atorvastatin 40mg PO HS mod ISS peridex salt 2G PO q12h      IV FLUIDS: IVL  DRIPS: dexmedetomidine (0.8)   DIET: Jevity at 67cc/hr  Lines: [place]   Drains:   EVD removed  Wounds:    CODE STATUS:  Full Code                       GOALS OF CARE:  aggressive                      DISPOSITION:  ICU  ICH Score on admission  = GCS Score: 3-4 (2 pts) E1VTM2 (+) IVH = 3  ICH volume 20ml  Estimated 30-day mortality 3 points: 72%3 =================================  NEUROCRITICAL CARE ATTENDING NOTE  =================================    ARPAN RUANO   MRN-[id number]  Summary:  44y/M with no PMH presented with L UE weakness / numbness, onset at 630 p.m.; then developed dysarthria, L UE/LE weakness.  He was brought to [place] ED, n/v, SBP 200s, more altered, intubated.  Imaging showed R thalamic hemorrhage.  Transferred to [facility].    COURSE IN THE HOSPITAL:   Admitted, given DDAVP / platelets; EVD inserted, improved   04/15 fever overnight   Tmax 38.3 remained on trach collar   No significant events overnight, remained on trach collar tmax 38.1; EVD removed   minor bleeding in gracy hole site, stapled overnight    Past Medical History:   Allergies:  No Known Allergies  Home meds:     PHYSICAL EXAMINATION  T(C): 37.7 ( @ 06:00), Max: 37.7 (- @ 06:00) HR: 72 ( @ 07:00) (62 - 88) BP: 126/81 (- @ 07:00) (114/75 - 167/111) RR: 22 ( @ 07:00) (15 - 25) SpO2: 99% ( @ 07:00) (97% - 100%)  NEUROLOGIC EXAMINATION:  Patient opens eyes spontaneously, following commands, pupils 3mm reactive, no gaze preference; moves R UE/LE spontaneously, L UE extends to noxious and L LE withdraws  GENERAL: trach collar 40%  EENT:  anicteric  CARDIOVASCULAR: (+) S1 S2, normal rate and regular rhythm  PULMONARY: clear to auscultation bilaterally; copious secretions q1-2h suctioning  ABDOMEN: soft, nontender with normoactive bowel sounds; PEG site clean  EXTREMITIES: no edema  SKIN: no rash    LABS:  CAPILLARY BLOOD GLUCOSE 147 131 145 117                         12.8   12.66 )-----------( 664      ( 2019 05:24 )             38.3     138  |  101  |  20  ----------------------------<  152<H>  4.8   |  22  |  0.80    Ca    9.9      2019 05:24  Phos  4.0     -  Mg     2.3      @ 07:01  -   @ 07:00  IN: 1991 mL / OUT: 1051 mL / NET: 940 mL    LDL = 113 ()   HDL = 70 ()  TG = 85 ()  TSH = 0.733 ()    Bacteriology:   UA NEG   Blood CS NG2D x2   04/15 CSF NGTD   Blood CS NG5D x1 (final)   CSF NGTD  04/10 Sputum MSSA H influenza S Ceftri   sputum culture numerous MSSA   Blood CS NG5D x2 (final)    CSF studies:    L   *** [id number] WBC0 *** %N0 %L0     L3.4   *** [id number] WBC37 *** %N35 %L1     EEG:  Neuroimagin/19 CT head: L frontal EVD, no change in size, no new foci of hemorrhage resolving hemorrhages, IVH unchanged    12:48 p.m. CT head:  s/p EVD, decrease in size of ventricles, R ICH unchanged with IV extension, punctate hyperdense R BG R conner R thalamus, unchanged,    2:11 a.m. CT head:  large R basal ganglia / thalamic ICH, small L basal ganglia / R pontine acute hemorrhage; c/w hypertensive ICH; extension into R lateral ventricle; MLS, mass effect, no HCP  Other imagin/19 KUB ultrasound: normal renal ultrasound, no significant stenosis    MEDICATIONS: SQL 40 HS ceftriaxone 2g IV q24h levetiracetam 1G PO BID quetiapine 12.5mg PO daily amlodipine 10mg PO daily clonidine 0.2mg PO q8h hydralazine 100mg PO q8h lisinopril 40mg PO daily metoprolol 100mg PO q12h atorvastatin 40mg PO HS mod ISS peridex salt 2G PO q12h      IV FLUIDS: IVL  DRIPS: dexmedetomidine (0.5)   DIET: Jevity at 67cc/hr  Lines: [place]   Drains:   EVD removed; restraints  Wounds:    CODE STATUS:  Full Code                       GOALS OF CARE:  aggressive                      DISPOSITION:  ICU  ICH Score on admission  = GCS Score: 3-4 (2 pts) E1VTM2 (+) IVH = 3  ICH volume 20ml  Estimated 30-day mortality 3 points: 72%3

## 2019-04-21 NOTE — PROGRESS NOTE ADULT - SUBJECTIVE AND OBJECTIVE BOX
HPI:  History obtained by patient's girlfriend and chart from [place], patient is unresponsive:     45 y/o male with no significant PMHx presents to [place] with AMS, left side weakness and numbness. Per girlfriend, [place] work when he developed acute onset of left facial numbness and LUE and numbness around 6:30PM. When get got home he developed AMS, dysarthria, and weakness in the LUE and LLE. At [place] ED patient had several episodes of emesis and was hypertensive to SBP in the 200's. Work up revealed right thalamic hemorrhage on CT head. Patient not on any anticoagulation use per girlfriend. Of note, patient took Excedrin for headache at home. Patient transferred to [facility] for further management. (06 Apr 2019 00:04)    S/Overnight events:  remained on trach collar , no acute events       Hospital Course:   4/7: overnight, patient requiring large amount of cardene and propofol.  Plan today is to transition to precedex, start PO lisinopril and wean of cardene.  Becomes extremely agitated when off sedation.  4/8: Cerebral angio without findings of aneurysm. EVD stopped working, pulled back without improvement. CT Head performed.  4/9: Received ativan 6mg for agitation. EVD stopped draining at 6pm yesterday taken for stat CTH. Dr. So notified and EVD lowered to 5cmH2O without output.   4/10: s/p penumbra evacuation of ICH. Pt seen and examined at bedside postop. Remains intubated, on versed. ICPs stable.  (OR EBL 30cc, received LR 200cc)  4/11: JUSTIN overnight. Remains on versed. Remains intubated. Neuro stable.  4/12: Given ativan overnight, bucking the vent. Remains on versed drip. Neuro stable. ICPs stable. Increase hydralazine to 75 Q8.   4/13 JUSTIN overnight, febrile, urinary retention Straight Cath x 1 = 1L, neuro exam unchanged since yesterday, started Cardene gtt as per Dr. So goal -140  4/14 POD#4 febrile overnight, o/w JUSTIN overnight, EVD@ 5cm H2O, Clonidine started, Ceftriax until 4/19 Staph sputum, Versed/Precedex, straight cathed overnight  4/15: ICP stable. neuro stable. Remains on versed/precedex  4/16: Tolerating CPAP, CTH performed, decreased vent size, EVD raised to 10 cmH2O neuro stable   4/18:  JUSTIN overnight, EVD @15cm H2O, CPAP overnight, Ceftriax until 4/19 PNA, Precedex PRN comfort, rectal tube - diarrhea; fever spike   4/19 EVD clamped since 4/18, ICPs fluctuate, HCT today, R IJ, Ceftraix until 4/19, Versed gtt, neuro exam is improving, Duplex Renal artery and Metanephrine & Catecholamine Urine collection 24hrs for high BP work-up  4/20: EVD removed yesterday. Neuro stable. On precedex  4/21: Neuro stable.  remained on trach collar , no acute events        Vital Signs Last 24 Hrs  T(C): 37.7 (21 Apr 2019 06:00), Max: 37.7 (21 Apr 2019 06:00)  T(F): 99.9 (21 Apr 2019 06:00), Max: 99.9 (21 Apr 2019 06:00)  HR: 72 (21 Apr 2019 07:00) (62 - 88)  BP: 126/81 (21 Apr 2019 07:00) (114/75 - 167/111)  BP(mean): 105 (21 Apr 2019 07:00) (87 - 132)  RR: 22 (21 Apr 2019 07:00) (15 - 25)  SpO2: 99% (21 Apr 2019 07:00) (97% - 100%)    I&O's Detail    20 Apr 2019 07:01  -  21 Apr 2019 07:00  --------------------------------------------------------  IN:    dexmedetomidine Infusion: 250 mL    Enteral Tube Flush: 200 mL    ns in tub fed  [id number]: 1541 mL  Total IN: 1991 mL    OUT:    Incontinent per Condom Catheter: 1051 mL  Total OUT: 1051 mL    Total NET: 940 mL        I&O's Summary    20 Apr 2019 07:01  -  21 Apr 2019 07:00  --------------------------------------------------------  IN: 1991 mL / OUT: 1051 mL / NET: 940 mL        PHYSICAL EXAM:    Trached, EVD site c/d/i  PERRL, tracking with eyes  lungs CTA  Soft, NT/ND. +BS  Pulses 2+ throughout  Neuro: Moving RUE/RLE spontaneously, withdrawal LLE and LUE. following commands intermittently (shows 2 fingers and wiggles toes on right,) PERRL, +corneals, +gag    TUBES/LINES:  [] CVC  [] A-line  [] Lumbar Drain  [] Ventriculostomy  [] Other    DIET:  [] NPO  [] Mechanical  [x] Tube feeds    LABS:                        12.8   12.66 )-----------( 664      ( 21 Apr 2019 05:24 )             38.3     04-21    138  |  101  |  20  ----------------------------<  152<H>  4.8   |  22  |  0.80    Ca    9.9      21 Apr 2019 05:24  Phos  4.0     04-21  Mg     2.3     04-21              CAPILLARY BLOOD GLUCOSE      POCT Blood Glucose.: 147 mg/dL (21 Apr 2019 06:33)  POCT Blood Glucose.: 131 mg/dL (20 Apr 2019 21:41)  POCT Blood Glucose.: 145 mg/dL (20 Apr 2019 16:14)  POCT Blood Glucose.: 117 mg/dL (20 Apr 2019 11:14)      Drug Levels: [] N/A    CSF Analysis: [] N/A      Allergies    No Known Allergies    Intolerances      MEDICATIONS:  Antibiotics:  cefTRIAXone   IVPB 2 Gram(s) IV Intermittent every 24 hours    Neuro:  acetaminophen    Suspension .. 650 milliGRAM(s) Oral every 6 hours PRN  dexmedetomidine Infusion 0.1 MICROgram(s)/kG/Hr IV Continuous <Continuous>  levETIRAcetam  Solution 1000 milliGRAM(s) Enteral Tube two times a day  LORazepam   Injectable 4 milliGRAM(s) IV Push every 4 hours PRN  ondansetron Injectable 4 milliGRAM(s) IV Push every 6 hours PRN  QUEtiapine 12.5 milliGRAM(s) Oral daily    Anticoagulation:  enoxaparin Injectable 40 milliGRAM(s) SubCutaneous at bedtime    OTHER:  amLODIPine   Tablet 10 milliGRAM(s) Oral daily  atorvastatin 40 milliGRAM(s) Oral at bedtime  chlorhexidine 2% Cloths 1 Application(s) Topical <User Schedule>  cloNIDine 0.2 milliGRAM(s) Oral every 8 hours  dextrose 40% Gel 15 Gram(s) Oral once PRN  dextrose 50% Injectable 12.5 Gram(s) IV Push once  dextrose 50% Injectable 25 Gram(s) IV Push once  dextrose 50% Injectable 25 Gram(s) IV Push once  glucagon  Injectable 1 milliGRAM(s) IntraMuscular once PRN  hydrALAZINE 100 milliGRAM(s) Oral every 8 hours  hydrALAZINE Injectable 10 milliGRAM(s) IV Push every 4 hours PRN  influenza   Vaccine 0.5 milliLiter(s) IntraMuscular once  insulin lispro (HumaLOG) corrective regimen sliding scale   SubCutaneous Before meals and at bedtime  lisinopril 40 milliGRAM(s) Oral daily  metoprolol tartrate 100 milliGRAM(s) Oral every 12 hours    IVF:  dextrose 5%. 1000 milliLiter(s) IV Continuous <Continuous>  sodium chloride 2 Gram(s) Oral every 12 hours    CULTURES:  Culture Results:   No growth at 2 days. (04-18 @ 15:05)  Culture Results:   No growth at 2 days. (04-18 @ 15:05)    RADIOLOGY & ADDITIONAL TESTS:      ASSESSMENT:  44y Male w/ ETOH abuse now s/p right thalamic IPH with IVH s/p Emergent Rt EVD placement 4/6/19, s/p Cerebral angiogram neg 4/8/19, new left EVD placed and R EVD removed 4/9/19, now s/p penumbra evacuation of ICH 4/10/19, EVD removed 4/19/19    HEMORRHAGE STROKE  HEMORRHAGE  Handoff  ICH (intracerebral hemorrhage)  ICH (intracerebral hemorrhage)  Open tracheostomy  Bronchoscopy, adult  EGD, with PEG  Craniotomy for intracranial hemorrhage  Evacuation of hematoma of face  Angiogram, carotid and cerebral, bilateral      Plan:     NEURO:  Neuro checks q1h  Keppra for seizure prophylaxis  wean precedex  Continue fentanyl and ativan PRN agitation    CARDIOVASCULAR:   SBP goal <160  Continue lopressor 50mg BID, norvasc 10mg daily, and lisinopril 40mg daily, hydralazine 100mg Q8h  Clonidine .2 q8h  Daily labs, electrolyte repletion PRN,  Continue Statin therapy  Right IJ in place    PULMONARY:   trach collar  Daily CXR    RENAL:   Voiding, condom catheter, straight Cath PRN  Normal Na goal  Duplex Renal artery negative for BECKIE   Metanephrine and Catecholamine Urine collection 24hrs for hypertension work-up    GI:   s/p Peg    ID:  Ceftriax PNA until 4/23    ENDO:   ISS    DVT PROPHYLAXIS:   SCDs, SQL     DISPOSITION: Continue NSICU care,  Full code,  D/w Dr. So and Dr. Schultz      Assessment:  Present when checked    []  GCS  E   V  M     Heart Failure: []Acute, [] acute on chronic , []chronic  Heart Failure:  [] Diastolic (HFpEF), [] Systolic (HFrEF), []Combined (HFpEF and HFrEF), [] RHF, [] Pulm HTN, [] Other    [] KRISTA, [] ATN, [] AIN, [] other  [] CKD1, [] CKD2, [] CKD 3, [] CKD 4, [] CKD 5, []ESRD    Encephalopathy: [] Metabolic, [] Hepatic, [] toxic, [x] Neurological, [] Other    Abnormal Nurtitional Status: [] malnurtition (see nutrition note), [ ]underweight: BMI < 19, [] morbid obesity: BMI >40, [] Cachexia    [] Sepsis  [] hypovolemic shock,[] cardiogenic shock, [] hemorrhagic shock, [] neuogenic shock  [x] Acute Respiratory Failure  [x]Cerebral edema, [] Brain compression/ herniation,   [] Functional quadriplegia  [] Acute blood loss anemia

## 2019-04-21 NOTE — PROGRESS NOTE ADULT - ASSESSMENT
44y/M with   1.  intracerebral hemorrhage (R basal ganglia / thalamic; small L basal ganglia, R pontine), brain compression, cerebral edema; s/p R crani, endoscopic evacuation of ICH (04/10/2019, Dr. So)  2.  dyslipidemia    PLAN:   NEURO: neurochecks q2h, PRN pain meds with Tylenol; taper precedex to off  ICH:  BP control; no aneurysm on formal angio (04/08)  seizure prophylaxis: as per neurosurgery  EVD: removed   agitation: start seroquel 12.5 daily  REHAB:  physical therapy evaluation and management    EARLY MOB:   HOB up     PULM:  s/p trach, trach collar, chest PT, pulmo toilette; CXR this morning  CARDIO:  SBP goal 100-160mm Hg, cont lisinopril 40 mg PO daily, amlodipine, lopressor 75 BID, clonidine 0.2 BID; hydrazaline 100 q8h; work-up for secondary hypertension; EKG to check QTc  ENDO:  Blood sugar goals 140-180 mg/dL, continue insulin sliding scale; atorvastatin 40mg PO daily, A1C  4.9  GI:  docusate senna  DIET: Jevity continue tube feeds  RENAL:  Na goal 135-145, IVL; decrease salt tabs to 2g q12h  HEM/ONC: Hb stable; given DDAVP and platelet transfusion for aspirin reversal  VTE Prophylaxis: SCDs, SQL  ID: afebrile, no leukocytosis, ceftriaxone 2G daily (last day 04/23); panculture q3d if fever spikes  Social: will update family; sister freeman (surrogate decision maker), father, common law wife who is pregnant    ATTENDING ATTESTATION:  I was physically present for the key portions of the evaluation and management (E/M) service provided.  I agree with the above history, physical and plan, which I have reviewed and edited where appropriate.    Patient at high risk for neurological deterioration or death due to:  ICU delirium, aspiration PNA, DVT / PE.  Critical care time, excluding procedures: 60 minutes spent on total encounter, more than 50% of the visit was spent counseling and/or coordinating care by the attending physician.     Plan discussed with RN, house staff. 44y/M with   1.  intracerebral hemorrhage (R basal ganglia / thalamic; small L basal ganglia, R pontine), brain compression, cerebral edema; s/p R crani, endoscopic evacuation of ICH (04/10/2019, Dr. So)  2.  dyslipidemia    PLAN:   NEURO: neurochecks q2h, PRN pain meds with Tylenol; taper precedex to off  ICH:  BP control; no aneurysm on formal angio (04/08)  seizure prophylaxis: as per neurosurgery  EVD: removed   agitation: increase seroquel to 25mg daily   REHAB:  physical therapy evaluation and management    EARLY MOB:   HOB up     PULM:  s/p trach, trach collar on 35%, chest PT, pulmo toilette  CARDIO:  SBP goal 100-160mm Hg, cont lisinopril 40 mg PO daily, amlodipine, lopressor 75 BID, clonidine 0.2 BID; hydrazaline 100 q8h; work-up for secondary hypertension  ENDO:  Blood sugar goals 140-180 mg/dL, continue insulin sliding scale; atorvastatin 40mg PO daily, A1C  4.9  GI:  docusate senna  DIET: Jevity continue tube feeds  RENAL:  Na goal 135-145, IVL; salt tabs 2g q12h  HEM/ONC: Hb stable; given DDAVP and platelet transfusion for aspirin reversal  VTE Prophylaxis: SCDs, SQL  ID: afebrile, no leukocytosis, ceftriaxone 2G daily (last day 04/23); panculture q3d if fever spikes  Social: will update family; sister freeman (surrogate decision maker), father, common law wife who is pregnant    ATTENDING ATTESTATION:  I was physically present for the key portions of the evaluation and management (E/M) service provided.  I agree with the above history, physical and plan, which I have reviewed and edited where appropriate.    Patient at high risk for neurological deterioration or death due to:  ICU delirium, aspiration PNA, DVT / PE.  Critical care time, excluding procedures: 60 minutes spent on total encounter, more than 50% of the visit was spent counseling and/or coordinating care by the attending physician.     Plan discussed with RN, house staff.

## 2019-04-22 LAB
ANION GAP SERPL CALC-SCNC: 13 MMOL/L — SIGNIFICANT CHANGE UP (ref 5–17)
APPEARANCE UR: CLEAR — SIGNIFICANT CHANGE UP
BILIRUB UR-MCNC: NEGATIVE — SIGNIFICANT CHANGE UP
BUN SERPL-MCNC: 23 MG/DL — SIGNIFICANT CHANGE UP (ref 7–23)
CALCIUM SERPL-MCNC: 9.7 MG/DL — SIGNIFICANT CHANGE UP (ref 8.4–10.5)
CHLORIDE SERPL-SCNC: 104 MMOL/L — SIGNIFICANT CHANGE UP (ref 96–108)
CO2 SERPL-SCNC: 24 MMOL/L — SIGNIFICANT CHANGE UP (ref 22–31)
COLOR SPEC: YELLOW — SIGNIFICANT CHANGE UP
CREAT SERPL-MCNC: 0.86 MG/DL — SIGNIFICANT CHANGE UP (ref 0.5–1.3)
DIFF PNL FLD: NEGATIVE — SIGNIFICANT CHANGE UP
GLUCOSE SERPL-MCNC: 164 MG/DL — HIGH (ref 70–99)
GLUCOSE UR QL: NEGATIVE — SIGNIFICANT CHANGE UP
HCT VFR BLD CALC: 37 % — LOW (ref 39–50)
HGB BLD-MCNC: 12.4 G/DL — LOW (ref 13–17)
KETONES UR-MCNC: ABNORMAL MG/DL
LEUKOCYTE ESTERASE UR-ACNC: NEGATIVE — SIGNIFICANT CHANGE UP
MAGNESIUM SERPL-MCNC: 2.4 MG/DL — SIGNIFICANT CHANGE UP (ref 1.6–2.6)
MCHC RBC-ENTMCNC: 32.1 PG — SIGNIFICANT CHANGE UP (ref 27–34)
MCHC RBC-ENTMCNC: 33.5 GM/DL — SIGNIFICANT CHANGE UP (ref 32–36)
MCV RBC AUTO: 95.9 FL — SIGNIFICANT CHANGE UP (ref 80–100)
NITRITE UR-MCNC: NEGATIVE — SIGNIFICANT CHANGE UP
NRBC # BLD: 0 /100 WBCS — SIGNIFICANT CHANGE UP (ref 0–0)
PH UR: 5.5 — SIGNIFICANT CHANGE UP (ref 5–8)
PHOSPHATE SERPL-MCNC: 4.3 MG/DL — SIGNIFICANT CHANGE UP (ref 2.5–4.5)
PLATELET # BLD AUTO: 662 K/UL — HIGH (ref 150–400)
POTASSIUM SERPL-MCNC: 4.9 MMOL/L — SIGNIFICANT CHANGE UP (ref 3.5–5.3)
POTASSIUM SERPL-SCNC: 4.9 MMOL/L — SIGNIFICANT CHANGE UP (ref 3.5–5.3)
PROT UR-MCNC: NEGATIVE MG/DL — SIGNIFICANT CHANGE UP
RBC # BLD: 3.86 M/UL — LOW (ref 4.2–5.8)
RBC # FLD: 11.9 % — SIGNIFICANT CHANGE UP (ref 10.3–14.5)
SODIUM SERPL-SCNC: 141 MMOL/L — SIGNIFICANT CHANGE UP (ref 135–145)
SP GR SPEC: 1.02 — SIGNIFICANT CHANGE UP (ref 1–1.03)
UROBILINOGEN FLD QL: 0.2 E.U./DL — SIGNIFICANT CHANGE UP
WBC # BLD: 13.38 K/UL — HIGH (ref 3.8–10.5)
WBC # FLD AUTO: 13.38 K/UL — HIGH (ref 3.8–10.5)

## 2019-04-22 PROCEDURE — 99291 CRITICAL CARE FIRST HOUR: CPT | Mod: 24

## 2019-04-22 PROCEDURE — 71045 X-RAY EXAM CHEST 1 VIEW: CPT | Mod: 26

## 2019-04-22 RX ORDER — ALBUTEROL 90 UG/1
1 AEROSOL, METERED ORAL EVERY 4 HOURS
Qty: 0 | Refills: 0 | Status: DISCONTINUED | OUTPATIENT
Start: 2019-04-22 | End: 2019-05-04

## 2019-04-22 RX ORDER — ROBINUL 0.2 MG/ML
1 INJECTION INTRAMUSCULAR; INTRAVENOUS EVERY 8 HOURS
Qty: 0 | Refills: 0 | Status: DISCONTINUED | OUTPATIENT
Start: 2019-04-22 | End: 2019-04-22

## 2019-04-22 RX ORDER — ACETYLCYSTEINE 200 MG/ML
4 VIAL (ML) MISCELLANEOUS EVERY 6 HOURS
Qty: 0 | Refills: 0 | Status: DISCONTINUED | OUTPATIENT
Start: 2019-04-22 | End: 2019-04-22

## 2019-04-22 RX ORDER — ACETYLCYSTEINE 200 MG/ML
4 VIAL (ML) MISCELLANEOUS EVERY 6 HOURS
Qty: 0 | Refills: 0 | Status: COMPLETED | OUTPATIENT
Start: 2019-04-22 | End: 2019-04-23

## 2019-04-22 RX ORDER — IPRATROPIUM/ALBUTEROL SULFATE 18-103MCG
3 AEROSOL WITH ADAPTER (GRAM) INHALATION EVERY 6 HOURS
Qty: 0 | Refills: 0 | Status: COMPLETED | OUTPATIENT
Start: 2019-04-22 | End: 2019-04-25

## 2019-04-22 RX ORDER — TIOTROPIUM BROMIDE 18 UG/1
1 CAPSULE ORAL; RESPIRATORY (INHALATION) DAILY
Qty: 0 | Refills: 0 | Status: DISCONTINUED | OUTPATIENT
Start: 2019-04-22 | End: 2019-05-04

## 2019-04-22 RX ADMIN — Medication 100 MILLIGRAM(S): at 13:53

## 2019-04-22 RX ADMIN — LEVETIRACETAM 1000 MILLIGRAM(S): 250 TABLET, FILM COATED ORAL at 05:07

## 2019-04-22 RX ADMIN — Medication 4 MILLILITER(S): at 13:54

## 2019-04-22 RX ADMIN — ENOXAPARIN SODIUM 40 MILLIGRAM(S): 100 INJECTION SUBCUTANEOUS at 21:45

## 2019-04-22 RX ADMIN — Medication 100 MILLIGRAM(S): at 05:06

## 2019-04-22 RX ADMIN — AMLODIPINE BESYLATE 10 MILLIGRAM(S): 2.5 TABLET ORAL at 05:08

## 2019-04-22 RX ADMIN — Medication 650 MILLIGRAM(S): at 21:39

## 2019-04-22 RX ADMIN — Medication 3 MILLILITER(S): at 13:54

## 2019-04-22 RX ADMIN — Medication 650 MILLIGRAM(S): at 13:52

## 2019-04-22 RX ADMIN — QUETIAPINE FUMARATE 25 MILLIGRAM(S): 200 TABLET, FILM COATED ORAL at 11:38

## 2019-04-22 RX ADMIN — Medication 100 MILLIGRAM(S): at 17:09

## 2019-04-22 RX ADMIN — ATORVASTATIN CALCIUM 40 MILLIGRAM(S): 80 TABLET, FILM COATED ORAL at 21:45

## 2019-04-22 RX ADMIN — Medication 4 MILLILITER(S): at 19:37

## 2019-04-22 RX ADMIN — Medication 650 MILLIGRAM(S): at 00:35

## 2019-04-22 RX ADMIN — LISINOPRIL 40 MILLIGRAM(S): 2.5 TABLET ORAL at 05:06

## 2019-04-22 RX ADMIN — Medication 650 MILLIGRAM(S): at 12:08

## 2019-04-22 RX ADMIN — Medication 600 MILLIGRAM(S): at 10:35

## 2019-04-22 RX ADMIN — Medication 0.2 MILLIGRAM(S): at 21:45

## 2019-04-22 RX ADMIN — Medication 3 MILLILITER(S): at 19:37

## 2019-04-22 RX ADMIN — SODIUM CHLORIDE 2 GRAM(S): 9 INJECTION INTRAMUSCULAR; INTRAVENOUS; SUBCUTANEOUS at 17:10

## 2019-04-22 RX ADMIN — LEVETIRACETAM 1000 MILLIGRAM(S): 250 TABLET, FILM COATED ORAL at 17:10

## 2019-04-22 RX ADMIN — SODIUM CHLORIDE 2 GRAM(S): 9 INJECTION INTRAMUSCULAR; INTRAVENOUS; SUBCUTANEOUS at 05:06

## 2019-04-22 RX ADMIN — CEFTRIAXONE 100 GRAM(S): 500 INJECTION, POWDER, FOR SOLUTION INTRAMUSCULAR; INTRAVENOUS at 08:54

## 2019-04-22 RX ADMIN — Medication 0.2 MILLIGRAM(S): at 13:53

## 2019-04-22 RX ADMIN — Medication 0.2 MILLIGRAM(S): at 05:06

## 2019-04-22 RX ADMIN — Medication 650 MILLIGRAM(S): at 20:04

## 2019-04-22 NOTE — PROGRESS NOTE ADULT - SUBJECTIVE AND OBJECTIVE BOX
HPI:  History obtained by patient's girlfriend and chart from [facility], patient is unresponsive:     45 y/o male with no significant PMHx presents to [facility] with AMS, left side weakness and numbness. Per [place], [place] when he developed acute onset of left facial numbness and LUE and numbness around 6:30PM. When get got home he developed AMS, dysarthria, and weakness in the LUE and LLE. At [facility] ED patient had several episodes of emesis and was hypertensive to SBP in the 200's. Work up revealed right thalamic hemorrhage on CT head. Patient not on any anticoagulation use per girlfriend. Of note, patient took Excedrin for headache at home. Patient transferred to [facility] for further management. (06 Apr 2019 00:04)    S/Overnight events:  remained on trach collar , no acute events       Hospital Course:   4/7: overnight, patient requiring large amount of cardene and propofol.  Plan today is to transition to precedex, start PO lisinopril and wean of cardene.  Becomes extremely agitated when off sedation.  4/8: Cerebral angio without findings of aneurysm. EVD stopped working, pulled back without improvement. CT Head performed.  4/9: Received ativan 6mg for agitation. EVD stopped draining at 6pm yesterday taken for stat CTH. Dr. So notified and EVD lowered to 5cmH2O without output.   4/10: s/p penumbra evacuation of ICH. Pt seen and examined at bedside postop. Remains intubated, on versed. ICPs stable.  (OR EBL 30cc, received LR 200cc)  4/11: JUSTIN overnight. Remains on versed. Remains intubated. Neuro stable.  4/12: Given ativan overnight, bucking the vent. Remains on versed drip. Neuro stable. ICPs stable. Increase hydralazine to 75 Q8.   4/13 JUSTIN overnight, febrile, urinary retention Straight Cath x 1 = 1L, neuro exam unchanged since yesterday, started Cardene gtt as per Dr. So goal -140  4/14 POD#4 febrile overnight, o/w JUSTIN overnight, EVD@ 5cm H2O, Clonidine started, Ceftriax until 4/19 Staph sputum, Versed/Precedex, straight cathed overnight  4/15: ICP stable. neuro stable. Remains on versed/precedex  4/16: Tolerating CPAP, CTH performed, decreased vent size, EVD raised to 10 cmH2O neuro stable   4/18:  JUSTIN overnight, EVD @15cm H2O, CPAP overnight, Ceftriax until 4/19 PNA, Precedex PRN comfort, rectal tube - diarrhea; fever spike   4/19 EVD clamped since 4/18, ICPs fluctuate, HCT today, R IJ, Ceftraix until 4/19, Versed gtt, neuro exam is improving, Duplex Renal artery and Metanephrine & Catecholamine Urine collection 24hrs for high BP work-up  4/20: EVD removed yesterday. Neuro stable. On precedex  4/21: Neuro stable.  remained on trach collar , no acute events  4/22     ICU Vital Signs Last 24 Hrs  T(C): 36.8 (22 Apr 2019 05:00), Max: 38.2 (21 Apr 2019 23:00)  T(F): 98.3 (22 Apr 2019 05:00), Max: 100.7 (21 Apr 2019 23:00)  HR: 78 (22 Apr 2019 06:00) (64 - 98)  BP: 134/86 (22 Apr 2019 06:00) (100/56 - 185/107)  BP(mean): 106 (22 Apr 2019 06:00) (73 - 131)  ABP: --  ABP(mean): --  RR: 20 (22 Apr 2019 06:00) (11 - 26)  SpO2: 100% (22 Apr 2019 06:00) (97% - 100%)      PHYSICAL EXAM:    Trached, EVD site c/d/i  PERRL, tracking with eyes  lungs CTA  Soft, NT/ND. +BS  Pulses 2+ throughout  Neuro: Moving RUE/RLE spontaneously, withdrawal LLE and LUE. following commands intermittently (shows 2 fingers and wiggles toes on right,) PERRL, +corneals, +gag    TUBES/LINES:  [] CVC  [] A-line  [] Lumbar Drain  [] Ventriculostomy  [] Other    DIET:  [] NPO  [] Mechanical  [x] Tube feeds    LABS:                                12.4   13.38 )-----------( 662      ( 22 Apr 2019 06:10 )             37.0   04-21    138  |  101  |  20  ----------------------------<  152<H>  4.8   |  22  |  0.80    Ca    9.9      21 Apr 2019 05:24  Phos  4.0     04-21  Mg     2.3     04-21              CAPILLARY BLOOD GLUCOSE      POCT Blood Glucose.: 147 mg/dL (21 Apr 2019 06:33)  POCT Blood Glucose.: 131 mg/dL (20 Apr 2019 21:41)  POCT Blood Glucose.: 145 mg/dL (20 Apr 2019 16:14)  POCT Blood Glucose.: 117 mg/dL (20 Apr 2019 11:14)      Drug Levels: [] N/A    CSF Analysis: [] N/A      Allergies    No Known Allergies    Intolerances      MEDICATIONS:  Antibiotics:  cefTRIAXone   IVPB 2 Gram(s) IV Intermittent every 24 hours    Neuro:  acetaminophen    Suspension .. 650 milliGRAM(s) Oral every 6 hours PRN  dexmedetomidine Infusion 0.1 MICROgram(s)/kG/Hr IV Continuous <Continuous>  levETIRAcetam  Solution 1000 milliGRAM(s) Enteral Tube two times a day  LORazepam   Injectable 4 milliGRAM(s) IV Push every 4 hours PRN  ondansetron Injectable 4 milliGRAM(s) IV Push every 6 hours PRN  QUEtiapine 12.5 milliGRAM(s) Oral daily    Anticoagulation:  enoxaparin Injectable 40 milliGRAM(s) SubCutaneous at bedtime    OTHER:  amLODIPine   Tablet 10 milliGRAM(s) Oral daily  atorvastatin 40 milliGRAM(s) Oral at bedtime  chlorhexidine 2% Cloths 1 Application(s) Topical <User Schedule>  cloNIDine 0.2 milliGRAM(s) Oral every 8 hours  dextrose 40% Gel 15 Gram(s) Oral once PRN  dextrose 50% Injectable 12.5 Gram(s) IV Push once  dextrose 50% Injectable 25 Gram(s) IV Push once  dextrose 50% Injectable 25 Gram(s) IV Push once  glucagon  Injectable 1 milliGRAM(s) IntraMuscular once PRN  hydrALAZINE 100 milliGRAM(s) Oral every 8 hours  hydrALAZINE Injectable 10 milliGRAM(s) IV Push every 4 hours PRN  influenza   Vaccine 0.5 milliLiter(s) IntraMuscular once  insulin lispro (HumaLOG) corrective regimen sliding scale   SubCutaneous Before meals and at bedtime  lisinopril 40 milliGRAM(s) Oral daily  metoprolol tartrate 100 milliGRAM(s) Oral every 12 hours    IVF:  dextrose 5%. 1000 milliLiter(s) IV Continuous <Continuous>  sodium chloride 2 Gram(s) Oral every 12 hours    CULTURES:  Culture Results:   No growth at 2 days. (04-18 @ 15:05)  Culture Results:   No growth at 2 days. (04-18 @ 15:05)    RADIOLOGY & ADDITIONAL TESTS:      ASSESSMENT:  44y Male w/ ETOH abuse now s/p right thalamic IPH with IVH s/p Emergent Rt EVD placement 4/6/19, s/p Cerebral angiogram neg 4/8/19, new left EVD placed and R EVD removed 4/9/19, now s/p penumbra evacuation of ICH 4/10/19, EVD removed 4/19/19    HEMORRHAGE STROKE  HEMORRHAGE  Handoff  ICH (intracerebral hemorrhage)  ICH (intracerebral hemorrhage)  Open tracheostomy  Bronchoscopy, adult  EGD, with PEG  Craniotomy for intracranial hemorrhage  Evacuation of hematoma of face  Angiogram, carotid and cerebral, bilateral      Plan:     NEURO:  Neuro checks q1h  Keppra for seizure prophylaxis  wean precedex  Continue fentanyl and ativan PRN agitation    CARDIOVASCULAR:   SBP goal <160  Continue lopressor 50mg BID, norvasc 10mg daily, and lisinopril 40mg daily, hydralazine 100mg Q8h  Clonidine .2 q8h  Daily labs, electrolyte repletion PRN,  Continue Statin therapy  Right IJ in place    PULMONARY:   trach collar  Daily CXR    RENAL:   Voiding, condom catheter, straight Cath PRN  Normal Na goal  Duplex Renal artery negative for BECKIE   Metanephrine and Catecholamine Urine collection 24hrs for hypertension work-up    GI:   s/p Peg    ID:  Ceftriax PNA until 4/23    ENDO:   ISS    DVT PROPHYLAXIS:   SCDs, SQL     DISPOSITION: Continue NSICU care,  Full code,  D/w Dr. So and Dr. Schultz      Assessment:  Present when checked    []  GCS  E   V  M     Heart Failure: []Acute, [] acute on chronic , []chronic  Heart Failure:  [] Diastolic (HFpEF), [] Systolic (HFrEF), []Combined (HFpEF and HFrEF), [] RHF, [] Pulm HTN, [] Other    [] KRISTA, [] ATN, [] AIN, [] other  [] CKD1, [] CKD2, [] CKD 3, [] CKD 4, [] CKD 5, []ESRD    Encephalopathy: [] Metabolic, [] Hepatic, [] toxic, [x] Neurological, [] Other    Abnormal Nurtitional Status: [] malnurtition (see nutrition note), [ ]underweight: BMI < 19, [] morbid obesity: BMI >40, [] Cachexia    [] Sepsis  [] hypovolemic shock,[] cardiogenic shock, [] hemorrhagic shock, [] neuogenic shock  [x] Acute Respiratory Failure  [x]Cerebral edema, [] Brain compression/ herniation,   [] Functional quadriplegia  [] Acute blood loss anemia HPI:  History obtained by patient's girlfriend and chart from [facility], patient is unresponsive:     45 y/o male with no significant PMHx presents to [facility] with AMS, left side weakness and numbness. Per [place], [place] when he developed acute onset of left facial numbness and LUE and numbness around 6:30PM. When get got home he developed AMS, dysarthria, and weakness in the LUE and LLE. At [facility] ED patient had several episodes of emesis and was hypertensive to SBP in the 200's. Work up revealed right thalamic hemorrhage on CT head. Patient not on any anticoagulation use per girlfriend. Of note, patient took Excedrin for headache at home. Patient transferred to [facility] for further management. (06 Apr 2019 00:04)    S/Overnight events:  remained on trach collar , no acute events       Hospital Course:   4/7: overnight, patient requiring large amount of cardene and propofol.  Plan today is to transition to precedex, start PO lisinopril and wean of cardene.  Becomes extremely agitated when off sedation.  4/8: Cerebral angio without findings of aneurysm. EVD stopped working, pulled back without improvement. CT Head performed.  4/9: Received ativan 6mg for agitation. EVD stopped draining at 6pm yesterday taken for stat CTH. Dr. So notified and EVD lowered to 5cmH2O without output.   4/10: s/p penumbra evacuation of ICH. Pt seen and examined at bedside postop. Remains intubated, on versed. ICPs stable.  (OR EBL 30cc, received LR 200cc)  4/11: JUSTIN overnight. Remains on versed. Remains intubated. Neuro stable.  4/12: Given ativan overnight, bucking the vent. Remains on versed drip. Neuro stable. ICPs stable. Increase hydralazine to 75 Q8.   4/13 JUSTIN overnight, febrile, urinary retention Straight Cath x 1 = 1L, neuro exam unchanged since yesterday, started Cardene gtt as per Dr. So goal -140  4/14 POD#4 febrile overnight, o/w JUSTIN overnight, EVD@ 5cm H2O, Clonidine started, Ceftriax until 4/19 Staph sputum, Versed/Precedex, straight cathed overnight  4/15: ICP stable. neuro stable. Remains on versed/precedex  4/16: Tolerating CPAP, CTH performed, decreased vent size, EVD raised to 10 cmH2O neuro stable   4/18:  JUSTIN overnight, EVD @15cm H2O, CPAP overnight, Ceftriax until 4/19 PNA, Precedex PRN comfort, rectal tube - diarrhea; fever spike   4/19 EVD clamped since 4/18, ICPs fluctuate, HCT today, R IJ, Ceftraix until 4/19, Versed gtt, neuro exam is improving, Duplex Renal artery and Metanephrine & Catecholamine Urine collection 24hrs for high BP work-up  4/20: EVD removed yesterday. Neuro stable. On precedex  4/21: Neuro stable.  remained on trach collar , no acute events  4/22 JUSTIN overnight, frquent suctioning q1h, on Trach collar, TFs via PEG, Ceftriax until 4/13    ICU Vital Signs Last 24 Hrs  T(C): 36.8 (22 Apr 2019 05:00), Max: 38.2 (21 Apr 2019 23:00)  T(F): 98.3 (22 Apr 2019 05:00), Max: 100.7 (21 Apr 2019 23:00)  HR: 78 (22 Apr 2019 06:00) (64 - 98)  BP: 134/86 (22 Apr 2019 06:00) (100/56 - 185/107)  BP(mean): 106 (22 Apr 2019 06:00) (73 - 131)  ABP: --  ABP(mean): --  RR: 20 (22 Apr 2019 06:00) (11 - 26)  SpO2: 100% (22 Apr 2019 06:00) (97% - 100%)      PHYSICAL EXAM:    Trached, EVD site c/d/i  PERRL, tracking with eyes  lungs CTA  Soft, NT/ND. +BS  Pulses 2+ throughout  Neuro: Moving RUE/RLE spontaneously, withdrawal LLE and LUE. following commands intermittently (shows 2 fingers and wiggles toes on right,) PERRL, +corneals, +gag    TUBES/LINES:  [] CVC  [] A-line  [] Lumbar Drain  [] Ventriculostomy  [] Other    DIET:  [] NPO  [] Mechanical  [x] Tube feeds    LABS:                                12.4   13.38 )-----------( 662      ( 22 Apr 2019 06:10 )             37.0   04-21    138  |  101  |  20  ----------------------------<  152<H>  4.8   |  22  |  0.80    Ca    9.9      21 Apr 2019 05:24  Phos  4.0     04-21  Mg     2.3     04-21              CAPILLARY BLOOD GLUCOSE      POCT Blood Glucose.: 147 mg/dL (21 Apr 2019 06:33)  POCT Blood Glucose.: 131 mg/dL (20 Apr 2019 21:41)  POCT Blood Glucose.: 145 mg/dL (20 Apr 2019 16:14)  POCT Blood Glucose.: 117 mg/dL (20 Apr 2019 11:14)      Drug Levels: [] N/A    CSF Analysis: [] N/A      Allergies    No Known Allergies    Intolerances      MEDICATIONS:  Antibiotics:  cefTRIAXone   IVPB 2 Gram(s) IV Intermittent every 24 hours    Neuro:  acetaminophen    Suspension .. 650 milliGRAM(s) Oral every 6 hours PRN  dexmedetomidine Infusion 0.1 MICROgram(s)/kG/Hr IV Continuous <Continuous>  levETIRAcetam  Solution 1000 milliGRAM(s) Enteral Tube two times a day  LORazepam   Injectable 4 milliGRAM(s) IV Push every 4 hours PRN  ondansetron Injectable 4 milliGRAM(s) IV Push every 6 hours PRN  QUEtiapine 12.5 milliGRAM(s) Oral daily    Anticoagulation:  enoxaparin Injectable 40 milliGRAM(s) SubCutaneous at bedtime    OTHER:  amLODIPine   Tablet 10 milliGRAM(s) Oral daily  atorvastatin 40 milliGRAM(s) Oral at bedtime  chlorhexidine 2% Cloths 1 Application(s) Topical <User Schedule>  cloNIDine 0.2 milliGRAM(s) Oral every 8 hours  dextrose 40% Gel 15 Gram(s) Oral once PRN  dextrose 50% Injectable 12.5 Gram(s) IV Push once  dextrose 50% Injectable 25 Gram(s) IV Push once  dextrose 50% Injectable 25 Gram(s) IV Push once  glucagon  Injectable 1 milliGRAM(s) IntraMuscular once PRN  hydrALAZINE 100 milliGRAM(s) Oral every 8 hours  hydrALAZINE Injectable 10 milliGRAM(s) IV Push every 4 hours PRN  influenza   Vaccine 0.5 milliLiter(s) IntraMuscular once  insulin lispro (HumaLOG) corrective regimen sliding scale   SubCutaneous Before meals and at bedtime  lisinopril 40 milliGRAM(s) Oral daily  metoprolol tartrate 100 milliGRAM(s) Oral every 12 hours    IVF:  dextrose 5%. 1000 milliLiter(s) IV Continuous <Continuous>  sodium chloride 2 Gram(s) Oral every 12 hours    CULTURES:  Culture Results:   No growth at 2 days. (04-18 @ 15:05)  Culture Results:   No growth at 2 days. (04-18 @ 15:05)    RADIOLOGY & ADDITIONAL TESTS:      ASSESSMENT:  44y Male w/ ETOH abuse now s/p right thalamic IPH with IVH s/p Emergent Rt EVD placement 4/6/19, s/p Cerebral angiogram neg 4/8/19, new left EVD placed and R EVD removed 4/9/19, now s/p penumbra evacuation of ICH 4/10/19, EVD removed 4/19/19    HEMORRHAGE STROKE  HEMORRHAGE  Handoff  ICH (intracerebral hemorrhage)  ICH (intracerebral hemorrhage)  Open tracheostomy  Bronchoscopy, adult  EGD, with PEG  Craniotomy for intracranial hemorrhage  Evacuation of hematoma of face  Angiogram, carotid and cerebral, bilateral      Plan:     NEURO:  Neuro checks q1h  Keppra for seizure prophylaxis  wean precedex  Continue fentanyl and ativan PRN agitation    CARDIOVASCULAR:   SBP goal <160  Continue lopressor 50mg BID, norvasc 10mg daily, and lisinopril 40mg daily, hydralazine 100mg Q8h  Clonidine .2 q8h  Daily labs, electrolyte repletion PRN,  Continue Statin therapy  Right IJ in place    PULMONARY:   trach collar  Daily CXR  Duoneb x 3 days      RENAL:   Voiding, condom catheter, straight Cath PRN  Normal Na goal  Duplex Renal artery negative for BECKIE   Metanephrine and Catecholamine Urine collection 24hrs for hypertension work-up    GI:   s/p Peg    ID:  Ceftriax PNA until 4/23    ENDO:   ISS    DVT PROPHYLAXIS:   SCDs, SQL     DISPOSITION: Continue NSICU care,  Full code,  D/w Dr. So and Dr. Schultz      Assessment:  Present when checked    []  GCS  E   V  M     Heart Failure: []Acute, [] acute on chronic , []chronic  Heart Failure:  [] Diastolic (HFpEF), [] Systolic (HFrEF), []Combined (HFpEF and HFrEF), [] RHF, [] Pulm HTN, [] Other    [] KRISTA, [] ATN, [] AIN, [] other  [] CKD1, [] CKD2, [] CKD 3, [] CKD 4, [] CKD 5, []ESRD    Encephalopathy: [] Metabolic, [] Hepatic, [] toxic, [x] Neurological, [] Other    Abnormal Nurtitional Status: [] malnurtition (see nutrition note), [ ]underweight: BMI < 19, [] morbid obesity: BMI >40, [] Cachexia    [] Sepsis  [] hypovolemic shock,[] cardiogenic shock, [] hemorrhagic shock, [] neuogenic shock  [x] Acute Respiratory Failure  [x]Cerebral edema, [] Brain compression/ herniation,   [] Functional quadriplegia  [] Acute blood loss anemia

## 2019-04-22 NOTE — PROVIDER CONTACT NOTE (OTHER) - ACTION/TREATMENT ORDERED:
Hold tube feeds 4/14 at 0800 for possible trach and peg per PA Shlifer.
Pt EVD output 0ml for last 2 hours, MARNIE Castro aware and no further actions ordered.
MARNIE Castro  made aware no further actions ordered.
PA aware that EVD is still having 0 output. PA stated to notify him if EVD begins to have output
ua and sputum culture sent

## 2019-04-22 NOTE — PROGRESS NOTE ADULT - SUBJECTIVE AND OBJECTIVE BOX
=================================  NEUROCRITICAL CARE ATTENDING NOTE  =================================    ARPAN RUANO   MRN-[id number]  Summary:  44y/M  HPI:  History obtained by patient's girlfriend and chart from [place], patient is unresponsive:     45 y/o male with no significant PMHx presents to [place] with AMS, left side weakness and numbness. Per [place], [place] when he developed acute onset of left facial numbness and LUE and numbness around 6:30PM. When get got home he developed AMS, dysarthria, and weakness in the LUE and LLE. At [place] ED patient had several episodes of emesis and was hypertensive to SBP in the 200's. Work up revealed right thalamic hemorrhage on CT head. Patient not on any anticoagulation use per girlfriend. Of note, patient took Excedrin for headache at home. Patient transferred to [facility] for further management. (2019 00:04)      Overnight Events:  Denies HA/N/V/Dizziness.      PHYSICAL EXAMINATION  T(C): 36.8 ( @ 05:00), Max: 38.2 ( @ 23:00)  HR: 80 ( @ 07:00) (64 - 98)  BP: 133/85 ( @ 07:00) (100/56 - 185/107)  RR: 22 ( @ 07:00) (11 - 26)  SpO2: 98% ( @ 07:00) (97% - 100%)  CVP(mm Hg): --    LABS:  CAPILLARY BLOOD GLUCOSE      POCT Blood Glucose.: 138 mg/dL (2019 06:41)  POCT Blood Glucose.: 125 mg/dL (2019 21:52)  POCT Blood Glucose.: 109 mg/dL (2019 16:04)  POCT Blood Glucose.: 138 mg/dL (2019 11:31)                          12.4   13.38 )-----------( 662      ( 2019 06:10 )             37.0         141  |  104  |  23  ----------------------------<  164<H>  4.9   |  24  |  0.86    Ca    9.7      2019 06:10  Phos  4.3       Mg     2.4      @ 07: @ 07:00  --------------------------------------------------------  IN: 1703.1 mL / OUT: 1850 mL / NET: -146.9 mL     @ 07:01   @ 08:15  --------------------------------------------------------  IN: 67 mL / OUT: 0 mL / NET: 67 mL        MEDICATIONS:  MEDICATIONS  (STANDING):  amLODIPine   Tablet 10 milliGRAM(s) Oral daily  atorvastatin 40 milliGRAM(s) Oral at bedtime  cefTRIAXone   IVPB 2 Gram(s) IV Intermittent every 24 hours  cloNIDine 0.2 milliGRAM(s) Oral every 8 hours  dextrose 5%. 1000 milliLiter(s) (50 mL/Hr) IV Continuous <Continuous>  dextrose 50% Injectable 12.5 Gram(s) IV Push once  dextrose 50% Injectable 25 Gram(s) IV Push once  dextrose 50% Injectable 25 Gram(s) IV Push once  enoxaparin Injectable 40 milliGRAM(s) SubCutaneous at bedtime  guaiFENesin  milliGRAM(s) Oral every 12 hours  hydrALAZINE 100 milliGRAM(s) Oral every 8 hours  influenza   Vaccine 0.5 milliLiter(s) IntraMuscular once  insulin lispro (HumaLOG) corrective regimen sliding scale   SubCutaneous Before meals and at bedtime  levETIRAcetam  Solution 1000 milliGRAM(s) Enteral Tube two times a day  lisinopril 40 milliGRAM(s) Oral daily  metoprolol tartrate 100 milliGRAM(s) Oral every 12 hours  QUEtiapine 25 milliGRAM(s) Oral daily  sodium chloride 2 Gram(s) Oral every 12 hours    MEDICATIONS  (PRN):  acetaminophen    Suspension .. 650 milliGRAM(s) Oral every 6 hours PRN Temp greater or equal to 38C (100.4F), Mild Pain (1 - 3)  dextrose 40% Gel 15 Gram(s) Oral once PRN Blood Glucose LESS THAN 70 milliGRAM(s)/deciliter  glucagon  Injectable 1 milliGRAM(s) IntraMuscular once PRN Glucose LESS THAN 70 milligrams/deciliter  hydrALAZINE Injectable 10 milliGRAM(s) IV Push every 4 hours PRN BP>160  LORazepam   Injectable 4 milliGRAM(s) IV Push every 4 hours PRN RASS 4  ondansetron Injectable 4 milliGRAM(s) IV Push every 6 hours PRN Nausea and/or Vomiting    COURSE IN THE HOSPITAL:   Admitted, given DDAVP / platelets; EVD inserted, improved   04/15 fever overnight   Tmax 38.3 remained on trach collar   No significant events overnight, remained on trach collar tmax 38.1; EVD removed   minor bleeding in gracy hole site, stapled overnight    Past Medical History:   Allergies:  No Known Allergies  Home meds:     PHYSICAL EXAMINATION    NEUROLOGIC EXAMINATION:  Patient opens eyes spontaneously, following commands, pupils 3mm reactive, no gaze preference; moves R UE/LE spontaneously, L UE extends to noxious and L LE withdraws    CARDIOVASCULAR: (+) S1 S2, normal rate and regular rhythm  PULMONARY: clear to auscultation bilaterally; copious secretions q1-2h suctioning  ABDOMEN: soft, nontender with normoactive bowel sounds; PEG site clean  EXTREMITIES: no edema  SKIN: no rash    LABS:  CAPILLARY BLOOD GLUCOSE 147 131 145 117                         12.8   12.66 )-----------( 664      ( 2019 05:24 )             38.3     138  |  101  |  20  ----------------------------<  152<H>  4.8   |  22  |  0.80    Ca    9.9      2019 05:24  Phos  4.0       Mg     2.3      @ 07: @ 07:00  IN:  mL / OUT: 1051 mL / NET: 940 mL    LDL = 113 ()   HDL = 70 ()  TG = 85 ()  TSH = 0.733 ()    Bacteriology:   UA NEG   Blood CS NG2D x2   04/15 CSF NGTD   Blood CS NG5D x1 (final)   CSF NGTD  04/10 Sputum MSSA H influenza S Ceftri   sputum culture numerous MSSA   Blood CS NG5D x2 (final)    CSF studies:    L   *** [id number] WBC0 *** %N0 %L0     L3.4   *** [id number] WBC37 *** %N35 %L1     EEG:  Neuroimagin/19 CT head: L frontal EVD, no change in size, no new foci of hemorrhage resolving hemorrhages, IVH unchanged    12:48 p.m. CT head:  s/p EVD, decrease in size of ventricles, R ICH unchanged with IV extension, punctate hyperdense R BG R conner R thalamus, unchanged,    2:11 a.m. CT head:  large R basal ganglia / thalamic ICH, small L basal ganglia / R pontine acute hemorrhage; c/w hypertensive ICH; extension into R lateral ventricle; MLS, mass effect, no HCP  Other imagin/19 KUB ultrasound: normal renal ultrasound, no significant stenosis    MEDICATIONS: SQL 40 HS ceftriaxone 2g IV q24h levetiracetam 1G PO BID quetiapine 12.5mg PO daily amlodipine 10mg PO daily clonidine 0.2mg PO q8h hydralazine 100mg PO q8h lisinopril 40mg PO daily metoprolol 100mg PO q12h atorvastatin 40mg PO HS mod ISS peridex salt 2G PO q12h      IV FLUIDS: IVL  DRIPS: dexmedetomidine (0.5)   DIET: Jevity at 67cc/hr  Lines: [place]   Drains:   EVD removed; restraints  Wounds:    CODE STATUS:  Full Code                       GOALS OF CARE:  aggressive                      DISPOSITION:  ICU  ICH Score on admission  = GCS Score: 3-4 (2 pts) E1VTM2 (+) IVH = 3  ICH volume 20ml  Estimated 30-day mortality 3 points: 72%3

## 2019-04-22 NOTE — PROGRESS NOTE ADULT - ASSESSMENT
44y/M with   1.  intracerebral hemorrhage (R basal ganglia / thalamic; small L basal ganglia, R pontine), brain compression, cerebral edema; s/p R crani, endoscopic evacuation of ICH (04/10/2019, Dr. So)  2.  dyslipidemia    PLAN:   NEURO: neurochecks q2h, PRN pain meds with Tylenol; taper precedex to off  ICH:  BP control; no aneurysm on formal angio (04/08)  seizure prophylaxis: as per neurosurgery  EVD: removed   agitation: increase seroquel to 25mg daily   REHAB:  physical therapy evaluation and management    EARLY MOB:   HOB up     PULM:  s/p trach, trach collar on 35%, chest PT, pulmo toilette  CARDIO:  SBP goal 100-160mm Hg, cont lisinopril 40 mg PO daily, amlodipine, lopressor 75 BID, clonidine 0.2 BID; hydrazaline 100 q8h; work-up for secondary hypertension  ENDO:  Blood sugar goals 140-180 mg/dL, continue insulin sliding scale; atorvastatin 40mg PO daily, A1C  4.9  GI:  docusate senna  DIET: Jevity continue tube feeds  RENAL:  Na goal 135-145, IVL; salt tabs 2g q12h  HEM/ONC: Hb stable; given DDAVP and platelet transfusion for aspirin reversal  VTE Prophylaxis: SCDs, SQL  ID: afebrile, no leukocytosis, ceftriaxone 2G daily (last day 04/23); panculture q3d if fever spikes  Social: will update family; sister freeman (surrogate decision maker), father, common law wife who is pregnant    ATTENDING ATTESTATION:  I was physically present for the key portions of the evaluation and management (E/M) service provided.  I agree with the above history, physical and plan, which I have reviewed and edited where appropriate.    Patient at high risk for neurological deterioration or death due to:  ICU delirium, aspiration PNA, DVT / PE.  Critical care time, excluding procedures: 60 minutes spent on total encounter, more than 50% of the visit was spent counseling and/or coordinating care by the attending physician.     Plan discussed with RN, house staff.

## 2019-04-23 LAB
ANION GAP SERPL CALC-SCNC: 14 MMOL/L — SIGNIFICANT CHANGE UP (ref 5–17)
BUN SERPL-MCNC: 36 MG/DL — HIGH (ref 7–23)
CALCIUM SERPL-MCNC: 9.8 MG/DL — SIGNIFICANT CHANGE UP (ref 8.4–10.5)
CHLORIDE SERPL-SCNC: 105 MMOL/L — SIGNIFICANT CHANGE UP (ref 96–108)
CO2 SERPL-SCNC: 22 MMOL/L — SIGNIFICANT CHANGE UP (ref 22–31)
COLLECT DURATION TIME UR: 24 H — SIGNIFICANT CHANGE UP
CREAT SERPL-MCNC: 1.03 MG/DL — SIGNIFICANT CHANGE UP (ref 0.5–1.3)
CULTURE RESULTS: SIGNIFICANT CHANGE UP
CULTURE RESULTS: SIGNIFICANT CHANGE UP
GLUCOSE SERPL-MCNC: 132 MG/DL — HIGH (ref 70–99)
GRAM STN FLD: SIGNIFICANT CHANGE UP
HCT VFR BLD CALC: 38.4 % — LOW (ref 39–50)
HGB BLD-MCNC: 12.5 G/DL — LOW (ref 13–17)
MAGNESIUM SERPL-MCNC: 2.7 MG/DL — HIGH (ref 1.6–2.6)
MCHC RBC-ENTMCNC: 31.6 PG — SIGNIFICANT CHANGE UP (ref 27–34)
MCHC RBC-ENTMCNC: 32.6 GM/DL — SIGNIFICANT CHANGE UP (ref 32–36)
MCV RBC AUTO: 97 FL — SIGNIFICANT CHANGE UP (ref 80–100)
METANEPH 24H UR-MRATE: 352 MCG/24 H — SIGNIFICANT CHANGE UP
METANEPH UR-MCNC: 24 H — SIGNIFICANT CHANGE UP
METANEPH UR-MCNC: 2775 ML — SIGNIFICANT CHANGE UP
METANEPH UR-MCNC: 352 MCG/24 H — SIGNIFICANT CHANGE UP
METANEPHS 24H UR-MRATE: 2775 ML — SIGNIFICANT CHANGE UP
METANEPHS 24H UR-MRATE: 993 MCG/24 H — SIGNIFICANT CHANGE UP
METANEPHS UR-MCNC: 993 MCG/24 H — SIGNIFICANT CHANGE UP
NORMETANEPHRINE 24H UR-MRATE: 641 MCG/24 H — SIGNIFICANT CHANGE UP
NORMETANEPHRINE UR-SCNC: 641 MCG/24 H — SIGNIFICANT CHANGE UP
NRBC # BLD: 0 /100 WBCS — SIGNIFICANT CHANGE UP (ref 0–0)
PHOSPHATE SERPL-MCNC: 4.2 MG/DL — SIGNIFICANT CHANGE UP (ref 2.5–4.5)
PLATELET # BLD AUTO: 657 K/UL — HIGH (ref 150–400)
POTASSIUM SERPL-MCNC: 5.1 MMOL/L — SIGNIFICANT CHANGE UP (ref 3.5–5.3)
POTASSIUM SERPL-SCNC: 5.1 MMOL/L — SIGNIFICANT CHANGE UP (ref 3.5–5.3)
RBC # BLD: 3.96 M/UL — LOW (ref 4.2–5.8)
RBC # FLD: 12.2 % — SIGNIFICANT CHANGE UP (ref 10.3–14.5)
SODIUM SERPL-SCNC: 141 MMOL/L — SIGNIFICANT CHANGE UP (ref 135–145)
SPECIMEN SOURCE: SIGNIFICANT CHANGE UP
SPECIMEN VOL 24H UR: 2775 ML — SIGNIFICANT CHANGE UP
SPECIMEN VOL 24H UR: 993 MCG/24 H — SIGNIFICANT CHANGE UP
WBC # BLD: 20.56 K/UL — HIGH (ref 3.8–10.5)
WBC # FLD AUTO: 20.56 K/UL — HIGH (ref 3.8–10.5)

## 2019-04-23 PROCEDURE — 71045 X-RAY EXAM CHEST 1 VIEW: CPT | Mod: 26

## 2019-04-23 PROCEDURE — 93970 EXTREMITY STUDY: CPT | Mod: 26

## 2019-04-23 PROCEDURE — 99233 SBSQ HOSP IP/OBS HIGH 50: CPT | Mod: 24

## 2019-04-23 RX ORDER — ACETAMINOPHEN 500 MG
1000 TABLET ORAL ONCE
Qty: 0 | Refills: 0 | Status: COMPLETED | OUTPATIENT
Start: 2019-04-23 | End: 2019-04-23

## 2019-04-23 RX ORDER — CEFEPIME 1 G/1
2000 INJECTION, POWDER, FOR SOLUTION INTRAMUSCULAR; INTRAVENOUS EVERY 12 HOURS
Qty: 0 | Refills: 0 | Status: DISCONTINUED | OUTPATIENT
Start: 2019-04-23 | End: 2019-04-24

## 2019-04-23 RX ORDER — ENOXAPARIN SODIUM 100 MG/ML
40 INJECTION SUBCUTANEOUS AT BEDTIME
Qty: 0 | Refills: 0 | Status: DISCONTINUED | OUTPATIENT
Start: 2019-04-23 | End: 2019-05-04

## 2019-04-23 RX ADMIN — Medication 650 MILLIGRAM(S): at 17:16

## 2019-04-23 RX ADMIN — Medication 650 MILLIGRAM(S): at 02:08

## 2019-04-23 RX ADMIN — Medication 650 MILLIGRAM(S): at 07:50

## 2019-04-23 RX ADMIN — Medication 4 MILLILITER(S): at 01:32

## 2019-04-23 RX ADMIN — Medication 3 MILLILITER(S): at 08:52

## 2019-04-23 RX ADMIN — Medication 100 MILLIGRAM(S): at 05:26

## 2019-04-23 RX ADMIN — LISINOPRIL 40 MILLIGRAM(S): 2.5 TABLET ORAL at 05:26

## 2019-04-23 RX ADMIN — CEFEPIME 100 MILLIGRAM(S): 1 INJECTION, POWDER, FOR SOLUTION INTRAMUSCULAR; INTRAVENOUS at 17:24

## 2019-04-23 RX ADMIN — Medication 0.2 MILLIGRAM(S): at 21:31

## 2019-04-23 RX ADMIN — Medication 100 MILLIGRAM(S): at 00:29

## 2019-04-23 RX ADMIN — AMLODIPINE BESYLATE 10 MILLIGRAM(S): 2.5 TABLET ORAL at 05:26

## 2019-04-23 RX ADMIN — Medication 3 MILLILITER(S): at 01:32

## 2019-04-23 RX ADMIN — SODIUM CHLORIDE 2 GRAM(S): 9 INJECTION INTRAMUSCULAR; INTRAVENOUS; SUBCUTANEOUS at 05:26

## 2019-04-23 RX ADMIN — Medication 3 MILLILITER(S): at 21:44

## 2019-04-23 RX ADMIN — Medication 0.2 MILLIGRAM(S): at 05:26

## 2019-04-23 RX ADMIN — CEFTRIAXONE 100 GRAM(S): 500 INJECTION, POWDER, FOR SOLUTION INTRAMUSCULAR; INTRAVENOUS at 08:52

## 2019-04-23 RX ADMIN — Medication 650 MILLIGRAM(S): at 01:48

## 2019-04-23 RX ADMIN — Medication 100 MILLIGRAM(S): at 17:23

## 2019-04-23 RX ADMIN — Medication 1000 MILLIGRAM(S): at 22:07

## 2019-04-23 RX ADMIN — SODIUM CHLORIDE 2 GRAM(S): 9 INJECTION INTRAMUSCULAR; INTRAVENOUS; SUBCUTANEOUS at 17:23

## 2019-04-23 RX ADMIN — ATORVASTATIN CALCIUM 40 MILLIGRAM(S): 80 TABLET, FILM COATED ORAL at 21:31

## 2019-04-23 RX ADMIN — Medication 3 MILLILITER(S): at 14:12

## 2019-04-23 RX ADMIN — Medication 650 MILLIGRAM(S): at 16:33

## 2019-04-23 RX ADMIN — Medication 2: at 06:44

## 2019-04-23 RX ADMIN — Medication 100 MILLIGRAM(S): at 17:25

## 2019-04-23 RX ADMIN — LEVETIRACETAM 1000 MILLIGRAM(S): 250 TABLET, FILM COATED ORAL at 05:26

## 2019-04-23 RX ADMIN — Medication 100 MILLIGRAM(S): at 07:26

## 2019-04-23 RX ADMIN — LEVETIRACETAM 1000 MILLIGRAM(S): 250 TABLET, FILM COATED ORAL at 17:25

## 2019-04-23 RX ADMIN — Medication 400 MILLIGRAM(S): at 21:18

## 2019-04-23 RX ADMIN — Medication 650 MILLIGRAM(S): at 07:26

## 2019-04-23 RX ADMIN — ENOXAPARIN SODIUM 40 MILLIGRAM(S): 100 INJECTION SUBCUTANEOUS at 21:31

## 2019-04-23 NOTE — PROGRESS NOTE ADULT - ASSESSMENT
44y/M with   1.  intracerebral hemorrhage (R basal ganglia / thalamic; small L basal ganglia, R pontine), brain compression, cerebral edema; s/p R crani, endoscopic evacuation of ICH (04/10/2019, Dr. So)  2.  dyslipidemia      CODE STATUS:  Full Code                       GOALS OF CARE:  aggressive                      DISPOSITION:  ICU  ICH Score on admission  = GCS Score: 3-4 (2 pts) E1VTM2 (+) IVH = 3  ICH volume 20ml  Estimated 30-day mortality 3 points: 72%3    PLAN:   NEURO: neurochecks q2h, PRN pain meds with Tylenol; taper precedex to off  ICH:  BP control; no aneurysm on formal angio (04/08)  seizure prophylaxis: as per neurosurgery  EVD: removed   agitation: increase seroquel to 25mg daily   REHAB:  physical therapy evaluation and management    EARLY MOB:   HOB up     PULM:  s/p trach, trach collar on 35%, chest PT, pulmo toilette  CARDIO:  SBP goal 100-160mm Hg, cont lisinopril 40 mg PO daily, amlodipine, lopressor 75 BID, clonidine 0.2 BID; hydrazaline 100 q8h; work-up for secondary hypertension  ENDO:  Blood sugar goals 140-180 mg/dL, continue insulin sliding scale; atorvastatin 40mg PO daily, A1C  4.9  GI:  docusate senna  DIET: Jevity continue tube feeds  RENAL:  Na goal 135-145, IVL; salt tabs 2g q12h  HEM/ONC: Hb stable; given DDAVP and platelet transfusion for aspirin reversal  VTE Prophylaxis: SCDs, SQL  ID: afebrile, no leukocytosis, ceftriaxone 2G daily (last day 04/23); panculture q3d if fever spikes  Social: will update family; sister freeman (surrogate decision maker), father, common law wife who is pregnant    ATTENDING ATTESTATION:  I was physically present for the key portions of the evaluation and management (E/M) service provided.  I agree with the above history, physical and plan, which I have reviewed and edited where appropriate.    Patient at high risk for neurological deterioration or death due to:  ICU delirium, aspiration PNA, DVT / PE.  Critical care time, excluding procedures: 60 minutes spent on total encounter, more than 50% of the visit was spent counseling and/or coordinating care by the attending physician.     Plan discussed with RN, house staff.

## 2019-04-23 NOTE — CHART NOTE - NSCHARTNOTEFT_GEN_A_CORE
Infectious Diseases Anti-infective Approval Note    Medication: cefepime  Dose: 2g  Route: IV  Frequency: q12h  Duration: 7 days    Dose may be adjusted as needed for alterations in renal function.    *THIS IS NOT AN INFECTIOUS DISEASES CONSULTATION*

## 2019-04-23 NOTE — PROGRESS NOTE ADULT - SUBJECTIVE AND OBJECTIVE BOX
Hospital Course:   : overnight, patient requiring large amount of cardene and propofol.  Plan today is to transition to precedex, start PO lisinopril and wean of cardene.  Becomes extremely agitated when off sedation.  : Cerebral angio without findings of aneurysm. EVD stopped working, pulled back without improvement. CT Head performed.  : Received ativan 6mg for agitation. EVD stopped draining at 6pm yesterday taken for stat CTH. Dr. So notified and EVD lowered to 5cmH2O without output.   4/10: s/p penumbra evacuation of ICH. Pt seen and examined at bedside postop. Remains intubated, on versed. ICPs stable.  (OR EBL 30cc, received LR 200cc)  : JUSTIN overnight. Remains on versed. Remains intubated. Neuro stable.  : Given ativan overnight, bucking the vent. Remains on versed drip. Neuro stable. ICPs stable. Increase hydralazine to 75 Q8.    JUSTIN overnight, febrile, urinary retention Straight Cath x 1 = 1L, neuro exam unchanged since yesterday, started Cardene gtt as per Dr. So goal -140   POD#4 febrile overnight, o/w JUSTIN overnight, EVD@ 5cm H2O, Clonidine started, Ceftriax until  Staph sputum, Versed/Precedex, straight cathed overnight  4/15: ICP stable. neuro stable. Remains on versed/precedex  : Tolerating CPAP, CTH performed, decreased vent size, EVD raised to 10 cmH2O neuro stable   :  JUSTIN overnight, EVD @15cm H2O, CPAP overnight, Ceftriax until  PNA, Precedex PRN comfort, rectal tube - diarrhea; fever spike    EVD clamped since , ICPs fluctuate, HCT today, R IJ, Ceftraix until , Versed gtt, neuro exam is improving, Duplex Renal artery and Metanephrine & Catecholamine Urine collection 24hrs for high BP work-up  : EVD removed yesterday. Neuro stable. On precedex  : Neuro stable.  remained on trach collar , no acute events   JUSTIN overnight, frquent suctioning q1h, on Trach collar, TFs via PEG, Ceftriax until : suctioning requirements down, JUSTIN overnight    Vital Signs Last 24 Hrs  T(C): 37.8 (2019 09:12), Max: 39.2 (2019 20:00)  T(F): 100.1 (2019 09:12), Max: 102.5 (2019 20:00)  HR: 81 (2019 10:14) (80 - 114)  BP: 116/62 (2019 10:00) (98/52 - 145/96)  BP(mean): 83 (2019 10:00) (69 - 112)  RR: 20 (2019 10:14) (17 - 27)  SpO2: 98% (2019 10:14) (94% - 99%)    I&O's Detail    2019 07:  -  2019 07:00  --------------------------------------------------------  IN:    Enteral Tube Flush: 735 mL    IV PiggyBack: 50 mL    ns in tub fed  [id number]: 1608 mL  Total IN: 2393 mL    OUT:    Incontinent per Condom Catheter: 1600 mL  Total OUT: 1600 mL    Total NET: 793 mL      2019 07:  -  2019 10:36  --------------------------------------------------------  IN:    IV PiggyBack: 100 mL    ns in tub fed  [id number]: 201 mL  Total IN: 301 mL    OUT:  Total OUT: 0 mL    Total NET: 301 mL        I&O's Summary    2019 07:  -  2019 07:00  --------------------------------------------------------  IN: 2393 mL / OUT: 1600 mL / NET: 793 mL    2019 07:01  -  2019 10:36  --------------------------------------------------------  IN: 301 mL / OUT: 0 mL / NET: 301 mL        PHYSICAL EXAM:  Trached, EVD site c/d/i  PERRL, tracking with eyes  lungs CTA  Soft, NT/ND. +BS  Pulses 2+ throughout  Neuro: Moving RUE/RLE spontaneously, withdrawal LLE and LUE. following commands intermittently (shows 2 fingers and wiggles toes on right,) PERRL, +corneals, +gag      TUBES/LINES:  pivs  peg    DIET:  [] NPO  [] Mechanical  [x] Tube feeds    LABS:                        12.5   20.56 )-----------( 657      ( 2019 05:26 )             38.4     04-23    141  |  105  |  36<H>  ----------------------------<  132<H>  5.1   |  22  |  1.03    Ca    9.8      2019 05:26  Phos  4.2     04-23  Mg     2.7     04-23        Urinalysis Basic - ( 2019 19:13 )    Color: Yellow / Appearance: Clear / S.020 / pH: x  Gluc: x / Ketone: Trace mg/dL  / Bili: Negative / Urobili: 0.2 E.U./dL   Blood: x / Protein: NEGATIVE mg/dL / Nitrite: NEGATIVE   Leuk Esterase: NEGATIVE / RBC: x / WBC x   Sq Epi: x / Non Sq Epi: x / Bacteria: x          CAPILLARY BLOOD GLUCOSE      POCT Blood Glucose.: 163 mg/dL (2019 06:14)  POCT Blood Glucose.: 143 mg/dL (2019 21:43)  POCT Blood Glucose.: 132 mg/dL (2019 17:03)  POCT Blood Glucose.: 140 mg/dL (2019 11:31)      Drug Levels: [] N/A    CSF Analysis: [] N/A      Allergies    No Known Allergies    Intolerances      MEDICATIONS:  Antibiotics:  cefTRIAXone   IVPB 2 Gram(s) IV Intermittent every 24 hours    Neuro:  acetaminophen    Suspension .. 650 milliGRAM(s) Oral every 6 hours PRN  levETIRAcetam  Solution 1000 milliGRAM(s) Enteral Tube two times a day  ondansetron Injectable 4 milliGRAM(s) IV Push every 6 hours PRN  QUEtiapine 25 milliGRAM(s) Oral daily    Anticoagulation:  enoxaparin Injectable 40 milliGRAM(s) SubCutaneous at bedtime    OTHER:  ALBUTerol    90 MICROgram(s) HFA Inhaler 1 Puff(s) Inhalation every 4 hours  ALBUTerol/ipratropium for Nebulization 3 milliLiter(s) Nebulizer every 6 hours  amLODIPine   Tablet 10 milliGRAM(s) Oral daily  atorvastatin 40 milliGRAM(s) Oral at bedtime  cloNIDine 0.2 milliGRAM(s) Oral every 8 hours  dextrose 40% Gel 15 Gram(s) Oral once PRN  dextrose 50% Injectable 12.5 Gram(s) IV Push once  dextrose 50% Injectable 25 Gram(s) IV Push once  dextrose 50% Injectable 25 Gram(s) IV Push once  glucagon  Injectable 1 milliGRAM(s) IntraMuscular once PRN  hydrALAZINE 100 milliGRAM(s) Oral every 8 hours  hydrALAZINE Injectable 10 milliGRAM(s) IV Push every 4 hours PRN  influenza   Vaccine 0.5 milliLiter(s) IntraMuscular once  insulin lispro (HumaLOG) corrective regimen sliding scale   SubCutaneous Before meals and at bedtime  lisinopril 40 milliGRAM(s) Oral daily  metoprolol tartrate 100 milliGRAM(s) Oral every 12 hours  tiotropium 18 MICROgram(s) Capsule 1 Capsule(s) Inhalation daily    IVF:  dextrose 5%. 1000 milliLiter(s) IV Continuous <Continuous>  sodium chloride 2 Gram(s) Oral every 12 hours    CULTURES:  Culture Results:   Moderate Gram Negative Rods Identification and susceptibility to follow.  Moderate Staphylococcus aureus Susceptibility to follow. ( @ 16:08)  Culture Results:   No growth at 4 days. ( @ 15:05)    RADIOLOGY & ADDITIONAL TESTS:      ASSESSMENT:  44y Male w/ ETOH abuse now s/p right thalamic IPH with IVH s/p Emergent Rt EVD placement 19, s/p Cerebral angiogram neg 19, new left EVD placed and R EVD removed 19, now s/p penumbra evacuation of ICH 4/10/19, EVD removed 19    HEMORRHAGE STROKE  HEMORRHAGE  Handoff  ICH (intracerebral hemorrhage)  ICH (intracerebral hemorrhage)  Open tracheostomy  Bronchoscopy, adult  EGD, with PEG  Craniotomy for intracranial hemorrhage  Evacuation of hematoma of face  Angiogram, carotid and cerebral, bilateral      Plan:     NEURO:  Neuro checks q4h  Keppra for seizure prophylaxis  wean precedex  Continue fentanyl and ativan PRN agitation    CARDIOVASCULAR:   SBP goal <160  Continue lopressor 50mg BID, norvasc 10mg daily, and lisinopril 40mg daily, hydralazine 100mg Q8h  Clonidine .2 q8h  Daily labs, electrolyte repletion PRN,  Continue Statin therapy  Right IJ in place    PULMONARY:   trach collar  Daily CXR  Duoneb x 3 days      RENAL:   Voiding, condom catheter, straight Cath PRN  Normal Na goal  Duplex Renal artery negative for BECKIE   Metanephrine and Catecholamine Urine collection 24hrs for hypertension work-up    GI:   s/p Peg    ID:  Ceftriax PNA until     ENDO:   ISS    DVT PROPHYLAXIS:   SCDs, SQL     DISPOSITION: Continue NSICU care,  Full code,  D/w Dr. So and Dr. Schultz Hospital Course:   : overnight, patient requiring large amount of cardene and propofol.  Plan today is to transition to precedex, start PO lisinopril and wean of cardene.  Becomes extremely agitated when off sedation.  : Cerebral angio without findings of aneurysm. EVD stopped working, pulled back without improvement. CT Head performed.  : Received ativan 6mg for agitation. EVD stopped draining at 6pm yesterday taken for stat CTH. Dr. So notified and EVD lowered to 5cmH2O without output.   4/10: s/p penumbra evacuation of ICH. Pt seen and examined at bedside postop. Remains intubated, on versed. ICPs stable.  (OR EBL 30cc, received LR 200cc)  : JUSTIN overnight. Remains on versed. Remains intubated. Neuro stable.  : Given ativan overnight, bucking the vent. Remains on versed drip. Neuro stable. ICPs stable. Increase hydralazine to 75 Q8.    JUSTIN overnight, febrile, urinary retention Straight Cath x 1 = 1L, neuro exam unchanged since yesterday, started Cardene gtt as per Dr. So goal -140   POD#4 febrile overnight, o/w JUSTIN overnight, EVD@ 5cm H2O, Clonidine started, Ceftriax until  Staph sputum, Versed/Precedex, straight cathed overnight  4/15: ICP stable. neuro stable. Remains on versed/precedex  : Tolerating CPAP, CTH performed, decreased vent size, EVD raised to 10 cmH2O neuro stable   :  JUSTIN overnight, EVD @15cm H2O, CPAP overnight, Ceftriax until  PNA, Precedex PRN comfort, rectal tube - diarrhea; fever spike    EVD clamped since , ICPs fluctuate, HCT today, R IJ, Ceftraix until , Versed gtt, neuro exam is improving, Duplex Renal artery and Metanephrine & Catecholamine Urine collection 24hrs for high BP work-up  : EVD removed yesterday. Neuro stable. On precedex  : Neuro stable.  remained on trach collar , no acute events   JUSTIN overnight, frquent suctioning q1h, on Trach collar, TFs via PEG, Ceftriax until : suctioning requirements down, JUSTIN overnight    Vital Signs Last 24 Hrs  T(C): 37.8 (2019 09:12), Max: 39.2 (2019 20:00)  T(F): 100.1 (2019 09:12), Max: 102.5 (2019 20:00)  HR: 81 (2019 10:14) (80 - 114)  BP: 116/62 (2019 10:00) (98/52 - 145/96)  BP(mean): 83 (2019 10:00) (69 - 112)  RR: 20 (2019 10:14) (17 - 27)  SpO2: 98% (2019 10:14) (94% - 99%)    I&O's Detail    2019 07:  -  2019 07:00  --------------------------------------------------------  IN:    Enteral Tube Flush: 735 mL    IV PiggyBack: 50 mL    ns in tub fed  [id number]: 1608 mL  Total IN: 2393 mL    OUT:    Incontinent per Condom Catheter: 1600 mL  Total OUT: 1600 mL    Total NET: 793 mL      2019 07:  -  2019 10:36  --------------------------------------------------------  IN:    IV PiggyBack: 100 mL    ns in tub fed  [id number]: 201 mL  Total IN: 301 mL    OUT:  Total OUT: 0 mL    Total NET: 301 mL        I&O's Summary    2019 07:  -  2019 07:00  --------------------------------------------------------  IN: 2393 mL / OUT: 1600 mL / NET: 793 mL    2019 07:01  -  2019 10:36  --------------------------------------------------------  IN: 301 mL / OUT: 0 mL / NET: 301 mL        PHYSICAL EXAM:  Trached, EVD site c/d/i  PERRL, tracking with eyes  lungs CTA  Soft, NT/ND. +BS  Pulses 2+ throughout  Neuro: Moving RUE/RLE spontaneously, withdrawal LLE and LUE. following commands intermittently (shows 2 fingers and wiggles toes on right,) PERRL, +corneals, +gag      TUBES/LINES:  pivs  peg    DIET:  [] NPO  [] Mechanical  [x] Tube feeds    LABS:                        12.5   20.56 )-----------( 657      ( 2019 05:26 )             38.4     04-23    141  |  105  |  36<H>  ----------------------------<  132<H>  5.1   |  22  |  1.03    Ca    9.8      2019 05:26  Phos  4.2     04-23  Mg     2.7     04-23        Urinalysis Basic - ( 2019 19:13 )    Color: Yellow / Appearance: Clear / S.020 / pH: x  Gluc: x / Ketone: Trace mg/dL  / Bili: Negative / Urobili: 0.2 E.U./dL   Blood: x / Protein: NEGATIVE mg/dL / Nitrite: NEGATIVE   Leuk Esterase: NEGATIVE / RBC: x / WBC x   Sq Epi: x / Non Sq Epi: x / Bacteria: x          CAPILLARY BLOOD GLUCOSE      POCT Blood Glucose.: 163 mg/dL (2019 06:14)  POCT Blood Glucose.: 143 mg/dL (2019 21:43)  POCT Blood Glucose.: 132 mg/dL (2019 17:03)  POCT Blood Glucose.: 140 mg/dL (2019 11:31)      Drug Levels: [] N/A    CSF Analysis: [] N/A      Allergies    No Known Allergies    Intolerances      MEDICATIONS:  Antibiotics:  cefTRIAXone   IVPB 2 Gram(s) IV Intermittent every 24 hours    Neuro:  acetaminophen    Suspension .. 650 milliGRAM(s) Oral every 6 hours PRN  levETIRAcetam  Solution 1000 milliGRAM(s) Enteral Tube two times a day  ondansetron Injectable 4 milliGRAM(s) IV Push every 6 hours PRN  QUEtiapine 25 milliGRAM(s) Oral daily    Anticoagulation:  enoxaparin Injectable 40 milliGRAM(s) SubCutaneous at bedtime    OTHER:  ALBUTerol    90 MICROgram(s) HFA Inhaler 1 Puff(s) Inhalation every 4 hours  ALBUTerol/ipratropium for Nebulization 3 milliLiter(s) Nebulizer every 6 hours  amLODIPine   Tablet 10 milliGRAM(s) Oral daily  atorvastatin 40 milliGRAM(s) Oral at bedtime  cloNIDine 0.2 milliGRAM(s) Oral every 8 hours  dextrose 40% Gel 15 Gram(s) Oral once PRN  dextrose 50% Injectable 12.5 Gram(s) IV Push once  dextrose 50% Injectable 25 Gram(s) IV Push once  dextrose 50% Injectable 25 Gram(s) IV Push once  glucagon  Injectable 1 milliGRAM(s) IntraMuscular once PRN  hydrALAZINE 100 milliGRAM(s) Oral every 8 hours  hydrALAZINE Injectable 10 milliGRAM(s) IV Push every 4 hours PRN  influenza   Vaccine 0.5 milliLiter(s) IntraMuscular once  insulin lispro (HumaLOG) corrective regimen sliding scale   SubCutaneous Before meals and at bedtime  lisinopril 40 milliGRAM(s) Oral daily  metoprolol tartrate 100 milliGRAM(s) Oral every 12 hours  tiotropium 18 MICROgram(s) Capsule 1 Capsule(s) Inhalation daily    IVF:  dextrose 5%. 1000 milliLiter(s) IV Continuous <Continuous>  sodium chloride 2 Gram(s) Oral every 12 hours    CULTURES:  Culture Results:   Moderate Gram Negative Rods Identification and susceptibility to follow.  Moderate Staphylococcus aureus Susceptibility to follow. ( @ 16:08)  Culture Results:   No growth at 4 days. ( @ 15:05)    RADIOLOGY & ADDITIONAL TESTS:      ASSESSMENT:  44y Male w/ ETOH abuse now s/p right thalamic IPH with IVH s/p Emergent Rt EVD placement 19, s/p Cerebral angiogram neg 19, new left EVD placed and R EVD removed 19, now s/p penumbra evacuation of ICH 4/10/19, EVD removed 19    HEMORRHAGE STROKE  HEMORRHAGE  Handoff  ICH (intracerebral hemorrhage)  ICH (intracerebral hemorrhage)  Open tracheostomy  Bronchoscopy, adult  EGD, with PEG  Craniotomy for intracranial hemorrhage  Evacuation of hematoma of face  Angiogram, carotid and cerebral, bilateral      Plan:     NEURO:  Neuro checks q4h  Keppra for seizure prophylaxis  wean precedex  Continue fentanyl and ativan PRN agitation    CARDIOVASCULAR:   SBP goal <160  Continue lopressor 50mg BID, norvasc 10mg daily, and lisinopril 40mg daily, hydralazine 100mg Q8h  Clonidine .2 q8h  Daily labs, electrolyte repletion PRN,  Continue Statin therapy  Right IJ in place    PULMONARY:   trach collar  Daily CXR  Duoneb x 3 days      RENAL:   Voiding, condom catheter, straight Cath PRN  Normal Na goal  Duplex Renal artery negative for BECKIE   Metanephrine and Catecholamine Urine collection 24hrs for hypertension work-up    GI:   s/p Peg    ID:  Ceftriax PNA   febrile overnight  keep ceft  f/u cultures    ENDO:   ISS    DVT PROPHYLAXIS:   SCDs, SQL     DISPOSITION: Continue NSICU care,  Full code,  D/w Dr. So and Dr. Schultz

## 2019-04-23 NOTE — PROGRESS NOTE ADULT - SUBJECTIVE AND OBJECTIVE BOX
=================================  NEUROCRITICAL CARE ATTENDING NOTE  =================================    ARPAN RUANO   MRN-[id number]  Summary:  44y/M  HPI:  History obtained by patient's girlfriend and chart from [place], patient is unresponsive:     43 y/o male with no significant PMHx presents to [place] with AMS, left side weakness and numbness. Per [place], [place] when he developed acute onset of left facial numbness and LUE and numbness around 6:30PM. When get got home he developed AMS, dysarthria, and weakness in the LUE and LLE. At [place] ED patient had several episodes of emesis and was hypertensive to SBP in the 200's. Work up revealed right thalamic hemorrhage on CT head. Patient not on any anticoagulation use per girlfriend. Of note, patient took Excedrin for headache at home. Patient transferred to [facility] for further management. (2019 00:04)      Overnight Events:  Denies HA/N/V/Dizziness.      PHYSICAL EXAMINATION  T(C): 38.3 ( @ 06:01), Max: 39.2 ( @ 20:00)  HR: 80 ( @ 08:00) (80 - 114)  BP: 112/64 ( @ 08:00) (98/52 - 147/92)  RR: 20 ( @ 08:00) (17 - 28)  SpO2: 96% ( @ 08:00) (94% - 99%)    LABS:  CAPILLARY BLOOD GLUCOSE      POCT Blood Glucose.: 163 mg/dL (2019 06:14)  POCT Blood Glucose.: 143 mg/dL (2019 21:43)  POCT Blood Glucose.: 132 mg/dL (2019 17:03)  POCT Blood Glucose.: 140 mg/dL (2019 11:31)                          12.5   20.56 )-----------( 657      ( 2019 05:26 )             38.4         141  |  105  |  36<H>  ----------------------------<  132<H>  5.1   |  22  |  1.03    Ca    9.8      2019 05:26  Phos  4.2       Mg     2.7      @ 07: @ 07:00  --------------------------------------------------------  IN: 2393 mL / OUT: 1600 mL / NET: 793 mL     @ 07:01   @ 08:02  --------------------------------------------------------  IN: 67 mL / OUT: 0 mL / NET: 67 mL        MEDICATIONS:  MEDICATIONS  (STANDING):  ALBUTerol    90 MICROgram(s) HFA Inhaler 1 Puff(s) Inhalation every 4 hours  ALBUTerol/ipratropium for Nebulization 3 milliLiter(s) Nebulizer every 6 hours  amLODIPine   Tablet 10 milliGRAM(s) Oral daily  atorvastatin 40 milliGRAM(s) Oral at bedtime  cefTRIAXone   IVPB 2 Gram(s) IV Intermittent every 24 hours  cloNIDine 0.2 milliGRAM(s) Oral every 8 hours  dextrose 5%. 1000 milliLiter(s) (50 mL/Hr) IV Continuous <Continuous>  dextrose 50% Injectable 12.5 Gram(s) IV Push once  dextrose 50% Injectable 25 Gram(s) IV Push once  dextrose 50% Injectable 25 Gram(s) IV Push once  enoxaparin Injectable 40 milliGRAM(s) SubCutaneous at bedtime  hydrALAZINE 100 milliGRAM(s) Oral every 8 hours  influenza   Vaccine 0.5 milliLiter(s) IntraMuscular once  insulin lispro (HumaLOG) corrective regimen sliding scale   SubCutaneous Before meals and at bedtime  levETIRAcetam  Solution 1000 milliGRAM(s) Enteral Tube two times a day  lisinopril 40 milliGRAM(s) Oral daily  metoprolol tartrate 100 milliGRAM(s) Oral every 12 hours  QUEtiapine 25 milliGRAM(s) Oral daily  sodium chloride 2 Gram(s) Oral every 12 hours  tiotropium 18 MICROgram(s) Capsule 1 Capsule(s) Inhalation daily    MEDICATIONS  (PRN):  acetaminophen    Suspension .. 650 milliGRAM(s) Oral every 6 hours PRN Temp greater or equal to 38C (100.4F), Mild Pain (1 - 3)  dextrose 40% Gel 15 Gram(s) Oral once PRN Blood Glucose LESS THAN 70 milliGRAM(s)/deciliter  glucagon  Injectable 1 milliGRAM(s) IntraMuscular once PRN Glucose LESS THAN 70 milligrams/deciliter  hydrALAZINE Injectable 10 milliGRAM(s) IV Push every 4 hours PRN BP>160  ondansetron Injectable 4 milliGRAM(s) IV Push every 6 hours PRN Nausea and/or Vomiting      COURSE IN THE HOSPITAL:   Admitted, given DDAVP / platelets; EVD inserted, improved   04/15 fever overnight   Tmax 38.3 remained on trach collar   No significant events overnight, remained on trach collar tmax 38.1; EVD removed   minor bleeding in gracy hole site, stapled overnight    Past Medical History:   Allergies:  No Known Allergies  Home meds:     PHYSICAL EXAMINATION    NEUROLOGIC EXAMINATION:  Patient opens eyes spontaneously, following commands, pupils 3mm reactive, no gaze preference; moves R UE/LE spontaneously, L UE extends to noxious and L LE withdraws    CARDIOVASCULAR: (+) S1 S2, normal rate and regular rhythm  PULMONARY: clear to auscultation bilaterally; copious secretions q1-2h suctioning  ABDOMEN: soft, nontender with normoactive bowel sounds; PEG site clean  EXTREMITIES: no edema  SKIN: no rash      LDL = 113 (-)   HDL = 70 (-)  TG = 85 (-)  TSH = 0.733 ()    Bacteriology:   UA NEG   Blood CS NG2D x2   04/15 CSF NGTD   Blood CS NG5D x1 (final)   CSF NGTD  04/10 Sputum MSSA H influenza S Ceftri   sputum culture numerous MSSA   Blood CS NG5D x2 (final)    CSF studies:    L   *** [id number] WBC0 *** %N0 %L0     L3.4   *** [id number] WBC37 *** %N35 %L1

## 2019-04-24 LAB
-  AMPICILLIN/SULBACTAM: SIGNIFICANT CHANGE UP
-  AMPICILLIN: SIGNIFICANT CHANGE UP
-  CEFAZOLIN: SIGNIFICANT CHANGE UP
-  CEFAZOLIN: SIGNIFICANT CHANGE UP
-  CEFTRIAXONE: SIGNIFICANT CHANGE UP
-  CLINDAMYCIN: SIGNIFICANT CHANGE UP
-  ERYTHROMYCIN: SIGNIFICANT CHANGE UP
-  GENTAMICIN: SIGNIFICANT CHANGE UP
-  LINEZOLID: SIGNIFICANT CHANGE UP
-  OXACILLIN: SIGNIFICANT CHANGE UP
-  PENICILLIN: SIGNIFICANT CHANGE UP
-  PIPERACILLIN/TAZOBACTAM: SIGNIFICANT CHANGE UP
-  RIFAMPIN: SIGNIFICANT CHANGE UP
-  TOBRAMYCIN: SIGNIFICANT CHANGE UP
-  TRIMETHOPRIM/SULFAMETHOXAZOLE: SIGNIFICANT CHANGE UP
-  TRIMETHOPRIM/SULFAMETHOXAZOLE: SIGNIFICANT CHANGE UP
-  VANCOMYCIN: SIGNIFICANT CHANGE UP
ANION GAP SERPL CALC-SCNC: 14 MMOL/L — SIGNIFICANT CHANGE UP (ref 5–17)
BUN SERPL-MCNC: 31 MG/DL — HIGH (ref 7–23)
CALCIUM SERPL-MCNC: 9.5 MG/DL — SIGNIFICANT CHANGE UP (ref 8.4–10.5)
CHLORIDE SERPL-SCNC: 110 MMOL/L — HIGH (ref 96–108)
CO2 SERPL-SCNC: 24 MMOL/L — SIGNIFICANT CHANGE UP (ref 22–31)
CREAT SERPL-MCNC: 0.93 MG/DL — SIGNIFICANT CHANGE UP (ref 0.5–1.3)
CULTURE RESULTS: SIGNIFICANT CHANGE UP
GLUCOSE SERPL-MCNC: 144 MG/DL — HIGH (ref 70–99)
HCT VFR BLD CALC: 37.1 % — LOW (ref 39–50)
HGB BLD-MCNC: 12 G/DL — LOW (ref 13–17)
MAGNESIUM SERPL-MCNC: 2.6 MG/DL — SIGNIFICANT CHANGE UP (ref 1.6–2.6)
MCHC RBC-ENTMCNC: 32 PG — SIGNIFICANT CHANGE UP (ref 27–34)
MCHC RBC-ENTMCNC: 32.3 GM/DL — SIGNIFICANT CHANGE UP (ref 32–36)
MCV RBC AUTO: 98.9 FL — SIGNIFICANT CHANGE UP (ref 80–100)
METHOD TYPE: SIGNIFICANT CHANGE UP
METHOD TYPE: SIGNIFICANT CHANGE UP
NRBC # BLD: 0 /100 WBCS — SIGNIFICANT CHANGE UP (ref 0–0)
ORGANISM # SPEC MICROSCOPIC CNT: SIGNIFICANT CHANGE UP
PHOSPHATE SERPL-MCNC: 3.8 MG/DL — SIGNIFICANT CHANGE UP (ref 2.5–4.5)
PLATELET # BLD AUTO: 617 K/UL — HIGH (ref 150–400)
POTASSIUM SERPL-MCNC: 5.3 MMOL/L — SIGNIFICANT CHANGE UP (ref 3.5–5.3)
POTASSIUM SERPL-SCNC: 5.3 MMOL/L — SIGNIFICANT CHANGE UP (ref 3.5–5.3)
RBC # BLD: 3.75 M/UL — LOW (ref 4.2–5.8)
RBC # FLD: 12 % — SIGNIFICANT CHANGE UP (ref 10.3–14.5)
SODIUM SERPL-SCNC: 148 MMOL/L — HIGH (ref 135–145)
SPECIMEN SOURCE: SIGNIFICANT CHANGE UP
WBC # BLD: 17.7 K/UL — HIGH (ref 3.8–10.5)
WBC # FLD AUTO: 17.7 K/UL — HIGH (ref 3.8–10.5)

## 2019-04-24 PROCEDURE — 99233 SBSQ HOSP IP/OBS HIGH 50: CPT | Mod: 24

## 2019-04-24 RX ORDER — FENTANYL CITRATE 50 UG/ML
25 INJECTION INTRAVENOUS EVERY 4 HOURS
Qty: 0 | Refills: 0 | Status: DISCONTINUED | OUTPATIENT
Start: 2019-04-24 | End: 2019-04-27

## 2019-04-24 RX ORDER — VANCOMYCIN HCL 1 G
1500 VIAL (EA) INTRAVENOUS EVERY 12 HOURS
Qty: 0 | Refills: 0 | Status: DISCONTINUED | OUTPATIENT
Start: 2019-04-24 | End: 2019-04-24

## 2019-04-24 RX ORDER — MEROPENEM 1 G/30ML
1000 INJECTION INTRAVENOUS EVERY 8 HOURS
Qty: 0 | Refills: 0 | Status: DISCONTINUED | OUTPATIENT
Start: 2019-04-24 | End: 2019-04-25

## 2019-04-24 RX ADMIN — SODIUM CHLORIDE 2 GRAM(S): 9 INJECTION INTRAMUSCULAR; INTRAVENOUS; SUBCUTANEOUS at 06:52

## 2019-04-24 RX ADMIN — Medication 0.2 MILLIGRAM(S): at 15:39

## 2019-04-24 RX ADMIN — Medication 0.2 MILLIGRAM(S): at 06:51

## 2019-04-24 RX ADMIN — MEROPENEM 100 MILLIGRAM(S): 1 INJECTION INTRAVENOUS at 15:38

## 2019-04-24 RX ADMIN — Medication 100 MILLIGRAM(S): at 15:39

## 2019-04-24 RX ADMIN — CEFEPIME 100 MILLIGRAM(S): 1 INJECTION, POWDER, FOR SOLUTION INTRAMUSCULAR; INTRAVENOUS at 06:51

## 2019-04-24 RX ADMIN — LEVETIRACETAM 1000 MILLIGRAM(S): 250 TABLET, FILM COATED ORAL at 19:02

## 2019-04-24 RX ADMIN — Medication 3 MILLILITER(S): at 09:13

## 2019-04-24 RX ADMIN — AMLODIPINE BESYLATE 10 MILLIGRAM(S): 2.5 TABLET ORAL at 06:51

## 2019-04-24 RX ADMIN — Medication 100 MILLIGRAM(S): at 19:01

## 2019-04-24 RX ADMIN — Medication 100 MILLIGRAM(S): at 06:52

## 2019-04-24 RX ADMIN — Medication 100 MILLIGRAM(S): at 09:52

## 2019-04-24 RX ADMIN — Medication 3 MILLILITER(S): at 01:40

## 2019-04-24 RX ADMIN — Medication 3 MILLILITER(S): at 14:44

## 2019-04-24 RX ADMIN — LEVETIRACETAM 1000 MILLIGRAM(S): 250 TABLET, FILM COATED ORAL at 06:52

## 2019-04-24 RX ADMIN — Medication 650 MILLIGRAM(S): at 13:38

## 2019-04-24 RX ADMIN — Medication 3 MILLILITER(S): at 20:00

## 2019-04-24 RX ADMIN — LISINOPRIL 40 MILLIGRAM(S): 2.5 TABLET ORAL at 06:52

## 2019-04-24 RX ADMIN — Medication 100 MILLIGRAM(S): at 00:17

## 2019-04-24 RX ADMIN — MEROPENEM 100 MILLIGRAM(S): 1 INJECTION INTRAVENOUS at 09:51

## 2019-04-24 RX ADMIN — Medication 650 MILLIGRAM(S): at 14:30

## 2019-04-24 RX ADMIN — FENTANYL CITRATE 25 MICROGRAM(S): 50 INJECTION INTRAVENOUS at 06:11

## 2019-04-24 NOTE — PROGRESS NOTE ADULT - SUBJECTIVE AND OBJECTIVE BOX
=================================  NEUROCRITICAL CARE ATTENDING NOTE  =================================    ARPAN RUANO   MRN-[id number]  Summary:  44y/M  HPI:  History obtained by patient's girlfriend and chart from [place], patient is unresponsive:     43 y/o male with no significant PMHx presents to [place] with AMS, left side weakness and numbness. Per [place], [place] when he developed acute onset of left facial numbness and LUE and numbness around 6:30PM. When get got home he developed AMS, dysarthria, and weakness in the LUE and LLE. At [place] ED patient had several episodes of emesis and was hypertensive to SBP in the 200's. Work up revealed right thalamic hemorrhage on CT head. Patient not on any anticoagulation use per girlfriend. Of note, patient took Excedrin for headache at home. Patient transferred to [facility] for further management. (2019 00:04)      Overnight Events:  Denies HA/N/V/Dizziness.      PHYSICAL EXAMINATION  T(C): 38 ( @ 06:22), Max: 38.4 ( @ 18:07)  HR: 104 ( @ 07:00) (80 - 120)  BP: 134/81 ( @ 07:00) (91/51 - 149/95)  RR: 18 ( @ 07:00) (17 - 38)  SpO2: 97% ( @ 07:00) (91% - 99%)  CVP(mm Hg): --    LABS:  CAPILLARY BLOOD GLUCOSE      POCT Blood Glucose.: 129 mg/dL (2019 06:57)  POCT Blood Glucose.: 138 mg/dL (2019 06:22)  POCT Blood Glucose.: 128 mg/dL (2019 22:10)  POCT Blood Glucose.: 130 mg/dL (2019 17:01)  POCT Blood Glucose.: 137 mg/dL (2019 10:56)                          12.5   20.56 )-----------( 657      ( 2019 05:26 )             38.4         141  |  105  |  36<H>  ----------------------------<  132<H>  5.1   |  22  |  1.03    Ca    9.8      2019 05:26  Phos  4.2       Mg     2.7      @ 07:  -   @ 07:00  --------------------------------------------------------  IN: 2258 mL / OUT: 1850 mL / NET: 408 mL        MEDICATIONS:  MEDICATIONS  (STANDING):  ALBUTerol    90 MICROgram(s) HFA Inhaler 1 Puff(s) Inhalation every 4 hours  ALBUTerol/ipratropium for Nebulization 3 milliLiter(s) Nebulizer every 6 hours  amLODIPine   Tablet 10 milliGRAM(s) Oral daily  atorvastatin 40 milliGRAM(s) Oral at bedtime  cefepime   IVPB 2000 milliGRAM(s) IV Intermittent every 12 hours  cloNIDine 0.2 milliGRAM(s) Oral every 8 hours  dextrose 5%. 1000 milliLiter(s) (50 mL/Hr) IV Continuous <Continuous>  dextrose 50% Injectable 12.5 Gram(s) IV Push once  dextrose 50% Injectable 25 Gram(s) IV Push once  dextrose 50% Injectable 25 Gram(s) IV Push once  enoxaparin Injectable 40 milliGRAM(s) SubCutaneous at bedtime  hydrALAZINE 100 milliGRAM(s) Oral every 8 hours  influenza   Vaccine 0.5 milliLiter(s) IntraMuscular once  insulin lispro (HumaLOG) corrective regimen sliding scale   SubCutaneous Before meals and at bedtime  levETIRAcetam  Solution 1000 milliGRAM(s) Enteral Tube two times a day  lisinopril 40 milliGRAM(s) Oral daily  metoprolol tartrate 100 milliGRAM(s) Oral every 12 hours  sodium chloride 2 Gram(s) Oral every 12 hours  tiotropium 18 MICROgram(s) Capsule 1 Capsule(s) Inhalation daily    MEDICATIONS  (PRN):  acetaminophen    Suspension .. 650 milliGRAM(s) Oral every 6 hours PRN Temp greater or equal to 38C (100.4F), Mild Pain (1 - 3)  dextrose 40% Gel 15 Gram(s) Oral once PRN Blood Glucose LESS THAN 70 milliGRAM(s)/deciliter  fentaNYL    Injectable 25 MICROGram(s) IV Push every 4 hours PRN agitation  glucagon  Injectable 1 milliGRAM(s) IntraMuscular once PRN Glucose LESS THAN 70 milligrams/deciliter  hydrALAZINE Injectable 10 milliGRAM(s) IV Push every 4 hours PRN BP>160  ondansetron Injectable 4 milliGRAM(s) IV Push every 6 hours PRN Nausea and/or Vomiting        COURSE IN THE HOSPITAL:   Admitted, given DDAVP / platelets; EVD inserted, improved   04/15 fever overnight   Tmax 38.3 remained on trach collar   No significant events overnight, remained on trach collar tmax 38.1; EVD removed   minor bleeding in gracy hole site, stapled overnight    Past Medical History:   Allergies:  No Known Allergies  Home meds:     PHYSICAL EXAMINATION    NEUROLOGIC EXAMINATION:  Patient opens eyes spontaneously, following commands, pupils 3mm reactive, no gaze preference; moves R UE/LE spontaneously, L UE extends to noxious and L LE withdraws    CARDIOVASCULAR: (+) S1 S2, normal rate and regular rhythm  PULMONARY: clear to auscultation bilaterally; copious secretions q1-2h suctioning  ABDOMEN: soft, nontender with normoactive bowel sounds; PEG site clean  EXTREMITIES: no edema  SKIN: no rash      LDL = 113 ()   HDL = 70 ()  TG = 85 ()  TSH = 0.733 ()    Bacteriology:   UA NEG   Blood CS NG2D x2   04/15 CSF NGTD   Blood CS NG5D x1 (final)   CSF NGTD  04/10 Sputum MSSA H influenza S Ceftri   sputum culture numerous MSSA   Blood CS NG5D x2 (final)    CSF studies:    L   *** [id number] WBC0 *** %N0 %L0     L3.4   *** [id number] WBC37 *** %N35 %L1

## 2019-04-24 NOTE — CHART NOTE - NSCHARTNOTEFT_GEN_A_CORE
Admitting Diagnosis:   Patient is a 44y old  Male who presents with a chief complaint of right thalamic hemorrhage (24 Apr 2019 07:20)      PAST MEDICAL & SURGICAL HISTORY:      Current Nutrition Order:  Jevity 1.2 Calin @ 67mL/hr x 24hrs plus 1 ProStat via PEG. Provides: 1608 mL TV, 2030 kcal, 104 gm pro, 1298 mL free water, 161% RDI    PO Intake: Good (%) [   ]  Fair (50-75%) [   ] Poor (<25%) [   ]- N/A NPO w/EN    GI Issues: Last BM 4/23; no TF residuals recorded per RN    Pain: Pt appearing comfortable at this time     Skin Integrity: Yonathan 15, surgical wound     Labs:   04-24    148<H>  |  110<H>  |  31<H>  ----------------------------<  144<H>  5.3   |  24  |  0.93    Ca    9.5      24 Apr 2019 07:34  Phos  3.8     04-24  Mg     2.6     04-24      CAPILLARY BLOOD GLUCOSE      POCT Blood Glucose.: 140 mg/dL (24 Apr 2019 11:12)  POCT Blood Glucose.: 129 mg/dL (24 Apr 2019 06:57)  POCT Blood Glucose.: 138 mg/dL (24 Apr 2019 06:22)  POCT Blood Glucose.: 128 mg/dL (23 Apr 2019 22:10)  POCT Blood Glucose.: 130 mg/dL (23 Apr 2019 17:01)      Medications:  MEDICATIONS  (STANDING):  ALBUTerol    90 MICROgram(s) HFA Inhaler 1 Puff(s) Inhalation every 4 hours  ALBUTerol/ipratropium for Nebulization 3 milliLiter(s) Nebulizer every 6 hours  amLODIPine   Tablet 10 milliGRAM(s) Oral daily  atorvastatin 40 milliGRAM(s) Oral at bedtime  cloNIDine 0.2 milliGRAM(s) Oral every 8 hours  dextrose 5%. 1000 milliLiter(s) (50 mL/Hr) IV Continuous <Continuous>  dextrose 50% Injectable 12.5 Gram(s) IV Push once  dextrose 50% Injectable 25 Gram(s) IV Push once  dextrose 50% Injectable 25 Gram(s) IV Push once  enoxaparin Injectable 40 milliGRAM(s) SubCutaneous at bedtime  hydrALAZINE 100 milliGRAM(s) Oral every 8 hours  influenza   Vaccine 0.5 milliLiter(s) IntraMuscular once  insulin lispro (HumaLOG) corrective regimen sliding scale   SubCutaneous Before meals and at bedtime  levETIRAcetam  Solution 1000 milliGRAM(s) Enteral Tube two times a day  lisinopril 40 milliGRAM(s) Oral daily  meropenem  IVPB 1000 milliGRAM(s) IV Intermittent every 8 hours  metoprolol tartrate 100 milliGRAM(s) Oral every 12 hours  tiotropium 18 MICROgram(s) Capsule 1 Capsule(s) Inhalation daily    MEDICATIONS  (PRN):  acetaminophen    Suspension .. 650 milliGRAM(s) Oral every 6 hours PRN Temp greater or equal to 38C (100.4F), Mild Pain (1 - 3)  dextrose 40% Gel 15 Gram(s) Oral once PRN Blood Glucose LESS THAN 70 milliGRAM(s)/deciliter  fentaNYL    Injectable 25 MICROGram(s) IV Push every 4 hours PRN agitation  glucagon  Injectable 1 milliGRAM(s) IntraMuscular once PRN Glucose LESS THAN 70 milligrams/deciliter  hydrALAZINE Injectable 10 milliGRAM(s) IV Push every 4 hours PRN BP>160  ondansetron Injectable 4 milliGRAM(s) IV Push every 6 hours PRN Nausea and/or Vomiting      Weight:  84.4kg per bedscale- unclear if accurate     Weight Change:   Please continue to take weekly weights to assess for weight trends     Nutrition Focused Physical Exam: Completed [   ]  Not Pertinent [ x  ]    Estimated energy needs:   ABW used for calculations as pt between % of IBW. Needs estimated for maintenance   1622-3604 kcal (25-30 kcal/kg)  102-116 gm (1.4-1.6 gm/kg)  5695-2602 ml (25-30 ml/kg)    Subjective:   45yo M w/ ETOH abuse now s/p right thalamic IPH with IVH s/p Emergent Rt EVD placement 4/6/19, s/p Cerebral angiogram neg 4/8/19, new left EVD placed and R EVD removed 4/9/19, now s/p penumbra evacuation of ICH 4/10/19. S/P tracheostomy, bronchoscopy, and EGD w/ PEG 4/15. CPAP wean to trach colla. TF recently on hold as plan for CAT scan this AM, pt previously tolerating TF at goal per RN. Rectal tube d/c'd. Recommend resume TF at goal when appropriate and follow volume based feeding protocol. RD to monitor closely and f/u per high risk protocol.    Previous Nutrition Diagnosis:  Increased protein needs RT increased demand for nutrients AEB pt vented    Active [ X ]  Resolved [   ]    If resolved, new PES:     Goal:  Pt to meet >75% EER via EN.    Recommendations:  1. Recommend continue Jevity 1.2 @ 67 mL/hr, Prostat SF 1x/day (1608 mL TV, 2030 kcal, 104 gm pro, 1298 mL free water, 161% RDI vitamin/mineral). Continue volume based feeding protocol & monitor for signs of intolerance.   2. Monitor labs, obtain current weight and trend weekly weights.    Education: N/A, pt vented    Risk Level: High [ X  ] Moderate [   ] Low [   ]. Admitting Diagnosis:   Patient is a 44y old  Male who presents with a chief complaint of right thalamic hemorrhage (24 Apr 2019 07:20)      PAST MEDICAL & SURGICAL HISTORY:      Current Nutrition Order:  Jevity 1.2 Calin @ 67mL/hr x 24hrs plus 1 ProStat via PEG. Provides: 1608 mL TV, 2030 kcal, 104 gm pro, 1298 mL free water, 161% RDI    PO Intake: Good (%) [   ]  Fair (50-75%) [   ] Poor (<25%) [   ]- N/A NPO w/EN    GI Issues: Last BM 4/23; no TF residuals recorded per RN    Pain: Pt appearing comfortable at this time     Skin Integrity: Yonathan 15, surgical wound     Labs:   04-24    148<H>  |  110<H>  |  31<H>  ----------------------------<  144<H>  5.3   |  24  |  0.93    Ca    9.5      24 Apr 2019 07:34  Phos  3.8     04-24  Mg     2.6     04-24      CAPILLARY BLOOD GLUCOSE      POCT Blood Glucose.: 140 mg/dL (24 Apr 2019 11:12)  POCT Blood Glucose.: 129 mg/dL (24 Apr 2019 06:57)  POCT Blood Glucose.: 138 mg/dL (24 Apr 2019 06:22)  POCT Blood Glucose.: 128 mg/dL (23 Apr 2019 22:10)  POCT Blood Glucose.: 130 mg/dL (23 Apr 2019 17:01)      Medications:  MEDICATIONS  (STANDING):  ALBUTerol    90 MICROgram(s) HFA Inhaler 1 Puff(s) Inhalation every 4 hours  ALBUTerol/ipratropium for Nebulization 3 milliLiter(s) Nebulizer every 6 hours  amLODIPine   Tablet 10 milliGRAM(s) Oral daily  atorvastatin 40 milliGRAM(s) Oral at bedtime  cloNIDine 0.2 milliGRAM(s) Oral every 8 hours  dextrose 5%. 1000 milliLiter(s) (50 mL/Hr) IV Continuous <Continuous>  dextrose 50% Injectable 12.5 Gram(s) IV Push once  dextrose 50% Injectable 25 Gram(s) IV Push once  dextrose 50% Injectable 25 Gram(s) IV Push once  enoxaparin Injectable 40 milliGRAM(s) SubCutaneous at bedtime  hydrALAZINE 100 milliGRAM(s) Oral every 8 hours  influenza   Vaccine 0.5 milliLiter(s) IntraMuscular once  insulin lispro (HumaLOG) corrective regimen sliding scale   SubCutaneous Before meals and at bedtime  levETIRAcetam  Solution 1000 milliGRAM(s) Enteral Tube two times a day  lisinopril 40 milliGRAM(s) Oral daily  meropenem  IVPB 1000 milliGRAM(s) IV Intermittent every 8 hours  metoprolol tartrate 100 milliGRAM(s) Oral every 12 hours  tiotropium 18 MICROgram(s) Capsule 1 Capsule(s) Inhalation daily    MEDICATIONS  (PRN):  acetaminophen    Suspension .. 650 milliGRAM(s) Oral every 6 hours PRN Temp greater or equal to 38C (100.4F), Mild Pain (1 - 3)  dextrose 40% Gel 15 Gram(s) Oral once PRN Blood Glucose LESS THAN 70 milliGRAM(s)/deciliter  fentaNYL    Injectable 25 MICROGram(s) IV Push every 4 hours PRN agitation  glucagon  Injectable 1 milliGRAM(s) IntraMuscular once PRN Glucose LESS THAN 70 milligrams/deciliter  hydrALAZINE Injectable 10 milliGRAM(s) IV Push every 4 hours PRN BP>160  ondansetron Injectable 4 milliGRAM(s) IV Push every 6 hours PRN Nausea and/or Vomiting      Weight:  84.4kg per bedscale- unclear if accurate (12kg weight gain)    Weight Change:   Please continue to take weekly weights to assess for weight trends     Nutrition Focused Physical Exam: Completed [   ]  Not Pertinent [ x  ]    Estimated energy needs:   ABW (72kg) used for calculations as pt between % of IBW. Needs estimated for maintenance in adults; increased needs for post-op wound healing  Calories: 25-30 kcal/kg = 9255-3794 kcal/day  Protein: 1.0-1.2 g/kg = 72-86g protein/day  Fluids: 25-30 mL/kg = 2954-9241 mL/day    Subjective:   45yo M w/ ETOH abuse now s/p right thalamic IPH with IVH s/p Emergent Rt EVD placement 4/6/19, s/p Cerebral angiogram neg 4/8/19, new left EVD placed and R EVD removed 4/9/19, now s/p penumbra evacuation of ICH 4/10/19. S/P tracheostomy, bronchoscopy, and EGD w/ PEG 4/15. EVD removed on 4/20. Found to be growing staph aureus/enterobacter cloacae in sputum (on meropenem). Pt seen in room, asleep, left to rest per RN request, breathing on trach collar. MAP 85. No sedation or pressors running at this time. EN running at goal w/good tolerance per RN. 1 BM overnight, no TF residuals. WBC trending down.     Previous Nutrition Diagnosis:  Increased protein needs RT increased demand for nutrients AEB pt vented    Active [ X ]  Resolved [   ]    If resolved, new PES:     Goal:  Pt to meet >75% EER via EN.    Recommendations:  1. Recommend decreasing EN to Jevity 1.2 Calin @ 65mL/hr x 24hrs via PEG. Provides: 1560 mL TV, 1872 kcal, 86g pro, 1259mL free H2O, 156% RDI, 1.2g/kg ABW pro  *communicated to team  2. Monitor for s/s intolerance; maintain aspiration precautions at all times   3. Please continue to follow VBF protocol   4. Weekly weights   5. POC BG Q6hrs     Education: N/A- pt sleeping at time of visit     Risk Level: High [ X  ] Moderate [   ] Low [   ].

## 2019-04-24 NOTE — PROGRESS NOTE ADULT - SUBJECTIVE AND OBJECTIVE BOX
HPI:  History obtained by patient's girlfriend and chart from [place], patient is unresponsive:     43 y/o male with no significant PMHx presents to [place] with AMS, left side weakness and numbness. Per girlfriend, [place] when he developed acute onset of left facial numbness and LUE and numbness around 6:30PM. When get got home he developed AMS, dysarthria, and weakness in the LUE and LLE. At [place] ED patient had several episodes of emesis and was hypertensive to SBP in the 200's. Work up revealed right thalamic hemorrhage on CT head. Patient not on any anticoagulation use per girlfriend. Of note, patient took Excedrin for headache at home. Patient transferred to [facility] for further management. (2019 00:04)      Hospital course:   : overnight, patient requiring large amount of cardene and propofol.  Plan today is to transition to precedex, start PO lisinopril and wean of cardene.  Becomes extremely agitated when off sedation.  : Cerebral angio without findings of aneurysm. EVD stopped working, pulled back without improvement. CT Head performed.  : Received ativan 6mg for agitation. EVD stopped draining at 6pm yesterday taken for stat CTH. Dr. So notified and EVD lowered to 5cmH2O without output.   4/10: s/p penumbra evacuation of ICH. Pt seen and examined at bedside postop. Remains intubated, on versed. ICPs stable.  (OR EBL 30cc, received LR 200cc)  : JUSTIN overnight. Remains on versed. Remains intubated. Neuro stable.  : Given ativan overnight, bucking the vent. Remains on versed drip. Neuro stable. ICPs stable. Increase hydralazine to 75 Q8.    JUSTIN overnight, febrile, urinary retention Straight Cath x 1 = 1L, neuro exam unchanged since yesterday, started Cardene gtt as per Dr. So goal -140   POD#4 febrile overnight, o/w JUSTIN overnight, EVD@ 5cm H2O, Clonidine started, Ceftriax until  Staph sputum, Versed/Precedex, straight cathed overnight  4/15: ICP stable. neuro stable. Remains on versed/precedex  : Tolerating CPAP, CTH performed, decreased vent size, EVD raised to 10 cmH2O neuro stable   :  JUSTIN overnight, EVD @15cm H2O, CPAP overnight, Ceftriax until  PNA, Precedex PRN comfort, rectal tube - diarrhea; fever spike    EVD clamped since , ICPs fluctuate, HCT today, R IJ, Ceftraix until , Versed gtt, neuro exam is improving, Duplex Renal artery and Metanephrine & Catecholamine Urine collection 24hrs for high BP work-up  : EVD removed yesterday. Neuro stable. On precedex  : Neuro stable.  remained on trach collar , no acute events   JUSTIN overnight, frquent suctioning q1h, on Trach collar, TFs via PEG, Ceftriax until : suctioning requirements down, JUSTIN overnight  : no issues overnight. suctioning q2-4 hours, able to cough up secretions on own. Ceftriaxone switched to cefepime yesterday, final sensitivities pending. Staph aureus and enterobacter cloacae growing in sputum cultures.     Vital Signs Last 24 Hrs  T(C): 37.6 (2019 01:13), Max: 38.4 (2019 18:07)  T(F): 99.6 (2019 01:13), Max: 101.1 (2019 18:07)  HR: 120 (2019 02:00) (80 - 120)  BP: 146/73 (2019 02:00) (91/51 - 146/73)  BP(mean): 99 (2019 02:00) (68 - 103)  RR: 28 (2019 02:00) (17 - 38)  SpO2: 96% (2019 02:00) (91% - 99%)    I&O's Summary    2019 07:01  -  2019 07:00  --------------------------------------------------------  IN: 2393 mL / OUT: 1600 mL / NET: 793 mL    2019 07:01  -  2019 03:26  --------------------------------------------------------  IN: 1539 mL / OUT: 450 mL / NET: 1089 mL        PHYSICAL EXAM:  Neurological: trach collar, able to mouth words appropriately and nods / shakes head appropriately to questions. NAD, FC on right  CN II-XII: EOMI, PERRL, left facial droop, tongue midline   Motor: Moving R side 5/5, L side w/d to noxious stim    SILT throughout  WWP throughout   Incision site: C/D/I, no erythema, edema or purulent drainage   Cardiovascular: regular rate   Respiratory: tolerating trach collar   Gastrointestinal: abd soft, NT, ND   Genitourinary: condom cath - voiding   Extremities: no LE edema     TUBES/LINES:  [] Becerra  [] A-line  [] Lumbar Drain  [] Wound Drains  [] NGT   [] EVD   [] CVC  [x] Other: condom cath, trach and PEG      DIET:  [] NPO  [] Mechanical  [x] Tube feeds    LABS:                        12.5   20.56 )-----------( 657      ( 2019 05:26 )             38.4     04-    141  |  105  |  36<H>  ----------------------------<  132<H>  5.1   |  22  |  1.03    Ca    9.8      2019 05:26  Phos  4.2     04-  Mg     2.7     04-        Urinalysis Basic - ( 2019 19:13 )    Color: Yellow / Appearance: Clear / S.020 / pH: x  Gluc: x / Ketone: Trace mg/dL  / Bili: Negative / Urobili: 0.2 E.U./dL   Blood: x / Protein: NEGATIVE mg/dL / Nitrite: NEGATIVE   Leuk Esterase: NEGATIVE / RBC: x / WBC x   Sq Epi: x / Non Sq Epi: x / Bacteria: x          CAPILLARY BLOOD GLUCOSE      POCT Blood Glucose.: 128 mg/dL (2019 22:10)  POCT Blood Glucose.: 130 mg/dL (2019 17:01)  POCT Blood Glucose.: 137 mg/dL (2019 10:56)  POCT Blood Glucose.: 163 mg/dL (2019 06:14)      Drug Levels: [] N/A    CSF Analysis: [] N/A      Allergies    No Known Allergies    Intolerances        Home Medications:      MEDICATIONS:  MEDICATIONS  (STANDING):  ALBUTerol    90 MICROgram(s) HFA Inhaler 1 Puff(s) Inhalation every 4 hours  ALBUTerol/ipratropium for Nebulization 3 milliLiter(s) Nebulizer every 6 hours  amLODIPine   Tablet 10 milliGRAM(s) Oral daily  atorvastatin 40 milliGRAM(s) Oral at bedtime  cefepime   IVPB 2000 milliGRAM(s) IV Intermittent every 12 hours  cloNIDine 0.2 milliGRAM(s) Oral every 8 hours  dextrose 5%. 1000 milliLiter(s) (50 mL/Hr) IV Continuous <Continuous>  dextrose 50% Injectable 12.5 Gram(s) IV Push once  dextrose 50% Injectable 25 Gram(s) IV Push once  dextrose 50% Injectable 25 Gram(s) IV Push once  enoxaparin Injectable 40 milliGRAM(s) SubCutaneous at bedtime  hydrALAZINE 100 milliGRAM(s) Oral every 8 hours  influenza   Vaccine 0.5 milliLiter(s) IntraMuscular once  insulin lispro (HumaLOG) corrective regimen sliding scale   SubCutaneous Before meals and at bedtime  levETIRAcetam  Solution 1000 milliGRAM(s) Enteral Tube two times a day  lisinopril 40 milliGRAM(s) Oral daily  metoprolol tartrate 100 milliGRAM(s) Oral every 12 hours  sodium chloride 2 Gram(s) Oral every 12 hours  tiotropium 18 MICROgram(s) Capsule 1 Capsule(s) Inhalation daily    MEDICATIONS  (PRN):  acetaminophen    Suspension .. 650 milliGRAM(s) Oral every 6 hours PRN Temp greater or equal to 38C (100.4F), Mild Pain (1 - 3)  dextrose 40% Gel 15 Gram(s) Oral once PRN Blood Glucose LESS THAN 70 milliGRAM(s)/deciliter  glucagon  Injectable 1 milliGRAM(s) IntraMuscular once PRN Glucose LESS THAN 70 milligrams/deciliter  hydrALAZINE Injectable 10 milliGRAM(s) IV Push every 4 hours PRN BP>160  ondansetron Injectable 4 milliGRAM(s) IV Push every 6 hours PRN Nausea and/or Vomiting      CULTURES:  Culture Results:   Moderate Enterobacter cloacae complex  Moderate Staphylococcus aureus  Susceptibility to follow. ( @ 16:08)  Culture Results:   No growth at 5 days. ( @ 15:05)      RADIOLOGY & ADDITIONAL TESTS:      ASSESSMENT:  44y Male w/ ETOH abuse now s/p right thalamic IPH with IVH s/p Emergent Rt EVD placement 19, s/p Cerebral angiogram neg 19, new left EVD placed and R EVD removed 19, now s/p penumbra evacuation of ICH 4/10/19, EVD removed 19    PLAN:  Neuro checks q4h  Keppra for seizure prophylaxis  off precedex  fentanyl and ativan PRN agitation    CARDIOVASCULAR:   SBP goal <160  Continue lopressor 100mg BID, norvasc 10mg daily, and lisinopril 40mg daily, hydralazine 100mg Q8h  Clonidine .2 q8h  Daily labs, electrolyte repletion PRN,  Continue Statin therapy    PULMONARY:   trach collar  Daily CXR  Duoneb x 3 days    RENAL:   Voiding, condom catheter, straight Cath PRN  Normal Na goal  Duplex Renal artery negative for BEKCIE   Metanephrine and Catecholamine Urine collection 24hrs for hypertension work-up    GI:   s/p Peg  cont PEG feeds    ID:  Ceftriax changed to cefepime yesterday for aspiration PNA - staph aureus, h flu and enterobacter cloacae growing in sputum, f/u sensitivities    low grade fever overnight  f/u final cx     ENDO:   ISS    DVT PROPHYLAXIS:   SCDs, SQL     DISPOSITION: Continue NSICU care,  Full code,  D/w Dr. So and Dr. Schultz      Assessment: present when checked     [] GCS   E   V   M     Heart Failure: [] Acute, [] acute on chronic, [] chronic   Heart Failure: [] Diastolic (HFpEF), [] Systolic (HRrEF), [] Combined (HFpEF and HFrEF), [] RHF, [] Pulm HTN, [] Other     [] KRISTA, [] ATN, [] AIN, [] other   [] CKD1, [] CKD2, [] CKD3, [] CKD4, [] CKD5, [] ESRD     Encephalopathy: [] Metabolic, [] Hepatic, [] Toxic, [] Neurological, [] Other     Abnormal Nutritional Status: [] malnutrition (see nutrition note), []underweight: BMI <19, [] morbid obesity: BMI >40, [] Cachexia     [] Sepsis   [] Hypovolemic shock, [] Cardiogenic shock, [] Hemorrhagic shock, [] Neurogenic shock   [] Acute respiratory failure   [x] Cerebral edema, [] Brain compression / herniation   [] Functional quadriplegia   [] Acute blood loss anemia HPI:  History obtained by patient's girlfriend and chart from [place], patient is unresponsive:     43 y/o male with no significant PMHx presents to [place] with AMS, left side weakness and numbness. Per [place], [place] when he developed acute onset of left facial numbness and LUE and numbness around 6:30PM. When get got home he developed AMS, dysarthria, and weakness in the LUE and LLE. At [place] ED patient had several episodes of emesis and was hypertensive to SBP in the 200's. Work up revealed right thalamic hemorrhage on CT head. Patient not on any anticoagulation use per girlfriend. Of note, patient took Excedrin for headache at home. Patient transferred to [facility] for further management. (2019 00:04)      Hospital course:   : overnight, patient requiring large amount of cardene and propofol.  Plan today is to transition to precedex, start PO lisinopril and wean of cardene.  Becomes extremely agitated when off sedation.  : Cerebral angio without findings of aneurysm. EVD stopped working, pulled back without improvement. CT Head performed.  : Received ativan 6mg for agitation. EVD stopped draining at 6pm yesterday taken for stat CTH. Dr. So notified and EVD lowered to 5cmH2O without output.   4/10: s/p penumbra evacuation of ICH. Pt seen and examined at bedside postop. Remains intubated, on versed. ICPs stable.  (OR EBL 30cc, received LR 200cc)  : JUSTIN overnight. Remains on versed. Remains intubated. Neuro stable.  : Given ativan overnight, bucking the vent. Remains on versed drip. Neuro stable. ICPs stable. Increase hydralazine to 75 Q8.    JUSTIN overnight, febrile, urinary retention Straight Cath x 1 = 1L, neuro exam unchanged since yesterday, started Cardene gtt as per Dr. So goal -140   POD#4 febrile overnight, o/w JUSTIN overnight, EVD@ 5cm H2O, Clonidine started, Ceftriax until  Staph sputum, Versed/Precedex, straight cathed overnight  4/15: ICP stable. neuro stable. Remains on versed/precedex  : Tolerating CPAP, CTH performed, decreased vent size, EVD raised to 10 cmH2O neuro stable   :  UJSTIN overnight, EVD @15cm H2O, CPAP overnight, Ceftriax until  PNA, Precedex PRN comfort, rectal tube - diarrhea; fever spike    EVD clamped since , ICPs fluctuate, HCT today, R IJ, Ceftraix until , Versed gtt, neuro exam is improving, Duplex Renal artery and Metanephrine & Catecholamine Urine collection 24hrs for high BP work-up  : EVD removed yesterday. Neuro stable. On precedex  : Neuro stable.  remained on trach collar , no acute events   JUSTIN overnight, frquent suctioning q1h, on Trach collar, TFs via PEG, Ceftriax until : suctioning requirements down, JUSTIN overnight  : no issues overnight. suctioning q2-4 hours, able to cough up secretions on own. Ceftriaxone switched to cefepime yesterday, final sensitivities pending. Staph aureus and enterobacter cloacae growing in sputum cultures.     Vital Signs Last 24 Hrs  T(C): 37.6 (2019 01:13), Max: 38.4 (2019 18:07)  T(F): 99.6 (2019 01:13), Max: 101.1 (2019 18:07)  HR: 120 (2019 02:00) (80 - 120)  BP: 146/73 (2019 02:00) (91/51 - 146/73)  BP(mean): 99 (2019 02:00) (68 - 103)  RR: 28 (2019 02:00) (17 - 38)  SpO2: 96% (2019 02:00) (91% - 99%)    I&O's Summary    2019 07:01  -  2019 07:00  --------------------------------------------------------  IN: 2393 mL / OUT: 1600 mL / NET: 793 mL    2019 07:01  -  2019 03:26  --------------------------------------------------------  IN: 1539 mL / OUT: 450 mL / NET: 1089 mL        PHYSICAL EXAM:  Neurological: trach collar, able to mouth words appropriately and nods / shakes head appropriately to questions. NAD, FC on right  CN II-XII: EOMI, PERRL, left facial droop, tongue midline   Motor: Moving R side 5/5, L side w/d to noxious stim    SILT throughout  WWP throughout   Incision site: C/D/I, no erythema, edema or purulent drainage   Cardiovascular: regular rate   Respiratory: tolerating trach collar   Gastrointestinal: abd soft, NT, ND   Genitourinary: condom cath - voiding   Extremities: no LE edema     TUBES/LINES:  [] Becerra  [] A-line  [] Lumbar Drain  [] Wound Drains  [] NGT   [] EVD   [] CVC  [x] Other: condom cath, trach and PEG      DIET:  [] NPO  [] Mechanical  [x] Tube feeds    LABS:                        12.5   20.56 )-----------( 657      ( 2019 05:26 )             38.4     04-    141  |  105  |  36<H>  ----------------------------<  132<H>  5.1   |  22  |  1.03    Ca    9.8      2019 05:26  Phos  4.2     04-  Mg     2.7     04-        Urinalysis Basic - ( 2019 19:13 )    Color: Yellow / Appearance: Clear / S.020 / pH: x  Gluc: x / Ketone: Trace mg/dL  / Bili: Negative / Urobili: 0.2 E.U./dL   Blood: x / Protein: NEGATIVE mg/dL / Nitrite: NEGATIVE   Leuk Esterase: NEGATIVE / RBC: x / WBC x   Sq Epi: x / Non Sq Epi: x / Bacteria: x          CAPILLARY BLOOD GLUCOSE      POCT Blood Glucose.: 128 mg/dL (2019 22:10)  POCT Blood Glucose.: 130 mg/dL (2019 17:01)  POCT Blood Glucose.: 137 mg/dL (2019 10:56)  POCT Blood Glucose.: 163 mg/dL (2019 06:14)      Drug Levels: [] N/A    CSF Analysis: [] N/A      Allergies    No Known Allergies    Intolerances        Home Medications:      MEDICATIONS:  MEDICATIONS  (STANDING):  ALBUTerol    90 MICROgram(s) HFA Inhaler 1 Puff(s) Inhalation every 4 hours  ALBUTerol/ipratropium for Nebulization 3 milliLiter(s) Nebulizer every 6 hours  amLODIPine   Tablet 10 milliGRAM(s) Oral daily  atorvastatin 40 milliGRAM(s) Oral at bedtime  cefepime   IVPB 2000 milliGRAM(s) IV Intermittent every 12 hours  cloNIDine 0.2 milliGRAM(s) Oral every 8 hours  dextrose 5%. 1000 milliLiter(s) (50 mL/Hr) IV Continuous <Continuous>  dextrose 50% Injectable 12.5 Gram(s) IV Push once  dextrose 50% Injectable 25 Gram(s) IV Push once  dextrose 50% Injectable 25 Gram(s) IV Push once  enoxaparin Injectable 40 milliGRAM(s) SubCutaneous at bedtime  hydrALAZINE 100 milliGRAM(s) Oral every 8 hours  influenza   Vaccine 0.5 milliLiter(s) IntraMuscular once  insulin lispro (HumaLOG) corrective regimen sliding scale   SubCutaneous Before meals and at bedtime  levETIRAcetam  Solution 1000 milliGRAM(s) Enteral Tube two times a day  lisinopril 40 milliGRAM(s) Oral daily  metoprolol tartrate 100 milliGRAM(s) Oral every 12 hours  sodium chloride 2 Gram(s) Oral every 12 hours  tiotropium 18 MICROgram(s) Capsule 1 Capsule(s) Inhalation daily    MEDICATIONS  (PRN):  acetaminophen    Suspension .. 650 milliGRAM(s) Oral every 6 hours PRN Temp greater or equal to 38C (100.4F), Mild Pain (1 - 3)  dextrose 40% Gel 15 Gram(s) Oral once PRN Blood Glucose LESS THAN 70 milliGRAM(s)/deciliter  glucagon  Injectable 1 milliGRAM(s) IntraMuscular once PRN Glucose LESS THAN 70 milligrams/deciliter  hydrALAZINE Injectable 10 milliGRAM(s) IV Push every 4 hours PRN BP>160  ondansetron Injectable 4 milliGRAM(s) IV Push every 6 hours PRN Nausea and/or Vomiting      CULTURES:  Culture Results:   Moderate Enterobacter cloacae complex  Moderate Staphylococcus aureus  Susceptibility to follow. ( @ 16:08)  Culture Results:   No growth at 5 days. ( @ 15:05)      RADIOLOGY & ADDITIONAL TESTS:      ASSESSMENT:  44y Male w/ ETOH abuse now s/p right thalamic IPH with IVH s/p Emergent Rt EVD placement 19, s/p Cerebral angiogram neg 19, new left EVD placed and R EVD removed 19, now s/p penumbra evacuation of ICH 4/10/19, EVD removed 19    PLAN:  Neuro checks q4h  Keppra for seizure prophylaxis  off precedex  fentanyl and ativan PRN agitation    CARDIOVASCULAR:   SBP goal <160  Continue lopressor 100mg BID, norvasc 10mg daily, and lisinopril 40mg daily, hydralazine 100mg Q8h  Clonidine .2 q8h  Daily labs, electrolyte repletion PRN,  Continue Statin therapy    PULMONARY:   trach collar  Daily CXR  Duoneb x 3 days    RENAL:   Voiding, condom catheter, straight Cath PRN  Normal Na goal  Duplex Renal artery negative for BECKIE   Metanephrine and Catecholamine Urine collection 24hrs for hypertension work-up    GI:   s/p Peg  cont PEG feeds    ID:  Ceftriax changed to cefepime yesterday for aspiration PNA - staph aureus, h flu and enterobacter cloacae growing in sputum, f/u sensitivities    low grade fever overnight  f/u final cx     ENDO:   ISS    DVT PROPHYLAXIS:   SCDs, SQL     DISPOSITION: Continue NSICU care,  Full code,  D/w Dr. So and Dr. Wheeler      Assessment: present when checked     [] GCS   E   V   M     Heart Failure: [] Acute, [] acute on chronic, [] chronic   Heart Failure: [] Diastolic (HFpEF), [] Systolic (HRrEF), [] Combined (HFpEF and HFrEF), [] RHF, [] Pulm HTN, [] Other     [] KRISTA, [] ATN, [] AIN, [] other   [] CKD1, [] CKD2, [] CKD3, [] CKD4, [] CKD5, [] ESRD     Encephalopathy: [] Metabolic, [] Hepatic, [] Toxic, [] Neurological, [] Other     Abnormal Nutritional Status: [] malnutrition (see nutrition note), []underweight: BMI <19, [] morbid obesity: BMI >40, [] Cachexia     [] Sepsis   [] Hypovolemic shock, [] Cardiogenic shock, [] Hemorrhagic shock, [] Neurogenic shock   [] Acute respiratory failure   [x] Cerebral edema, [] Brain compression / herniation   [] Functional quadriplegia   [] Acute blood loss anemia HPI:  History obtained by patient's girlfriend and chart from [place], patient is unresponsive:     43 y/o male with no significant PMHx presents to [place] with AMS, left side weakness and numbness. Per [place] when he developed acute onset of left facial numbness and LUE and numbness around 6:30PM. When get got home he developed AMS, dysarthria, and weakness in the LUE and LLE. At [place] ED patient had several episodes of emesis and was hypertensive to SBP in the 200's. Work up revealed right thalamic hemorrhage on CT head. Patient not on any anticoagulation use per girlfriend. Of note, patient took Excedrin for headache at home. Patient transferred to [facility] for further management. (2019 00:04)      Hospital course:   : overnight, patient requiring large amount of cardene and propofol.  Plan today is to transition to precedex, start PO lisinopril and wean of cardene.  Becomes extremely agitated when off sedation.  : Cerebral angio without findings of aneurysm. EVD stopped working, pulled back without improvement. CT Head performed.  : Received ativan 6mg for agitation. EVD stopped draining at 6pm yesterday taken for stat CTH. Dr. So notified and EVD lowered to 5cmH2O without output.   4/10: s/p penumbra evacuation of ICH. Pt seen and examined at bedside postop. Remains intubated, on versed. ICPs stable.  (OR EBL 30cc, received LR 200cc)  : JUSTIN overnight. Remains on versed. Remains intubated. Neuro stable.  : Given ativan overnight, bucking the vent. Remains on versed drip. Neuro stable. ICPs stable. Increase hydralazine to 75 Q8.    JUSTIN overnight, febrile, urinary retention Straight Cath x 1 = 1L, neuro exam unchanged since yesterday, started Cardene gtt as per Dr. So goal -140   POD#4 febrile overnight, o/w JUSTIN overnight, EVD@ 5cm H2O, Clonidine started, Ceftriax until  Staph sputum, Versed/Precedex, straight cathed overnight  4/15: ICP stable. neuro stable. Remains on versed/precedex  : Tolerating CPAP, CTH performed, decreased vent size, EVD raised to 10 cmH2O neuro stable   :  JUSTIN overnight, EVD @15cm H2O, CPAP overnight, Ceftriax until  PNA, Precedex PRN comfort, rectal tube - diarrhea; fever spike    EVD clamped since , ICPs fluctuate, HCT today, R IJ, Ceftraix until , Versed gtt, neuro exam is improving, Duplex Renal artery and Metanephrine & Catecholamine Urine collection 24hrs for high BP work-up  : EVD removed yesterday. Neuro stable. On precedex  : Neuro stable.  remained on trach collar , no acute events   JUSTIN overnight, frquent suctioning q1h, on Trach collar, TFs via PEG, Ceftriax until : suctioning requirements down, JUSTIN overnight  : no issues overnight. suctioning q2-4 hours, able to cough up secretions on own. Ceftriaxone switched to cefepime yesterday, final sensitivities pending. Staph aureus and enterobacter cloacae growing in sputum cultures.   Cefepime then switched to meropenum due to sensitivities and growing MSSA in sputum    Vital Signs Last 24 Hrs  T(C): 37.6 (2019 01:13), Max: 38.4 (2019 18:07)  T(F): 99.6 (2019 01:13), Max: 101.1 (2019 18:07)  HR: 120 (2019 02:00) (80 - 120)  BP: 146/73 (2019 02:00) (91/51 - 146/73)  BP(mean): 99 (2019 02:00) (68 - 103)  RR: 28 (2019 02:00) (17 - 38)  SpO2: 96% (2019 02:00) (91% - 99%)    I&O's Summary    2019 07:01  -  2019 07:00  --------------------------------------------------------  IN: 2393 mL / OUT: 1600 mL / NET: 793 mL    2019 07:01  -  2019 03:26  --------------------------------------------------------  IN: 1539 mL / OUT: 450 mL / NET: 1089 mL        PHYSICAL EXAM:  Neurological: trach collar, able to mouth words appropriately and nods / shakes head appropriately to questions. NAD, FC on right  CN II-XII: EOMI, PERRL, left facial droop, tongue midline   Motor: Moving R side 5/5, L side w/d to noxious stim    SILT throughout  WWP throughout   Incision site: C/D/I, no erythema, edema or purulent drainage   Cardiovascular: regular rate   Respiratory: tolerating trach collar   Gastrointestinal: abd soft, NT, ND   Genitourinary: condom cath - voiding   Extremities: no LE edema     TUBES/LINES:  [] Becerra  [] A-line  [] Lumbar Drain  [] Wound Drains  [] NGT   [] EVD   [] CVC  [x] Other: condom cath, trach and PEG      DIET:  [] NPO  [] Mechanical  [x] Tube feeds    LABS:                        12.5   20.56 )-----------( 657      ( 2019 05:26 )             38.4     04-23    141  |  105  |  36<H>  ----------------------------<  132<H>  5.1   |  22  |  1.03    Ca    9.8      2019 05:26  Phos  4.2     04-23  Mg     2.7     04-23        Urinalysis Basic - ( 2019 19:13 )    Color: Yellow / Appearance: Clear / S.020 / pH: x  Gluc: x / Ketone: Trace mg/dL  / Bili: Negative / Urobili: 0.2 E.U./dL   Blood: x / Protein: NEGATIVE mg/dL / Nitrite: NEGATIVE   Leuk Esterase: NEGATIVE / RBC: x / WBC x   Sq Epi: x / Non Sq Epi: x / Bacteria: x          CAPILLARY BLOOD GLUCOSE      POCT Blood Glucose.: 128 mg/dL (2019 22:10)  POCT Blood Glucose.: 130 mg/dL (2019 17:01)  POCT Blood Glucose.: 137 mg/dL (2019 10:56)  POCT Blood Glucose.: 163 mg/dL (2019 06:14)      Drug Levels: [] N/A    CSF Analysis: [] N/A      Allergies    No Known Allergies    Intolerances        Home Medications:      MEDICATIONS:  MEDICATIONS  (STANDING):  ALBUTerol    90 MICROgram(s) HFA Inhaler 1 Puff(s) Inhalation every 4 hours  ALBUTerol/ipratropium for Nebulization 3 milliLiter(s) Nebulizer every 6 hours  amLODIPine   Tablet 10 milliGRAM(s) Oral daily  atorvastatin 40 milliGRAM(s) Oral at bedtime  cefepime   IVPB 2000 milliGRAM(s) IV Intermittent every 12 hours  cloNIDine 0.2 milliGRAM(s) Oral every 8 hours  dextrose 5%. 1000 milliLiter(s) (50 mL/Hr) IV Continuous <Continuous>  dextrose 50% Injectable 12.5 Gram(s) IV Push once  dextrose 50% Injectable 25 Gram(s) IV Push once  dextrose 50% Injectable 25 Gram(s) IV Push once  enoxaparin Injectable 40 milliGRAM(s) SubCutaneous at bedtime  hydrALAZINE 100 milliGRAM(s) Oral every 8 hours  influenza   Vaccine 0.5 milliLiter(s) IntraMuscular once  insulin lispro (HumaLOG) corrective regimen sliding scale   SubCutaneous Before meals and at bedtime  levETIRAcetam  Solution 1000 milliGRAM(s) Enteral Tube two times a day  lisinopril 40 milliGRAM(s) Oral daily  metoprolol tartrate 100 milliGRAM(s) Oral every 12 hours  sodium chloride 2 Gram(s) Oral every 12 hours  tiotropium 18 MICROgram(s) Capsule 1 Capsule(s) Inhalation daily    MEDICATIONS  (PRN):  acetaminophen    Suspension .. 650 milliGRAM(s) Oral every 6 hours PRN Temp greater or equal to 38C (100.4F), Mild Pain (1 - 3)  dextrose 40% Gel 15 Gram(s) Oral once PRN Blood Glucose LESS THAN 70 milliGRAM(s)/deciliter  glucagon  Injectable 1 milliGRAM(s) IntraMuscular once PRN Glucose LESS THAN 70 milligrams/deciliter  hydrALAZINE Injectable 10 milliGRAM(s) IV Push every 4 hours PRN BP>160  ondansetron Injectable 4 milliGRAM(s) IV Push every 6 hours PRN Nausea and/or Vomiting      CULTURES:  Culture Results:   Moderate Enterobacter cloacae complex  Moderate Staphylococcus aureus  Susceptibility to follow. ( @ 16:08)  Culture Results:   No growth at 5 days. ( @ 15:05)      RADIOLOGY & ADDITIONAL TESTS:      ASSESSMENT:  44y Male w/ ETOH abuse now s/p right thalamic IPH with IVH s/p Emergent Rt EVD placement 19, s/p Cerebral angiogram neg 19, new left EVD placed and R EVD removed 19, now s/p penumbra evacuation of ICH 4/10/19, EVD removed 19    PLAN:  Neuro checks q4h  Keppra for seizure prophylaxis  off precedex  fentanyl and ativan PRN agitation    CARDIOVASCULAR:   SBP goal <160  Continue lopressor 100mg BID, norvasc 10mg daily, and lisinopril 40mg daily, hydralazine 100mg Q8h  Clonidine .2 q8h  Daily labs, electrolyte repletion PRN,  Continue Statin therapy    PULMONARY:   trach collar  Duoneb x 3 days  suctioning q2-4h    RENAL:   Voiding, condom catheter, straight Cath PRN  Normal Na goal  Duplex Renal artery negative for BECKIE   Metanephrine and Catecholamine Urine collection 24hrs for hypertension work-up    GI:   s/p Peg  cont PEG feeds    ID:  On meropenum for coverage for enterobacter and MSSA  f/u final cx     ENDO:   ISS    DVT PROPHYLAXIS:   SCDs, SQL     DISPOSITION: Continue NSICU care,  Full code,  D/w Dr. So and Dr. Wheeelr      Assessment: present when checked     [] GCS   E   V   M     Heart Failure: [] Acute, [] acute on chronic, [] chronic   Heart Failure: [] Diastolic (HFpEF), [] Systolic (HRrEF), [] Combined (HFpEF and HFrEF), [] RHF, [] Pulm HTN, [] Other     [] KRISTA, [] ATN, [] AIN, [] other   [] CKD1, [] CKD2, [] CKD3, [] CKD4, [] CKD5, [] ESRD     Encephalopathy: [] Metabolic, [] Hepatic, [] Toxic, [x] Neurological, [] Other     Abnormal Nutritional Status: [] malnutrition (see nutrition note), []underweight: BMI <19, [] morbid obesity: BMI >40, [] Cachexia     [] Sepsis   [] Hypovolemic shock, [] Cardiogenic shock, [] Hemorrhagic shock, [] Neurogenic shock   [] Acute respiratory failure   [x] Cerebral edema, [] Brain compression / herniation   [] Functional quadriplegia   [] Acute blood loss anemia

## 2019-04-24 NOTE — CHART NOTE - NSCHARTNOTEFT_GEN_A_CORE
Infectious Diseases Anti-infective Approval Note    Medication: meropenem  Dose: 1g  Route: IV  Frequency: q8h  Duration: 7 days    Dose may be adjusted as needed for alterations in renal function.    *THIS IS NOT AN INFECTIOUS DISEASES CONSULTATION*

## 2019-04-24 NOTE — PROGRESS NOTE ADULT - ASSESSMENT
44y/M with   1.  intracerebral hemorrhage (R basal ganglia / thalamic; small L basal ganglia, R pontine), brain compression, cerebral edema; s/p R crani, endoscopic evacuation of ICH (04/10/2019, Dr. So)  2.  dyslipidemia    ICH Score on admission  = GCS Score: 3-4 (2 pts) E1VTM2 (+) IVH = 3  ICH volume 20ml  Estimated 30-day mortality 3 points: 72%3    PLAN:   NEURO: neurochecks q2h, PRN pain meds with Tylenol; taper precedex to off  ICH:  BP control; no aneurysm on formal angio (04/08)  seizure prophylaxis: as per neurosurgery  EVD: removed   agitation: increase seroquel to 25mg daily   REHAB:  physical therapy evaluation and management    EARLY MOB:   HOB up     PULM:  s/p trach, trach collar on 35%, chest PT, pulmo toilette  CARDIO:  SBP goal 100-160mm Hg, cont lisinopril 40 mg PO daily, amlodipine, lopressor 75 BID, clonidine 0.2 BID; hydrazaline 100 q8h; work-up for secondary hypertension  ENDO:  Blood sugar goals 140-180 mg/dL, continue insulin sliding scale; atorvastatin 40mg PO daily, A1C  4.9  GI:  docusate senna  DIET: Jevity continue tube feeds  RENAL:  Na goal 135-145, IVL; salt tabs 2g q12h  HEM/ONC: Hb stable; given DDAVP and platelet transfusion for aspirin reversal  VTE Prophylaxis: SCDs, SQL  ID: afebrile, no leukocytosis, ceftriaxone 2G daily (last day 04/23); panculture q3d if fever spikes  Social: will update family; sister freeman (surrogate decision maker), father, common law wife who is pregnant      CODE STATUS:  Full Code                       GOALS OF CARE:  aggressive                      DISPOSITION:  ICU

## 2019-04-25 LAB
ANION GAP SERPL CALC-SCNC: 11 MMOL/L — SIGNIFICANT CHANGE UP (ref 5–17)
BUN SERPL-MCNC: 33 MG/DL — HIGH (ref 7–23)
CALCIUM SERPL-MCNC: 9.6 MG/DL — SIGNIFICANT CHANGE UP (ref 8.4–10.5)
CHLORIDE SERPL-SCNC: 103 MMOL/L — SIGNIFICANT CHANGE UP (ref 96–108)
CO2 SERPL-SCNC: 25 MMOL/L — SIGNIFICANT CHANGE UP (ref 22–31)
CREAT SERPL-MCNC: 0.97 MG/DL — SIGNIFICANT CHANGE UP (ref 0.5–1.3)
CULTURE RESULTS: NO GROWTH — SIGNIFICANT CHANGE UP
GLUCOSE SERPL-MCNC: 120 MG/DL — HIGH (ref 70–99)
HCT VFR BLD CALC: 34.9 % — LOW (ref 39–50)
HGB BLD-MCNC: 11.4 G/DL — LOW (ref 13–17)
MAGNESIUM SERPL-MCNC: 2.6 MG/DL — SIGNIFICANT CHANGE UP (ref 1.6–2.6)
MCHC RBC-ENTMCNC: 31.8 PG — SIGNIFICANT CHANGE UP (ref 27–34)
MCHC RBC-ENTMCNC: 32.7 GM/DL — SIGNIFICANT CHANGE UP (ref 32–36)
MCV RBC AUTO: 97.5 FL — SIGNIFICANT CHANGE UP (ref 80–100)
NRBC # BLD: 0 /100 WBCS — SIGNIFICANT CHANGE UP (ref 0–0)
PHOSPHATE SERPL-MCNC: 4.3 MG/DL — SIGNIFICANT CHANGE UP (ref 2.5–4.5)
PLATELET # BLD AUTO: 513 K/UL — HIGH (ref 150–400)
POTASSIUM SERPL-MCNC: 5 MMOL/L — SIGNIFICANT CHANGE UP (ref 3.5–5.3)
POTASSIUM SERPL-SCNC: 5 MMOL/L — SIGNIFICANT CHANGE UP (ref 3.5–5.3)
RBC # BLD: 3.58 M/UL — LOW (ref 4.2–5.8)
RBC # FLD: 11.7 % — SIGNIFICANT CHANGE UP (ref 10.3–14.5)
SODIUM SERPL-SCNC: 139 MMOL/L — SIGNIFICANT CHANGE UP (ref 135–145)
SPECIMEN SOURCE: SIGNIFICANT CHANGE UP
WBC # BLD: 10.51 K/UL — HIGH (ref 3.8–10.5)
WBC # FLD AUTO: 10.51 K/UL — HIGH (ref 3.8–10.5)

## 2019-04-25 PROCEDURE — 71045 X-RAY EXAM CHEST 1 VIEW: CPT | Mod: 26

## 2019-04-25 PROCEDURE — 99233 SBSQ HOSP IP/OBS HIGH 50: CPT | Mod: 24

## 2019-04-25 RX ORDER — MEROPENEM 1 G/30ML
1000 INJECTION INTRAVENOUS EVERY 8 HOURS
Qty: 0 | Refills: 0 | Status: DISCONTINUED | OUTPATIENT
Start: 2019-04-25 | End: 2019-05-01

## 2019-04-25 RX ADMIN — ENOXAPARIN SODIUM 40 MILLIGRAM(S): 100 INJECTION SUBCUTANEOUS at 23:34

## 2019-04-25 RX ADMIN — AMLODIPINE BESYLATE 10 MILLIGRAM(S): 2.5 TABLET ORAL at 06:18

## 2019-04-25 RX ADMIN — Medication 0.2 MILLIGRAM(S): at 14:26

## 2019-04-25 RX ADMIN — Medication 0.2 MILLIGRAM(S): at 06:18

## 2019-04-25 RX ADMIN — Medication 100 MILLIGRAM(S): at 02:58

## 2019-04-25 RX ADMIN — LISINOPRIL 40 MILLIGRAM(S): 2.5 TABLET ORAL at 06:18

## 2019-04-25 RX ADMIN — Medication 3 MILLILITER(S): at 01:01

## 2019-04-25 RX ADMIN — MEROPENEM 100 MILLIGRAM(S): 1 INJECTION INTRAVENOUS at 00:33

## 2019-04-25 RX ADMIN — Medication 0.2 MILLIGRAM(S): at 00:32

## 2019-04-25 RX ADMIN — ATORVASTATIN CALCIUM 40 MILLIGRAM(S): 80 TABLET, FILM COATED ORAL at 23:11

## 2019-04-25 RX ADMIN — ATORVASTATIN CALCIUM 40 MILLIGRAM(S): 80 TABLET, FILM COATED ORAL at 00:32

## 2019-04-25 RX ADMIN — Medication 100 MILLIGRAM(S): at 16:38

## 2019-04-25 RX ADMIN — Medication 3 MILLILITER(S): at 08:05

## 2019-04-25 RX ADMIN — Medication 100 MILLIGRAM(S): at 19:21

## 2019-04-25 RX ADMIN — MEROPENEM 100 MILLIGRAM(S): 1 INJECTION INTRAVENOUS at 14:26

## 2019-04-25 RX ADMIN — LEVETIRACETAM 1000 MILLIGRAM(S): 250 TABLET, FILM COATED ORAL at 19:21

## 2019-04-25 RX ADMIN — MEROPENEM 100 MILLIGRAM(S): 1 INJECTION INTRAVENOUS at 23:11

## 2019-04-25 RX ADMIN — Medication 100 MILLIGRAM(S): at 06:18

## 2019-04-25 RX ADMIN — MEROPENEM 100 MILLIGRAM(S): 1 INJECTION INTRAVENOUS at 06:18

## 2019-04-25 RX ADMIN — Medication 100 MILLIGRAM(S): at 07:35

## 2019-04-25 RX ADMIN — ENOXAPARIN SODIUM 40 MILLIGRAM(S): 100 INJECTION SUBCUTANEOUS at 00:31

## 2019-04-25 RX ADMIN — LEVETIRACETAM 1000 MILLIGRAM(S): 250 TABLET, FILM COATED ORAL at 09:54

## 2019-04-25 RX ADMIN — Medication 0.2 MILLIGRAM(S): at 23:11

## 2019-04-25 NOTE — PROGRESS NOTE ADULT - SUBJECTIVE AND OBJECTIVE BOX
=================================  NEUROCRITICAL CARE ATTENDING NOTE  =================================    ARPAN RUANO   MRN-[id number]  Summary:  44y/M  HPI:  History obtained by patient's girlfriend and chart from [facility], patient is unresponsive:     43 y/o male with no significant PMHx presents to [facility] with AMS, left side weakness and numbness. Per girlfriend, [place] work when he developed acute onset of left facial numbness and LUE and numbness around 6:30PM. When get got home he developed AMS, dysarthria, and weakness in the LUE and LLE. At [facility] ED patient had several episodes of emesis and was hypertensive to SBP in the 200's. Work up revealed right thalamic hemorrhage on CT head. Patient not on any anticoagulation use per girlfriend. Of note, patient took Excedrin for headache at home. Patient transferred to [facility] for further management. (2019 00:04)      Overnight Events:  Denies HA/N/V/Dizziness.      PHYSICAL EXAMINATION  T(C): 36.9 ( @ 05:18), Max: 38.3 ( @ 13:00)  HR: 76 ( @ 07:00) (74 - 106)  BP: 121/75 ( @ 07:00) (82/48 - 125/79)  RR: 20 ( @ 07:00) (12 - 29)  SpO2: 97% ( @ 07:00) (90% - 99%)  CVP(mm Hg): --    LABS:  CAPILLARY BLOOD GLUCOSE      POCT Blood Glucose.: 108 mg/dL (2019 06:05)  POCT Blood Glucose.: 138 mg/dL (2019 17:35)  POCT Blood Glucose.: 140 mg/dL (2019 11:12)                          11.4   10.51 )-----------( 513      ( 2019 06:11 )             34.9         139  |  103  |  33<H>  ----------------------------<  120<H>  5.0   |  25  |  0.97    Ca    9.6      2019 06:12  Phos  4.3       Mg     2.6      @ 07: @ 07:00  --------------------------------------------------------  IN: 2266 mL / OUT: 1700 mL / NET: 566 mL     @ 07: @ 07:47  --------------------------------------------------------  IN: 67 mL / OUT: 0 mL / NET: 67 mL        MEDICATIONS:  MEDICATIONS  (STANDING):  ALBUTerol    90 MICROgram(s) HFA Inhaler 1 Puff(s) Inhalation every 4 hours  ALBUTerol/ipratropium for Nebulization 3 milliLiter(s) Nebulizer every 6 hours  amLODIPine   Tablet 10 milliGRAM(s) Oral daily  atorvastatin 40 milliGRAM(s) Oral at bedtime  cloNIDine 0.2 milliGRAM(s) Oral every 8 hours  dextrose 5%. 1000 milliLiter(s) (50 mL/Hr) IV Continuous <Continuous>  dextrose 50% Injectable 12.5 Gram(s) IV Push once  dextrose 50% Injectable 25 Gram(s) IV Push once  dextrose 50% Injectable 25 Gram(s) IV Push once  enoxaparin Injectable 40 milliGRAM(s) SubCutaneous at bedtime  hydrALAZINE 100 milliGRAM(s) Oral every 8 hours  influenza   Vaccine 0.5 milliLiter(s) IntraMuscular once  insulin lispro (HumaLOG) corrective regimen sliding scale   SubCutaneous Before meals and at bedtime  levETIRAcetam  Solution 1000 milliGRAM(s) Enteral Tube two times a day  lisinopril 40 milliGRAM(s) Oral daily  meropenem  IVPB 1000 milliGRAM(s) IV Intermittent every 8 hours  metoprolol tartrate 100 milliGRAM(s) Oral every 12 hours  tiotropium 18 MICROgram(s) Capsule 1 Capsule(s) Inhalation daily    MEDICATIONS  (PRN):  acetaminophen    Suspension .. 650 milliGRAM(s) Oral every 6 hours PRN Temp greater or equal to 38C (100.4F), Mild Pain (1 - 3)  dextrose 40% Gel 15 Gram(s) Oral once PRN Blood Glucose LESS THAN 70 milliGRAM(s)/deciliter  fentaNYL    Injectable 25 MICROGram(s) IV Push every 4 hours PRN agitation  glucagon  Injectable 1 milliGRAM(s) IntraMuscular once PRN Glucose LESS THAN 70 milligrams/deciliter  hydrALAZINE Injectable 10 milliGRAM(s) IV Push every 4 hours PRN BP>160  ondansetron Injectable 4 milliGRAM(s) IV Push every 6 hours PRN Nausea and/or Vomiting      COURSE IN THE HOSPITAL:   Admitted, given DDAVP / platelets; EVD inserted, improved   04/15 fever overnight   Tmax 38.3 remained on trach collar   No significant events overnight, remained on trach collar tmax 38.1; EVD removed   minor bleeding in gracy hole site, stapled overnight    Past Medical History:   Allergies:  No Known Allergies  Home meds:     PHYSICAL EXAMINATION    NEUROLOGIC EXAMINATION:  Patient opens eyes spontaneously, following commands, pupils 3mm reactive, no gaze preference; moves R UE/LE spontaneously, L UE extends to noxious and L LE withdraws    CARDIOVASCULAR: (+) S1 S2, normal rate and regular rhythm  PULMONARY: clear to auscultation bilaterally; copious secretions q1-2h suctioning  ABDOMEN: soft, nontender with normoactive bowel sounds; PEG site clean  EXTREMITIES: no edema  SKIN: no rash      LDL = 113 ()   HDL = 70 (-)  TG = 85 ()  TSH = 0.733 ()    Bacteriology:   UA NEG   Blood CS NG2D x2   04/15 CSF NGTD   Blood CS NG5D x1 (final)   CSF NGTD  04/10 Sputum MSSA H influenza S Ceftri   sputum culture numerous MSSA   Blood CS NG5D x2 (final)    CSF studies:    L   *** [id number] WBC0 *** %N0 %L0   04-11  L3.4   *** [id number] [id number] *** %N35 %L1

## 2019-04-25 NOTE — PROGRESS NOTE ADULT - SUBJECTIVE AND OBJECTIVE BOX
Hospital course:   4/7: overnight, patient requiring large amount of cardene and propofol.  Plan today is to transition to precedex, start PO lisinopril and wean of cardene.  Becomes extremely agitated when off sedation.  4/8: Cerebral angio without findings of aneurysm. EVD stopped working, pulled back without improvement. CT Head performed.  4/9: Received ativan 6mg for agitation. EVD stopped draining at 6pm yesterday taken for stat CTH. Dr. So notified and EVD lowered to 5cmH2O without output.   4/10: s/p penumbra evacuation of ICH. Pt seen and examined at bedside postop. Remains intubated, on versed. ICPs stable.  (OR EBL 30cc, received LR 200cc)  4/11: JUSTIN overnight. Remains on versed. Remains intubated. Neuro stable.  4/12: Given ativan overnight, bucking the vent. Remains on versed drip. Neuro stable. ICPs stable. Increase hydralazine to 75 Q8.   4/13 JUSTIN overnight, febrile, urinary retention Straight Cath x 1 = 1L, neuro exam unchanged since yesterday, started Cardene gtt as per Dr. So goal -140  4/14 POD#4 febrile overnight, o/w JUSTIN overnight, EVD@ 5cm H2O, Clonidine started, Ceftriax until 4/19 Staph sputum, Versed/Precedex, straight cathed overnight  4/15: ICP stable. neuro stable. Remains on versed/precedex  4/16: Tolerating CPAP, CTH performed, decreased vent size, EVD raised to 10 cmH2O neuro stable   4/18:  JUSTIN overnight, EVD @15cm H2O, CPAP overnight, Ceftriax until 4/19 PNA, Precedex PRN comfort, rectal tube - diarrhea; fever spike   4/19 EVD clamped since 4/18, ICPs fluctuate, HCT today, R IJ, Ceftraix until 4/19, Versed gtt, neuro exam is improving, Duplex Renal artery and Metanephrine & Catecholamine Urine collection 24hrs for high BP work-up  4/20: EVD removed yesterday. Neuro stable. On precedex  4/21: Neuro stable.  remained on trach collar , no acute events  4/22 JUSTIN overnight, frquent suctioning q1h, on Trach collar, TFs via PEG, Ceftriax until 4/13 4/23: suctioning requirements down, JSUTIN overnight  4/24: no issues overnight. suctioning q2-4 hours, able to cough up secretions on own. Ceftriaxone switched to cefepime yesterday, final sensitivities pending. Staph aureus and enterobacter cloacae growing in sputum cultures.   Cefepime then switched to meropenum due to sensitivities and growing MSSA in sputum  4/25:     Vital Signs Last 24 Hrs  T(C): 36.9 (25 Apr 2019 05:18), Max: 38.3 (24 Apr 2019 13:00)  T(F): 98.5 (25 Apr 2019 05:18), Max: 101 (24 Apr 2019 13:00)  HR: 74 (25 Apr 2019 05:00) (74 - 106)  BP: 96/58 (25 Apr 2019 05:00) (82/48 - 134/81)  BP(mean): 78 (25 Apr 2019 05:00) (64 - 102)  RR: 21 (25 Apr 2019 05:00) (12 - 29)  SpO2: 99% (25 Apr 2019 05:00) (90% - 99%)    I&O's Detail    23 Apr 2019 07:01  -  24 Apr 2019 07:00  --------------------------------------------------------  IN:    Enteral Tube Flush: 400 mL    IV PiggyBack: 250 mL    ns in tub fed  [id number]: 1608 mL  Total IN: 2258 mL    OUT:    Incontinent per Condom Catheter: 1850 mL  Total OUT: 1850 mL    Total NET: 408 mL      24 Apr 2019 07:01  -  25 Apr 2019 06:42  --------------------------------------------------------  IN:    Enteral Tube Flush: 575 mL    IV PiggyBack: 100 mL    ns in tub fed  [id number]: 1206 mL  Total IN: 1881 mL    OUT:    Incontinent per Condom Catheter: 1700 mL  Total OUT: 1700 mL    Total NET: 181 mL        I&O's Summary    23 Apr 2019 07:01  -  24 Apr 2019 07:00  --------------------------------------------------------  IN: 2258 mL / OUT: 1850 mL / NET: 408 mL    24 Apr 2019 07:01  -  25 Apr 2019 06:42  --------------------------------------------------------  IN: 1881 mL / OUT: 1700 mL / NET: 181 mL        PHYSICAL EXAM:  Neurological: trach collar, able to mouth words appropriately and nods / shakes head appropriately to questions. NAD, FC on right  CN II-XII: EOMI, PERRL, left facial droop, tongue midline   Motor: Moving R side 5/5, L side w/d to noxious stim    SILT throughout  WWP throughout   Incision site: C/D/I, no erythema, edema or purulent drainage   Cardiovascular: regular rate   Respiratory: tolerating trach collar   Gastrointestinal: abd soft, NT, ND   Genitourinary: condom cath - voiding   Extremities: no LE edema     TUBES/LINES:  trach  peg  piv    DIET:  [] NPO  [] Mechanical  [x] Tube feeds    LABS:                        11.4   10.51 )-----------( 513      ( 25 Apr 2019 06:11 )             34.9     04-24    148<H>  |  110<H>  |  31<H>  ----------------------------<  144<H>  5.3   |  24  |  0.93    Ca    9.5      24 Apr 2019 07:34  Phos  3.8     04-24  Mg     2.6     04-24              CAPILLARY BLOOD GLUCOSE      POCT Blood Glucose.: 108 mg/dL (25 Apr 2019 06:05)  POCT Blood Glucose.: 138 mg/dL (24 Apr 2019 17:35)  POCT Blood Glucose.: 140 mg/dL (24 Apr 2019 11:12)  POCT Blood Glucose.: 129 mg/dL (24 Apr 2019 06:57)      Drug Levels: [] N/A    CSF Analysis: [] N/A      Allergies    No Known Allergies    Intolerances      MEDICATIONS:  Antibiotics:  meropenem  IVPB 1000 milliGRAM(s) IV Intermittent every 8 hours    Neuro:  acetaminophen    Suspension .. 650 milliGRAM(s) Oral every 6 hours PRN  fentaNYL    Injectable 25 MICROGram(s) IV Push every 4 hours PRN  levETIRAcetam  Solution 1000 milliGRAM(s) Enteral Tube two times a day  ondansetron Injectable 4 milliGRAM(s) IV Push every 6 hours PRN    Anticoagulation:  enoxaparin Injectable 40 milliGRAM(s) SubCutaneous at bedtime    OTHER:  ALBUTerol    90 MICROgram(s) HFA Inhaler 1 Puff(s) Inhalation every 4 hours  ALBUTerol/ipratropium for Nebulization 3 milliLiter(s) Nebulizer every 6 hours  amLODIPine   Tablet 10 milliGRAM(s) Oral daily  atorvastatin 40 milliGRAM(s) Oral at bedtime  cloNIDine 0.2 milliGRAM(s) Oral every 8 hours  dextrose 40% Gel 15 Gram(s) Oral once PRN  dextrose 50% Injectable 12.5 Gram(s) IV Push once  dextrose 50% Injectable 25 Gram(s) IV Push once  dextrose 50% Injectable 25 Gram(s) IV Push once  glucagon  Injectable 1 milliGRAM(s) IntraMuscular once PRN  hydrALAZINE 100 milliGRAM(s) Oral every 8 hours  hydrALAZINE Injectable 10 milliGRAM(s) IV Push every 4 hours PRN  influenza   Vaccine 0.5 milliLiter(s) IntraMuscular once  insulin lispro (HumaLOG) corrective regimen sliding scale   SubCutaneous Before meals and at bedtime  lisinopril 40 milliGRAM(s) Oral daily  metoprolol tartrate 100 milliGRAM(s) Oral every 12 hours  tiotropium 18 MICROgram(s) Capsule 1 Capsule(s) Inhalation daily    IVF:  dextrose 5%. 1000 milliLiter(s) IV Continuous <Continuous>    CULTURES:  Culture Results:   Moderate Enterobacter cloacae complex  Moderate Staphylococcus aureus  No routine respiratory derek Isolated (04-22 @ 16:08)  Culture Results:   No growth at 5 days. (04-18 @ 15:05)    RADIOLOGY & ADDITIONAL TESTS:      ASSESSMENT:  44y Male w/ ETOH abuse now s/p right thalamic IPH with IVH s/p Emergent Rt EVD placement 4/6/19, s/p Cerebral angiogram neg 4/8/19, new left EVD placed and R EVD removed 4/9/19, now s/p penumbra evacuation of ICH 4/10/19, EVD removed 4/19/19    PLAN:  Neuro checks q4h  Keppra for seizure prophylaxis  prn pain control    CARDIOVASCULAR:   SBP goal <160  Continue lopressor 100mg BID, norvasc 10mg daily, and lisinopril 40mg daily, hydralazine 100mg Q8h  Clonidine .2 q8h  Continue Statin therapy    PULMONARY:   trach collar  Duoneb x 3 days  suctioning q2-4h    RENAL:   Voiding, condom catheter, straight Cath PRN  Normal Na goal  Duplex Renal artery negative for BECKIE   Metanephrine and Catecholamine Urine collection 24hrs for hypertension work-up    GI:   s/p Peg  cont PEG feeds    ID:  On meropenum for coverage for enterobacter and MSSA  f/u final cx     ENDO:   ISS    DVT PROPHYLAXIS:   SCDs, SQL     DISPOSITION: Continue NSICU care,  Full code,  D/w Dr. So and Dr. Wheeler

## 2019-04-26 LAB
ANION GAP SERPL CALC-SCNC: 11 MMOL/L — SIGNIFICANT CHANGE UP (ref 5–17)
BUN SERPL-MCNC: 35 MG/DL — HIGH (ref 7–23)
CALCIUM SERPL-MCNC: 9.3 MG/DL — SIGNIFICANT CHANGE UP (ref 8.4–10.5)
CHLORIDE SERPL-SCNC: 103 MMOL/L — SIGNIFICANT CHANGE UP (ref 96–108)
CO2 SERPL-SCNC: 25 MMOL/L — SIGNIFICANT CHANGE UP (ref 22–31)
CREAT SERPL-MCNC: 0.91 MG/DL — SIGNIFICANT CHANGE UP (ref 0.5–1.3)
GLUCOSE SERPL-MCNC: 134 MG/DL — HIGH (ref 70–99)
HCT VFR BLD CALC: 35.8 % — LOW (ref 39–50)
HGB BLD-MCNC: 11.6 G/DL — LOW (ref 13–17)
MAGNESIUM SERPL-MCNC: 2.5 MG/DL — SIGNIFICANT CHANGE UP (ref 1.6–2.6)
MCHC RBC-ENTMCNC: 31.3 PG — SIGNIFICANT CHANGE UP (ref 27–34)
MCHC RBC-ENTMCNC: 32.4 GM/DL — SIGNIFICANT CHANGE UP (ref 32–36)
MCV RBC AUTO: 96.5 FL — SIGNIFICANT CHANGE UP (ref 80–100)
NRBC # BLD: 0 /100 WBCS — SIGNIFICANT CHANGE UP (ref 0–0)
PHOSPHATE SERPL-MCNC: 3.1 MG/DL — SIGNIFICANT CHANGE UP (ref 2.5–4.5)
PLATELET # BLD AUTO: 478 K/UL — HIGH (ref 150–400)
POTASSIUM SERPL-MCNC: 4.6 MMOL/L — SIGNIFICANT CHANGE UP (ref 3.5–5.3)
POTASSIUM SERPL-SCNC: 4.6 MMOL/L — SIGNIFICANT CHANGE UP (ref 3.5–5.3)
RBC # BLD: 3.71 M/UL — LOW (ref 4.2–5.8)
RBC # FLD: 11.8 % — SIGNIFICANT CHANGE UP (ref 10.3–14.5)
SODIUM SERPL-SCNC: 139 MMOL/L — SIGNIFICANT CHANGE UP (ref 135–145)
WBC # BLD: 7.14 K/UL — SIGNIFICANT CHANGE UP (ref 3.8–10.5)
WBC # FLD AUTO: 7.14 K/UL — SIGNIFICANT CHANGE UP (ref 3.8–10.5)

## 2019-04-26 PROCEDURE — 99233 SBSQ HOSP IP/OBS HIGH 50: CPT | Mod: 24

## 2019-04-26 PROCEDURE — 71045 X-RAY EXAM CHEST 1 VIEW: CPT | Mod: 26

## 2019-04-26 RX ADMIN — Medication 0.2 MILLIGRAM(S): at 13:42

## 2019-04-26 RX ADMIN — ATORVASTATIN CALCIUM 40 MILLIGRAM(S): 80 TABLET, FILM COATED ORAL at 22:36

## 2019-04-26 RX ADMIN — MEROPENEM 100 MILLIGRAM(S): 1 INJECTION INTRAVENOUS at 05:28

## 2019-04-26 RX ADMIN — Medication 0.2 MILLIGRAM(S): at 05:30

## 2019-04-26 RX ADMIN — ENOXAPARIN SODIUM 40 MILLIGRAM(S): 100 INJECTION SUBCUTANEOUS at 22:36

## 2019-04-26 RX ADMIN — Medication 100 MILLIGRAM(S): at 23:58

## 2019-04-26 RX ADMIN — LEVETIRACETAM 1000 MILLIGRAM(S): 250 TABLET, FILM COATED ORAL at 05:28

## 2019-04-26 RX ADMIN — Medication 650 MILLIGRAM(S): at 10:03

## 2019-04-26 RX ADMIN — MEROPENEM 100 MILLIGRAM(S): 1 INJECTION INTRAVENOUS at 22:35

## 2019-04-26 RX ADMIN — Medication 0.2 MILLIGRAM(S): at 22:36

## 2019-04-26 RX ADMIN — Medication 100 MILLIGRAM(S): at 10:37

## 2019-04-26 RX ADMIN — Medication 100 MILLIGRAM(S): at 18:30

## 2019-04-26 RX ADMIN — Medication 650 MILLIGRAM(S): at 11:47

## 2019-04-26 RX ADMIN — AMLODIPINE BESYLATE 10 MILLIGRAM(S): 2.5 TABLET ORAL at 05:27

## 2019-04-26 RX ADMIN — MEROPENEM 100 MILLIGRAM(S): 1 INJECTION INTRAVENOUS at 15:01

## 2019-04-26 RX ADMIN — LISINOPRIL 40 MILLIGRAM(S): 2.5 TABLET ORAL at 05:29

## 2019-04-26 RX ADMIN — Medication 100 MILLIGRAM(S): at 17:44

## 2019-04-26 RX ADMIN — Medication 100 MILLIGRAM(S): at 05:27

## 2019-04-26 RX ADMIN — LEVETIRACETAM 1000 MILLIGRAM(S): 250 TABLET, FILM COATED ORAL at 18:30

## 2019-04-26 RX ADMIN — Medication 100 MILLIGRAM(S): at 02:57

## 2019-04-26 NOTE — PROGRESS NOTE ADULT - SUBJECTIVE AND OBJECTIVE BOX
HPI:  History obtained by patient's girlfriend and chart from [place], patient is unresponsive:     43 y/o male with no significant PMHx presents to [place] with AMS, left side weakness and numbness. Per [place], [place] when he developed acute onset of left facial numbness and LUE and numbness around 6:30PM. When get got home he developed AMS, dysarthria, and weakness in the LUE and LLE. At [place] ED patient had several episodes of emesis and was hypertensive to SBP in the 200's. Work up revealed right thalamic hemorrhage on CT head. Patient not on any anticoagulation use per girlfriend. Of note, patient took Excedrin for headache at home. Patient transferred to [facility] for further management. (06 Apr 2019 00:04)    OVERNIGHT EVENTS: JUSTIN overnight, neuro stable    Hospital course:   4/7: overnight, patient requiring large amount of cardene and propofol.  Plan today is to transition to precedex, start PO lisinopril and wean of cardene.  Becomes extremely agitated when off sedation.  4/8: Cerebral angio without findings of aneurysm. EVD stopped working, pulled back without improvement. CT Head performed.  4/9: Received ativan 6mg for agitation. EVD stopped draining at 6pm yesterday taken for stat CTH. Dr. So notified and EVD lowered to 5cmH2O without output.   4/10: s/p penumbra evacuation of ICH. Pt seen and examined at bedside postop. Remains intubated, on versed. ICPs stable.  (OR EBL 30cc, received LR 200cc)  4/11: JUSTIN overnight. Remains on versed. Remains intubated. Neuro stable.  4/12: Given ativan overnight, bucking the vent. Remains on versed drip. Neuro stable. ICPs stable. Increase hydralazine to 75 Q8.   4/13 JUSTIN overnight, febrile, urinary retention Straight Cath x 1 = 1L, neuro exam unchanged since yesterday, started Cardene gtt as per Dr. So goal -140  4/14 POD#4 febrile overnight, o/w JUSTIN overnight, EVD@ 5cm H2O, Clonidine started, Ceftriax until 4/19 Staph sputum, Versed/Precedex, straight cathed overnight  4/15: ICP stable. neuro stable. Remains on versed/precedex  4/16: Tolerating CPAP, CTH performed, decreased vent size, EVD raised to 10 cmH2O neuro stable   4/18:  JUSTIN overnight, EVD @15cm H2O, CPAP overnight, Ceftriax until 4/19 PNA, Precedex PRN comfort, rectal tube - diarrhea; fever spike   4/19 EVD clamped since 4/18, ICPs fluctuate, HCT today, R IJ, Ceftraix until 4/19, Versed gtt, neuro exam is improving, Duplex Renal artery and Metanephrine & Catecholamine Urine collection 24hrs for high BP work-up  4/20: EVD removed yesterday. Neuro stable. On precedex  4/21: Neuro stable.  remained on trach collar , no acute events  4/22 JUSTIN overnight, frquent suctioning q1h, on Trach collar, TFs via PEG, Ceftriax until 4/13 4/23: suctioning requirements down, JUSTIN overnight  4/24: no issues overnight. suctioning q2-4 hours, able to cough up secretions on own. Ceftriaxone switched to cefepime yesterday, final sensitivities pending. Staph aureus and enterobacter cloacae growing in sputum cultures.   Cefepime then switched to meropenum due to sensitivities and growing MSSA in sputum  4/26: JUSTIN overnight, neuro stable        Vital Signs Last 24 Hrs  T(C): 36.6 (25 Apr 2019 20:53), Max: 37.6 (25 Apr 2019 18:52)  T(F): 97.8 (25 Apr 2019 20:53), Max: 99.6 (25 Apr 2019 18:52)  HR: 84 (25 Apr 2019 20:21) (71 - 94)  BP: 121/74 (25 Apr 2019 20:21) (82/48 - 134/77)  BP(mean): 92 (25 Apr 2019 20:21) (64 - 103)  RR: 17 (25 Apr 2019 20:21) (13 - 34)  SpO2: 97% (25 Apr 2019 20:21) (92% - 99%)    I&O's Summary    24 Apr 2019 07:01  -  25 Apr 2019 07:00  --------------------------------------------------------  IN: 2266 mL / OUT: 1700 mL / NET: 566 mL    25 Apr 2019 07:01  -  26 Apr 2019 01:23  --------------------------------------------------------  IN: 803 mL / OUT: 250 mL / NET: 553 mL        PHYSICAL EXAM:  Neurological: trach collar, able to mouth words appropriately and nods / shakes head appropriately to questions. NAD, FC on right  CN II-XII: EOMI, PERRL, left facial droop, tongue midline   Motor: Moving R side 5/5, L side w/d to noxious stim    SILT throughout  WWP throughout   Incision site: C/D/I, no erythema, edema or purulent drainage   Cardiovascular: regular rate   Respiratory: tolerating trach collar   Gastrointestinal: abd soft, NT, ND   Genitourinary: condom cath - voiding   Extremities: no LE edema       TUBES/LINES:  [] Becerra  [] Lumbar Drain  [] Wound Drains  [] Others      DIET:  [] NPO  [] Mechanical  [x] Tube feeds    LABS:                        11.4   10.51 )-----------( 513      ( 25 Apr 2019 06:11 )             34.9     04-25    139  |  103  |  33<H>  ----------------------------<  120<H>  5.0   |  25  |  0.97    Ca    9.6      25 Apr 2019 06:12  Phos  4.3     04-25  Mg     2.6     04-25              CAPILLARY BLOOD GLUCOSE      POCT Blood Glucose.: 134 mg/dL (25 Apr 2019 21:43)  POCT Blood Glucose.: 143 mg/dL (25 Apr 2019 16:47)  POCT Blood Glucose.: 124 mg/dL (25 Apr 2019 11:14)  POCT Blood Glucose.: 108 mg/dL (25 Apr 2019 06:05)      Drug Levels: [] N/A    CSF Analysis: [] N/A      Allergies    No Known Allergies    Intolerances      MEDICATIONS:  Antibiotics:  meropenem  IVPB 1000 milliGRAM(s) IV Intermittent every 8 hours    Neuro:  acetaminophen    Suspension .. 650 milliGRAM(s) Oral every 6 hours PRN  fentaNYL    Injectable 25 MICROGram(s) IV Push every 4 hours PRN  levETIRAcetam  Solution 1000 milliGRAM(s) Enteral Tube two times a day  ondansetron Injectable 4 milliGRAM(s) IV Push every 6 hours PRN    Anticoagulation:  enoxaparin Injectable 40 milliGRAM(s) SubCutaneous at bedtime    OTHER:  ALBUTerol    90 MICROgram(s) HFA Inhaler 1 Puff(s) Inhalation every 4 hours  amLODIPine   Tablet 10 milliGRAM(s) Oral daily  atorvastatin 40 milliGRAM(s) Oral at bedtime  cloNIDine 0.2 milliGRAM(s) Oral every 8 hours  dextrose 40% Gel 15 Gram(s) Oral once PRN  dextrose 50% Injectable 12.5 Gram(s) IV Push once  dextrose 50% Injectable 25 Gram(s) IV Push once  dextrose 50% Injectable 25 Gram(s) IV Push once  glucagon  Injectable 1 milliGRAM(s) IntraMuscular once PRN  hydrALAZINE 100 milliGRAM(s) Oral every 8 hours  hydrALAZINE Injectable 10 milliGRAM(s) IV Push every 4 hours PRN  influenza   Vaccine 0.5 milliLiter(s) IntraMuscular once  insulin lispro (HumaLOG) corrective regimen sliding scale   SubCutaneous Before meals and at bedtime  lisinopril 40 milliGRAM(s) Oral daily  metoprolol tartrate 100 milliGRAM(s) Oral every 12 hours  tiotropium 18 MICROgram(s) Capsule 1 Capsule(s) Inhalation daily    IVF:  dextrose 5%. 1000 milliLiter(s) IV Continuous <Continuous>    CULTURES:  Culture Results:   Moderate Enterobacter cloacae complex  Moderate Staphylococcus aureus  No routine respiratory derek Isolated (04-22 @ 16:08)  Culture Results:   No growth at 5 days. (04-18 @ 15:05)    RADIOLOGY & ADDITIONAL TESTS:      ASSESSMENT:  44y Male w/ ETOH abuse now s/p right thalamic IPH with IVH s/p Emergent Rt EVD placement 4/6/19, s/p Cerebral angiogram neg 4/8/19, new left EVD placed and R EVD removed 4/9/19, now s/p penumbra evacuation of ICH 4/10/19, EVD removed 4/19/19    PLAN:  - neuro checks  - vitals checks  - pain control  - cont Keppra  - Continue lopressor 100mg BID, norvasc 10mg daily, and lisinopril 40mg daily, hydralazine 100mg Q8h, Clonidine .2 q8h  - s/p Peg, cont PEG feeds  - Meropenem for pneumonia x7 days  - DVT PROPHYLAXIS: [x] Venodynes [x] Heparin/Lovenox  - PT/OT/OOB    DISPOSITION: stepdown, full code, dispo pending    d/w Dr. So     Assessment:  Present when checked    []  GCS  E   V  M     Heart Failure: []Acute, [] acute on chronic , []chronic  Heart Failure:  [] Diastolic (HFpEF), [] Systolic (HFrEF), []Combined (HFpEF and HFrEF), [] RHF, [] Pulm HTN, [] Other    [] KRISTA, [] ATN, [] AIN, [] other  [] CKD1, [] CKD2, [] CKD 3, [] CKD 4, [] CKD 5, []ESRD    Encephalopathy: [] Metabolic, [] Hepatic, [] toxic, [x] Neurological, [] Other    Abnormal Nurtitional Status: [] malnurtition (see nutrition note), [ ]underweight: BMI < 19, [] morbid obesity: BMI >40, [] Cachexia    [] Sepsis  [] hypovolemic shock,[] cardiogenic shock, [] hemorrhagic shock, [] neuogenic shock  [] Acute Respiratory Failure  []Cerebral edema, [] Brain compression/ herniation,   [] Functional quadriplegia  [] Acute blood loss anemia

## 2019-04-26 NOTE — PROGRESS NOTE ADULT - SUBJECTIVE AND OBJECTIVE BOX
=================================  NEUROCRITICAL CARE ATTENDING NOTE  =================================    ARPAN RUANO   MRN-[id number]  Summary:  44y/M  HPI:  History obtained by patient's girlfriend and chart from [place], patient is unresponsive:     45 y/o male with no significant PMHx presents to [place] with AMS, left side weakness and numbness. Per [place], [place] when he developed acute onset of left facial numbness and LUE and numbness around 6:30PM. When get got home he developed AMS, dysarthria, and weakness in the LUE and LLE. At [place] ED patient had several episodes of emesis and was hypertensive to SBP in the 200's. Work up revealed right thalamic hemorrhage on CT head. Patient not on any anticoagulation use per girlfriend. Of note, patient took Excedrin for headache at home. Patient transferred to [facility] for further management. (2019 00:04)      Overnight Events:  Denies HA/N/V/Dizziness.      PHYSICAL EXAMINATION  T(C): 36.9 ( @ 05:18), Max: 38.3 ( @ 13:00)  HR: 76 ( @ 07:00) (74 - 106)  BP: 121/75 ( @ 07:00) (82/48 - 125/79)  RR: 20 ( @ 07:00) (12 - 29)  SpO2: 97% ( @ 07:00) (90% - 99%)  CVP(mm Hg): --    LABS:  CAPILLARY BLOOD GLUCOSE      POCT Blood Glucose.: 108 mg/dL (2019 06:05)  POCT Blood Glucose.: 138 mg/dL (2019 17:35)  POCT Blood Glucose.: 140 mg/dL (2019 11:12)                          11.4   10.51 )-----------( 513      ( 2019 06:11 )             34.9         139  |  103  |  33<H>  ----------------------------<  120<H>  5.0   |  25  |  0.97    Ca    9.6      2019 06:12  Phos  4.3       Mg     2.6      @ 07: @ 07:00  --------------------------------------------------------  IN: 2266 mL / OUT: 1700 mL / NET: 566 mL     @ 07: @ 07:47  --------------------------------------------------------  IN: 67 mL / OUT: 0 mL / NET: 67 mL        MEDICATIONS:  MEDICATIONS  (STANDING):  ALBUTerol    90 MICROgram(s) HFA Inhaler 1 Puff(s) Inhalation every 4 hours  ALBUTerol/ipratropium for Nebulization 3 milliLiter(s) Nebulizer every 6 hours  amLODIPine   Tablet 10 milliGRAM(s) Oral daily  atorvastatin 40 milliGRAM(s) Oral at bedtime  cloNIDine 0.2 milliGRAM(s) Oral every 8 hours  dextrose 5%. 1000 milliLiter(s) (50 mL/Hr) IV Continuous <Continuous>  dextrose 50% Injectable 12.5 Gram(s) IV Push once  dextrose 50% Injectable 25 Gram(s) IV Push once  dextrose 50% Injectable 25 Gram(s) IV Push once  enoxaparin Injectable 40 milliGRAM(s) SubCutaneous at bedtime  hydrALAZINE 100 milliGRAM(s) Oral every 8 hours  influenza   Vaccine 0.5 milliLiter(s) IntraMuscular once  insulin lispro (HumaLOG) corrective regimen sliding scale   SubCutaneous Before meals and at bedtime  levETIRAcetam  Solution 1000 milliGRAM(s) Enteral Tube two times a day  lisinopril 40 milliGRAM(s) Oral daily  meropenem  IVPB 1000 milliGRAM(s) IV Intermittent every 8 hours  metoprolol tartrate 100 milliGRAM(s) Oral every 12 hours  tiotropium 18 MICROgram(s) Capsule 1 Capsule(s) Inhalation daily    MEDICATIONS  (PRN):  acetaminophen    Suspension .. 650 milliGRAM(s) Oral every 6 hours PRN Temp greater or equal to 38C (100.4F), Mild Pain (1 - 3)  dextrose 40% Gel 15 Gram(s) Oral once PRN Blood Glucose LESS THAN 70 milliGRAM(s)/deciliter  fentaNYL    Injectable 25 MICROGram(s) IV Push every 4 hours PRN agitation  glucagon  Injectable 1 milliGRAM(s) IntraMuscular once PRN Glucose LESS THAN 70 milligrams/deciliter  hydrALAZINE Injectable 10 milliGRAM(s) IV Push every 4 hours PRN BP>160  ondansetron Injectable 4 milliGRAM(s) IV Push every 6 hours PRN Nausea and/or Vomiting      COURSE IN THE HOSPITAL:   Admitted, given DDAVP / platelets; EVD inserted, improved   04/15 fever overnight   Tmax 38.3 remained on trach collar   No significant events overnight, remained on trach collar tmax 38.1; EVD removed   minor bleeding in gracy hole site, stapled overnight    Past Medical History:   Allergies:  No Known Allergies  Home meds:     PHYSICAL EXAMINATION    NEUROLOGIC EXAMINATION:  Patient opens eyes spontaneously, following commands, pupils 3mm reactive, no gaze preference; moves R UE/LE spontaneously, L UE extends to noxious and L LE withdraws    CARDIOVASCULAR: (+) S1 S2, normal rate and regular rhythm  PULMONARY: clear to auscultation bilaterally; copious secretions q1-2h suctioning  ABDOMEN: soft, nontender with normoactive bowel sounds; PEG site clean  EXTREMITIES: no edema  SKIN: no rash      LDL = 113 ()   HDL = 70 (-)  TG = 85 ()  TSH = 0.733 ()    Bacteriology:   UA NEG   Blood CS NG2D x2   04/15 CSF NGTD   Blood CS NG5D x1 (final)   CSF NGTD  04/10 Sputum MSSA H influenza S Ceftri   sputum culture numerous MSSA   Blood CS NG5D x2 (final)    CSF studies:    L   *** [id number] WBC0 *** %N0 %L0   04-11  L3.4   *** [id number] WBC37 *** %N35 %L1

## 2019-04-27 LAB
ANION GAP SERPL CALC-SCNC: 10 MMOL/L — SIGNIFICANT CHANGE UP (ref 5–17)
BUN SERPL-MCNC: 42 MG/DL — HIGH (ref 7–23)
CALCIUM SERPL-MCNC: 9.6 MG/DL — SIGNIFICANT CHANGE UP (ref 8.4–10.5)
CHLORIDE SERPL-SCNC: 104 MMOL/L — SIGNIFICANT CHANGE UP (ref 96–108)
CO2 SERPL-SCNC: 27 MMOL/L — SIGNIFICANT CHANGE UP (ref 22–31)
CREAT SERPL-MCNC: 1.03 MG/DL — SIGNIFICANT CHANGE UP (ref 0.5–1.3)
GLUCOSE SERPL-MCNC: 110 MG/DL — HIGH (ref 70–99)
HCT VFR BLD CALC: 35.2 % — LOW (ref 39–50)
HGB BLD-MCNC: 11.6 G/DL — LOW (ref 13–17)
MAGNESIUM SERPL-MCNC: 2.7 MG/DL — HIGH (ref 1.6–2.6)
MCHC RBC-ENTMCNC: 31.9 PG — SIGNIFICANT CHANGE UP (ref 27–34)
MCHC RBC-ENTMCNC: 33 GM/DL — SIGNIFICANT CHANGE UP (ref 32–36)
MCV RBC AUTO: 96.7 FL — SIGNIFICANT CHANGE UP (ref 80–100)
NRBC # BLD: 0 /100 WBCS — SIGNIFICANT CHANGE UP (ref 0–0)
PHOSPHATE SERPL-MCNC: 4.3 MG/DL — SIGNIFICANT CHANGE UP (ref 2.5–4.5)
PLATELET # BLD AUTO: 417 K/UL — HIGH (ref 150–400)
POTASSIUM SERPL-MCNC: 5 MMOL/L — SIGNIFICANT CHANGE UP (ref 3.5–5.3)
POTASSIUM SERPL-SCNC: 5 MMOL/L — SIGNIFICANT CHANGE UP (ref 3.5–5.3)
RBC # BLD: 3.64 M/UL — LOW (ref 4.2–5.8)
RBC # FLD: 11.8 % — SIGNIFICANT CHANGE UP (ref 10.3–14.5)
SODIUM SERPL-SCNC: 141 MMOL/L — SIGNIFICANT CHANGE UP (ref 135–145)
WBC # BLD: 6.28 K/UL — SIGNIFICANT CHANGE UP (ref 3.8–10.5)
WBC # FLD AUTO: 6.28 K/UL — SIGNIFICANT CHANGE UP (ref 3.8–10.5)

## 2019-04-27 PROCEDURE — 99233 SBSQ HOSP IP/OBS HIGH 50: CPT | Mod: 24

## 2019-04-27 RX ORDER — OXYCODONE AND ACETAMINOPHEN 5; 325 MG/1; MG/1
2 TABLET ORAL EVERY 6 HOURS
Qty: 0 | Refills: 0 | Status: DISCONTINUED | OUTPATIENT
Start: 2019-04-27 | End: 2019-05-04

## 2019-04-27 RX ORDER — OXYCODONE AND ACETAMINOPHEN 5; 325 MG/1; MG/1
1 TABLET ORAL EVERY 4 HOURS
Qty: 0 | Refills: 0 | Status: DISCONTINUED | OUTPATIENT
Start: 2019-04-27 | End: 2019-05-04

## 2019-04-27 RX ORDER — ACETAMINOPHEN 500 MG
1000 TABLET ORAL ONCE
Qty: 0 | Refills: 0 | Status: COMPLETED | OUTPATIENT
Start: 2019-04-27 | End: 2019-04-27

## 2019-04-27 RX ORDER — AMANTADINE HCL 100 MG
100 CAPSULE ORAL
Qty: 0 | Refills: 0 | Status: DISCONTINUED | OUTPATIENT
Start: 2019-04-27 | End: 2019-05-04

## 2019-04-27 RX ADMIN — MEROPENEM 100 MILLIGRAM(S): 1 INJECTION INTRAVENOUS at 14:11

## 2019-04-27 RX ADMIN — MEROPENEM 100 MILLIGRAM(S): 1 INJECTION INTRAVENOUS at 22:57

## 2019-04-27 RX ADMIN — Medication 100 MILLIGRAM(S): at 18:03

## 2019-04-27 RX ADMIN — LISINOPRIL 40 MILLIGRAM(S): 2.5 TABLET ORAL at 06:15

## 2019-04-27 RX ADMIN — LEVETIRACETAM 1000 MILLIGRAM(S): 250 TABLET, FILM COATED ORAL at 06:15

## 2019-04-27 RX ADMIN — FENTANYL CITRATE 25 MICROGRAM(S): 50 INJECTION INTRAVENOUS at 11:12

## 2019-04-27 RX ADMIN — MEROPENEM 100 MILLIGRAM(S): 1 INJECTION INTRAVENOUS at 06:10

## 2019-04-27 RX ADMIN — Medication 0.2 MILLIGRAM(S): at 06:10

## 2019-04-27 RX ADMIN — ATORVASTATIN CALCIUM 40 MILLIGRAM(S): 80 TABLET, FILM COATED ORAL at 22:56

## 2019-04-27 RX ADMIN — AMLODIPINE BESYLATE 10 MILLIGRAM(S): 2.5 TABLET ORAL at 06:14

## 2019-04-27 RX ADMIN — LEVETIRACETAM 1000 MILLIGRAM(S): 250 TABLET, FILM COATED ORAL at 18:03

## 2019-04-27 RX ADMIN — ENOXAPARIN SODIUM 40 MILLIGRAM(S): 100 INJECTION SUBCUTANEOUS at 22:56

## 2019-04-27 RX ADMIN — Medication 100 MILLIGRAM(S): at 07:10

## 2019-04-27 RX ADMIN — Medication 400 MILLIGRAM(S): at 18:03

## 2019-04-27 RX ADMIN — Medication 100 MILLIGRAM(S): at 06:14

## 2019-04-27 RX ADMIN — Medication 0.2 MILLIGRAM(S): at 22:56

## 2019-04-27 NOTE — PROGRESS NOTE ADULT - SUBJECTIVE AND OBJECTIVE BOX
=================================  NEUROCRITICAL CARE ATTENDING NOTE  =================================    ARPAN RUANO   MRN-[id number]  Summary:  44y/M  HPI:  History obtained by patient's girlfriend and chart from [place], patient is unresponsive:     45 y/o male with no significant PMHx presents to [place] with AMS, left side weakness and numbness. Per [place], [place] when he developed acute onset of left facial numbness and LUE and numbness around 6:30PM. When get got home he developed AMS, dysarthria, and weakness in the LUE and LLE. At [place] ED patient had several episodes of emesis and was hypertensive to SBP in the 200's. Work up revealed right thalamic hemorrhage on CT head. Patient not on any anticoagulation use per girlfriend. Of note, patient took Excedrin for headache at home. Patient transferred to [facility] for further management. (2019 00:04)      Overnight Events:  Denies HA/N/V/Dizziness.      PHYSICAL EXAMINATION  T(C): 38 ( @ 06:22), Max: 38.4 ( @ 18:07)  HR: 104 ( @ 07:00) (80 - 120)  BP: 134/81 ( @ 07:00) (91/51 - 149/95)  RR: 18 ( @ 07:00) (17 - 38)  SpO2: 97% ( @ 07:00) (91% - 99%)  CVP(mm Hg): --    LABS:  CAPILLARY BLOOD GLUCOSE      POCT Blood Glucose.: 129 mg/dL (2019 06:57)  POCT Blood Glucose.: 138 mg/dL (2019 06:22)  POCT Blood Glucose.: 128 mg/dL (2019 22:10)  POCT Blood Glucose.: 130 mg/dL (2019 17:01)  POCT Blood Glucose.: 137 mg/dL (2019 10:56)                          12.5   20.56 )-----------( 657      ( 2019 05:26 )             38.4         141  |  105  |  36<H>  ----------------------------<  132<H>  5.1   |  22  |  1.03    Ca    9.8      2019 05:26  Phos  4.2       Mg     2.7      @ 07:  -   @ 07:00  --------------------------------------------------------  IN: 2258 mL / OUT: 1850 mL / NET: 408 mL        MEDICATIONS:  MEDICATIONS  (STANDING):  ALBUTerol    90 MICROgram(s) HFA Inhaler 1 Puff(s) Inhalation every 4 hours  ALBUTerol/ipratropium for Nebulization 3 milliLiter(s) Nebulizer every 6 hours  amLODIPine   Tablet 10 milliGRAM(s) Oral daily  atorvastatin 40 milliGRAM(s) Oral at bedtime  cefepime   IVPB 2000 milliGRAM(s) IV Intermittent every 12 hours  cloNIDine 0.2 milliGRAM(s) Oral every 8 hours  dextrose 5%. 1000 milliLiter(s) (50 mL/Hr) IV Continuous <Continuous>  dextrose 50% Injectable 12.5 Gram(s) IV Push once  dextrose 50% Injectable 25 Gram(s) IV Push once  dextrose 50% Injectable 25 Gram(s) IV Push once  enoxaparin Injectable 40 milliGRAM(s) SubCutaneous at bedtime  hydrALAZINE 100 milliGRAM(s) Oral every 8 hours  influenza   Vaccine 0.5 milliLiter(s) IntraMuscular once  insulin lispro (HumaLOG) corrective regimen sliding scale   SubCutaneous Before meals and at bedtime  levETIRAcetam  Solution 1000 milliGRAM(s) Enteral Tube two times a day  lisinopril 40 milliGRAM(s) Oral daily  metoprolol tartrate 100 milliGRAM(s) Oral every 12 hours  sodium chloride 2 Gram(s) Oral every 12 hours  tiotropium 18 MICROgram(s) Capsule 1 Capsule(s) Inhalation daily    MEDICATIONS  (PRN):  acetaminophen    Suspension .. 650 milliGRAM(s) Oral every 6 hours PRN Temp greater or equal to 38C (100.4F), Mild Pain (1 - 3)  dextrose 40% Gel 15 Gram(s) Oral once PRN Blood Glucose LESS THAN 70 milliGRAM(s)/deciliter  fentaNYL    Injectable 25 MICROGram(s) IV Push every 4 hours PRN agitation  glucagon  Injectable 1 milliGRAM(s) IntraMuscular once PRN Glucose LESS THAN 70 milligrams/deciliter  hydrALAZINE Injectable 10 milliGRAM(s) IV Push every 4 hours PRN BP>160  ondansetron Injectable 4 milliGRAM(s) IV Push every 6 hours PRN Nausea and/or Vomiting        COURSE IN THE HOSPITAL:   Admitted, given DDAVP / platelets; EVD inserted, improved   04/15 fever overnight   Tmax 38.3 remained on trach collar   No significant events overnight, remained on trach collar tmax 38.1; EVD removed   minor bleeding in gracy hole site, stapled overnight    Past Medical History:   Allergies:  No Known Allergies  Home meds:     PHYSICAL EXAMINATION    NEUROLOGIC EXAMINATION:  Patient opens eyes spontaneously, following commands, pupils 3mm reactive, no gaze preference; moves R UE/LE spontaneously, L UE extends to noxious and L LE withdraws    CARDIOVASCULAR: (+) S1 S2, normal rate and regular rhythm  PULMONARY: clear to auscultation bilaterally; copious secretions q1-2h suctioning  ABDOMEN: soft, nontender with normoactive bowel sounds; PEG site clean  EXTREMITIES: no edema  SKIN: no rash      LDL = 113 ()   HDL = 70 ()  TG = 85 ()  TSH = 0.733 ()    Bacteriology:   UA NEG   Blood CS NG2D x2   04/15 CSF NGTD   Blood CS NG5D x1 (final)   CSF NGTD  04/10 Sputum MSSA H influenza S Ceftri   sputum culture numerous MSSA   Blood CS NG5D x2 (final)    CSF studies:    L   *** [id number] WBC0 *** %N0 %L0     L3.4   *** [id number] WBC37 *** %N35 %L1

## 2019-04-27 NOTE — PROGRESS NOTE ADULT - SUBJECTIVE AND OBJECTIVE BOX
HPI:  History obtained by patient's girlfriend and chart from [facility], patient is unresponsive:     45 y/o male with no significant PMHx presents to [facility] with AMS, left side weakness and numbness. Per girlfriend, [place] when he developed acute onset of left facial numbness and LUE and numbness around 6:30PM. When get got home he developed AMS, dysarthria, and weakness in the LUE and LLE. At [facility] ED patient had several episodes of emesis and was hypertensive to SBP in the 200's. Work up revealed right thalamic hemorrhage on CT head. Patient not on any anticoagulation use per girlfriend. Of note, patient took Excedrin for headache at home. Patient transferred to [facility] for further management. (06 Apr 2019 00:04)    Hospital course:   4/7: overnight, patient requiring large amount of cardene and propofol.  Plan today is to transition to precedex, start PO lisinopril and wean of cardene.  Becomes extremely agitated when off sedation.  4/8: Cerebral angio without findings of aneurysm. EVD stopped working, pulled back without improvement. CT Head performed.  4/9: Received ativan 6mg for agitation. EVD stopped draining at 6pm yesterday taken for stat CTH. Dr. So notified and EVD lowered to 5cmH2O without output.   4/10: s/p penumbra evacuation of ICH. Pt seen and examined at bedside postop. Remains intubated, on versed. ICPs stable.  (OR EBL 30cc, received LR 200cc)  4/11: JUSTIN overnight. Remains on versed. Remains intubated. Neuro stable.  4/12: Given ativan overnight, bucking the vent. Remains on versed drip. Neuro stable. ICPs stable. Increase hydralazine to 75 Q8.   4/13 JUSTIN overnight, febrile, urinary retention Straight Cath x 1 = 1L, neuro exam unchanged since yesterday, started Cardene gtt as per Dr. So goal -140  4/14 POD#4 febrile overnight, o/w JUSTIN overnight, EVD@ 5cm H2O, Clonidine started, Ceftriax until 4/19 Staph sputum, Versed/Precedex, straight cathed overnight  4/15: ICP stable. neuro stable. Remains on versed/precedex  4/16: Tolerating CPAP, CTH performed, decreased vent size, EVD raised to 10 cmH2O neuro stable   4/18:  JUSTIN overnight, EVD @15cm H2O, CPAP overnight, Ceftriax until 4/19 PNA, Precedex PRN comfort, rectal tube - diarrhea; fever spike   4/19 EVD clamped since 4/18, ICPs fluctuate, HCT today, R IJ, Ceftraix until 4/19, Versed gtt, neuro exam is improving, Duplex Renal artery and Metanephrine & Catecholamine Urine collection 24hrs for high BP work-up  4/20: EVD removed yesterday. Neuro stable. On precedex  4/21: Neuro stable.  remained on trach collar , no acute events  4/22 JUSTIN overnight, frquent suctioning q1h, on Trach collar, TFs via PEG, Ceftriax until 4/13 4/23: suctioning requirements down, JUSTIN overnight  4/24: no issues overnight. suctioning q2-4 hours, able to cough up secretions on own. Ceftriaxone switched to cefepime yesterday, final sensitivities pending. Staph aureus and enterobacter cloacae growing in sputum cultures.   Cefepime then switched to meropenum due to sensitivities and growing MSSA in sputum  4/26: JUSTIN overnight, neuro stable    OVERNIGHT EVENTS:  Vital Signs Last 24 Hrs  T(C): 37 (27 Apr 2019 06:00), Max: 38 (26 Apr 2019 09:11)  T(F): 98.6 (27 Apr 2019 06:00), Max: 100.4 (26 Apr 2019 09:11)  HR: 82 (27 Apr 2019 04:24) (72 - 88)  BP: 119/76 (27 Apr 2019 04:24) (100/58 - 124/76)  BP(mean): 93 (27 Apr 2019 04:24) (74 - 94)  RR: 20 (27 Apr 2019 04:24) (18 - 20)  SpO2: 100% (27 Apr 2019 04:24) (95% - 100%)    I&O's Summary    26 Apr 2019 07:01  -  27 Apr 2019 07:00  --------------------------------------------------------  IN: 800 mL / OUT: 850 mL / NET: -50 mL        PHYSICAL EXAM:  Neurological: trach collar, able to mouth words appropriately and nods / shakes head appropriately to questions. NAD, FC on right  CN II-XII: EOMI, PERRL, left facial droop, tongue midline   Motor: Moving R side 5/5, L side w/d to noxious stim    SILT throughout  WWP throughout   Incision site: C/D/I, no erythema, edema or purulent drainage   Cardiovascular: regular rate   Respiratory: tolerating trach collar   Gastrointestinal: abd soft, NT, ND   Genitourinary: condom cath - voiding   Extremities: no LE edema       TUBES/LINES:  [] Becerra  [] Lumbar Drain  [] Wound Drains  [] Others      DIET:  [] NPO  [] Mechanical  [x] Tube feeds    LABS:                        11.6   6.28  )-----------( 417      ( 27 Apr 2019 06:26 )             35.2     04-27    141  |  104  |  42<H>  ----------------------------<  110<H>  5.0   |  27  |  1.03    Ca    9.6      27 Apr 2019 06:26  Phos  4.3     04-27  Mg     2.7     04-27              CAPILLARY BLOOD GLUCOSE      POCT Blood Glucose.: 107 mg/dL (27 Apr 2019 06:41)  POCT Blood Glucose.: 121 mg/dL (26 Apr 2019 21:39)  POCT Blood Glucose.: 131 mg/dL (26 Apr 2019 17:00)  POCT Blood Glucose.: 134 mg/dL (26 Apr 2019 11:24)      Drug Levels: [] N/A    CSF Analysis: [] N/A      Allergies    No Known Allergies    Intolerances      MEDICATIONS:  Antibiotics:  meropenem  IVPB 1000 milliGRAM(s) IV Intermittent every 8 hours    Neuro:  acetaminophen    Suspension .. 650 milliGRAM(s) Oral every 6 hours PRN  fentaNYL    Injectable 25 MICROGram(s) IV Push every 4 hours PRN  levETIRAcetam  Solution 1000 milliGRAM(s) Enteral Tube two times a day  ondansetron Injectable 4 milliGRAM(s) IV Push every 6 hours PRN    Anticoagulation:  enoxaparin Injectable 40 milliGRAM(s) SubCutaneous at bedtime    OTHER:  ALBUTerol    90 MICROgram(s) HFA Inhaler 1 Puff(s) Inhalation every 4 hours  amLODIPine   Tablet 10 milliGRAM(s) Oral daily  atorvastatin 40 milliGRAM(s) Oral at bedtime  cloNIDine 0.2 milliGRAM(s) Oral every 8 hours  dextrose 40% Gel 15 Gram(s) Oral once PRN  dextrose 50% Injectable 12.5 Gram(s) IV Push once  dextrose 50% Injectable 25 Gram(s) IV Push once  dextrose 50% Injectable 25 Gram(s) IV Push once  glucagon  Injectable 1 milliGRAM(s) IntraMuscular once PRN  hydrALAZINE 100 milliGRAM(s) Oral every 8 hours  hydrALAZINE Injectable 10 milliGRAM(s) IV Push every 4 hours PRN  influenza   Vaccine 0.5 milliLiter(s) IntraMuscular once  insulin lispro (HumaLOG) corrective regimen sliding scale   SubCutaneous Before meals and at bedtime  lisinopril 40 milliGRAM(s) Oral daily  metoprolol tartrate 100 milliGRAM(s) Oral every 12 hours  tiotropium 18 MICROgram(s) Capsule 1 Capsule(s) Inhalation daily    IVF:  dextrose 5%. 1000 milliLiter(s) IV Continuous <Continuous>    CULTURES:  Culture Results:   Moderate Enterobacter cloacae complex  Moderate Staphylococcus aureus  No routine respiratory derek Isolated (04-22 @ 16:08)  Culture Results:   No growth at 5 days. (04-18 @ 15:05)    RADIOLOGY & ADDITIONAL TESTS:      ASSESSMENT:  44y Male w/ ETOH abuse now s/p right thalamic IPH with IVH s/p Emergent Rt EVD placement 4/6/19, s/p Cerebral angiogram neg 4/8/19, new left EVD placed and R EVD removed 4/9/19, now s/p penumbra evacuation of ICH 4/10/19, EVD removed 4/19/19    HEMORRHAGE STROKE  HEMORRHAGE  Handoff  MEWS Score  ICH (intracerebral hemorrhage)  ICH (intracerebral hemorrhage)  Open tracheostomy  Bronchoscopy, adult  EGD, with PEG  Craniotomy for intracranial hemorrhage  Evacuation of hematoma of face  Angiogram, carotid and cerebral, bilateral      PLAN:   neuro checks  - vitals checks  - pain control  - cont Keppra  - Continue lopressor 100mg BID, norvasc 10mg daily, and lisinopril 40mg daily, hydralazine 100mg Q8h, Clonidine .2 q8h  - s/p Peg, cont PEG feeds  - Meropenem for pneumonia x7 days  - DVT PROPHYLAXIS: [x] Venodynes [x] Heparin/Lovenox  - PT/OT/OOB    DISPOSITION: stepdown, full code, dispo pending    d/w Dr. So       Assessment:  Present when checked    []  GCS  E   V  M     Heart Failure: []Acute, [] acute on chronic , []chronic  Heart Failure:  [] Diastolic (HFpEF), [] Systolic (HFrEF), []Combined (HFpEF and HFrEF), [] RHF, [] Pulm HTN, [] Other    [] KRISTA, [] ATN, [] AIN, [] other  [] CKD1, [] CKD2, [] CKD 3, [] CKD 4, [] CKD 5, []ESRD    Encephalopathy: [] Metabolic, [] Hepatic, [] toxic, [x] Neurological, [] Other    Abnormal Nurtitional Status: [] malnurtition (see nutrition note), [ ]underweight: BMI < 19, [] morbid obesity: BMI >40, [] Cachexia    [] Sepsis  [] hypovolemic shock,[] cardiogenic shock, [] hemorrhagic shock, [] neuogenic shock  [] Acute Respiratory Failure  []Cerebral edema, [] Brain compression/ herniation,   [] Functional quadriplegia  [] Acute blood loss anemia

## 2019-04-28 LAB
ANION GAP SERPL CALC-SCNC: 13 MMOL/L — SIGNIFICANT CHANGE UP (ref 5–17)
BUN SERPL-MCNC: 39 MG/DL — HIGH (ref 7–23)
CALCIUM SERPL-MCNC: 9.3 MG/DL — SIGNIFICANT CHANGE UP (ref 8.4–10.5)
CHLORIDE SERPL-SCNC: 104 MMOL/L — SIGNIFICANT CHANGE UP (ref 96–108)
CO2 SERPL-SCNC: 25 MMOL/L — SIGNIFICANT CHANGE UP (ref 22–31)
CREAT SERPL-MCNC: 0.96 MG/DL — SIGNIFICANT CHANGE UP (ref 0.5–1.3)
GLUCOSE SERPL-MCNC: 117 MG/DL — HIGH (ref 70–99)
HCT VFR BLD CALC: 36.7 % — LOW (ref 39–50)
HGB BLD-MCNC: 12 G/DL — LOW (ref 13–17)
MAGNESIUM SERPL-MCNC: 2.6 MG/DL — SIGNIFICANT CHANGE UP (ref 1.6–2.6)
MCHC RBC-ENTMCNC: 31.8 PG — SIGNIFICANT CHANGE UP (ref 27–34)
MCHC RBC-ENTMCNC: 32.7 GM/DL — SIGNIFICANT CHANGE UP (ref 32–36)
MCV RBC AUTO: 97.3 FL — SIGNIFICANT CHANGE UP (ref 80–100)
NRBC # BLD: 0 /100 WBCS — SIGNIFICANT CHANGE UP (ref 0–0)
PHOSPHATE SERPL-MCNC: 3.5 MG/DL — SIGNIFICANT CHANGE UP (ref 2.5–4.5)
PLATELET # BLD AUTO: 438 K/UL — HIGH (ref 150–400)
POTASSIUM SERPL-MCNC: 4.9 MMOL/L — SIGNIFICANT CHANGE UP (ref 3.5–5.3)
POTASSIUM SERPL-SCNC: 4.9 MMOL/L — SIGNIFICANT CHANGE UP (ref 3.5–5.3)
RBC # BLD: 3.77 M/UL — LOW (ref 4.2–5.8)
RBC # FLD: 11.8 % — SIGNIFICANT CHANGE UP (ref 10.3–14.5)
SODIUM SERPL-SCNC: 142 MMOL/L — SIGNIFICANT CHANGE UP (ref 135–145)
WBC # BLD: 6.98 K/UL — SIGNIFICANT CHANGE UP (ref 3.8–10.5)
WBC # FLD AUTO: 6.98 K/UL — SIGNIFICANT CHANGE UP (ref 3.8–10.5)

## 2019-04-28 PROCEDURE — 99233 SBSQ HOSP IP/OBS HIGH 50: CPT | Mod: 24

## 2019-04-28 RX ORDER — ACETAMINOPHEN 500 MG
1000 TABLET ORAL ONCE
Qty: 0 | Refills: 0 | Status: COMPLETED | OUTPATIENT
Start: 2019-04-28 | End: 2019-04-28

## 2019-04-28 RX ADMIN — MEROPENEM 100 MILLIGRAM(S): 1 INJECTION INTRAVENOUS at 06:53

## 2019-04-28 RX ADMIN — Medication 100 MILLIGRAM(S): at 06:52

## 2019-04-28 RX ADMIN — Medication 100 MILLIGRAM(S): at 18:25

## 2019-04-28 RX ADMIN — LEVETIRACETAM 1000 MILLIGRAM(S): 250 TABLET, FILM COATED ORAL at 06:52

## 2019-04-28 RX ADMIN — Medication 400 MILLIGRAM(S): at 10:51

## 2019-04-28 RX ADMIN — MEROPENEM 100 MILLIGRAM(S): 1 INJECTION INTRAVENOUS at 14:50

## 2019-04-28 RX ADMIN — LEVETIRACETAM 1000 MILLIGRAM(S): 250 TABLET, FILM COATED ORAL at 18:25

## 2019-04-28 RX ADMIN — Medication 100 MILLIGRAM(S): at 18:29

## 2019-04-28 RX ADMIN — Medication 1000 MILLIGRAM(S): at 11:15

## 2019-04-28 RX ADMIN — ENOXAPARIN SODIUM 40 MILLIGRAM(S): 100 INJECTION SUBCUTANEOUS at 22:01

## 2019-04-28 RX ADMIN — Medication 0.2 MILLIGRAM(S): at 22:01

## 2019-04-28 RX ADMIN — ATORVASTATIN CALCIUM 40 MILLIGRAM(S): 80 TABLET, FILM COATED ORAL at 22:01

## 2019-04-28 RX ADMIN — MEROPENEM 100 MILLIGRAM(S): 1 INJECTION INTRAVENOUS at 22:01

## 2019-04-28 NOTE — PROGRESS NOTE ADULT - SUBJECTIVE AND OBJECTIVE BOX
HPI:  History obtained by patient's girlfriend and chart from [facility], patient is unresponsive:     43 y/o male with no significant PMHx presents to [facility] with AMS, left side weakness and numbness. Per [place], [place] when he developed acute onset of left facial numbness and LUE and numbness around 6:30PM. When get got home he developed AMS, dysarthria, and weakness in the LUE and LLE. At [facility] ED patient had several episodes of emesis and was hypertensive to SBP in the 200's. Work up revealed right thalamic hemorrhage on CT head. Patient not on any anticoagulation use per girlfriend. Of note, patient took Excedrin for headache at home. Patient transferred to [facility] for further management. (06 Apr 2019 00:04)    Hospital course:   4/7: overnight, patient requiring large amount of cardene and propofol.  Plan today is to transition to precedex, start PO lisinopril and wean of cardene.  Becomes extremely agitated when off sedation.  4/8: Cerebral angio without findings of aneurysm. EVD stopped working, pulled back without improvement. CT Head performed.  4/9: Received ativan 6mg for agitation. EVD stopped draining at 6pm yesterday taken for stat CTH. Dr. So notified and EVD lowered to 5cmH2O without output.   4/10: s/p penumbra evacuation of ICH. Pt seen and examined at bedside postop. Remains intubated, on versed. ICPs stable.  (OR EBL 30cc, received LR 200cc)  4/11: JUSTIN overnight. Remains on versed. Remains intubated. Neuro stable.  4/12: Given ativan overnight, bucking the vent. Remains on versed drip. Neuro stable. ICPs stable. Increase hydralazine to 75 Q8.   4/13 JUSTIN overnight, febrile, urinary retention Straight Cath x 1 = 1L, neuro exam unchanged since yesterday, started Cardene gtt as per Dr. So goal -140  4/14 POD#4 febrile overnight, o/w JUSTIN overnight, EVD@ 5cm H2O, Clonidine started, Ceftriax until 4/19 Staph sputum, Versed/Precedex, straight cathed overnight  4/15: ICP stable. neuro stable. Remains on versed/precedex  4/16: Tolerating CPAP, CTH performed, decreased vent size, EVD raised to 10 cmH2O neuro stable   4/18:  JUSTIN overnight, EVD @15cm H2O, CPAP overnight, Ceftriax until 4/19 PNA, Precedex PRN comfort, rectal tube - diarrhea; fever spike   4/19 EVD clamped since 4/18, ICPs fluctuate, HCT today, R IJ, Ceftraix until 4/19, Versed gtt, neuro exam is improving, Duplex Renal artery and Metanephrine & Catecholamine Urine collection 24hrs for high BP work-up  4/20: EVD removed yesterday. Neuro stable. On precedex  4/21: Neuro stable.  remained on trach collar , no acute events  4/22 JUSTIN overnight, frquent suctioning q1h, on Trach collar, TFs via PEG, Ceftriax until 4/13 4/23: suctioning requirements down, JUSTIN overnight  4/24: no issues overnight. suctioning q2-4 hours, able to cough up secretions on own. Ceftriaxone switched to cefepime yesterday, final sensitivities pending. Staph aureus and enterobacter cloacae growing in sputum cultures.   Cefepime then switched to meropenum due to sensitivities and growing MSSA in sputum  4/26: JUSTIN overnight, neuro stable  4/27: JUSTIN overnight.  4/28: JUSTIN overnight. Neuro stable.    Vital Signs Last 24 Hrs  T(C): 36.8 (28 Apr 2019 05:05), Max: 37 (27 Apr 2019 19:31)  T(F): 98.2 (28 Apr 2019 05:05), Max: 98.6 (27 Apr 2019 19:31)  HR: 90 (28 Apr 2019 04:00) (72 - 92)  BP: 121/78 (28 Apr 2019 04:00) (92/56 - 129/77)  BP(mean): 95 (28 Apr 2019 04:00) (69 - 96)  RR: 20 (28 Apr 2019 04:00) (18 - 20)  SpO2: 99% (28 Apr 2019 04:00) (93% - 100%)    I&O's Summary    26 Apr 2019 07:01  -  27 Apr 2019 07:00  --------------------------------------------------------  IN: 800 mL / OUT: 850 mL / NET: -50 mL    27 Apr 2019 07:01  -  28 Apr 2019 06:06  --------------------------------------------------------  IN: 0 mL / OUT: 1600 mL / NET: -1600 mL    PHYSICAL EXAM:  Neurological: trach collar, able to mouth words appropriately and nods / shakes head appropriately to questions. NAD, FC on right  CN II-XII: EOMI, PERRL, left facial droop, tongue midline   Motor: Moving R side 5/5, L side w/d to noxious stimuli  SILT throughout  WWP throughout   Incision site: C/D/I, no erythema, edema or purulent drainage   Cardiovascular: regular rate rhythm  Respiratory: tolerating trach collar   Gastrointestinal: abd soft, NT, ND   Genitourinary: condom cath - voiding   Extremities: no LE edema     TUBES/LINES:  [] Becerra  [] Lumbar Drain  [] Wound Drains  [] Others    DIET:  [] NPO  [] Mechanical  [x] Tube feeds    LABS:                        11.6   6.28  )-----------( 417      ( 27 Apr 2019 06:26 )             35.2     04-27    141  |  104  |  42<H>  ----------------------------<  110<H>  5.0   |  27  |  1.03    Ca    9.6      27 Apr 2019 06:26  Phos  4.3     04-27  Mg     2.7     04-27    CAPILLARY BLOOD GLUCOSE    POCT Blood Glucose.: 107 mg/dL (27 Apr 2019 06:41)    Drug Levels: [] N/A    CSF Analysis: [] N/A    Allergies    No Known Allergies    Intolerances    MEDICATIONS:  Antibiotics:  meropenem  IVPB 1000 milliGRAM(s) IV Intermittent every 8 hours    Neuro:  acetaminophen    Suspension .. 650 milliGRAM(s) Oral every 6 hours PRN  amantadine 100 milliGRAM(s) Oral two times a day  levETIRAcetam  Solution 1000 milliGRAM(s) Enteral Tube two times a day  ondansetron Injectable 4 milliGRAM(s) IV Push every 6 hours PRN  oxyCODONE    5 mG/acetaminophen 325 mG 1 Tablet(s) Oral every 4 hours PRN  oxyCODONE    5 mG/acetaminophen 325 mG 2 Tablet(s) Oral every 6 hours PRN    Anticoagulation:  enoxaparin Injectable 40 milliGRAM(s) SubCutaneous at bedtime    OTHER:  ALBUTerol    90 MICROgram(s) HFA Inhaler 1 Puff(s) Inhalation every 4 hours  amLODIPine   Tablet 10 milliGRAM(s) Oral daily  atorvastatin 40 milliGRAM(s) Oral at bedtime  cloNIDine 0.2 milliGRAM(s) Oral every 8 hours  glucagon  Injectable 1 milliGRAM(s) IntraMuscular once PRN  hydrALAZINE 100 milliGRAM(s) Oral every 8 hours  hydrALAZINE Injectable 10 milliGRAM(s) IV Push every 4 hours PRN  influenza   Vaccine 0.5 milliLiter(s) IntraMuscular once  lisinopril 40 milliGRAM(s) Oral daily  metoprolol tartrate 100 milliGRAM(s) Oral every 12 hours  tiotropium 18 MICROgram(s) Capsule 1 Capsule(s) Inhalation daily    IVF:    CULTURES:  Culture Results:   Moderate Enterobacter cloacae complex  Moderate Staphylococcus aureus  No routine respiratory derek Isolated (04-22 @ 16:08)  Culture Results:   No growth at 5 days. (04-18 @ 15:05)    RADIOLOGY & ADDITIONAL TESTS:    ASSESSMENT:  44y Male w/ ETOH abuse now s/p right thalamic IPH with IVH s/p Emergent Rt EVD placement 4/6/19, s/p Cerebral angiogram neg 4/8/19, new left EVD placed and R EVD removed 4/9/19, now s/p penumbra evacuation of ICH 4/10/19, EVD removed 4/19/19    HEMORRHAGE STROKE  HEMORRHAGE  Handoff  MEWS Score  ICH (intracerebral hemorrhage)  ICH (intracerebral hemorrhage)  Open tracheostomy  Bronchoscopy, adult  EGD, with PEG  Craniotomy for intracranial hemorrhage  Evacuation of hematoma of face  Angiogram, carotid and cerebral, bilateral    PLAN:  - neuro checks  - vitals checks  - pain control prn percocet  - cont Keppra  - Continue lopressor 100mg BID, norvasc 10mg daily, and lisinopril 40mg daily, hydralazine 100mg Q8h, Clonidine .2 q8h  - s/p Peg, cont PEG feeds  - Meropenem for pneumonia x7 days  - DVT PROPHYLAXIS: [x] Venodynes [x] Heparin/Lovenox  - PT/OT/OOB    DISPOSITION: stepdown, full code, dispo pending    d/w Dr. So       Assessment:  Present when checked    []  GCS  E   V  M     Heart Failure: []Acute, [] acute on chronic , []chronic  Heart Failure:  [] Diastolic (HFpEF), [] Systolic (HFrEF), []Combined (HFpEF and HFrEF), [] RHF, [] Pulm HTN, [] Other    [] KRISTA, [] ATN, [] AIN, [] other  [] CKD1, [] CKD2, [] CKD 3, [] CKD 4, [] CKD 5, []ESRD    Encephalopathy: [] Metabolic, [] Hepatic, [] toxic, [x] Neurological, [] Other    Abnormal Nurtitional Status: [] malnurtition (see nutrition note), [ ]underweight: BMI < 19, [] morbid obesity: BMI >40, [] Cachexia    [] Sepsis  [] hypovolemic shock,[] cardiogenic shock, [] hemorrhagic shock, [] neuogenic shock  [] Acute Respiratory Failure  []Cerebral edema, [] Brain compression/ herniation,   [] Functional quadriplegia  [] Acute blood loss anemia

## 2019-04-28 NOTE — PROGRESS NOTE ADULT - SUBJECTIVE AND OBJECTIVE BOX
=================================  NEUROCRITICAL CARE ATTENDING NOTE  =================================    ARPAN RUANO   MRN-[id number]  Summary:  44y/M  HPI:  History obtained by patient's girlfriend and chart from [place] [facility], patient is unresponsive:     43 y/o male with no significant PMHx presents to [facility] with AMS, left side weakness and numbness. Per [place], [place] when he developed acute onset of left facial numbness and LUE and numbness around 6:30PM. When get got home he developed AMS, dysarthria, and weakness in the LUE and LLE. At [place] [facility] ED patient had several episodes of emesis and was hypertensive to SBP in the 200's. Work up revealed right thalamic hemorrhage on CT head. Patient not on any anticoagulation use per girlfriend. Of note, patient took Excedrin for headache at home. Patient transferred to [facility] for further management. (2019 00:04)      Overnight Events:  Denies HA/N/V/Dizziness.      PHYSICAL EXAMINATION  T(C): 36.7 ( @ 14:00), Max: 37 ( @ 19:31)  HR: 82 ( @ 13:56) (76 - 96)  BP: 103/71 ( @ 13:56) (92/56 - 131/88)  RR: 18 ( @ 13:56) (18 - 20)  SpO2: 98% ( @ 13:56) (98% - 100%)  CVP(mm Hg): --    LABS:  CAPILLARY BLOOD GLUCOSE      POCT Blood Glucose.: 119 mg/dL (2019 12:15)                          12.0   6.98  )-----------( 438      ( 2019 07:12 )             36.7         142  |  104  |  39<H>  ----------------------------<  117<H>  4.9   |  25  |  0.96    Ca    9.3      2019 07:12  Phos  3.5     -  Mg     2.6      @ 07:01  -   @ 07:00  --------------------------------------------------------  IN: 0 mL / OUT: 1600 mL / NET: -1600 mL        MEDICATIONS:  MEDICATIONS  (STANDING):  ALBUTerol    90 MICROgram(s) HFA Inhaler 1 Puff(s) Inhalation every 4 hours  amantadine 100 milliGRAM(s) Oral two times a day  amLODIPine   Tablet 10 milliGRAM(s) Oral daily  atorvastatin 40 milliGRAM(s) Oral at bedtime  cloNIDine 0.2 milliGRAM(s) Oral every 8 hours  enoxaparin Injectable 40 milliGRAM(s) SubCutaneous at bedtime  influenza   Vaccine 0.5 milliLiter(s) IntraMuscular once  levETIRAcetam  Solution 1000 milliGRAM(s) Enteral Tube two times a day  lisinopril 40 milliGRAM(s) Oral daily  meropenem  IVPB 1000 milliGRAM(s) IV Intermittent every 8 hours  metoprolol tartrate 100 milliGRAM(s) Oral every 12 hours  tiotropium 18 MICROgram(s) Capsule 1 Capsule(s) Inhalation daily    MEDICATIONS  (PRN):  acetaminophen    Suspension .. 650 milliGRAM(s) Oral every 6 hours PRN Temp greater or equal to 38C (100.4F), Mild Pain (1 - 3)  glucagon  Injectable 1 milliGRAM(s) IntraMuscular once PRN Glucose LESS THAN 70 milligrams/deciliter  ondansetron Injectable 4 milliGRAM(s) IV Push every 6 hours PRN Nausea and/or Vomiting  oxyCODONE    5 mG/acetaminophen 325 mG 1 Tablet(s) Oral every 4 hours PRN Moderate Pain (4 - 6)  oxyCODONE    5 mG/acetaminophen 325 mG 2 Tablet(s) Oral every 6 hours PRN Severe Pain (7 - 10)        COURSE IN THE HOSPITAL:   Admitted, given DDAVP / platelets; EVD inserted, improved   04/15 fever overnight   Tmax 38.3 remained on trach collar   No significant events overnight, remained on trach collar tmax 38.1; EVD removed   minor bleeding in gracy hole site, stapled overnight    Past Medical History:   Allergies:  No Known Allergies  Home meds:     PHYSICAL EXAMINATION    NEUROLOGIC EXAMINATION:  Patient opens eyes spontaneously, following commands, pupils 3mm reactive, no gaze preference; moves R UE/LE spontaneously, L UE extends to noxious and L LE withdraws    CARDIOVASCULAR: (+) S1 S2, normal rate and regular rhythm  PULMONARY: clear to auscultation bilaterally; copious secretions q1-2h suctioning  ABDOMEN: soft, nontender with normoactive bowel sounds; PEG site clean  EXTREMITIES: no edema  SKIN: no rash      LDL = 113 ()   HDL = 70 ()  TG = 85 ()  TSH = 0.733 ()    Bacteriology:   UA NEG   Blood CS NG2D x2   04/15 CSF NGTD   Blood CS NG5D x1 (final)   CSF NGTD  04/10 Sputum MSSA H influenza S Ceftri   sputum culture numerous MSSA   Blood CS NG5D x2 (final)    CSF studies:    L   *** [id number] WBC0 *** %N0 %L0     L3.4   *** [id number] WBC37 *** %N35 %L1

## 2019-04-29 LAB
ANION GAP SERPL CALC-SCNC: 13 MMOL/L — SIGNIFICANT CHANGE UP (ref 5–17)
BUN SERPL-MCNC: 33 MG/DL — HIGH (ref 7–23)
CALCIUM SERPL-MCNC: 9.9 MG/DL — SIGNIFICANT CHANGE UP (ref 8.4–10.5)
CHLORIDE SERPL-SCNC: 105 MMOL/L — SIGNIFICANT CHANGE UP (ref 96–108)
CO2 SERPL-SCNC: 27 MMOL/L — SIGNIFICANT CHANGE UP (ref 22–31)
CREAT SERPL-MCNC: 0.84 MG/DL — SIGNIFICANT CHANGE UP (ref 0.5–1.3)
GLUCOSE SERPL-MCNC: 132 MG/DL — HIGH (ref 70–99)
HCT VFR BLD CALC: 36.8 % — LOW (ref 39–50)
HGB BLD-MCNC: 11.9 G/DL — LOW (ref 13–17)
MAGNESIUM SERPL-MCNC: 2.5 MG/DL — SIGNIFICANT CHANGE UP (ref 1.6–2.6)
MCHC RBC-ENTMCNC: 31.2 PG — SIGNIFICANT CHANGE UP (ref 27–34)
MCHC RBC-ENTMCNC: 32.3 GM/DL — SIGNIFICANT CHANGE UP (ref 32–36)
MCV RBC AUTO: 96.3 FL — SIGNIFICANT CHANGE UP (ref 80–100)
NRBC # BLD: 0 /100 WBCS — SIGNIFICANT CHANGE UP (ref 0–0)
PHOSPHATE SERPL-MCNC: 3 MG/DL — SIGNIFICANT CHANGE UP (ref 2.5–4.5)
PLATELET # BLD AUTO: 420 K/UL — HIGH (ref 150–400)
POTASSIUM SERPL-MCNC: 4.8 MMOL/L — SIGNIFICANT CHANGE UP (ref 3.5–5.3)
POTASSIUM SERPL-SCNC: 4.8 MMOL/L — SIGNIFICANT CHANGE UP (ref 3.5–5.3)
RBC # BLD: 3.82 M/UL — LOW (ref 4.2–5.8)
RBC # FLD: 11.8 % — SIGNIFICANT CHANGE UP (ref 10.3–14.5)
SODIUM SERPL-SCNC: 145 MMOL/L — SIGNIFICANT CHANGE UP (ref 135–145)
WBC # BLD: 7.72 K/UL — SIGNIFICANT CHANGE UP (ref 3.8–10.5)
WBC # FLD AUTO: 7.72 K/UL — SIGNIFICANT CHANGE UP (ref 3.8–10.5)

## 2019-04-29 PROCEDURE — 99232 SBSQ HOSP IP/OBS MODERATE 35: CPT | Mod: 24

## 2019-04-29 RX ADMIN — Medication 650 MILLIGRAM(S): at 17:36

## 2019-04-29 RX ADMIN — Medication 650 MILLIGRAM(S): at 10:27

## 2019-04-29 RX ADMIN — ATORVASTATIN CALCIUM 40 MILLIGRAM(S): 80 TABLET, FILM COATED ORAL at 21:21

## 2019-04-29 RX ADMIN — Medication 100 MILLIGRAM(S): at 06:03

## 2019-04-29 RX ADMIN — LISINOPRIL 40 MILLIGRAM(S): 2.5 TABLET ORAL at 06:03

## 2019-04-29 RX ADMIN — LEVETIRACETAM 1000 MILLIGRAM(S): 250 TABLET, FILM COATED ORAL at 18:37

## 2019-04-29 RX ADMIN — MEROPENEM 100 MILLIGRAM(S): 1 INJECTION INTRAVENOUS at 14:39

## 2019-04-29 RX ADMIN — Medication 100 MILLIGRAM(S): at 18:37

## 2019-04-29 RX ADMIN — AMLODIPINE BESYLATE 10 MILLIGRAM(S): 2.5 TABLET ORAL at 06:04

## 2019-04-29 RX ADMIN — Medication 650 MILLIGRAM(S): at 09:22

## 2019-04-29 RX ADMIN — MEROPENEM 100 MILLIGRAM(S): 1 INJECTION INTRAVENOUS at 06:01

## 2019-04-29 RX ADMIN — MEROPENEM 100 MILLIGRAM(S): 1 INJECTION INTRAVENOUS at 21:21

## 2019-04-29 RX ADMIN — Medication 0.2 MILLIGRAM(S): at 21:22

## 2019-04-29 RX ADMIN — Medication 0.2 MILLIGRAM(S): at 06:01

## 2019-04-29 RX ADMIN — Medication 100 MILLIGRAM(S): at 18:38

## 2019-04-29 RX ADMIN — LEVETIRACETAM 1000 MILLIGRAM(S): 250 TABLET, FILM COATED ORAL at 06:03

## 2019-04-29 RX ADMIN — Medication 650 MILLIGRAM(S): at 23:39

## 2019-04-29 RX ADMIN — ENOXAPARIN SODIUM 40 MILLIGRAM(S): 100 INJECTION SUBCUTANEOUS at 21:23

## 2019-04-29 RX ADMIN — Medication 650 MILLIGRAM(S): at 16:50

## 2019-04-29 NOTE — CHART NOTE - NSCHARTNOTEFT_GEN_A_CORE
Admitting Diagnosis:   Patient is a 44y old  Male who presents with a chief complaint of right thalamic hemorrhage (29 Apr 2019 00:59)      PAST MEDICAL & SURGICAL HISTORY:      Current Nutrition Order: Jevity 1.2 via PEG @65ml/hr x24hrs (providing 1560ml TV, 1872kcal, 86gm pro, 1259ml water)    PO Intake: NPO +EN    GI Issues: No apparent GI distress, last BM 4/26 per EMR.  No TF residuals noted.    Pain: Unable to assess at this time    Skin Integrity: no edema, +trach, +PEG    Labs: POCT: 4/28: 119, 4/27: 107, 4/26: 121-137  04-29    145  |  105  |  33<H>  ----------------------------<  132<H>  4.8   |  27  |  0.84    Ca    9.9      29 Apr 2019 05:44  Phos  3.0     04-29  Mg     2.5     04-29      CAPILLARY BLOOD GLUCOSE          Medications:  MEDICATIONS  (STANDING):  ALBUTerol    90 MICROgram(s) HFA Inhaler 1 Puff(s) Inhalation every 4 hours  amantadine 100 milliGRAM(s) Oral two times a day  amLODIPine   Tablet 10 milliGRAM(s) Oral daily  atorvastatin 40 milliGRAM(s) Oral at bedtime  cloNIDine 0.2 milliGRAM(s) Oral every 8 hours  enoxaparin Injectable 40 milliGRAM(s) SubCutaneous at bedtime  influenza   Vaccine 0.5 milliLiter(s) IntraMuscular once  levETIRAcetam  Solution 1000 milliGRAM(s) Enteral Tube two times a day  lisinopril 40 milliGRAM(s) Oral daily  meropenem  IVPB 1000 milliGRAM(s) IV Intermittent every 8 hours  metoprolol tartrate 100 milliGRAM(s) Oral every 12 hours  tiotropium 18 MICROgram(s) Capsule 1 Capsule(s) Inhalation daily    MEDICATIONS  (PRN):  acetaminophen    Suspension .. 650 milliGRAM(s) Oral every 6 hours PRN Temp greater or equal to 38C (100.4F), Mild Pain (1 - 3)  glucagon  Injectable 1 milliGRAM(s) IntraMuscular once PRN Glucose LESS THAN 70 milligrams/deciliter  ondansetron Injectable 4 milliGRAM(s) IV Push every 6 hours PRN Nausea and/or Vomiting  oxyCODONE    5 mG/acetaminophen 325 mG 1 Tablet(s) Oral every 4 hours PRN Moderate Pain (4 - 6)  oxyCODONE    5 mG/acetaminophen 325 mG 2 Tablet(s) Oral every 6 hours PRN Severe Pain (7 - 10)      Weight:  Admit wt- 72kg    Weight Change: No new wts to assess. Request obtain updated wt; wts 3x/week.     Nutrition Focused Physical Exam: Completed [   ]  Not Pertinent [  x ]    Estimated energy needs:   Ht: 5'9, Wt: 72kg, IBW: 72.7kg, 99%IBW.   ABW (72kg) used for calculations as pt between % of IBW. Needs estimated for maintenance in adults; needs adjusted for post-op, wound healing.  Calories: 25-30 kcal/kg = 7512-4046 kcal/day  Protein: 1.0-1.2 g/kg = 72-86g protein/day  Fluids: 25-30 mL/kg = 7156-1445 mL/day    Subjective:   Pt seen for follow up. 43yo M w/ ETOH abuse now s/p right thalamic IPH with IVH s/p Emergent Rt EVD placement 4/6/19, s/p Cerebral angiogram neg 4/8/19, new left EVD placed and R EVD removed 4/9/19, now s/p penumbra evacuation of ICH 4/10/19. S/P tracheostomy, bronchoscopy, and EGD w/ PEG 4/15. EVD removed on 4/20. Currently being treated for MSSA PNA. Pt seen resting in bed, trached, non verbal per EMR, discussed pt w/ RN. Pt is on Jevity 1.2 via PEG @65ml/hr x24hrs, per RN pt tolerating TF well w/ minimal residuals and no GI distress, currently running at goal. Elevated POCT noted, continue to trend, consider change formula to Glucerna 1.2 in setting of persistent hyperglycemia for better glucose control (see recs below). RD to follow up per High Risk protocol.     Previous Nutrition Diagnosis:  Increased protein needs RT increased demand for nutrients AEB pt vented    Active [  x ]  Resolved [   ]    If resolved, new PES:     Goal:  Pt to meet >75% EER via EN.    Recommendations:  1. Recommend continue current EN order: Jevity 1.2 Calin @ 65mL/hr x 24hrs via PEG (providing 1560ml TV, 1872kcal, 86gm pro, 1259ml water). Additional fluids per team. Monitor for s/s intolerance; maintain aspiration precautions at all times.  2. POCT BG Q6hrs  3. If pt w/ persistent hyperglycemia, consider changing EN order to: Glucerna 1.2 via PEG @ 65ml/hr x24hrs (providing 1560ml TV, 1872kcal, 94gms pro, 1256ml water). Additional fluids per team.   4. Weekly weights       Education: N/A, pt non verbal per EMR     Risk Level: High [ x  ] Moderate [   ] Low [   ]

## 2019-04-29 NOTE — PROGRESS NOTE ADULT - SUBJECTIVE AND OBJECTIVE BOX
=================================  NEUROCRITICAL CARE ATTENDING NOTE  =================================    ARPAN RUANO   MRN-[id number]  Summary:  44y/M with no PMH presented with L UE weakness / numbness, onset at 630 p.m.; then developed dysarthria, L UE/LE weakness.  He was brought to [place] ED, n/v, SBP 200s, more altered, intubated.  Imaging showed R thalamic hemorrhage.  Transferred to [facility].    COURSE IN THE HOSPITAL:   Admitted, given DDAVP / platelets; EVD inserted, improved   04/15 fever overnight   Tmax 38.3 remained on trach collar   No significant events overnight, remained on trach collar tmax 38.1; EVD removed   minor bleeding in gracy hole site, stapled overnight   to  Stepdown to 8La   No significant events overnight    Past Medical History:   Allergies:  No Known Allergies  Home meds:     PHYSICAL EXAMINATION  T(C): 36.2 ( @ 13:00), Max: 37.1 ( @ 04:21) HR: 80 ( @ 11:54) (73 - 98) BP: 98/66 ( @ 11:54) (98/66 - 138/93) RR: 16 ( @ 11:54) (16 - 18) SpO2: 98% ( @ 11:54) (95% - 100%)   NEUROLOGIC EXAMINATION:  Patient opens eyes spontaneously, follows commands, pupils 3mm reactive, no gaze preference; moves R UE/LE spontaneously, L UE / LE withdraws to noxious stimuli  GENERAL: trach collar 40%  EENT:  anicteric  CARDIOVASCULAR: (+) S1 S2, normal rate and regular rhythm  PULMONARY: clear to auscultation bilaterally; copious secretions q1-2h suctioning  ABDOMEN: soft, nontender with normoactive bowel sounds; PEG site clean  EXTREMITIES: no edema  SKIN: no rash    LABS:             11.9   7.72  )-----------( 420      ( 2019 05:44 )             36.8     145  |  105  |  33<H>  ----------------------------<  132<H>  4.8   |  27  |  0.84    Ca    9.9      2019 05:44  Phos  3.0       Mg     2.5      @ 07:01  -   @ 07:00  IN: 780 mL / OUT: 1375 mL / NET: -595 mL    LDL = 113 ()   HDL = 70 ()  TG = 85 ()  TSH = 0.733 ()    Bacteriology:   UA NEG   sputum enterobacter cloacae, MSSA   Blood CS NG2D x2   04/15 CSF NGTD   Blood CS NG5D x1 (final)   CSF NGTD  04/10 Sputum MSSA H influenza S Ceftri   sputum culture numerous MSSA   Blood CS NG5D x2 (final)    CSF studies:    L   *** [id number] WBC0 *** %N0 %L0     L3.4   *** [id number] WBC37 *** %N35 %L1     EEG:  Neuroimagin/19 CT head: L frontal EVD, no change in size, no new foci of hemorrhage resolving hemorrhages, IVH unchanged    12:48 p.m. CT head:  s/p EVD, decrease in size of ventricles, R ICH unchanged with IV extension, punctate hyperdense R BG R conner R thalamus, unchanged,    2:11 a.m. CT head:  large R basal ganglia / thalamic ICH, small L basal ganglia / R pontine acute hemorrhage; c/w hypertensive ICH; extension into R lateral ventricle; MLS, mass effect, no HCP  Other imagin/23 LE Doppler: NEG   KUB ultrasound: normal renal ultrasound, no significant stenosis    MEDICATIONS: SQL 40 HS meropenem 1g IV q8h amantadine 100mg PO BID levetiracetam 1G PO BID amlodipine 10mg PO daily clonidine 0.2mg PO q8h lisinopril 40mg PO daily metoprolol 100mg PO q12h albuterol q4h tioptropium 18 ug inhalation daily atorvastatin 40mg PO HS percocet PRN      IV FLUIDS: IVL  DRIPS:    DIET: Jevity at 67cc/hr  Lines: [place]   Drains:     Wounds:    CODE STATUS:  Full Code                       GOALS OF CARE:  aggressive                      DISPOSITION:  8La  ICH Score on admission  = GCS Score: 3-4 (2 pts) E1VTM2 (+) IVH = 3  ICH volume 20ml  Estimated 30-day mortality 3 points: 72%3

## 2019-04-29 NOTE — PROGRESS NOTE ADULT - ASSESSMENT
44y/M with   1.  intracerebral hemorrhage (R basal ganglia / thalamic; small L basal ganglia, R pontine), brain compression, cerebral edema; s/p R crani, endoscopic evacuation of ICH (04/10/2019, Dr. So)  2.  dyslipidemia    PLAN:   NEURO: neurochecks q4h, PRN pain meds with Tylenol, peroccet  ICH:  BP control; no aneurysm on formal angio (04/08)  seizure prophylaxis: as per neurosurgery  EVD: removed   REHAB:  physical therapy evaluation and management    EARLY MOB:   HOB up     PULM:  chest PT, pulmo toilette  CARDIO:  SBP goal 100-160mm Hg, off hydralazine, decreased doses of antihypertensives  ENDO:  Blood sugar goals 140-180 mg/dL, continue insulin sliding scale; atorvastatin 40mg PO daily, A1C  4.9  GI:  docusate senna  DIET: Jevity continue tube feeds  RENAL:  Na goal 135-145   HEM/ONC: Hb stable; given DDAVP and platelet transfusion for aspirin reversal  VTE Prophylaxis: SCDs, SQL  ID: afebrile, no leukocytosis; meropenem for PNA? (enterobacter cloacae + MSSA on sputum culture) - complete 7 days (last day 05/01)  Social: will update family; sister freeman (surrogate decision maker), father, common law wife who is pregnant    ATTENDING ATTESTATION:  I was physically present for the key portions of the evaluation and management (E/M) service provided.  I agree with the above history, physical and plan, which I have reviewed and edited where appropriate.    Patient at high risk for neurological deterioration or death due to:  ICU delirium, aspiration PNA, DVT / PE.  Critical care time, excluding procedures: 60 minutes spent on total encounter, more than 50% of the visit was spent counseling and/or coordinating care by the attending physician.     Plan discussed with RN, house staff.

## 2019-04-30 LAB
ANION GAP SERPL CALC-SCNC: 11 MMOL/L — SIGNIFICANT CHANGE UP (ref 5–17)
BUN SERPL-MCNC: 33 MG/DL — HIGH (ref 7–23)
CALCIUM SERPL-MCNC: 9.7 MG/DL — SIGNIFICANT CHANGE UP (ref 8.4–10.5)
CHLORIDE SERPL-SCNC: 106 MMOL/L — SIGNIFICANT CHANGE UP (ref 96–108)
CO2 SERPL-SCNC: 26 MMOL/L — SIGNIFICANT CHANGE UP (ref 22–31)
CREAT SERPL-MCNC: 0.89 MG/DL — SIGNIFICANT CHANGE UP (ref 0.5–1.3)
CULTURE RESULTS: NO GROWTH — SIGNIFICANT CHANGE UP
GLUCOSE SERPL-MCNC: 116 MG/DL — HIGH (ref 70–99)
HCT VFR BLD CALC: 37.8 % — LOW (ref 39–50)
HGB BLD-MCNC: 12.4 G/DL — LOW (ref 13–17)
MAGNESIUM SERPL-MCNC: 2.5 MG/DL — SIGNIFICANT CHANGE UP (ref 1.6–2.6)
MCHC RBC-ENTMCNC: 31.6 PG — SIGNIFICANT CHANGE UP (ref 27–34)
MCHC RBC-ENTMCNC: 32.8 GM/DL — SIGNIFICANT CHANGE UP (ref 32–36)
MCV RBC AUTO: 96.2 FL — SIGNIFICANT CHANGE UP (ref 80–100)
NRBC # BLD: 0 /100 WBCS — SIGNIFICANT CHANGE UP (ref 0–0)
PHOSPHATE SERPL-MCNC: 3.1 MG/DL — SIGNIFICANT CHANGE UP (ref 2.5–4.5)
PLATELET # BLD AUTO: 402 K/UL — HIGH (ref 150–400)
POTASSIUM SERPL-MCNC: 4.8 MMOL/L — SIGNIFICANT CHANGE UP (ref 3.5–5.3)
POTASSIUM SERPL-SCNC: 4.8 MMOL/L — SIGNIFICANT CHANGE UP (ref 3.5–5.3)
RBC # BLD: 3.93 M/UL — LOW (ref 4.2–5.8)
RBC # FLD: 11.6 % — SIGNIFICANT CHANGE UP (ref 10.3–14.5)
SODIUM SERPL-SCNC: 143 MMOL/L — SIGNIFICANT CHANGE UP (ref 135–145)
SPECIMEN SOURCE: SIGNIFICANT CHANGE UP
WBC # BLD: 9.13 K/UL — SIGNIFICANT CHANGE UP (ref 3.8–10.5)
WBC # FLD AUTO: 9.13 K/UL — SIGNIFICANT CHANGE UP (ref 3.8–10.5)

## 2019-04-30 RX ADMIN — ENOXAPARIN SODIUM 40 MILLIGRAM(S): 100 INJECTION SUBCUTANEOUS at 21:40

## 2019-04-30 RX ADMIN — LISINOPRIL 40 MILLIGRAM(S): 2.5 TABLET ORAL at 06:27

## 2019-04-30 RX ADMIN — LEVETIRACETAM 1000 MILLIGRAM(S): 250 TABLET, FILM COATED ORAL at 06:28

## 2019-04-30 RX ADMIN — Medication 100 MILLIGRAM(S): at 17:33

## 2019-04-30 RX ADMIN — Medication 100 MILLIGRAM(S): at 06:28

## 2019-04-30 RX ADMIN — Medication 100 MILLIGRAM(S): at 06:27

## 2019-04-30 RX ADMIN — AMLODIPINE BESYLATE 10 MILLIGRAM(S): 2.5 TABLET ORAL at 06:27

## 2019-04-30 RX ADMIN — MEROPENEM 100 MILLIGRAM(S): 1 INJECTION INTRAVENOUS at 22:40

## 2019-04-30 RX ADMIN — MEROPENEM 100 MILLIGRAM(S): 1 INJECTION INTRAVENOUS at 13:27

## 2019-04-30 RX ADMIN — Medication 650 MILLIGRAM(S): at 13:27

## 2019-04-30 RX ADMIN — Medication 650 MILLIGRAM(S): at 14:00

## 2019-04-30 RX ADMIN — MEROPENEM 100 MILLIGRAM(S): 1 INJECTION INTRAVENOUS at 06:27

## 2019-04-30 RX ADMIN — ATORVASTATIN CALCIUM 40 MILLIGRAM(S): 80 TABLET, FILM COATED ORAL at 21:50

## 2019-04-30 RX ADMIN — Medication 0.2 MILLIGRAM(S): at 06:27

## 2019-04-30 RX ADMIN — LEVETIRACETAM 1000 MILLIGRAM(S): 250 TABLET, FILM COATED ORAL at 17:32

## 2019-04-30 RX ADMIN — Medication 0.2 MILLIGRAM(S): at 13:27

## 2019-04-30 NOTE — PROGRESS NOTE ADULT - SUBJECTIVE AND OBJECTIVE BOX
HPI:  History obtained by patient's girlfriend and chart from [place], patient is unresponsive:     45 y/o male with no significant PMHx presents to [place] with AMS, left side weakness and numbness. Per girlfriend, [place] from work when he developed acute onset of left facial numbness and LUE and numbness around 6:30PM. When get got home he developed AMS, dysarthria, and weakness in the LUE and LLE. At [place] ED patient had several episodes of emesis and was hypertensive to SBP in the 200's. Work up revealed right thalamic hemorrhage on CT head. Patient not on any anticoagulation use per girlfriend. Of note, patient took Excedrin for headache at home. Patient transferred to [facility] for further management. (06 Apr 2019 00:04)    OVERNIGHT EVENTS:  Vital Signs Last 24 Hrs  T(C): 37.6 (30 Apr 2019 09:50), Max: 37.6 (30 Apr 2019 09:50)  T(F): 99.6 (30 Apr 2019 09:50), Max: 99.6 (30 Apr 2019 09:50)  HR: 86 (30 Apr 2019 08:32) (74 - 92)  BP: 93/66 (30 Apr 2019 08:32) (93/66 - 133/80)  BP(mean): 75 (30 Apr 2019 08:32) (75 - 100)  RR: 18 (30 Apr 2019 08:32) (16 - 24)  SpO2: 97% (30 Apr 2019 08:32) (95% - 98%)    I&O's Summary    29 Apr 2019 07:01  -  30 Apr 2019 07:00  --------------------------------------------------------  IN: 1130 mL / OUT: 600 mL / NET: 530 mL    30 Apr 2019 07:01  -  30 Apr 2019 12:20  --------------------------------------------------------  IN: 260 mL / OUT: 0 mL / NET: 260 mL    Hospital course:   4/7: overnight, patient requiring large amount of cardene and propofol.  Plan today is to transition to precedex, start PO lisinopril and wean of cardene.  Becomes extremely agitated when off sedation.  4/8: Cerebral angio without findings of aneurysm. EVD stopped working, pulled back without improvement. CT Head performed.  4/9: Received ativan 6mg for agitation. EVD stopped draining at 6pm yesterday taken for stat CTH. Dr. So notified and EVD lowered to 5cmH2O without output.   4/10: s/p penumbra evacuation of ICH. Pt seen and examined at bedside postop. Remains intubated, on versed. ICPs stable.  (OR EBL 30cc, received LR 200cc)  4/11: JUSTIN overnight. Remains on versed. Remains intubated. Neuro stable.  4/12: Given ativan overnight, bucking the vent. Remains on versed drip. Neuro stable. ICPs stable. Increase hydralazine to 75 Q8.   4/13 JUSTIN overnight, febrile, urinary retention Straight Cath x 1 = 1L, neuro exam unchanged since yesterday, started Cardene gtt as per Dr. So goal -140  4/14 POD#4 febrile overnight, o/w JUSTIN overnight, EVD@ 5cm H2O, Clonidine started, Ceftriax until 4/19 Staph sputum, Versed/Precedex, straight cathed overnight  4/15: ICP stable. neuro stable. Remains on versed/precedex  4/16: Tolerating CPAP, CTH performed, decreased vent size, EVD raised to 10 cmH2O neuro stable   4/18:  JUSTIN overnight, EVD @15cm H2O, CPAP overnight, Ceftriax until 4/19 PNA, Precedex PRN comfort, rectal tube - diarrhea; fever spike   4/19 EVD clamped since 4/18, ICPs fluctuate, HCT today, R IJ, Ceftraix until 4/19, Versed gtt, neuro exam is improving, Duplex Renal artery and Metanephrine & Catecholamine Urine collection 24hrs for high BP work-up  4/20: EVD removed yesterday. Neuro stable. On precedex  4/21: Neuro stable.  remained on trach collar , no acute events  4/22 JUSTIN overnight, frquent suctioning q1h, on Trach collar, TFs via PEG, Ceftriax until 4/13 4/23: suctioning requirements down, JUSTIN overnight  4/24: no issues overnight. suctioning q2-4 hours, able to cough up secretions on own. Ceftriaxone switched to cefepime yesterday, final sensitivities pending. Staph aureus and enterobacter cloacae growing in sputum cultures.   Cefepime then switched to meropenum due to sensitivities and growing MSSA in sputum  4/26: JUSTIN overnight, neuro stable  4/27: JUSTIN overnight.  4/28: JUSTIN overnight. Neuro stable.  4/29: JUSTIN. Needs re-auth for AR due to abx changed.  4/30 uneventful night        PHYSICAL EXAM:  Neurological: trach collar, able to mouth words appropriately and nods / shakes head appropriately to questions. NAD, FC on right  CN II-XII: EOMI, PERRL, left facial droop, tongue midline   Motor: Moving R side 5/5, L side w/d to noxious stimuli  SILT throughout  Incision site: C/D/I, no erythema, edema or purulent drainage, flap sunken.  Cardiovascular: regular rate rhythm  Respiratory: tolerating trach collar   Gastrointestinal: abd soft, NT, ND   Genitourinary: condom cath - voiding   Extremities: no LE edema       LABS:                        12.4   9.13  )-----------( 402      ( 30 Apr 2019 06:27 )             37.8     04-30    143  |  106  |  33<H>  ----------------------------<  116<H>  4.8   |  26  |  0.89    Ca    9.7      30 Apr 2019 06:27  Phos  3.1     04-30  Mg     2.5     04-30        CAPILLARY BLOOD GLUCOSE      Drug Levels: [] N/A    CSF Analysis: [] N/A      Allergies    No Known Allergies    Intolerances      MEDICATIONS:  Antibiotics:  meropenem  IVPB 1000 milliGRAM(s) IV Intermittent every 8 hours    Neuro:  acetaminophen    Suspension .. 650 milliGRAM(s) Oral every 6 hours PRN  amantadine 100 milliGRAM(s) Oral two times a day  levETIRAcetam  Solution 1000 milliGRAM(s) Enteral Tube two times a day  ondansetron Injectable 4 milliGRAM(s) IV Push every 6 hours PRN  oxyCODONE    5 mG/acetaminophen 325 mG 1 Tablet(s) Oral every 4 hours PRN  oxyCODONE    5 mG/acetaminophen 325 mG 2 Tablet(s) Oral every 6 hours PRN    Anticoagulation:  enoxaparin Injectable 40 milliGRAM(s) SubCutaneous at bedtime    OTHER:  ALBUTerol    90 MICROgram(s) HFA Inhaler 1 Puff(s) Inhalation every 4 hours  amLODIPine   Tablet 10 milliGRAM(s) Oral daily  atorvastatin 40 milliGRAM(s) Oral at bedtime  cloNIDine 0.2 milliGRAM(s) Oral every 8 hours  glucagon  Injectable 1 milliGRAM(s) IntraMuscular once PRN  influenza   Vaccine 0.5 milliLiter(s) IntraMuscular once  lisinopril 40 milliGRAM(s) Oral daily  metoprolol tartrate 100 milliGRAM(s) Oral every 12 hours  tiotropium 18 MICROgram(s) Capsule 1 Capsule(s) Inhalation daily    IVF:    CULTURES:  Culture Results:   Moderate Enterobacter cloacae complex  Moderate Staphylococcus aureus  No routine respiratory derek Isolated (04-22 @ 16:08)  Culture Results:   No growth at 5 days. (04-18 @ 15:05)    RADIOLOGY & ADDITIONAL TESTS:      ASSESSMENT:  44y Male w/ ETOH abuse now s/p right thalamic IPH with IVH s/p Emergent Rt EVD placement 4/6/19, s/p Cerebral angiogram neg 4/8/19, new left EVD placed and R EVD removed 4/9/19, now s/p penumbra evacuation of ICH 4/10/19, EVD removed 4/19/19.  Currently being treated for MSSA PNA    PLAN:  - neuro and vital checks  - pain control prn percocet  - cont Keppra  - Continue lopressor 100mg BID, norvasc 10mg daily, and lisinopril 40mg daily, hydralazine 100mg Q8h, Clonidine .2 q8h  - s/p Peg, cont PEG feeds  - Meropenem for pneumonia x7 days  - DVT PROPHYLAXIS: [x] Venodynes [x] Heparin/Lovenox  - PT/OT/OOB    DISPOSITION: stepdown, full code, dispo pending to AR after reauthorization    Assessment:  Present when checked    []  GCS  E   V  M     Heart Failure: []Acute, [] acute on chronic , []chronic  Heart Failure:  [] Diastolic (HFpEF), [] Systolic (HFrEF), []Combined (HFpEF and HFrEF), [] RHF, [] Pulm HTN, [] Other    [] KRISTA, [] ATN, [] AIN, [] other  [] CKD1, [] CKD2, [] CKD 3, [] CKD 4, [] CKD 5, []ESRD    Encephalopathy: [] Metabolic, [] Hepatic, [] toxic, [] Neurological, [] Other    Abnormal Nurtitional Status: [] malnurtition (see nutrition note), [ ]underweight: BMI < 19, [] morbid obesity: BMI >40, [] Cachexia    [] Sepsis  [] hypovolemic shock,[] cardiogenic shock, [] hemorrhagic shock, [] neuogenic shock  [] Acute Respiratory Failure  []Cerebral edema, [] Brain compression/ herniation,   [] Functional quadriplegia  [] Acute blood loss anemia

## 2019-05-01 RX ADMIN — ENOXAPARIN SODIUM 40 MILLIGRAM(S): 100 INJECTION SUBCUTANEOUS at 21:49

## 2019-05-01 RX ADMIN — Medication 0.2 MILLIGRAM(S): at 21:48

## 2019-05-01 RX ADMIN — Medication 100 MILLIGRAM(S): at 06:30

## 2019-05-01 RX ADMIN — LEVETIRACETAM 1000 MILLIGRAM(S): 250 TABLET, FILM COATED ORAL at 06:28

## 2019-05-01 RX ADMIN — ATORVASTATIN CALCIUM 40 MILLIGRAM(S): 80 TABLET, FILM COATED ORAL at 21:48

## 2019-05-01 RX ADMIN — Medication 0.2 MILLIGRAM(S): at 06:29

## 2019-05-01 RX ADMIN — LISINOPRIL 40 MILLIGRAM(S): 2.5 TABLET ORAL at 06:30

## 2019-05-01 RX ADMIN — Medication 100 MILLIGRAM(S): at 17:19

## 2019-05-01 RX ADMIN — Medication 0.2 MILLIGRAM(S): at 00:39

## 2019-05-01 RX ADMIN — MEROPENEM 100 MILLIGRAM(S): 1 INJECTION INTRAVENOUS at 06:28

## 2019-05-01 RX ADMIN — Medication 100 MILLIGRAM(S): at 17:20

## 2019-05-01 RX ADMIN — AMLODIPINE BESYLATE 10 MILLIGRAM(S): 2.5 TABLET ORAL at 06:44

## 2019-05-01 RX ADMIN — Medication 100 MILLIGRAM(S): at 06:29

## 2019-05-01 RX ADMIN — LEVETIRACETAM 1000 MILLIGRAM(S): 250 TABLET, FILM COATED ORAL at 17:19

## 2019-05-01 NOTE — CHART NOTE - NSCHARTNOTEFT_GEN_A_CORE
Admitting Diagnosis:   Patient is a 44y old  Male who presents with a chief complaint of right thalamic hemorrhage (01 May 2019 01:46)      PAST MEDICAL & SURGICAL HISTORY:      Current Nutrition Order:   Jevity 1.2 via PEG @65ml/hr x24hrs (1560ml TV, 1872kcal, 86g pro, 1259ml FW)    PO Intake: Good (%) [   ]  Fair (50-75%) [   ] Poor (<25%) [   ] N/A    GI Issues:   Frequent loose stool o/n    Pain:   Unable to assess    Skin Integrity:  Surgical incision  PEG placed  +trach collar  Yonathan score 13      Labs:   04-30    143  |  106  |  33<H>  ----------------------------<  116<H>  4.8   |  26  |  0.89    Ca    9.7      30 Apr 2019 06:27  Phos  3.1     04-30  Mg     2.5     04-30      CAPILLARY BLOOD GLUCOSE          Medications:  MEDICATIONS  (STANDING):  ALBUTerol    90 MICROgram(s) HFA Inhaler 1 Puff(s) Inhalation every 4 hours  amantadine 100 milliGRAM(s) Oral two times a day  amLODIPine   Tablet 10 milliGRAM(s) Oral daily  atorvastatin 40 milliGRAM(s) Oral at bedtime  cloNIDine 0.2 milliGRAM(s) Oral every 8 hours  enoxaparin Injectable 40 milliGRAM(s) SubCutaneous at bedtime  influenza   Vaccine 0.5 milliLiter(s) IntraMuscular once  levETIRAcetam  Solution 1000 milliGRAM(s) Enteral Tube two times a day  lisinopril 40 milliGRAM(s) Oral daily  metoprolol tartrate 100 milliGRAM(s) Oral every 12 hours  tiotropium 18 MICROgram(s) Capsule 1 Capsule(s) Inhalation daily    MEDICATIONS  (PRN):  acetaminophen    Suspension .. 650 milliGRAM(s) Oral every 6 hours PRN Temp greater or equal to 38C (100.4F), Mild Pain (1 - 3)  glucagon  Injectable 1 milliGRAM(s) IntraMuscular once PRN Glucose LESS THAN 70 milligrams/deciliter  ondansetron Injectable 4 milliGRAM(s) IV Push every 6 hours PRN Nausea and/or Vomiting  oxyCODONE    5 mG/acetaminophen 325 mG 1 Tablet(s) Oral every 4 hours PRN Moderate Pain (4 - 6)  oxyCODONE    5 mG/acetaminophen 325 mG 2 Tablet(s) Oral every 6 hours PRN Severe Pain (7 - 10)        Weight: 159lbs  Height: 5'9", IBW 160lbs+/-10%, %IBW 99%, BMI 23.4    Weight Change:   Unable to obtain diet/wt history prior to admission.   No new wt to assess. Please obtain updated wt to assess adequacy of feeds.    Nutrition Focused Physical Exam: Completed [   ]  Not Pertinent [  x ]    Estimated energy needs:   ABW (72kg) used for calculations as pt between % of IBW.   Needs estimated for maintenance in adults; needs adjusted for post-op, wound healing.  1800-2160kcal/day (25-30kcal/kg)  72-86g pro/day (1-1.2g/kg)  1800-2160ml fluid/day (25-30ml/kg)    Subjective:   45yo M s/p  right thalamic IPH with IVH s/p Emergent Rt EVD placement 4/6/19, s/p Cerebral angiogram neg 4/8/19, new left EVD placed and R EVD removed 4/9/19, now s/p penumbra evacuation of ICH 4/10/19, EVD removed 4/19/19.  Currently being treated for MSSA PNA Pt seen resting in bed, awake but not communicative. TF running at goal rate. Currently ordered for Jevity 1.2 via PEG @65ml/hr x24hrs. Pt had experienced frequent loose stool overnight, no more BMs this am per RN. Do not suspect EN intolerance as pt has been on this formula for >1 week now w/ good tolerance. Pt unable to state N/V or discomfort w/ feeds. If diarrhea persists consider switching to more peptide based formula. RD to follow.     Previous Nutrition Diagnosis:  Increased protein needs RT increased demand for nutrients AEB pt vented  Active [   ]  Resolved [ x  ]    If resolved, new PES:   inadequate oral intake RT unable to take PO 2/2 AMS AEB EN dependant    Goal:  Pt to meet >75% EER via EN.    Recommendations:  1. Recommend continue current EN order: Jevity 1.2 Calin @ 65mL/hr x 24hrs via PEG (1560ml TV, 1872kcal, 86g pro, 1259ml FW). Additional fluids per team. Monitor for s/s intolerance; maintain aspiration precautions at all times.  2. Monitor for further s/s GI distress  3. If diarrhea persists consider more peptide based formula  4. Additional fluids per team  5. Obtain updated weight to assess adequacy of feeds    Education: N/A 2/2 AMS    Risk Level: High [ x  ] Moderate [   ] Low [   ].

## 2019-05-01 NOTE — PROGRESS NOTE ADULT - SUBJECTIVE AND OBJECTIVE BOX
HPI:  History obtained by patient's girlfriend and chart from [facility], patient is unresponsive:     43 y/o male with no significant PMHx presents to [facility] with AMS, left side weakness and numbness. Per girlfriend, [place] work when he developed acute onset of left facial numbness and LUE and numbness around 6:30PM. When get got home he developed AMS, dysarthria, and weakness in the LUE and LLE. At [facility] ED patient had several episodes of emesis and was hypertensive to SBP in the 200's. Work up revealed right thalamic hemorrhage on CT head. Patient not on any anticoagulation use per girlfriend. Of note, patient took Excedrin for headache at home. Patient transferred to [facility] for further management. (06 Apr 2019 00:04)    Hospital course:   4/7: overnight, patient requiring large amount of cardene and propofol.  Plan today is to transition to precedex, start PO lisinopril and wean of cardene.  Becomes extremely agitated when off sedation.  4/8: Cerebral angio without findings of aneurysm. EVD stopped working, pulled back without improvement. CT Head performed.  4/9: Received ativan 6mg for agitation. EVD stopped draining at 6pm yesterday taken for stat CTH. Dr. So notified and EVD lowered to 5cmH2O without output.   4/10: s/p penumbra evacuation of ICH. Pt seen and examined at bedside postop. Remains intubated, on versed. ICPs stable.  (OR EBL 30cc, received LR 200cc)  4/11: JUSTIN overnight. Remains on versed. Remains intubated. Neuro stable.  4/12: Given ativan overnight, bucking the vent. Remains on versed drip. Neuro stable. ICPs stable. Increase hydralazine to 75 Q8.   4/13 JUSTIN overnight, febrile, urinary retention Straight Cath x 1 = 1L, neuro exam unchanged since yesterday, started Cardene gtt as per Dr. So goal -140  4/14 POD#4 febrile overnight, o/w JUSTIN overnight, EVD@ 5cm H2O, Clonidine started, Ceftriax until 4/19 Staph sputum, Versed/Precedex, straight cathed overnight  4/15: ICP stable. neuro stable. Remains on versed/precedex  4/16: Tolerating CPAP, CTH performed, decreased vent size, EVD raised to 10 cmH2O neuro stable   4/18:  JUSTIN overnight, EVD @15cm H2O, CPAP overnight, Ceftriax until 4/19 PNA, Precedex PRN comfort, rectal tube - diarrhea; fever spike   4/19 EVD clamped since 4/18, ICPs fluctuate, HCT today, R IJ, Ceftraix until 4/19, Versed gtt, neuro exam is improving, Duplex Renal artery and Metanephrine & Catecholamine Urine collection 24hrs for high BP work-up  4/20: EVD removed yesterday. Neuro stable. On precedex  4/21: Neuro stable.  remained on trach collar , no acute events  4/22 JUSTIN overnight, frquent suctioning q1h, on Trach collar, TFs via PEG, Ceftriax until 4/13 4/23: suctioning requirements down, JUSTIN overnight  4/24: no issues overnight. suctioning q2-4 hours, able to cough up secretions on own. Ceftriaxone switched to cefepime yesterday, final sensitivities pending. Staph aureus and enterobacter cloacae growing in sputum cultures.   Cefepime then switched to meropenum due to sensitivities and growing MSSA in sputum  4/26: JUSTIN overnight, neuro stable  4/27: JUSTIN overnight.  4/28: JUSTIN overnight. Neuro stable.  4/29: JUSTIN. Needs re-auth for AR due to abx changed.  4/30 uneventful night  5/01: Frequent loose stool overnight.    OVERNIGHT EVENTS: loose stools  Vital Signs Last 24 Hrs  T(C): 36.2 (30 Apr 2019 21:00), Max: 37.6 (30 Apr 2019 09:50)  T(F): 97.2 (30 Apr 2019 21:00), Max: 99.6 (30 Apr 2019 09:50)  HR: 74 (30 Apr 2019 21:00) (74 - 88)  BP: 122/86 (30 Apr 2019 21:00) (93/66 - 122/86)  BP(mean): 98 (30 Apr 2019 21:00) (75 - 98)  RR: 18 (30 Apr 2019 21:00) (16 - 20)  SpO2: 99% (30 Apr 2019 21:00) (93% - 99%)    I&O's Summary    29 Apr 2019 07:01  -  30 Apr 2019 07:00  --------------------------------------------------------  IN: 1130 mL / OUT: 600 mL / NET: 530 mL    30 Apr 2019 07:01  -  01 May 2019 01:46  --------------------------------------------------------  IN: 1310 mL / OUT: 425 mL / NET: 885 mL        PHYSICAL EXAM:  Neurological: trach collar, able to speak at a times and nods / shakes head appropriately to questions. NAD, FC on right  CN II-XII: EOMI, PERRL, left facial droop, tongue midline   Motor: Moving R side 5/5, L side w/d to noxious stimuli  SILT throughout  Incision site: C/D/I, no erythema, edema or purulent drainage, flap sunken.  Cardiovascular: regular rate rhythm  Respiratory: tolerating trach collar   Gastrointestinal: abd soft, ND , PEG in place.  Genitourinary: condom cath - voiding   Extremities: no LE edema       DIET: PEG feeds    LABS:                        12.4   9.13  )-----------( 402      ( 30 Apr 2019 06:27 )             37.8     04-30    143  |  106  |  33<H>  ----------------------------<  116<H>  4.8   |  26  |  0.89    Ca    9.7      30 Apr 2019 06:27  Phos  3.1     04-30  Mg     2.5     04-30              CAPILLARY BLOOD GLUCOSE          Drug Levels: [] N/A    CSF Analysis: [] N/A      Allergies    No Known Allergies    Intolerances      MEDICATIONS:  Antibiotics:  meropenem  IVPB 1000 milliGRAM(s) IV Intermittent every 8 hours    Neuro:  acetaminophen    Suspension .. 650 milliGRAM(s) Oral every 6 hours PRN  amantadine 100 milliGRAM(s) Oral two times a day  levETIRAcetam  Solution 1000 milliGRAM(s) Enteral Tube two times a day  ondansetron Injectable 4 milliGRAM(s) IV Push every 6 hours PRN  oxyCODONE    5 mG/acetaminophen 325 mG 1 Tablet(s) Oral every 4 hours PRN  oxyCODONE    5 mG/acetaminophen 325 mG 2 Tablet(s) Oral every 6 hours PRN    Anticoagulation:  enoxaparin Injectable 40 milliGRAM(s) SubCutaneous at bedtime    OTHER:  ALBUTerol    90 MICROgram(s) HFA Inhaler 1 Puff(s) Inhalation every 4 hours  amLODIPine   Tablet 10 milliGRAM(s) Oral daily  atorvastatin 40 milliGRAM(s) Oral at bedtime  cloNIDine 0.2 milliGRAM(s) Oral every 8 hours  glucagon  Injectable 1 milliGRAM(s) IntraMuscular once PRN  influenza   Vaccine 0.5 milliLiter(s) IntraMuscular once  lisinopril 40 milliGRAM(s) Oral daily  metoprolol tartrate 100 milliGRAM(s) Oral every 12 hours  tiotropium 18 MICROgram(s) Capsule 1 Capsule(s) Inhalation daily    IVF:    CULTURES:  Culture Results:   Moderate Enterobacter cloacae complex  Moderate Staphylococcus aureus  No routine respiratory derek Isolated (04-22 @ 16:08)  Culture Results:   No growth at 5 days. (04-18 @ 15:05)    RADIOLOGY & ADDITIONAL TESTS:      ASSESSMENT:  44y Male s/p  right thalamic IPH with IVH s/p Emergent Rt EVD placement 4/6/19, s/p Cerebral angiogram neg 4/8/19, new left EVD placed and R EVD removed 4/9/19, now s/p penumbra evacuation of ICH 4/10/19, EVD removed 4/19/19.  Currently being treated for MSSA PNA      HEMORRHAGE STROKE  HEMORRHAGE  Handoff  MEWS Score  ICH (intracerebral hemorrhage)  ICH (intracerebral hemorrhage)  Open tracheostomy  Bronchoscopy, adult  EGD, with PEG  Craniotomy for intracranial hemorrhage  Evacuation of hematoma of face  Angiogram, carotid and cerebral, bilateral      PLAN:  neuro and vital checks  - pain control prn percocet  - cont Keppra  - Continue lopressor 100mg BID, norvasc 10mg daily, and lisinopril 40mg daily, hydralazine 100mg Q8h, Clonidine .2 q8h  - s/p Peg, cont PEG feeds - consider holding feeds or stop free water for loose stools  - Meropenem for pneumonia x7 days  - DVT PROPHYLAXIS: [x] Venodynes [x] Heparin/Lovenox  - PT/OT/OOB    DVT PROPHYLAXIS:  [x] Venodynes                                [x] Heparin/Lovenox    DISPOSITION: Acute rehab     Assessment:  Present when checked    []  GCS  E   V  M     Heart Failure: []Acute, [] acute on chronic , []chronic  Heart Failure:  [] Diastolic (HFpEF), [] Systolic (HFrEF), []Combined (HFpEF and HFrEF), [] RHF, [] Pulm HTN, [] Other    [] KRISTA, [] ATN, [] AIN, [] other  [] CKD1, [] CKD2, [] CKD 3, [] CKD 4, [] CKD 5, []ESRD    Encephalopathy: [] Metabolic, [] Hepatic, [] toxic, [x] Neurological, [] Other    Abnormal Nurtitional Status: [] malnurtition (see nutrition note), [ ]underweight: BMI < 19, [] morbid obesity: BMI >40, [] Cachexia    [] Sepsis  [] hypovolemic shock,[] cardiogenic shock, [] hemorrhagic shock, [] neuogenic shock  [] Acute Respiratory Failure  []Cerebral edema, [] Brain compression/ herniation,   [] Functional quadriplegia  [] Acute blood loss anemia

## 2019-05-02 LAB
ANION GAP SERPL CALC-SCNC: 12 MMOL/L — SIGNIFICANT CHANGE UP (ref 5–17)
BUN SERPL-MCNC: 34 MG/DL — HIGH (ref 7–23)
CALCIUM SERPL-MCNC: 9.8 MG/DL — SIGNIFICANT CHANGE UP (ref 8.4–10.5)
CHLORIDE SERPL-SCNC: 104 MMOL/L — SIGNIFICANT CHANGE UP (ref 96–108)
CO2 SERPL-SCNC: 26 MMOL/L — SIGNIFICANT CHANGE UP (ref 22–31)
CREAT SERPL-MCNC: 0.87 MG/DL — SIGNIFICANT CHANGE UP (ref 0.5–1.3)
GLUCOSE SERPL-MCNC: 131 MG/DL — HIGH (ref 70–99)
HCT VFR BLD CALC: 38.2 % — LOW (ref 39–50)
HGB BLD-MCNC: 12.4 G/DL — LOW (ref 13–17)
MAGNESIUM SERPL-MCNC: 2.4 MG/DL — SIGNIFICANT CHANGE UP (ref 1.6–2.6)
MCHC RBC-ENTMCNC: 31.5 PG — SIGNIFICANT CHANGE UP (ref 27–34)
MCHC RBC-ENTMCNC: 32.5 GM/DL — SIGNIFICANT CHANGE UP (ref 32–36)
MCV RBC AUTO: 97 FL — SIGNIFICANT CHANGE UP (ref 80–100)
NRBC # BLD: 0 /100 WBCS — SIGNIFICANT CHANGE UP (ref 0–0)
PHOSPHATE SERPL-MCNC: 3.4 MG/DL — SIGNIFICANT CHANGE UP (ref 2.5–4.5)
PLATELET # BLD AUTO: 391 K/UL — SIGNIFICANT CHANGE UP (ref 150–400)
POTASSIUM SERPL-MCNC: 4.6 MMOL/L — SIGNIFICANT CHANGE UP (ref 3.5–5.3)
POTASSIUM SERPL-SCNC: 4.6 MMOL/L — SIGNIFICANT CHANGE UP (ref 3.5–5.3)
RBC # BLD: 3.94 M/UL — LOW (ref 4.2–5.8)
RBC # FLD: 11.7 % — SIGNIFICANT CHANGE UP (ref 10.3–14.5)
SODIUM SERPL-SCNC: 142 MMOL/L — SIGNIFICANT CHANGE UP (ref 135–145)
WBC # BLD: 7.54 K/UL — SIGNIFICANT CHANGE UP (ref 3.8–10.5)
WBC # FLD AUTO: 7.54 K/UL — SIGNIFICANT CHANGE UP (ref 3.8–10.5)

## 2019-05-02 RX ADMIN — LEVETIRACETAM 1000 MILLIGRAM(S): 250 TABLET, FILM COATED ORAL at 18:11

## 2019-05-02 RX ADMIN — Medication 100 MILLIGRAM(S): at 18:11

## 2019-05-02 RX ADMIN — ATORVASTATIN CALCIUM 40 MILLIGRAM(S): 80 TABLET, FILM COATED ORAL at 21:20

## 2019-05-02 RX ADMIN — LEVETIRACETAM 1000 MILLIGRAM(S): 250 TABLET, FILM COATED ORAL at 06:55

## 2019-05-02 RX ADMIN — Medication 0.2 MILLIGRAM(S): at 15:29

## 2019-05-02 RX ADMIN — Medication 100 MILLIGRAM(S): at 06:56

## 2019-05-02 RX ADMIN — Medication 0.2 MILLIGRAM(S): at 06:56

## 2019-05-02 RX ADMIN — LISINOPRIL 40 MILLIGRAM(S): 2.5 TABLET ORAL at 06:56

## 2019-05-02 RX ADMIN — Medication 100 MILLIGRAM(S): at 09:02

## 2019-05-02 RX ADMIN — ENOXAPARIN SODIUM 40 MILLIGRAM(S): 100 INJECTION SUBCUTANEOUS at 21:20

## 2019-05-02 RX ADMIN — AMLODIPINE BESYLATE 10 MILLIGRAM(S): 2.5 TABLET ORAL at 06:56

## 2019-05-02 RX ADMIN — Medication 0.2 MILLIGRAM(S): at 21:20

## 2019-05-02 NOTE — PROGRESS NOTE ADULT - SUBJECTIVE AND OBJECTIVE BOX
HPI:  History obtained by patient's girlfriend and chart from [place], patient is unresponsive:     45 y/o male with no significant PMHx presents to [facility] with AMS, left side weakness and numbness. Per girlfriend, [place] when he developed acute onset of left facial numbness and LUE and numbness around 6:30PM. When get got home he developed AMS, dysarthria, and weakness in the LUE and LLE. At [place] ED patient had several episodes of emesis and was hypertensive to SBP in the 200's. Work up revealed right thalamic hemorrhage on CT head. Patient not on any anticoagulation use per girlfriend. Of note, patient took Excedrin for headache at home. Patient transferred to [facility] for further management. (06 Apr 2019 00:04)    OVERNIGHT EVENTS:  No active issues overnight reported. Patient denies any pain.    Hospital course:   4/7: overnight, patient requiring large amount of cardene and propofol.  Plan today is to transition to precedex, start PO lisinopril and wean of cardene.  Becomes extremely agitated when off sedation.  4/8: Cerebral angio without findings of aneurysm. EVD stopped working, pulled back without improvement. CT Head performed.  4/9: Received ativan 6mg for agitation. EVD stopped draining at 6pm yesterday taken for stat CTH. Dr. So notified and EVD lowered to 5cmH2O without output.   4/10: s/p penumbra evacuation of ICH. Pt seen and examined at bedside postop. Remains intubated, on versed. ICPs stable.  (OR EBL 30cc, received LR 200cc)  4/11: JUSTIN overnight. Remains on versed. Remains intubated. Neuro stable.  4/12: Given ativan overnight, bucking the vent. Remains on versed drip. Neuro stable. ICPs stable. Increase hydralazine to 75 Q8.   4/13 JUSTIN overnight, febrile, urinary retention Straight Cath x 1 = 1L, neuro exam unchanged since yesterday, started Cardene gtt as per Dr. So goal -140  4/14 POD#4 febrile overnight, o/w JUSTIN overnight, EVD@ 5cm H2O, Clonidine started, Ceftriax until 4/19 Staph sputum, Versed/Precedex, straight cathed overnight  4/15: ICP stable. neuro stable. Remains on versed/precedex  4/16: Tolerating CPAP, CTH performed, decreased vent size, EVD raised to 10 cmH2O neuro stable   4/18:  JUSTIN overnight, EVD @15cm H2O, CPAP overnight, Ceftriax until 4/19 PNA, Precedex PRN comfort, rectal tube - diarrhea; fever spike   4/19 EVD clamped since 4/18, ICPs fluctuate, HCT today, R IJ, Ceftraix until 4/19, Versed gtt, neuro exam is improving, Duplex Renal artery and Metanephrine & Catecholamine Urine collection 24hrs for high BP work-up  4/20: EVD removed yesterday. Neuro stable. On precedex  4/21: Neuro stable.  remained on trach collar , no acute events  4/22 JUSTIN overnight, frquent suctioning q1h, on Trach collar, TFs via PEG, Ceftriax until 4/13 4/23: suctioning requirements down, JUSTIN overnight  4/24: no issues overnight. suctioning q2-4 hours, able to cough up secretions on own. Ceftriaxone switched to cefepime yesterday, final sensitivities pending. Staph aureus and enterobacter cloacae growing in sputum cultures.   Cefepime then switched to meropenum due to sensitivities and growing MSSA in sputum  4/26: JUSTIN overnight, neuro stable  4/27: JUSTIN overnight.  4/28: JUSTIN overnight. Neuro stable.  4/29: JUSTIN. Needs re-auth for AR due to abx changed.  4/30 uneventful night  5/01: Frequent loose stool overnight. Pending insurance authorization of rehab placement.      Vital Signs Last 24 Hrs  T(C): 36.6 (02 May 2019 05:38), Max: 37.5 (01 May 2019 22:14)  T(F): 97.9 (02 May 2019 05:38), Max: 99.5 (01 May 2019 22:14)  HR: 96 (02 May 2019 04:29) (70 - 96)  BP: 138/91 (02 May 2019 04:29) (91/64 - 138/91)  BP(mean): 109 (02 May 2019 04:29) (74 - 109)  RR: 18 (02 May 2019 04:29) (17 - 20)  SpO2: 98% (02 May 2019 04:29) (97% - 100%)    I&O's Summary    30 Apr 2019 07:01  -  01 May 2019 07:00  --------------------------------------------------------  IN: 1935 mL / OUT: 825 mL / NET: 1110 mL    01 May 2019 07:01  -  02 May 2019 06:14  --------------------------------------------------------  IN: 845 mL / OUT: 1050 mL / NET: -205 mL        PHYSICAL EXAM:  Gen: NAD, Awake/alert and oriented.  HEENT: PERRL. EOMI. Right scalp incision healing well.  Neck: +Trach, C/D/I.  Lungs: Clear b/l  Heart: S1, S2. NSR.  Abd: Soft, NT/ND. +BS. +PEG.  Neuro: Left facial asymmetry, o/w CNs intact. Following commands. Left hemiparesis. Moving right side with good strength.      TUBES/LINES:  [] Becerra  [] Lumbar Drain  [] Wound Drains  [] Others      DIET:  [] NPO  [] Mechanical  [x] Tube feeds    LABS:                        12.4   7.54  )-----------( 391      ( 02 May 2019 05:55 )             38.2     04-30    143  |  106  |  33<H>  ----------------------------<  116<H>  4.8   |  26  |  0.89    Ca    9.7      30 Apr 2019 06:27  Phos  3.1     04-30  Mg     2.5     04-30              CAPILLARY BLOOD GLUCOSE          Drug Levels: [] N/A    CSF Analysis: [] N/A      Allergies    No Known Allergies    Intolerances      MEDICATIONS:  Antibiotics:    Neuro:  acetaminophen    Suspension .. 650 milliGRAM(s) Oral every 6 hours PRN  amantadine 100 milliGRAM(s) Oral two times a day  levETIRAcetam  Solution 1000 milliGRAM(s) Enteral Tube two times a day  ondansetron Injectable 4 milliGRAM(s) IV Push every 6 hours PRN  oxyCODONE    5 mG/acetaminophen 325 mG 1 Tablet(s) Oral every 4 hours PRN  oxyCODONE    5 mG/acetaminophen 325 mG 2 Tablet(s) Oral every 6 hours PRN    Anticoagulation:  enoxaparin Injectable 40 milliGRAM(s) SubCutaneous at bedtime    OTHER:  ALBUTerol    90 MICROgram(s) HFA Inhaler 1 Puff(s) Inhalation every 4 hours  amLODIPine   Tablet 10 milliGRAM(s) Oral daily  atorvastatin 40 milliGRAM(s) Oral at bedtime  cloNIDine 0.2 milliGRAM(s) Oral every 8 hours  glucagon  Injectable 1 milliGRAM(s) IntraMuscular once PRN  influenza   Vaccine 0.5 milliLiter(s) IntraMuscular once  lisinopril 40 milliGRAM(s) Oral daily  metoprolol tartrate 100 milliGRAM(s) Oral every 12 hours  tiotropium 18 MICROgram(s) Capsule 1 Capsule(s) Inhalation daily    IVF:    CULTURES:  Culture Results:   Moderate Enterobacter cloacae complex  Moderate Staphylococcus aureus  No routine respiratory derek Isolated (04-22 @ 16:08)  Culture Results:   No growth at 5 days. (04-18 @ 15:05)    RADIOLOGY & ADDITIONAL TESTS:      ASSESSMENT:  44y Male s/p  right thalamic IPH with IVH s/p Emergent Rt EVD placement 4/6/19, s/p Cerebral angiogram (negative) 4/8/19, new left EVD placed and R EVD removed 4/9/19, now s/p Penumbra evacuation of Rt ICH 4/10/19, EVD removed 4/19/19, s/p Trach/PEG.    PLAN:  -Pending rehab placement, awaiting insurance authorization,  -Continue neuro checks,  -Pain meds PRN,  -Continue Keppra for seizure ppx,  -SCDs/SQL, LE dopplers ordered for weekly check,  -Meropenem x7 days completed for pneumonia,  -Cont TFs via PEG  -D/w Dr. So    Assessment:  Present when checked    []  GCS  E   V  M     Heart Failure: []Acute, [] acute on chronic , []chronic  Heart Failure:  [] Diastolic (HFpEF), [] Systolic (HFrEF), []Combined (HFpEF and HFrEF), [] RHF, [] Pulm HTN, [] Other    [] KRISTA, [] ATN, [] AIN, [] other  [] CKD1, [] CKD2, [] CKD 3, [] CKD 4, [] CKD 5, []ESRD    Encephalopathy: [] Metabolic, [] Hepatic, [] toxic, [] Neurological, [] Other    Abnormal Nurtitional Status: [] malnurtition (see nutrition note), [ ]underweight: BMI < 19, [] morbid obesity: BMI >40, [] Cachexia    [] Sepsis  [] hypovolemic shock,[] cardiogenic shock, [] hemorrhagic shock, [] neuogenic shock  [] Acute Respiratory Failure  []Cerebral edema, [] Brain compression/ herniation,   [] Functional quadriplegia  [] Acute blood loss anemia

## 2019-05-03 ENCOUNTER — TRANSCRIPTION ENCOUNTER (OUTPATIENT)
Age: 44
End: 2019-05-03

## 2019-05-03 LAB
ANION GAP SERPL CALC-SCNC: 10 MMOL/L — SIGNIFICANT CHANGE UP (ref 5–17)
BUN SERPL-MCNC: 35 MG/DL — HIGH (ref 7–23)
CALCIUM SERPL-MCNC: 9.5 MG/DL — SIGNIFICANT CHANGE UP (ref 8.4–10.5)
CHLORIDE SERPL-SCNC: 107 MMOL/L — SIGNIFICANT CHANGE UP (ref 96–108)
CO2 SERPL-SCNC: 27 MMOL/L — SIGNIFICANT CHANGE UP (ref 22–31)
CREAT SERPL-MCNC: 0.87 MG/DL — SIGNIFICANT CHANGE UP (ref 0.5–1.3)
GLUCOSE SERPL-MCNC: 114 MG/DL — HIGH (ref 70–99)
HCT VFR BLD CALC: 35.7 % — LOW (ref 39–50)
HGB BLD-MCNC: 11.4 G/DL — LOW (ref 13–17)
MCHC RBC-ENTMCNC: 31.1 PG — SIGNIFICANT CHANGE UP (ref 27–34)
MCHC RBC-ENTMCNC: 31.9 GM/DL — LOW (ref 32–36)
MCV RBC AUTO: 97.3 FL — SIGNIFICANT CHANGE UP (ref 80–100)
NRBC # BLD: 0 /100 WBCS — SIGNIFICANT CHANGE UP (ref 0–0)
PLATELET # BLD AUTO: 362 K/UL — SIGNIFICANT CHANGE UP (ref 150–400)
POTASSIUM SERPL-MCNC: 4.6 MMOL/L — SIGNIFICANT CHANGE UP (ref 3.5–5.3)
POTASSIUM SERPL-SCNC: 4.6 MMOL/L — SIGNIFICANT CHANGE UP (ref 3.5–5.3)
RBC # BLD: 3.67 M/UL — LOW (ref 4.2–5.8)
RBC # FLD: 11.9 % — SIGNIFICANT CHANGE UP (ref 10.3–14.5)
SODIUM SERPL-SCNC: 144 MMOL/L — SIGNIFICANT CHANGE UP (ref 135–145)
WBC # BLD: 7.16 K/UL — SIGNIFICANT CHANGE UP (ref 3.8–10.5)
WBC # FLD AUTO: 7.16 K/UL — SIGNIFICANT CHANGE UP (ref 3.8–10.5)

## 2019-05-03 RX ORDER — LISINOPRIL 2.5 MG/1
20 TABLET ORAL DAILY
Qty: 0 | Refills: 0 | Status: DISCONTINUED | OUTPATIENT
Start: 2019-05-03 | End: 2019-05-04

## 2019-05-03 RX ORDER — QUETIAPINE FUMARATE 200 MG/1
25 TABLET, FILM COATED ORAL AT BEDTIME
Qty: 0 | Refills: 0 | Status: DISCONTINUED | OUTPATIENT
Start: 2019-05-03 | End: 2019-05-04

## 2019-05-03 RX ADMIN — Medication 100 MILLIGRAM(S): at 18:17

## 2019-05-03 RX ADMIN — Medication 0.2 MILLIGRAM(S): at 15:18

## 2019-05-03 RX ADMIN — ATORVASTATIN CALCIUM 40 MILLIGRAM(S): 80 TABLET, FILM COATED ORAL at 22:16

## 2019-05-03 RX ADMIN — Medication 100 MILLIGRAM(S): at 05:57

## 2019-05-03 RX ADMIN — LEVETIRACETAM 1000 MILLIGRAM(S): 250 TABLET, FILM COATED ORAL at 05:57

## 2019-05-03 RX ADMIN — Medication 650 MILLIGRAM(S): at 12:30

## 2019-05-03 RX ADMIN — Medication 650 MILLIGRAM(S): at 13:30

## 2019-05-03 RX ADMIN — ENOXAPARIN SODIUM 40 MILLIGRAM(S): 100 INJECTION SUBCUTANEOUS at 22:15

## 2019-05-03 RX ADMIN — LISINOPRIL 20 MILLIGRAM(S): 2.5 TABLET ORAL at 12:30

## 2019-05-03 RX ADMIN — QUETIAPINE FUMARATE 25 MILLIGRAM(S): 200 TABLET, FILM COATED ORAL at 01:13

## 2019-05-03 RX ADMIN — LEVETIRACETAM 1000 MILLIGRAM(S): 250 TABLET, FILM COATED ORAL at 18:17

## 2019-05-03 RX ADMIN — QUETIAPINE FUMARATE 25 MILLIGRAM(S): 200 TABLET, FILM COATED ORAL at 22:16

## 2019-05-03 RX ADMIN — Medication 0.2 MILLIGRAM(S): at 22:16

## 2019-05-03 NOTE — DISCHARGE NOTE PROVIDER - HOSPITAL COURSE
History of Present Illness:    Reason for Admission: right thalamic hemorrhage    History of Present Illness:     History obtained by patient's girlfriend and chart from [place], patient is unresponsive:         43 y/o male with no significant PMHx presents to [place] with AMS, left side weakness and numbness. Per girlfriend, [place] when he developed acute onset of left facial numbness and LUE and numbness around 6:30PM. When get got home he developed AMS, dysarthria, and weakness in the LUE and LLE. At [place] ED patient had several episodes of emesis and was hypertensive to SBP in the 200's. Work up revealed right thalamic hemorrhage on CT head. Patient not on any anticoagulation use per girlfriend. Of note, patient took Excedrin for headache at home. Patient transferred to [facility] for further management.         Review of Systems:    Review of Systems: as mentioned in HPI    Other Review of Systems: All other review of systems negative, except as noted in HPI            Allergies and Intolerances:          Allergies:    Hospital course:     4/7: overnight, patient requiring large amount of cardene and propofol.  Plan today is to transition to precedex, start PO lisinopril and wean of cardene.  Becomes extremely agitated when off sedation.    4/8: Cerebral angio without findings of aneurysm. EVD stopped working, pulled back without improvement. CT Head performed.    4/9: Received ativan 6mg for agitation. EVD stopped draining at 6pm yesterday taken for stat CTH. Dr. So notified and EVD lowered to 5cmH2O without output.     4/10: s/p penumbra evacuation of ICH. Pt seen and examined at bedside postop. Remains intubated, on versed. ICPs stable.  (OR EBL 30cc, received LR 200cc)    4/11: JUSTIN overnight. Remains on versed. Remains intubated. Neuro stable.    4/12: Given ativan overnight, bucking the vent. Remains on versed drip. Neuro stable. ICPs stable. Increase hydralazine to 75 Q8.     4/13 JUSTIN overnight, febrile, urinary retention Straight Cath x 1 = 1L, neuro exam unchanged since yesterday, started Cardene gtt as per Dr. So goal -140    4/14 POD#4 febrile overnight, o/w JUSTIN overnight, EVD@ 5cm H2O, Clonidine started, Ceftriax until 4/19 Staph sputum, Versed/Precedex, straight cathed overnight    4/15: ICP stable. neuro stable. Remains on versed/precedex    4/16: Tolerating CPAP, CTH performed, decreased vent size, EVD raised to 10 cmH2O neuro stable     4/18:  JUSTIN overnight, EVD @15cm H2O, CPAP overnight, Ceftriax until 4/19 PNA, Precedex PRN comfort, rectal tube - diarrhea; fever spike     4/19 EVD clamped since 4/18, ICPs fluctuate, HCT today, R IJ, Ceftraix until 4/19, Versed gtt, neuro exam is improving, Duplex Renal artery and Metanephrine & Catecholamine Urine collection 24hrs for high BP work-up    4/20: EVD removed yesterday. Neuro stable. On precedex    4/21: Neuro stable.  remained on trach collar , no acute events    4/22 JUSTIN overnight, frquent suctioning q1h, on Trach collar, TFs via PEG, Ceftriax until 4/13 4/23: suctioning requirements down, JUSTIN overnight    4/24: no issues overnight. suctioning q2-4 hours, able to cough up secretions on own. Ceftriaxone switched to cefepime yesterday, final sensitivities pending. Staph aureus and enterobacter cloacae growing in sputum cultures.   Cefepime then switched to meropenum due to sensitivities and growing MSSA in sputum    4/26: JUSTIN overnight, neuro stable    4/27: JUSTIN overnight.    4/28: JUSTIN overnight. Neuro stable.    4/29: JUSTIN. Needs re-auth for AR due to abx changed.    4/30 uneventful night    5/01: Frequent loose stool overnight. Pending insurance authorization of rehab placement.    5/2: Completed meropenem     5/3: No issues overnight. Pending LE dopplers. Poss d/c to rehab today History of Present Illness:    Reason for Admission: right thalamic hemorrhage    History of Present Illness:     History obtained by patient's girlfriend and chart from [place], patient is unresponsive:         45 y/o male with no significant PMHx presents to [place] with AMS, left side weakness and numbness. Per girlfriend, [place] when he developed acute onset of left facial numbness and LUE and numbness around 6:30PM. When get got home he developed AMS, dysarthria, and weakness in the LUE and LLE. At [place] ED patient had several episodes of emesis and was hypertensive to SBP in the 200's. Work up revealed right thalamic hemorrhage on CT head. Patient not on any anticoagulation use per girlfriend. Of note, patient took Excedrin for headache at home. Patient transferred to [facility] for further management.         Review of Systems:    Review of Systems: as mentioned in HPI    Other Review of Systems: All other review of systems negative, except as noted in HPI            Allergies and Intolerances:          Allergies:    Hospital course:     4/7: overnight, patient requiring large amount of cardene and propofol.  Plan today is to transition to precedex, start PO lisinopril and wean of cardene.  Becomes extremely agitated when off sedation.    4/8: Cerebral angio without findings of aneurysm. EVD stopped working, pulled back without improvement. CT Head performed.    4/9: Received ativan 6mg for agitation. EVD stopped draining at 6pm yesterday taken for stat CTH. Dr. So notified and EVD lowered to 5cmH2O without output.     4/10: s/p penumbra evacuation of ICH. Pt seen and examined at bedside postop. Remains intubated, on versed. ICPs stable.  (OR EBL 30cc, received LR 200cc)    4/11: JUSTIN overnight. Remains on versed. Remains intubated. Neuro stable.    4/12: Given ativan overnight, bucking the vent. Remains on versed drip. Neuro stable. ICPs stable. Increase hydralazine to 75 Q8.     4/13 JUSTIN overnight, febrile, urinary retention Straight Cath x 1 = 1L, neuro exam unchanged since yesterday, started Cardene gtt as per Dr. So goal -140    4/14 POD#4 febrile overnight, o/w JUSTIN overnight, EVD@ 5cm H2O, Clonidine started, Ceftriax until 4/19 Staph sputum, Versed/Precedex, straight cathed overnight    4/15: ICP stable. neuro stable. Remains on versed/precedex    4/16: Tolerating CPAP, CTH performed, decreased vent size, EVD raised to 10 cmH2O neuro stable     4/18:  JUSTIN overnight, EVD @15cm H2O, CPAP overnight, Ceftriax until 4/19 PNA, Precedex PRN comfort, rectal tube - diarrhea; fever spike     4/19 EVD clamped since 4/18, ICPs fluctuate, HCT today, R IJ, Ceftraix until 4/19, Versed gtt, neuro exam is improving, Duplex Renal artery and Metanephrine & Catecholamine Urine collection 24hrs for high BP work-up    4/20: EVD removed yesterday. Neuro stable. On precedex    4/21: Neuro stable.  remained on trach collar , no acute events    4/22 JUSTIN overnight, frquent suctioning q1h, on Trach collar, TFs via PEG, Ceftriax until 4/13 4/23: suctioning requirements down, JUSTIN overnight    4/24: no issues overnight. suctioning q2-4 hours, able to cough up secretions on own. Ceftriaxone switched to cefepime yesterday, final sensitivities pending. Staph aureus and enterobacter cloacae growing in sputum cultures.   Cefepime then switched to meropenum due to sensitivities and growing MSSA in sputum    4/26: JUSTIN overnight, neuro stable    4/27: JUSTIN overnight.    4/28: JUSTIN overnight. Neuro stable.    4/29: JUSTIN. Needs re-auth for AR due to abx changed.    4/30 uneventful night    5/01: Frequent loose stool overnight. Pending insurance authorization of rehab placement.    5/2: Completed meropenem     5/3: No issues overnight. Pending LE dopplers. Poss d/c to rehab today    5/4: Accepted to [facility], discharge today. Family aware.

## 2019-05-03 NOTE — PROGRESS NOTE ADULT - SUBJECTIVE AND OBJECTIVE BOX
HPI:  History obtained by patient's girlfriend and chart from [facility], patient is unresponsive:     45 y/o male with no significant PMHx presents to [facility] with AMS, left side weakness and numbness. Per girlfriend, [place] work when he developed acute onset of left facial numbness and LUE and numbness around 6:30PM. When get got home he developed AMS, dysarthria, and weakness in the LUE and LLE. At [facility] ED patient had several episodes of emesis and was hypertensive to SBP in the 200's. Work up revealed right thalamic hemorrhage on CT head. Patient not on any anticoagulation use per girlfriend. Of note, patient took Excedrin for headache at home. Patient transferred to [facility] for further management. (06 Apr 2019 00:04)      Hospital course:   4/7: overnight, patient requiring large amount of cardene and propofol.  Plan today is to transition to precedex, start PO lisinopril and wean of cardene.  Becomes extremely agitated when off sedation.  4/8: Cerebral angio without findings of aneurysm. EVD stopped working, pulled back without improvement. CT Head performed.  4/9: Received ativan 6mg for agitation. EVD stopped draining at 6pm yesterday taken for stat CTH. Dr. So notified and EVD lowered to 5cmH2O without output.   4/10: s/p penumbra evacuation of ICH. Pt seen and examined at bedside postop. Remains intubated, on versed. ICPs stable.  (OR EBL 30cc, received LR 200cc)  4/11: JUSTIN overnight. Remains on versed. Remains intubated. Neuro stable.  4/12: Given ativan overnight, bucking the vent. Remains on versed drip. Neuro stable. ICPs stable. Increase hydralazine to 75 Q8.   4/13 JUSTIN overnight, febrile, urinary retention Straight Cath x 1 = 1L, neuro exam unchanged since yesterday, started Cardene gtt as per Dr. So goal -140  4/14 POD#4 febrile overnight, o/w JUSTIN overnight, EVD@ 5cm H2O, Clonidine started, Ceftriax until 4/19 Staph sputum, Versed/Precedex, straight cathed overnight  4/15: ICP stable. neuro stable. Remains on versed/precedex  4/16: Tolerating CPAP, CTH performed, decreased vent size, EVD raised to 10 cmH2O neuro stable   4/18:  JUSTIN overnight, EVD @15cm H2O, CPAP overnight, Ceftriax until 4/19 PNA, Precedex PRN comfort, rectal tube - diarrhea; fever spike   4/19 EVD clamped since 4/18, ICPs fluctuate, HCT today, R IJ, Ceftraix until 4/19, Versed gtt, neuro exam is improving, Duplex Renal artery and Metanephrine & Catecholamine Urine collection 24hrs for high BP work-up  4/20: EVD removed yesterday. Neuro stable. On precedex  4/21: Neuro stable.  remained on trach collar , no acute events  4/22 JUSTIN overnight, frquent suctioning q1h, on Trach collar, TFs via PEG, Ceftriax until 4/13 4/23: suctioning requirements down, JUSTIN overnight  4/24: no issues overnight. suctioning q2-4 hours, able to cough up secretions on own. Ceftriaxone switched to cefepime yesterday, final sensitivities pending. Staph aureus and enterobacter cloacae growing in sputum cultures.   Cefepime then switched to meropenum due to sensitivities and growing MSSA in sputum  4/26: JUSTIN overnight, neuro stable  4/27: JUSTIN overnight.  4/28: JUSTIN overnight. Neuro stable.  4/29: JUSTIN. Needs re-auth for AR due to abx changed.  4/30 uneventful night  5/01: Frequent loose stool overnight. Pending insurance authorization of rehab placement.  5/2: Completed meropenem   5/3: No issues overnight. Pending LE dopplers. Poss d/c to rehab today    Vital Signs Last 24 Hrs  T(C): 36.8 (02 May 2019 21:56), Max: 36.9 (02 May 2019 09:15)  T(F): 98.3 (02 May 2019 21:56), Max: 98.5 (02 May 2019 14:23)  HR: 86 (02 May 2019 23:37) (74 - 96)  BP: 108/74 (02 May 2019 23:37) (104/67 - 138/91)  BP(mean): 87 (02 May 2019 23:37) (79 - 109)  RR: 17 (02 May 2019 23:37) (16 - 18)  SpO2: 97% (02 May 2019 23:37) (94% - 100%)    I&O's Summary    01 May 2019 07:01  -  02 May 2019 07:00  --------------------------------------------------------  IN: 845 mL / OUT: 1050 mL / NET: -205 mL    02 May 2019 07:01  -  03 May 2019 02:00  --------------------------------------------------------  IN: 1225 mL / OUT: 1225 mL / NET: 0 mL        PHYSICAL EXAM:  Gen: NAD, Awake/alert and oriented.  HEENT: PERRL. EOMI. Right scalp incision healing well.  Neck: +Trach collar, C/D/I.  Lungs: Clear b/l  Heart: S1, S2. NSR.  Abd: Soft, NT/ND. +BS. +PEG.  Neuro: Left facial asymmetry, o/w CNs intact. Following commands on R. Left hemiparesis. Moving right side with good strength.    TUBES/LINES:  [] Becerra  [] A-line  [] Lumbar Drain  [] Wound Drains  [] NGT   [] EVD   [] CVC  [x] Other: trach, PEG       DIET:  [] NPO  [] Mechanical  [x] Tube feeds    LABS:                        12.4   7.54  )-----------( 391      ( 02 May 2019 05:55 )             38.2     05-02    142  |  104  |  34<H>  ----------------------------<  131<H>  4.6   |  26  |  0.87    Ca    9.8      02 May 2019 05:55  Phos  3.4     05-02  Mg     2.4     05-02              CAPILLARY BLOOD GLUCOSE          Drug Levels: [] N/A    CSF Analysis: [] N/A      Allergies    No Known Allergies    Intolerances        Home Medications:      MEDICATIONS:  MEDICATIONS  (STANDING):  ALBUTerol    90 MICROgram(s) HFA Inhaler 1 Puff(s) Inhalation every 4 hours  amantadine 100 milliGRAM(s) Oral two times a day  amLODIPine   Tablet 10 milliGRAM(s) Oral daily  atorvastatin 40 milliGRAM(s) Oral at bedtime  cloNIDine 0.2 milliGRAM(s) Oral every 8 hours  enoxaparin Injectable 40 milliGRAM(s) SubCutaneous at bedtime  influenza   Vaccine 0.5 milliLiter(s) IntraMuscular once  levETIRAcetam  Solution 1000 milliGRAM(s) Enteral Tube two times a day  lisinopril 40 milliGRAM(s) Oral daily  metoprolol tartrate 100 milliGRAM(s) Oral every 12 hours  QUEtiapine 25 milliGRAM(s) Oral at bedtime  tiotropium 18 MICROgram(s) Capsule 1 Capsule(s) Inhalation daily    MEDICATIONS  (PRN):  acetaminophen    Suspension .. 650 milliGRAM(s) Oral every 6 hours PRN Temp greater or equal to 38C (100.4F), Mild Pain (1 - 3)  glucagon  Injectable 1 milliGRAM(s) IntraMuscular once PRN Glucose LESS THAN 70 milligrams/deciliter  ondansetron Injectable 4 milliGRAM(s) IV Push every 6 hours PRN Nausea and/or Vomiting  oxyCODONE    5 mG/acetaminophen 325 mG 1 Tablet(s) Oral every 4 hours PRN Moderate Pain (4 - 6)  oxyCODONE    5 mG/acetaminophen 325 mG 2 Tablet(s) Oral every 6 hours PRN Severe Pain (7 - 10)      CULTURES:  Culture Results:   Moderate Enterobacter cloacae complex  Moderate Staphylococcus aureus  No routine respiratory derek Isolated (04-22 @ 16:08)      RADIOLOGY & ADDITIONAL TESTS:      ASSESSMENT:  44y Male s/p  right thalamic IPH with IVH s/p Emergent Rt EVD placement 4/6/19, s/p Cerebral angiogram (negative) 4/8/19, new left EVD placed and R EVD removed 4/9/19, now s/p Penumbra evacuation of Rt ICH 4/10/19, EVD removed 4/19/19, s/p Trach/PEG.    PLAN:  -Pending rehab placement, awaiting insurance authorization,  -Continue neuro checks,  -Pain meds PRN,  -Monitor BP - off hydralazine   -Continue Keppra for seizure ppx,  -SCDs/SQL, LE dopplers ordered for weekly check,  -Meropenem x7 days completed for pneumonia, currently afebrile   -Cont TFs via PEG  -D/w Dr. So      Assessment: present when checked     [] GCS   E   V   M     Heart Failure: [] Acute, [] acute on chronic, [] chronic   Heart Failure: [] Diastolic (HFpEF), [] Systolic (HRrEF), [] Combined (HFpEF and HFrEF), [] RHF, [] Pulm HTN, [] Other     [] KRISTA, [] ATN, [] AIN, [] other   [] CKD1, [] CKD2, [] CKD3, [] CKD4, [] CKD5, [] ESRD     Encephalopathy: [] Metabolic, [] Hepatic, [] Toxic, [] Neurological, [] Other     Abnormal Nutritional Status: [] malnutrition (see nutrition note), []underweight: BMI <19, [] morbid obesity: BMI >40, [] Cachexia     [] Sepsis   [] Hypovolemic shock, [] Cardiogenic shock, [] Hemorrhagic shock, [] Neurogenic shock   [] Acute respiratory failure   [] Cerebral edema, [] Brain compression / herniation   [] Functional quadriplegia   [] Acute blood loss anemia

## 2019-05-03 NOTE — DISCHARGE NOTE PROVIDER - NSDCCPCAREPLAN_GEN_ALL_CORE_FT
PRINCIPAL DISCHARGE DIAGNOSIS  Diagnosis: Thalamic hemorrhage with stroke  Assessment and Plan of Treatment: transfer to rehab to regain your independence prior to returning home  No heavy lifting anything greater than 10 pounds, no bending or twisting  Activity as prescribed by the rehab  Able to shower with assistance and get your hair wet  Once home call the office to make a follow up appt Dr So

## 2019-05-03 NOTE — DISCHARGE NOTE PROVIDER - CARE PROVIDER_API CALL
Lamont So)  Neurosurgery  [address], [place] [facility]  Phone: [phone]  Fax: [phone]  Follow Up Time:

## 2019-05-04 ENCOUNTER — TRANSCRIPTION ENCOUNTER (OUTPATIENT)
Age: 44
End: 2019-05-04

## 2019-05-04 VITALS — RESPIRATION RATE: 18 BRPM | HEART RATE: 81 BPM | OXYGEN SATURATION: 95 %

## 2019-05-04 PROCEDURE — 70496 CT ANGIOGRAPHY HEAD: CPT

## 2019-05-04 PROCEDURE — 80076 HEPATIC FUNCTION PANEL: CPT

## 2019-05-04 PROCEDURE — 82384 ASSAY THREE CATECHOLAMINES: CPT

## 2019-05-04 PROCEDURE — 83605 ASSAY OF LACTIC ACID: CPT

## 2019-05-04 PROCEDURE — 95951: CPT

## 2019-05-04 PROCEDURE — 81003 URINALYSIS AUTO W/O SCOPE: CPT

## 2019-05-04 PROCEDURE — 94640 AIRWAY INHALATION TREATMENT: CPT

## 2019-05-04 PROCEDURE — 89051 BODY FLUID CELL COUNT: CPT

## 2019-05-04 PROCEDURE — 86850 RBC ANTIBODY SCREEN: CPT

## 2019-05-04 PROCEDURE — 85730 THROMBOPLASTIN TIME PARTIAL: CPT

## 2019-05-04 PROCEDURE — 80061 LIPID PANEL: CPT

## 2019-05-04 PROCEDURE — 93306 TTE W/DOPPLER COMPLETE: CPT

## 2019-05-04 PROCEDURE — 87205 SMEAR GRAM STAIN: CPT

## 2019-05-04 PROCEDURE — 83036 HEMOGLOBIN GLYCOSYLATED A1C: CPT

## 2019-05-04 PROCEDURE — C1769: CPT

## 2019-05-04 PROCEDURE — 93976 VASCULAR STUDY: CPT

## 2019-05-04 PROCEDURE — 84100 ASSAY OF PHOSPHORUS: CPT

## 2019-05-04 PROCEDURE — 82553 CREATINE MB FRACTION: CPT

## 2019-05-04 PROCEDURE — 36415 COLL VENOUS BLD VENIPUNCTURE: CPT

## 2019-05-04 PROCEDURE — 87184 SC STD DISK METHOD PER PLATE: CPT

## 2019-05-04 PROCEDURE — 85027 COMPLETE CBC AUTOMATED: CPT

## 2019-05-04 PROCEDURE — 87040 BLOOD CULTURE FOR BACTERIA: CPT

## 2019-05-04 PROCEDURE — 82803 BLOOD GASES ANY COMBINATION: CPT

## 2019-05-04 PROCEDURE — 84157 ASSAY OF PROTEIN OTHER: CPT

## 2019-05-04 PROCEDURE — 94002 VENT MGMT INPAT INIT DAY: CPT

## 2019-05-04 PROCEDURE — C1757: CPT

## 2019-05-04 PROCEDURE — 82550 ASSAY OF CK (CPK): CPT

## 2019-05-04 PROCEDURE — C1713: CPT

## 2019-05-04 PROCEDURE — 87070 CULTURE OTHR SPECIMN AEROBIC: CPT

## 2019-05-04 PROCEDURE — 87186 SC STD MICRODIL/AGAR DIL: CPT

## 2019-05-04 PROCEDURE — 93970 EXTREMITY STUDY: CPT

## 2019-05-04 PROCEDURE — 80307 DRUG TEST PRSMV CHEM ANLYZR: CPT

## 2019-05-04 PROCEDURE — 86901 BLOOD TYPING SEROLOGIC RH(D): CPT

## 2019-05-04 PROCEDURE — P9035: CPT

## 2019-05-04 PROCEDURE — 81001 URINALYSIS AUTO W/SCOPE: CPT

## 2019-05-04 PROCEDURE — 83735 ASSAY OF MAGNESIUM: CPT

## 2019-05-04 PROCEDURE — C1889: CPT

## 2019-05-04 PROCEDURE — 86900 BLOOD TYPING SEROLOGIC ABO: CPT

## 2019-05-04 PROCEDURE — 80202 ASSAY OF VANCOMYCIN: CPT

## 2019-05-04 PROCEDURE — 97112 NEUROMUSCULAR REEDUCATION: CPT

## 2019-05-04 PROCEDURE — 84484 ASSAY OF TROPONIN QUANT: CPT

## 2019-05-04 PROCEDURE — 80048 BASIC METABOLIC PNL TOTAL CA: CPT

## 2019-05-04 PROCEDURE — 94003 VENT MGMT INPAT SUBQ DAY: CPT

## 2019-05-04 PROCEDURE — 71045 X-RAY EXAM CHEST 1 VIEW: CPT

## 2019-05-04 PROCEDURE — 97116 GAIT TRAINING THERAPY: CPT

## 2019-05-04 PROCEDURE — 70450 CT HEAD/BRAIN W/O DYE: CPT

## 2019-05-04 PROCEDURE — 82945 GLUCOSE OTHER FLUID: CPT

## 2019-05-04 PROCEDURE — C1894: CPT

## 2019-05-04 PROCEDURE — 97110 THERAPEUTIC EXERCISES: CPT

## 2019-05-04 PROCEDURE — 97163 PT EVAL HIGH COMPLEX 45 MIN: CPT

## 2019-05-04 PROCEDURE — 84443 ASSAY THYROID STIM HORMONE: CPT

## 2019-05-04 PROCEDURE — 76770 US EXAM ABDO BACK WALL COMP: CPT

## 2019-05-04 PROCEDURE — L8699: CPT

## 2019-05-04 PROCEDURE — 82962 GLUCOSE BLOOD TEST: CPT

## 2019-05-04 PROCEDURE — 85610 PROTHROMBIN TIME: CPT

## 2019-05-04 PROCEDURE — 97530 THERAPEUTIC ACTIVITIES: CPT

## 2019-05-04 PROCEDURE — 36430 TRANSFUSION BLD/BLD COMPNT: CPT

## 2019-05-04 PROCEDURE — 83835 ASSAY OF METANEPHRINES: CPT

## 2019-05-04 PROCEDURE — C1725: CPT

## 2019-05-04 PROCEDURE — 87641 MR-STAPH DNA AMP PROBE: CPT

## 2019-05-04 PROCEDURE — 80053 COMPREHEN METABOLIC PANEL: CPT

## 2019-05-04 RX ORDER — LEVETIRACETAM 250 MG/1
10 TABLET, FILM COATED ORAL
Qty: 0 | Refills: 0 | COMMUNITY
Start: 2019-05-04

## 2019-05-04 RX ORDER — ALBUTEROL 90 UG/1
1 AEROSOL, METERED ORAL
Qty: 0 | Refills: 0 | COMMUNITY
Start: 2019-05-04

## 2019-05-04 RX ORDER — ACETAMINOPHEN 500 MG
20.31 TABLET ORAL
Qty: 0 | Refills: 0 | COMMUNITY
Start: 2019-05-04

## 2019-05-04 RX ORDER — ENOXAPARIN SODIUM 100 MG/ML
40 INJECTION SUBCUTANEOUS
Qty: 0 | Refills: 0 | COMMUNITY
Start: 2019-05-04

## 2019-05-04 RX ORDER — LISINOPRIL 2.5 MG/1
1 TABLET ORAL
Qty: 0 | Refills: 0 | COMMUNITY
Start: 2019-05-04

## 2019-05-04 RX ORDER — ATORVASTATIN CALCIUM 80 MG/1
1 TABLET, FILM COATED ORAL
Qty: 0 | Refills: 0 | COMMUNITY
Start: 2019-05-04

## 2019-05-04 RX ORDER — AMANTADINE HCL 100 MG
1 CAPSULE ORAL
Qty: 0 | Refills: 0 | COMMUNITY
Start: 2019-05-04

## 2019-05-04 RX ORDER — METOPROLOL TARTRATE 50 MG
1 TABLET ORAL
Qty: 0 | Refills: 0 | COMMUNITY
Start: 2019-05-04

## 2019-05-04 RX ORDER — QUETIAPINE FUMARATE 200 MG/1
1 TABLET, FILM COATED ORAL
Qty: 0 | Refills: 0 | COMMUNITY
Start: 2019-05-04

## 2019-05-04 RX ORDER — TIOTROPIUM BROMIDE 18 UG/1
1 CAPSULE ORAL; RESPIRATORY (INHALATION)
Qty: 0 | Refills: 0 | COMMUNITY
Start: 2019-05-04

## 2019-05-04 RX ADMIN — Medication 100 MILLIGRAM(S): at 06:37

## 2019-05-04 RX ADMIN — LEVETIRACETAM 1000 MILLIGRAM(S): 250 TABLET, FILM COATED ORAL at 06:37

## 2019-05-04 NOTE — DISCHARGE NOTE NURSING/CASE MANAGEMENT/SOCIAL WORK - NSDCDPATPORTLINK_GEN_ALL_CORE
You can access the [facility] Portal, offered by [facility], by registering with the following website: [email or web address]

## 2019-05-04 NOTE — PROGRESS NOTE ADULT - PROVIDER SPECIALTY LIST ADULT
NSICU
Neurology
Neurosurgery
Surgery
Neurosurgery
NSICU
NSICU

## 2019-05-04 NOTE — PROGRESS NOTE ADULT - SUBJECTIVE AND OBJECTIVE BOX
HPI:  History obtained by patient's girlfriend and chart from [facility], patient is unresponsive:     45 y/o male with no significant PMHx presents to [facility] with AMS, left side weakness and numbness. Per girlfriend, [place] when he developed acute onset of left facial numbness and LUE and numbness around 6:30PM. When get got home he developed AMS, dysarthria, and weakness in the LUE and LLE. At [facility] ED patient had several episodes of emesis and was hypertensive to SBP in the 200's. Work up revealed right thalamic hemorrhage on CT head. Patient not on any anticoagulation use per girlfriend. Of note, patient took Excedrin for headache at home. Patient transferred to [facility] for further management. (06 Apr 2019 00:04)    OVERNIGHT EVENTS: NO acute events overnight, denies any pain.    Hospital course:   4/7: overnight, patient requiring large amount of cardene and propofol.  Plan today is to transition to precedex, start PO lisinopril and wean of cardene.  Becomes extremely agitated when off sedation.  4/8: Cerebral angio without findings of aneurysm. EVD stopped working, pulled back without improvement. CT Head performed.  4/9: Received ativan 6mg for agitation. EVD stopped draining at 6pm yesterday taken for stat CTH. Dr. So notified and EVD lowered to 5cmH2O without output.   4/10: s/p penumbra evacuation of ICH. Pt seen and examined at bedside postop. Remains intubated, on versed. ICPs stable.  (OR EBL 30cc, received LR 200cc)  4/11: JUSTIN overnight. Remains on versed. Remains intubated. Neuro stable.  4/12: Given ativan overnight, bucking the vent. Remains on versed drip. Neuro stable. ICPs stable. Increase hydralazine to 75 Q8.   4/13 JUSTIN overnight, febrile, urinary retention Straight Cath x 1 = 1L, neuro exam unchanged since yesterday, started Cardene gtt as per Dr. So goal -140  4/14 POD#4 febrile overnight, o/w JUSTIN overnight, EVD@ 5cm H2O, Clonidine started, Ceftriax until 4/19 Staph sputum, Versed/Precedex, straight cathed overnight  4/15: ICP stable. neuro stable. Remains on versed/precedex  4/16: Tolerating CPAP, CTH performed, decreased vent size, EVD raised to 10 cmH2O neuro stable   4/18:  JUSTIN overnight, EVD @15cm H2O, CPAP overnight, Ceftriax until 4/19 PNA, Precedex PRN comfort, rectal tube - diarrhea; fever spike   4/19 EVD clamped since 4/18, ICPs fluctuate, HCT today, R IJ, Ceftraix until 4/19, Versed gtt, neuro exam is improving, Duplex Renal artery and Metanephrine & Catecholamine Urine collection 24hrs for high BP work-up  4/20: EVD removed yesterday. Neuro stable. On precedex  4/21: Neuro stable.  remained on trach collar , no acute events  4/22 JUSTIN overnight, frquent suctioning q1h, on Trach collar, TFs via PEG, Ceftriax until 4/13 4/23: suctioning requirements down, JUSTIN overnight  4/24: no issues overnight. suctioning q2-4 hours, able to cough up secretions on own. Ceftriaxone switched to cefepime yesterday, final sensitivities pending. Staph aureus and enterobacter cloacae growing in sputum cultures.   Cefepime then switched to meropenum due to sensitivities and growing MSSA in sputum  4/26: JUSTIN overnight, neuro stable  4/27: JUSTIN overnight.  4/28: JUSTIN overnight. Neuro stable.  4/29: JUSTIN. Needs re-auth for AR due to abx changed.  4/30 uneventful night  5/01: Frequent loose stool overnight. Pending insurance authorization of rehab placement.  5/2: Completed Meropenem for pneumonia,  5/3: No issues overnight. Pending LE dopplers. Poss d/c to rehab today  5/4: No active issues, pending rehab placement.    Vital Signs Last 24 Hrs  T(C): 37.2 (04 May 2019 05:10), Max: 37.2 (03 May 2019 09:51)  T(F): 98.9 (04 May 2019 05:10), Max: 98.9 (03 May 2019 09:51)  HR: 76 (04 May 2019 04:15) (68 - 94)  BP: 96/65 (04 May 2019 04:15) (96/65 - 128/87)  BP(mean): 77 (04 May 2019 04:15) (74 - 104)  RR: 17 (04 May 2019 04:15) (14 - 18)  SpO2: 92% (04 May 2019 04:15) (92% - 99%)    I&O's Summary    03 May 2019 07:01  -  04 May 2019 07:00  --------------------------------------------------------  IN: 900 mL / OUT: 1270 mL / NET: -370 mL        PHYSICAL EXAM:  Gen: NAD, Awake/alert and oriented.  HEENT: PERRL. EOMI. Right scalp incision healing well.  Neck: +Trach, C/D/I.  Lungs: Clear b/l  Heart: S1, S2. NSR.  Abd: Soft, NT/ND. +BS. +PEG.  Neuro: Left facial asymmetry, o/w CNs intact. Following commands. Left hemiparesis. Moving right side with good strength.    TUBES/LINES:  [] Becerra  [] Lumbar Drain  [] Wound Drains  [] Others      DIET:  [] NPO  [] Mechanical  [x] Tube feeds    LABS:                        11.4   7.16  )-----------( 362      ( 03 May 2019 06:19 )             35.7     05-03    144  |  107  |  35<H>  ----------------------------<  114<H>  4.6   |  27  |  0.87    Ca    9.5      03 May 2019 06:19              CAPILLARY BLOOD GLUCOSE          Drug Levels: [] N/A    CSF Analysis: [] N/A      Allergies    No Known Allergies    Intolerances      MEDICATIONS:  Antibiotics:    Neuro:  acetaminophen    Suspension .. 650 milliGRAM(s) Oral every 6 hours PRN  amantadine 100 milliGRAM(s) Oral two times a day  levETIRAcetam  Solution 1000 milliGRAM(s) Enteral Tube two times a day  ondansetron Injectable 4 milliGRAM(s) IV Push every 6 hours PRN  oxyCODONE    5 mG/acetaminophen 325 mG 1 Tablet(s) Oral every 4 hours PRN  oxyCODONE    5 mG/acetaminophen 325 mG 2 Tablet(s) Oral every 6 hours PRN  QUEtiapine 25 milliGRAM(s) Oral at bedtime    Anticoagulation:  enoxaparin Injectable 40 milliGRAM(s) SubCutaneous at bedtime    OTHER:  ALBUTerol    90 MICROgram(s) HFA Inhaler 1 Puff(s) Inhalation every 4 hours  atorvastatin 40 milliGRAM(s) Oral at bedtime  cloNIDine 0.2 milliGRAM(s) Oral every 8 hours  glucagon  Injectable 1 milliGRAM(s) IntraMuscular once PRN  influenza   Vaccine 0.5 milliLiter(s) IntraMuscular once  lisinopril 20 milliGRAM(s) Oral daily  metoprolol tartrate 100 milliGRAM(s) Oral every 12 hours  tiotropium 18 MICROgram(s) Capsule 1 Capsule(s) Inhalation daily    IVF:    CULTURES:  Culture Results:   Moderate Enterobacter cloacae complex  Moderate Staphylococcus aureus  No routine respiratory derek Isolated (04-22 @ 16:08)    RADIOLOGY & ADDITIONAL TESTS:      ASSESSMENT:  44y Male s/p  right thalamic IPH with IVH s/p Emergent Rt EVD placement 4/6/19, s/p Cerebral angiogram (negative) 4/8/19, new left EVD placed and R EVD removed 4/9/19, now s/p Penumbra evacuation of Rt ICH 4/10/19, EVD removed 4/19/19, s/p Trach/PEG.    PLAN:    -Pending rehab placement,  -Continue neuro checks,  -Pain meds PRN,  -Continue Keppra for seizure ppx,  -SCDs/SQL, suerveillance LE dopplers pending,  -Cont TFs via PEG  -D/w Dr. So    DISPOSITION:    Assessment:  Present when checked    []  GCS  E   V  M     Heart Failure: []Acute, [] acute on chronic , []chronic  Heart Failure:  [] Diastolic (HFpEF), [] Systolic (HFrEF), []Combined (HFpEF and HFrEF), [] RHF, [] Pulm HTN, [] Other    [] KRISTA, [] ATN, [] AIN, [] other  [] CKD1, [] CKD2, [] CKD 3, [] CKD 4, [] CKD 5, []ESRD    Encephalopathy: [] Metabolic, [] Hepatic, [] toxic, [] Neurological, [] Other    Abnormal Nurtitional Status: [] malnurtition (see nutrition note), [ ]underweight: BMI < 19, [] morbid obesity: BMI >40, [] Cachexia    [] Sepsis  [] hypovolemic shock,[] cardiogenic shock, [] hemorrhagic shock, [] neuogenic shock  [] Acute Respiratory Failure  []Cerebral edema, [] Brain compression/ herniation,   [] Functional quadriplegia  [] Acute blood loss anemia

## 2019-05-07 DIAGNOSIS — G81.94 HEMIPLEGIA, UNSPECIFIED AFFECTING LEFT NONDOMINANT SIDE: ICD-10-CM

## 2019-05-07 DIAGNOSIS — J96.00 ACUTE RESPIRATORY FAILURE, UNSPECIFIED WHETHER WITH HYPOXIA OR HYPERCAPNIA: ICD-10-CM

## 2019-05-07 DIAGNOSIS — R29.810 FACIAL WEAKNESS: ICD-10-CM

## 2019-05-07 DIAGNOSIS — I61.9 NONTRAUMATIC INTRACEREBRAL HEMORRHAGE, UNSPECIFIED: ICD-10-CM

## 2019-05-07 DIAGNOSIS — R33.9 RETENTION OF URINE, UNSPECIFIED: ICD-10-CM

## 2019-05-07 DIAGNOSIS — I10 ESSENTIAL (PRIMARY) HYPERTENSION: ICD-10-CM

## 2019-05-07 DIAGNOSIS — F10.20 ALCOHOL DEPENDENCE, UNCOMPLICATED: ICD-10-CM

## 2019-05-07 DIAGNOSIS — E78.5 HYPERLIPIDEMIA, UNSPECIFIED: ICD-10-CM

## 2019-05-07 DIAGNOSIS — R13.10 DYSPHAGIA, UNSPECIFIED: ICD-10-CM

## 2019-05-07 DIAGNOSIS — J15.211 PNEUMONIA DUE TO METHICILLIN SUSCEPTIBLE STAPHYLOCOCCUS AUREUS: ICD-10-CM

## 2019-05-07 DIAGNOSIS — R50.9 FEVER, UNSPECIFIED: ICD-10-CM

## 2019-05-07 DIAGNOSIS — G93.6 CEREBRAL EDEMA: ICD-10-CM

## 2019-05-07 DIAGNOSIS — R29.716 NIHSS SCORE 16: ICD-10-CM

## 2019-05-07 DIAGNOSIS — G91.9 HYDROCEPHALUS, UNSPECIFIED: ICD-10-CM

## 2019-05-07 DIAGNOSIS — E46 UNSPECIFIED PROTEIN-CALORIE MALNUTRITION: ICD-10-CM

## 2019-05-07 DIAGNOSIS — G93.49 OTHER ENCEPHALOPATHY: ICD-10-CM

## 2019-05-07 DIAGNOSIS — G93.5 COMPRESSION OF BRAIN: ICD-10-CM

## 2019-05-07 DIAGNOSIS — G45.0 VERTEBRO-BASILAR ARTERY SYNDROME: ICD-10-CM

## 2019-05-28 NOTE — DIETITIAN INITIAL EVALUATION ADULT. - FACTORS AFF FOOD INTAKE
Use thumb brace for the right thumb trigger finger, take off as needed, use ibuprofen as needed, take with food. May take 1-3 weeks to heal    Caution with new anxiety medication atarax: may cause sedation  
other (specify)/pt NPO, intubated

## 2019-06-14 ENCOUNTER — APPOINTMENT (OUTPATIENT)
Dept: NEUROSURGERY | Facility: CLINIC | Age: 44
End: 2019-06-14

## 2019-06-24 ENCOUNTER — APPOINTMENT (OUTPATIENT)
Dept: NEUROSURGERY | Facility: CLINIC | Age: 44
End: 2019-06-24
Payer: MEDICAID

## 2019-06-24 VITALS
WEIGHT: 145 LBS | OXYGEN SATURATION: 97 % | BODY MASS INDEX: 20.76 KG/M2 | HEIGHT: 70 IN | HEART RATE: 86 BPM | SYSTOLIC BLOOD PRESSURE: 128 MMHG | DIASTOLIC BLOOD PRESSURE: 91 MMHG | TEMPERATURE: 97.9 F | RESPIRATION RATE: 16 BRPM

## 2019-06-24 DIAGNOSIS — Z82.61 FAMILY HISTORY OF ARTHRITIS: ICD-10-CM

## 2019-06-24 DIAGNOSIS — Z56.0 UNEMPLOYMENT, UNSPECIFIED: ICD-10-CM

## 2019-06-24 DIAGNOSIS — Z86.73 PERSONAL HISTORY OF TRANSIENT ISCHEMIC ATTACK (TIA), AND CEREBRAL INFARCTION W/OUT RESIDUAL DEFICITS: ICD-10-CM

## 2019-06-24 DIAGNOSIS — Z82.49 FAMILY HISTORY OF ISCHEMIC HEART DISEASE AND OTHER DISEASES OF THE CIRCULATORY SYSTEM: ICD-10-CM

## 2019-06-24 DIAGNOSIS — Z82.3 FAMILY HISTORY OF STROKE: ICD-10-CM

## 2019-06-24 PROCEDURE — 99024 POSTOP FOLLOW-UP VISIT: CPT

## 2019-06-24 SDOH — ECONOMIC STABILITY - INCOME SECURITY: UNEMPLOYMENT, UNSPECIFIED: Z56.0

## 2019-06-24 NOTE — REVIEW OF SYSTEMS
[Arm Weakness] : arm weakness [As Noted in HPI] : as noted in HPI [Leg Weakness] : leg weakness [Hand Weakness] :  hand weakness [Numbness] : numbness [Tingling] : tingling [Lightheadedness] : lightheadedness [Dizziness] : dizziness [Difficulty Walking] : difficulty walking [Negative] : Heme/Lymph

## 2019-06-25 NOTE — REASON FOR VISIT
[Spouse] : spouse [de-identified] : RIght parietal craniotomy for evacuation of ICH [de-identified] : 4/10/19 [de-identified] : Reports he is doing well. He was discharged from the hospital to [facility] acute rehab and was discharged from acute rehab to subacute rehab about two weeks ago. He is currenlty at [facility].\par He is currently on keppra bid for seizure prophylaxis. \par Denies any signs of postop wound infection which could include but is not limited to redness/swelling/purulent drainage\par Denies CP/SOB/unilateral leg edema. Denies seizures,  headaches, nausea, vomiting, dizziness, weakness. \par He continues to have LEFT arm and LEFT leg weakness but reports this is improving since hospitalization. He is able to stand and transfer with assistance.\par

## 2019-06-25 NOTE — PHYSICAL EXAM
[General Appearance - In No Acute Distress] : in no acute distress [General Appearance - Alert] : alert [Clean] : clean [Dry] : dry [Well-Healed] : well-healed [Intact] : intact [No Drainage] : without drainage [Normal Skin] : normal [Oriented To Time, Place, And Person] : oriented to person, place, and time [Cranial Nerves Optic (II)] : visual acuity intact bilaterally,  pupils equal round and reactive to light [Cranial Nerves Trigeminal (V)] : facial sensation intact symmetrically [Cranial Nerves Oculomotor (III)] : extraocular motion intact [Cranial Nerves Vestibulocochlear (VIII)] : hearing was intact bilaterally [Cranial Nerves Facial (VII)] : face symmetrical [Cranial Nerves Glossopharyngeal (IX)] : tongue and palate midline [Cranial Nerves Hypoglossal (XII)] : there was no tongue deviation with protrusion [Sclera] : the sclera and conjunctiva were normal [Neck Appearance] : the appearance of the neck was normal [Outer Ear] : the ears and nose were normal in appearance [] : no respiratory distress [Respiration, Rhythm And Depth] : normal respiratory rhythm and effort [Heart Rate And Rhythm] : heart rate was normal and rhythm regular [Skin Color & Pigmentation] : normal skin color and pigmentation [FreeTextEntry1] : RIGHT frontal [FreeTextEntry6] : LEFT upper extremity 2/5 strength\par LEFT lower extremity 2/5 strength\par RIGHT upper and RIGHT lower extremity 5/5 [FreeTextEntry8] : uses wheelchair; can transfer with one assist

## 2019-06-25 NOTE — HISTORY OF PRESENT ILLNESS
[FreeTextEntry1] : 44 year old man with history of HTN and alcoholism who presented to [facility] with large RIGHT thalamic intracerebral hemorrhage. He was noted to have altered mental status and LEFT hemiplegia. He was transferred to [facility] for further management and underwent right parietal craniotomy for evacuation of intracerebral hemorrhage.

## 2019-06-25 NOTE — ASSESSMENT
[FreeTextEntry1] : Has made good neurological recovery. \par Currently in physical therapy, occupational therapy at [facility] - LEFT upper and LEFT lower extremity strength improved since hospitalization.\par Recommend follow up with stroke neurology - referral provided.\par Recommend consultation with PCP for comprehensive medical management and blood pressure management.\par Recommend MRI/MRA brain with and without contrast in 3 months (September 2019). Return to the office after MRI/MRA brain complete.\par \par Plan:\par \par 1. MRI/MRA brain with and without contrast in 3 months (September 2019)\par 2. Return to the office after imaging complete (September 2019)\par \par Patient and patient's wife verbalize agreement and understanding with plan.

## 2019-06-25 NOTE — ADDENDUM
[FreeTextEntry1] : 	2019\par \par \par \par Re:	Rocael Ortega \par :	75\par MRN:  [id number]\par \par Mr. Ortega is a 44-year-old man who presents for followup after undergoing right frontal approach mini-craniotomy for endoscopic assisted evacuation of a large thalamic intracerebral hemorrhage with intraventricular extension.\par \par Mr. Ortega has made significant improvements since hospital discharge to acute rehab.  His tracheostomy has been removed.  He is now taking a normal p.o. diet.  He is communicative, alert, following commands.  He does still have a left hemiparesis.  However, with daily rehab he seems to be improving.\par \par Overall, I am quite happy with his progress.\par \par We will plan for him to taper off Keppra over the next week.  I will refer him to one of our stroke neurologists.  I would also like to see him again in three months after a brain MRI/MRA with and without contrast.\par \par \par \par Lamont So M.D.\par  of Neurosurgery\par [facility] [employer] [facility] Department of Neurosurgery\par [facility]\par [address]\par [facility], Third Floor\[place] [address]\par Tel: [phone]\par Email:  [email or web address]\par \par \par [employer]ISS[facility]/[employer] [employer] #[id number]\par \par \par

## 2019-08-27 ENCOUNTER — APPOINTMENT (OUTPATIENT)
Dept: INTERNAL MEDICINE | Facility: CLINIC | Age: 44
End: 2019-08-27
Payer: MEDICAID

## 2019-08-27 VITALS
HEART RATE: 70 BPM | HEIGHT: 70 IN | TEMPERATURE: 98.4 F | DIASTOLIC BLOOD PRESSURE: 76 MMHG | OXYGEN SATURATION: 99 % | WEIGHT: 145 LBS | BODY MASS INDEX: 20.76 KG/M2 | SYSTOLIC BLOOD PRESSURE: 110 MMHG

## 2019-08-27 DIAGNOSIS — I10 ESSENTIAL (PRIMARY) HYPERTENSION: ICD-10-CM

## 2019-08-27 DIAGNOSIS — M62.830 MUSCLE SPASM OF BACK: ICD-10-CM

## 2019-08-27 DIAGNOSIS — K59.09 OTHER CONSTIPATION: ICD-10-CM

## 2019-08-27 DIAGNOSIS — Z86.79 PERSONAL HISTORY OF OTHER DISEASES OF THE CIRCULATORY SYSTEM: ICD-10-CM

## 2019-08-27 PROCEDURE — 99204 OFFICE O/P NEW MOD 45 MIN: CPT

## 2019-08-27 RX ORDER — ACETAMINOPHEN 325 MG/1
325 TABLET, FILM COATED ORAL
Refills: 0 | Status: ACTIVE | COMMUNITY

## 2019-08-27 RX ORDER — SENNOSIDES 8.6 MG
8.6 TABLET ORAL
Refills: 0 | Status: ACTIVE | COMMUNITY

## 2019-08-27 RX ORDER — ALBUTEROL SULFATE 90 UG/1
INHALANT RESPIRATORY (INHALATION)
Refills: 0 | Status: DISCONTINUED | COMMUNITY
End: 2019-08-27

## 2019-08-27 RX ORDER — LEVETIRACETAM 500 MG/1
500 TABLET, FILM COATED ORAL
Refills: 0 | Status: DISCONTINUED | COMMUNITY
End: 2019-08-27

## 2019-08-27 RX ORDER — METHYLPHENIDATE HYDROCHLORIDE 5 MG/1
TABLET ORAL
Refills: 0 | Status: DISCONTINUED | COMMUNITY
End: 2019-08-27

## 2019-08-27 RX ORDER — TRAZODONE HYDROCHLORIDE 100 MG/1
100 TABLET ORAL
Refills: 0 | Status: DISCONTINUED | COMMUNITY
End: 2019-08-27

## 2019-08-27 RX ORDER — AMANTADINE HYDROCHLORIDE 100 MG/1
100 CAPSULE ORAL
Qty: 90 | Refills: 0 | Status: ACTIVE | COMMUNITY

## 2019-08-27 NOTE — HISTORY OF PRESENT ILLNESS
[Spouse] : spouse [FreeTextEntry8] : ICH/hemorrhagic stroke\par - 4/2019 pt complained of HA\par - developed left sided paresthesias\par - girlfriend looked up sx on line and found them to be compatible w/ stroke from uncontrolled HTN\par - at the time there was no motor weakness or facial asymmetric\par - pt went to lay down, but sx progressed\par - developed LUE weakness and facial droop \par - called 911 and taken to ED, found to have RIGHT ICH\par - intubated and taken to OR for drain placement\par - complicated by clot formation after procedure\par - after hospital d/c to subacute rehab then acute rehab at [facility]\par - currently has home PT, would like out patient PT for more intensive exercises\par - has residual LUE paralysis, able to ambulate w/ quad cane\par - requires ankle brace to prevent joint inversion \par \par HTN\par - compliant w/ medications\par \par Insomnia\par - secondary to chronic pain from muscle spasms

## 2019-08-27 NOTE — PHYSICAL EXAM
[No Acute Distress] : no acute distress [Well Nourished] : well nourished [Well Developed] : well developed [Well-Appearing] : well-appearing [No Respiratory Distress] : no respiratory distress  [No Accessory Muscle Use] : no accessory muscle use [Clear to Auscultation] : lungs were clear to auscultation bilaterally [Normal Rate] : normal rate  [Regular Rhythm] : with a regular rhythm [Normal S1, S2] : normal S1 and S2 [No Rash] : no rash [Alert and Oriented x3] : oriented to person, place, and time [Normal Insight/Judgement] : insight and judgment were intact [de-identified] : LUE paresis, able to extend left leg [de-identified] : in wheelchair [de-identified] : depressed affect, normal mood

## 2019-09-04 ENCOUNTER — NON-APPOINTMENT (OUTPATIENT)
Age: 44
End: 2019-09-04

## 2019-09-04 ENCOUNTER — APPOINTMENT (OUTPATIENT)
Dept: NEUROLOGY | Facility: CLINIC | Age: 44
End: 2019-09-04
Payer: MEDICAID

## 2019-09-04 VITALS
SYSTOLIC BLOOD PRESSURE: 108 MMHG | HEART RATE: 60 BPM | DIASTOLIC BLOOD PRESSURE: 90 MMHG | OXYGEN SATURATION: 97 % | HEIGHT: 70 IN

## 2019-09-04 PROCEDURE — 99215 OFFICE O/P EST HI 40 MIN: CPT

## 2019-09-04 RX ORDER — CHLORHEXIDINE GLUCONATE 4 %
5 LIQUID (ML) TOPICAL
Qty: 30 | Refills: 6 | Status: DISCONTINUED | COMMUNITY
End: 2019-09-04

## 2019-09-04 NOTE — PHYSICAL EXAM
[Sclera] : the sclera and conjunctiva were normal [Neck Appearance] : the appearance of the neck was normal [Neck Cervical Mass (___cm)] : no neck mass was observed [] : no respiratory distress [Respiration, Rhythm And Depth] : normal respiratory rhythm and effort [Auscultation Breath Sounds / Voice Sounds] : lungs were clear to auscultation bilaterally [Exaggerated Use Of Accessory Muscles For Inspiration] : no accessory muscle use [Heart Rate And Rhythm] : heart rate was normal and rhythm regular [Arterial Pulses Carotid] : carotid pulses were normal with no bruits [No CVA Tenderness] : no ~M costovertebral angle tenderness [No Spinal Tenderness] : no spinal tenderness [Skin Color & Pigmentation] : normal skin color and pigmentation [Skin Turgor] : normal skin turgor [FreeTextEntry1] : +depressed affect

## 2019-09-04 NOTE — ASSESSMENT
[FreeTextEntry1] : 43 y/o right handed M patient w/pmh of HTN and intracerebral hemorrhagic stroke in April 2019 presents for evaluation as referred by Dr. So. Neurological examination shows clonus of left arm and left leg; depressed affect. HTN well managed. \par \par Plan\par Stroke\par a)Botox 600 units for left arm and left leg\par b) Exercise Rx ([facility]): patient prescribed an 8 week regimen of exercises to improve strength and symptoms from the stroke. Continue with OT/PT. \par c) Dantrolene 25mg, 1 cap at bedtime for clonus\par d) Depression: lexapro 10mg\par e) increase stretching\par f)PT starting this friday\par DC norvasc\par e) HTN well controlled- patient can stop amlodipine; patient continue to be adherent to other BP medications and f/u with PCP\par \par

## 2019-09-04 NOTE — HISTORY OF PRESENT ILLNESS
[FreeTextEntry1] : 43 y/o right handed M patient w/pmh of HTN and intracerebral hemorrhagic stroke in April 2019 presents for evaluation as referred by Dr. So.  Denies smoking; used to drink 3-4 drinks daily. Present at the visit is his partner. 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 Hung out at home- left side started to droop and immobile- called 911 and was taken to [facility] HTN. \par \par Patient has been going to physical therapy (home); on Friday he will be going to [facility]. \par Did Dr. Cortes ([facility])- did Botox session in May (one)- left leg (shoulder). Has also been doing OT at home as well. \par \par Patient reports adherence to his BP medications and atorvastatin- denies muscle cramps or pain. \par Denies headaches, occasional dizziness, has some fatigue- takes naps (1 hour), has some tingling/numbness on right hand hand and leg, denies double vision, has some depression (feels he can't help with baby), some short term memory deficits, speech is normal. \par \par \par

## 2019-09-05 ENCOUNTER — TRANSCRIPTION ENCOUNTER (OUTPATIENT)
Age: 44
End: 2019-09-05

## 2019-09-12 ENCOUNTER — APPOINTMENT (OUTPATIENT)
Dept: INTERNAL MEDICINE | Facility: CLINIC | Age: 44
End: 2019-09-12
Payer: MEDICAID

## 2019-09-12 VITALS
TEMPERATURE: 98 F | SYSTOLIC BLOOD PRESSURE: 100 MMHG | HEIGHT: 70 IN | BODY MASS INDEX: 20.76 KG/M2 | DIASTOLIC BLOOD PRESSURE: 71 MMHG | OXYGEN SATURATION: 96 % | HEART RATE: 59 BPM | WEIGHT: 145 LBS

## 2019-09-12 DIAGNOSIS — Z00.00 ENCOUNTER FOR GENERAL ADULT MEDICAL EXAMINATION W/OUT ABNORMAL FINDINGS: ICD-10-CM

## 2019-09-12 DIAGNOSIS — I61.9 NONTRAUMATIC INTRACEREBRAL HEMORRHAGE, UNSPECIFIED: ICD-10-CM

## 2019-09-12 PROCEDURE — 36415 COLL VENOUS BLD VENIPUNCTURE: CPT

## 2019-09-12 PROCEDURE — 99396 PREV VISIT EST AGE 40-64: CPT | Mod: 25

## 2019-09-12 NOTE — HISTORY OF PRESENT ILLNESS
[FreeTextEntry1] : annual\par  [de-identified] : annual\par - doing well since last visit\par - was evaluated by neuro started on antispasm medications\par - scheduled for botox injections\par - has out pt PT today. will also have OT and speech therapy\par \par HTN\par - well controlled \par - compliant w/ medications\par - no dizziness or palpitations. \par \par HCM\par - needs flu vax

## 2019-09-12 NOTE — PHYSICAL EXAM
[No Acute Distress] : no acute distress [Well Nourished] : well nourished [Well-Appearing] : well-appearing [Well Developed] : well developed [Normal Sclera/Conjunctiva] : normal sclera/conjunctiva [Normal Outer Ear/Nose] : the outer ears and nose were normal in appearance [Normal TMs] : both tympanic membranes were normal [No Lymphadenopathy] : no lymphadenopathy [Supple] : supple [No Respiratory Distress] : no respiratory distress  [No Accessory Muscle Use] : no accessory muscle use [Clear to Auscultation] : lungs were clear to auscultation bilaterally [Normal Rate] : normal rate  [Regular Rhythm] : with a regular rhythm [Normal S1, S2] : normal S1 and S2 [No Edema] : there was no peripheral edema [Soft] : abdomen soft [Non Tender] : non-tender [Non-distended] : non-distended [No Rash] : no rash [Alert and Oriented x3] : oriented to person, place, and time [Normal Insight/Judgement] : insight and judgment were intact [de-identified] : in wheelchair [de-identified] : LUE paresis, able to extend left leg, wearing left ankle brace [de-identified] : depressed affect, normal mood

## 2019-09-12 NOTE — HEALTH RISK ASSESSMENT
[Excellent] : ~his/her~  mood as  excellent [] : No [No] : No [No falls in past year] : Patient reported no falls in the past year [0] : 2) Feeling down, depressed, or hopeless: Not at all (0) [[id number]] : 0 [Change in mental status noted] : No change in mental status noted [Language] : denies difficulty with language [Behavior] : denies difficulty with behavior [Learning/Retaining New Information] : denies difficulty learning/retaining new information [Handling Complex Tasks] : denies difficulty handling complex tasks [Reasoning] : denies difficulty with reasoning [Spatial Ability and Orientation] : denies difficulty with spatial ability and orientation [None] : None [With Significant Other] : lives with significant other [] :  [Some assistance needed] : feeding [Independent] : using telephone [Full assistance needed] : managing finances

## 2019-09-13 LAB
25(OH)D3 SERPL-MCNC: 39.2 NG/ML
ALBUMIN SERPL ELPH-MCNC: 5 G/DL
ALP BLD-CCNC: 115 U/L
ALT SERPL-CCNC: 40 U/L
ANION GAP SERPL CALC-SCNC: 16 MMOL/L
APPEARANCE: CLEAR
AST SERPL-CCNC: 23 U/L
BASOPHILS # BLD AUTO: 0.03 K/UL
BASOPHILS NFR BLD AUTO: 0.4 %
BILIRUB SERPL-MCNC: 0.6 MG/DL
BILIRUBIN URINE: NEGATIVE
BLOOD URINE: NEGATIVE
BUN SERPL-MCNC: 17 MG/DL
CALCIUM SERPL-MCNC: 9.8 MG/DL
CHLORIDE SERPL-SCNC: 103 MMOL/L
CHOLEST SERPL-MCNC: 144 MG/DL
CHOLEST/HDLC SERPL: 2.7 RATIO
CO2 SERPL-SCNC: 22 MMOL/L
COLOR: YELLOW
CREAT SERPL-MCNC: 1.02 MG/DL
EOSINOPHIL # BLD AUTO: 0.06 K/UL
EOSINOPHIL NFR BLD AUTO: 0.7 %
FERRITIN SERPL-MCNC: 218 NG/ML
FOLATE SERPL-MCNC: 12 NG/ML
GLUCOSE QUALITATIVE U: NEGATIVE
GLUCOSE SERPL-MCNC: 80 MG/DL
HCT VFR BLD CALC: 54.6 %
HCV AB SER QL: NONREACTIVE
HCV S/CO RATIO: 0.12 S/CO
HDLC SERPL-MCNC: 54 MG/DL
HGB BLD-MCNC: 17.2 G/DL
IMM GRANULOCYTES NFR BLD AUTO: 0.2 %
IRON SATN MFR SERPL: 33 %
IRON SERPL-MCNC: 108 UG/DL
KETONES URINE: NEGATIVE
LDLC SERPL CALC-MCNC: 59 MG/DL
LEUKOCYTE ESTERASE URINE: NEGATIVE
LYMPHOCYTES # BLD AUTO: 1.3 K/UL
LYMPHOCYTES NFR BLD AUTO: 15.3 %
MAGNESIUM SERPL-MCNC: 2.2 MG/DL
MAN DIFF?: NORMAL
MCHC RBC-ENTMCNC: 31.4 PG
MCHC RBC-ENTMCNC: 31.5 GM/DL
MCV RBC AUTO: 99.8 FL
MONOCYTES # BLD AUTO: 0.49 K/UL
MONOCYTES NFR BLD AUTO: 5.8 %
NEUTROPHILS # BLD AUTO: 6.58 K/UL
NEUTROPHILS NFR BLD AUTO: 77.6 %
NITRITE URINE: NEGATIVE
PH URINE: 5.5
PLATELET # BLD AUTO: 371 K/UL
POTASSIUM SERPL-SCNC: 4.3 MMOL/L
PROT SERPL-MCNC: 8.2 G/DL
PROTEIN URINE: NORMAL
PSA SERPL-MCNC: 0.15 NG/ML
RBC # BLD: 5.47 M/UL
RBC # FLD: 13.5 %
SODIUM SERPL-SCNC: 141 MMOL/L
SPECIFIC GRAVITY URINE: 1.03
T4 FREE SERPL-MCNC: 1.4 NG/DL
TIBC SERPL-MCNC: 326 UG/DL
TRIGL SERPL-MCNC: 153 MG/DL
TSH SERPL-ACNC: 0.96 UIU/ML
UIBC SERPL-MCNC: 218 UG/DL
UROBILINOGEN URINE: NORMAL
VIT B12 SERPL-MCNC: 1322 PG/ML
WBC # FLD AUTO: 8.48 K/UL

## 2019-09-27 ENCOUNTER — OUTPATIENT (OUTPATIENT)
Dept: OUTPATIENT SERVICES | Facility: HOSPITAL | Age: 44
LOS: 1 days | End: 2019-09-27
Payer: MEDICAID

## 2019-09-27 ENCOUNTER — APPOINTMENT (OUTPATIENT)
Dept: MRI IMAGING | Facility: HOSPITAL | Age: 44
End: 2019-09-27
Payer: MEDICAID

## 2019-09-27 PROCEDURE — 70544 MR ANGIOGRAPHY HEAD W/O DYE: CPT | Mod: 26,59

## 2019-09-27 PROCEDURE — 70544 MR ANGIOGRAPHY HEAD W/O DYE: CPT

## 2019-09-27 PROCEDURE — 70553 MRI BRAIN STEM W/O & W/DYE: CPT | Mod: 26

## 2019-09-27 PROCEDURE — 70553 MRI BRAIN STEM W/O & W/DYE: CPT

## 2019-09-27 PROCEDURE — A9585: CPT

## 2020-02-03 ENCOUNTER — APPOINTMENT (OUTPATIENT)
Dept: PHYSICAL MEDICINE AND REHAB | Facility: CLINIC | Age: 45
End: 2020-02-03
Payer: MEDICAID

## 2020-02-03 VITALS — SYSTOLIC BLOOD PRESSURE: 128 MMHG | HEART RATE: 59 BPM | DIASTOLIC BLOOD PRESSURE: 87 MMHG

## 2020-02-03 PROCEDURE — 99203 OFFICE O/P NEW LOW 30 MIN: CPT

## 2020-02-03 RX ORDER — BACLOFEN 5 MG/1
5 TABLET ORAL TWICE DAILY
Qty: 60 | Refills: 0 | Status: DISCONTINUED | COMMUNITY
Start: 2019-08-27 | End: 2020-02-03

## 2020-02-03 NOTE — SOCIAL HISTORY
[Apartment] : in an apartment [No Device Needed] : Patient doesn't use a device for ambulation [de-identified] : Girlfriend and  daughter [FreeTextEntry1] : first floor apartment.

## 2020-02-03 NOTE — ASSESSMENT
[FreeTextEntry1] : - Continue PT/OT\par - Will schedule for botox- LUE EFs/WFs and LLE Postr Tib and Plantar flexors- discussed risks/benefits/goals of care with patient and his girlfriend.

## 2020-02-03 NOTE — HISTORY OF PRESENT ILLNESS
[FreeTextEntry1] : Patient here for evaluation for botox injection.\par S/P CVA on 19- hemorrhagic- was managed surgically at [facility]- went to rehab at [facility] and then to [facility]) and now home where he lives with Girlfriend and  daughter.\par Has increased tone of the LUE and LLE- causes some difficulty with doing exercises and getting control of LUE. Takes Baclofen and Dantrolene which helps with spasticity.

## 2020-02-14 ENCOUNTER — APPOINTMENT (OUTPATIENT)
Dept: PHYSICAL MEDICINE AND REHAB | Facility: CLINIC | Age: 45
End: 2020-02-14
Payer: MEDICAID

## 2020-02-14 VITALS
TEMPERATURE: 97.6 F | SYSTOLIC BLOOD PRESSURE: 136 MMHG | HEART RATE: 74 BPM | DIASTOLIC BLOOD PRESSURE: 93 MMHG | OXYGEN SATURATION: 97 %

## 2020-02-14 PROCEDURE — 64643 CHEMODENERV 1 EXTREM 1-4 EA: CPT

## 2020-02-14 PROCEDURE — 95874 GUIDE NERV DESTR NEEDLE EMG: CPT

## 2020-02-14 PROCEDURE — 64642 CHEMODENERV 1 EXTREMITY 1-4: CPT

## 2020-02-14 PROCEDURE — 99213 OFFICE O/P EST LOW 20 MIN: CPT | Mod: 25

## 2020-02-14 NOTE — PROCEDURE
[Consent] : consent was given by patient or guardian [Site Verification] : the injection site was verified [Post-Injection Instructions Provided] : post-injection instructions were provided [] : The left [TWNoteComboBox1] : Biceps [TWNoteComboBox2] : 100 Units [de-identified] : Brachioradialis [de-identified] : 50 Units [de-identified] : Flexor Carpi Radialis [de-identified] : 75 Units [de-identified] : Tibialis Posterior [de-identified] : 75 Units [de-identified] : Medial Gastrocnemius [de-identified] : 100 Units

## 2020-02-14 NOTE — PHYSICAL EXAM
[Normal] : Normal skin color and pigmentation, normal turgor and no rash [de-identified] : no resp distress [de-identified] : well perfused [de-identified] : LUE motor 0/5, LLE 4/5 HF, 2/5 KE, 0/5 ADF/APF; RUE/RLE- 5/5; MAS 3 spasticity L EF; MAS 2 WF, MAS 2 L PF; sustained cloonus LLE

## 2020-02-14 NOTE — HISTORY OF PRESENT ILLNESS
[FreeTextEntry1] : Patient here for botox.\par Reviewed again with patient and his spouse risks/benefits/goals of care for injections.\par No recent medical issues.\par Getting PT/OT

## 2020-03-03 ENCOUNTER — APPOINTMENT (OUTPATIENT)
Dept: NEUROLOGY | Facility: CLINIC | Age: 45
End: 2020-03-03

## 2020-03-06 NOTE — PROGRESS NOTE ADULT - ATTENDING COMMENTS
Patient seen and examined with PA.  Agree with above. comminuted right femoral neck fracture    Implants: Synergy Size 13 HO stem, 45/28 bipolar head, -3 neck length

## 2020-03-20 ENCOUNTER — APPOINTMENT (OUTPATIENT)
Dept: PHYSICAL MEDICINE AND REHAB | Facility: CLINIC | Age: 45
End: 2020-03-20
Payer: MEDICAID

## 2020-03-20 VITALS — DIASTOLIC BLOOD PRESSURE: 78 MMHG | SYSTOLIC BLOOD PRESSURE: 115 MMHG | OXYGEN SATURATION: 97 % | HEART RATE: 63 BPM

## 2020-03-20 PROCEDURE — 99213 OFFICE O/P EST LOW 20 MIN: CPT

## 2020-03-20 RX ORDER — ONABOTULINUMTOXINA 200 [USP'U]/1
200 INJECTION, POWDER, LYOPHILIZED, FOR SOLUTION INTRADERMAL; INTRAMUSCULAR
Qty: 2 | Refills: 0 | Status: ACTIVE | OUTPATIENT
Start: 2020-03-20

## 2020-03-20 NOTE — HISTORY OF PRESENT ILLNESS
[FreeTextEntry1] : Patient s/p botox injection 2/14/\par Notes improvement of his spasticity and ambulating better. \par No new medical issues.

## 2020-03-20 NOTE — PHYSICAL EXAM
[Normal] : Oriented to person, place, and time, insight and judgement were intact and the affect was normal [de-identified] : no resp distress [de-identified] : well perfused [de-identified] : LUE motor 0/5, LLE 4/5 HF, 2/5 KE, 0/5 ADF/APF; RUE/RLE- 5/5; MAS 1 spasticity L EF; MAS 1 WF. Ambulates w QC- does not have heel strike but improved gait pattern noted

## 2020-04-20 ENCOUNTER — APPOINTMENT (OUTPATIENT)
Dept: NEUROSURGERY | Facility: CLINIC | Age: 45
End: 2020-04-20
Payer: MEDICAID

## 2020-04-20 PROCEDURE — 99215 OFFICE O/P EST HI 40 MIN: CPT | Mod: 95

## 2020-04-21 NOTE — DATA REVIEWED
[de-identified] : [facility]\par BRAIN W W/O CONTRAST 9/27/19 IMPRESSION:\par 1. Chronic sequella of right thalamic hypertensive hemorrhage, and other post procedural hemorrhage as above. No mass or pathological enchancement.\par 2. 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 [de-identified] : [facility] \par BRAIN W/O CONTRAST 9/27/19 IMPRESSION:\par 1. No intracranial steno-occlusive disease, no aneurysm or high flow vascular malformation.\par 2. Dolicoectasia in both anterior and posterior circulations, indicative of long standing and uncontrolled hypertension.

## 2020-04-21 NOTE — HISTORY OF PRESENT ILLNESS
[Home] : at home, [unfilled] , at the time of the visit. [Medical Office: ([facility])___] : at the medical office located in  [Partner] : partner [FreeTextEntry2] : Partner, Nena [FreeTextEntry3] : partner [FreeTextEntry4] : Sally Lynn RN [FreeTextEntry1] : 44 year old man with history of HTN and alcoholism who presented to [facility] with large RIGHT thalamic intracerebral hemorrhage. He was noted to have altered mental status and LEFT hemiplegia. He was transferred to [facility] for further management and underwent right parietal craniotomy for evacuation of intracerebral hemorrhage.

## 2020-04-21 NOTE — REASON FOR VISIT
[Follow-Up: _____] : a [unfilled] follow-up visit [FreeTextEntry1] : TODAY'S VISIT:\par Patient returns to review MRI/MRA done in September 2019.\par \par \par PRIOR VISIT 6/24/2019:\par Reports he is doing well. He was discharged from the hospital to [facility] acute rehab and was discharged from acute rehab to subacute rehab about two weeks ago. [facility]par He is currently on keppra bid for seizure prophylaxis. \par Denies any signs of postop wound infection which could include but is not limited to redness/swelling/purulent drainage\par Denies CP/SOB/unilateral leg edema. Denies seizures, headaches, nausea, vomiting, dizziness, weakness. \par He continues to have LEFT arm and LEFT leg weakness but reports this is improving since hospitalization. He is able to stand and transfer with assistance.\par \par Patient accompanied by spouse. \par

## 2020-05-04 ENCOUNTER — APPOINTMENT (OUTPATIENT)
Dept: INTERNAL MEDICINE | Facility: CLINIC | Age: 45
End: 2020-05-04
Payer: MEDICAID

## 2020-05-04 VITALS
HEART RATE: 65 BPM | HEIGHT: 70 IN | BODY MASS INDEX: 23.05 KG/M2 | TEMPERATURE: 97.9 F | DIASTOLIC BLOOD PRESSURE: 86 MMHG | WEIGHT: 161 LBS | OXYGEN SATURATION: 97 % | SYSTOLIC BLOOD PRESSURE: 141 MMHG

## 2020-05-04 DIAGNOSIS — F32.9 MAJOR DEPRESSIVE DISORDER, SINGLE EPISODE, UNSPECIFIED: ICD-10-CM

## 2020-05-04 DIAGNOSIS — I10 ESSENTIAL (PRIMARY) HYPERTENSION: ICD-10-CM

## 2020-05-04 DIAGNOSIS — E78.5 HYPERLIPIDEMIA, UNSPECIFIED: ICD-10-CM

## 2020-05-04 PROCEDURE — 99214 OFFICE O/P EST MOD 30 MIN: CPT | Mod: 25

## 2020-05-04 PROCEDURE — 36415 COLL VENOUS BLD VENIPUNCTURE: CPT

## 2020-05-04 RX ORDER — METOPROLOL TARTRATE 25 MG/1
25 TABLET, FILM COATED ORAL TWICE DAILY
Refills: 0 | Status: COMPLETED | COMMUNITY
End: 2020-05-04

## 2020-05-04 RX ORDER — ESCITALOPRAM OXALATE 10 MG/1
10 TABLET ORAL
Qty: 30 | Refills: 3 | Status: COMPLETED | COMMUNITY
Start: 2019-09-04 | End: 2020-05-04

## 2020-05-04 RX ORDER — ZOLPIDEM TARTRATE 10 MG/1
10 TABLET ORAL
Qty: 30 | Refills: 3 | Status: ACTIVE | COMMUNITY
Start: 2020-05-04 | End: 1900-01-01

## 2020-05-04 RX ORDER — ACETAMINOPHEN/DIPHENHYDRAMINE 500MG-25MG
1000 TABLET ORAL
Refills: 0 | Status: DISCONTINUED | COMMUNITY
End: 2020-05-04

## 2020-05-04 RX ORDER — BACLOFEN 20 MG/1
20 TABLET ORAL DAILY
Qty: 30 | Refills: 0 | Status: DISCONTINUED | COMMUNITY
End: 2020-05-04

## 2020-05-04 RX ORDER — ONABOTULINUMTOXINA 200 [USP'U]/1
200 INJECTION, POWDER, LYOPHILIZED, FOR SOLUTION INTRADERMAL; INTRAMUSCULAR
Qty: 1 | Refills: 0 | Status: COMPLETED | COMMUNITY
Start: 2019-09-04 | End: 2020-05-04

## 2020-05-04 RX ORDER — ONABOTULINUMTOXINA 200 [USP'U]/1
200 INJECTION, POWDER, LYOPHILIZED, FOR SOLUTION INTRADERMAL; INTRAMUSCULAR
Qty: 2 | Refills: 0 | Status: COMPLETED | OUTPATIENT
Start: 2020-02-11 | End: 2020-05-04

## 2020-05-04 RX ORDER — ONABOTULINUMTOXINA 200 [USP'U]/1
200 INJECTION, POWDER, LYOPHILIZED, FOR SOLUTION INTRADERMAL; INTRAMUSCULAR
Qty: 2 | Refills: 0 | Status: COMPLETED | OUTPATIENT
Start: 2020-02-03 | End: 2020-05-04

## 2020-05-04 NOTE — PHYSICAL EXAM
[Normal] : affect was normal and insight and judgment were intact [de-identified] : Left hemiparesis, spasm of left ankle and wrist.

## 2020-05-04 NOTE — HISTORY OF PRESENT ILLNESS
[FreeTextEntry1] : follow up [de-identified] : s/p ICH\par - doing better\par - needs PT referral\par - has continued LLE spasm which he is treated for w/ botox\par - has not seen much improvement, will have anther session this month\par \par HTN\par - compliant w/ norvasc, metoprolol, and lisinopril\par - wife concerned about multiple medications\par - does not feel light headed or weak from pills\par - BP slightly elevated today b/c of walk to office\par - uses wheelchair normally for long trips\par \par insomnia\par - trazodone no longer helps\par - has tried OTC supplements w/o improvement\par - was on Ambien in the past would like to resume. \par \par tooth infection\par - was seen at Urgent care\par - started on Augmentin  x 10 days\par - took tramadol last night for pain\par - will make appointment w/ dental once pandemic restrictions lifted.

## 2020-05-07 ENCOUNTER — TRANSCRIPTION ENCOUNTER (OUTPATIENT)
Age: 45
End: 2020-05-07

## 2020-05-07 DIAGNOSIS — R79.89 OTHER SPECIFIED ABNORMAL FINDINGS OF BLOOD CHEMISTRY: ICD-10-CM

## 2020-05-07 LAB
ALBUMIN SERPL ELPH-MCNC: 4.4 G/DL
ALP BLD-CCNC: 93 U/L
ALT SERPL-CCNC: 74 U/L
ANION GAP SERPL CALC-SCNC: 12 MMOL/L
AST SERPL-CCNC: 43 U/L
BASOPHILS # BLD AUTO: 0.04 K/UL
BASOPHILS NFR BLD AUTO: 0.8 %
BILIRUB SERPL-MCNC: 0.4 MG/DL
BUN SERPL-MCNC: 12 MG/DL
CALCIUM SERPL-MCNC: 9.4 MG/DL
CHLORIDE SERPL-SCNC: 102 MMOL/L
CHOLEST SERPL-MCNC: 127 MG/DL
CHOLEST/HDLC SERPL: 3 RATIO
CO2 SERPL-SCNC: 22 MMOL/L
CREAT SERPL-MCNC: 0.92 MG/DL
EOSINOPHIL # BLD AUTO: 0.16 K/UL
EOSINOPHIL NFR BLD AUTO: 3 %
GLUCOSE SERPL-MCNC: 105 MG/DL
HCT VFR BLD CALC: 44 %
HDLC SERPL-MCNC: 42 MG/DL
HGB BLD-MCNC: 14.3 G/DL
IMM GRANULOCYTES NFR BLD AUTO: 0.2 %
LDLC SERPL CALC-MCNC: 65 MG/DL
LYMPHOCYTES # BLD AUTO: 0.93 K/UL
LYMPHOCYTES NFR BLD AUTO: 17.6 %
MAN DIFF?: NORMAL
MCHC RBC-ENTMCNC: 31.8 PG
MCHC RBC-ENTMCNC: 32.5 GM/DL
MCV RBC AUTO: 98 FL
MONOCYTES # BLD AUTO: 0.5 K/UL
MONOCYTES NFR BLD AUTO: 9.5 %
NEUTROPHILS # BLD AUTO: 3.64 K/UL
NEUTROPHILS NFR BLD AUTO: 68.9 %
PLATELET # BLD AUTO: 382 K/UL
POTASSIUM SERPL-SCNC: 4.6 MMOL/L
PROT SERPL-MCNC: 7.4 G/DL
RBC # BLD: 4.49 M/UL
RBC # FLD: 12.7 %
SODIUM SERPL-SCNC: 137 MMOL/L
TRIGL SERPL-MCNC: 103 MG/DL
WBC # FLD AUTO: 5.28 K/UL

## 2020-05-18 ENCOUNTER — APPOINTMENT (OUTPATIENT)
Dept: PHYSICAL MEDICINE AND REHAB | Facility: CLINIC | Age: 45
End: 2020-05-18
Payer: MEDICAID

## 2020-05-18 VITALS
TEMPERATURE: 97.5 F | OXYGEN SATURATION: 96 % | DIASTOLIC BLOOD PRESSURE: 78 MMHG | SYSTOLIC BLOOD PRESSURE: 112 MMHG | HEART RATE: 63 BPM

## 2020-05-18 DIAGNOSIS — R25.2 CRAMP AND SPASM: ICD-10-CM

## 2020-05-18 PROCEDURE — 64642 CHEMODENERV 1 EXTREMITY 1-4: CPT

## 2020-05-18 PROCEDURE — 99213 OFFICE O/P EST LOW 20 MIN: CPT | Mod: 25

## 2020-05-18 PROCEDURE — 64643 CHEMODENERV 1 EXTREM 1-4 EA: CPT

## 2020-05-18 PROCEDURE — 95874 GUIDE NERV DESTR NEEDLE EMG: CPT

## 2020-05-18 NOTE — ASSESSMENT
[FreeTextEntry1] : - Post-injection instructions provided.\par - Continue therapy.\par - f/u in 6 wks

## 2020-05-18 NOTE — PHYSICAL EXAM
[Normal] : Oriented to person, place, and time, insight and judgement were intact and the affect was normal [de-identified] : no resp distress [de-identified] : LUE motor 0/5, LLE 4/5 HF, 2/5 KE, 0/5 ADF/APF; RUE/RLE- 5/5; MAS 2+ spasticity L EF; MAS 2+ WF. Ambulates w QC with recurvatum [de-identified] : well perfused

## 2020-05-18 NOTE — PROCEDURE
[Consent] : consent was given by patient or guardian [Site Verification] : the injection site was verified [Post-Injection Instructions Provided] : post-injection instructions were provided [] : EMG Guidance was used during the procedure [de-identified] : Botox Lot [id number], exp August 2022 [TWNoteComboBox1] : Biceps [TWNoteComboBox2] : 100 Units [de-identified] : Brachioradialis [de-identified] : 50 Units [de-identified] : Flexor Carpi Radialis [de-identified] : 50 Units [de-identified] : 125 Units [de-identified] : Medial Gastrocnemius [de-identified] : Tibialis Posterior [de-identified] : 75 Units

## 2020-05-18 NOTE — HISTORY OF PRESENT ILLNESS
[FreeTextEntry1] : Patient here for botox.\par Has had improvement in the past with prior injections.\par No new medical issues.

## 2020-05-31 RX ORDER — ATORVASTATIN CALCIUM 40 MG/1
40 TABLET, FILM COATED ORAL
Qty: 90 | Refills: 3 | Status: ACTIVE | COMMUNITY
Start: 1900-01-01 | End: 1900-01-01

## 2020-06-29 ENCOUNTER — APPOINTMENT (OUTPATIENT)
Dept: PHYSICAL MEDICINE AND REHAB | Facility: CLINIC | Age: 45
End: 2020-06-29

## 2020-08-05 DIAGNOSIS — N52.9 MALE ERECTILE DYSFUNCTION, UNSPECIFIED: ICD-10-CM

## 2020-08-13 DIAGNOSIS — G47.00 INSOMNIA, UNSPECIFIED: ICD-10-CM

## 2020-08-13 RX ORDER — ESZOPICLONE 2 MG/1
2 TABLET, FILM COATED ORAL
Qty: 30 | Refills: 3 | Status: ACTIVE | COMMUNITY
Start: 2020-08-13 | End: 1900-01-01

## 2020-08-24 ENCOUNTER — APPOINTMENT (OUTPATIENT)
Dept: PHYSICAL MEDICINE AND REHAB | Facility: CLINIC | Age: 45
End: 2020-08-24

## 2020-09-11 ENCOUNTER — TRANSCRIPTION ENCOUNTER (OUTPATIENT)
Age: 45
End: 2020-09-11

## 2020-09-14 ENCOUNTER — APPOINTMENT (OUTPATIENT)
Dept: INTERNAL MEDICINE | Facility: CLINIC | Age: 45
End: 2020-09-14
Payer: MEDICAID

## 2020-09-14 DIAGNOSIS — G81.94 HEMIPLEGIA, UNSPECIFIED AFFECTING LEFT NONDOMINANT SIDE: ICD-10-CM

## 2020-09-14 PROCEDURE — 99213 OFFICE O/P EST LOW 20 MIN: CPT | Mod: 95

## 2020-09-14 NOTE — HISTORY OF PRESENT ILLNESS
[Other Location: e.g. School (Enter Location, City,State)___] : at [unfilled], at the time of the visit. [Medical Office: ([facility])___] : at the medical office located in  [de-identified] : h/o CVA, w/ hemiparesis\par - needs new OT referral\par - doing well otherwise\par - compliant w/ all medications\par - no hypotension, dizziness, or light headedness.

## 2020-09-17 RX ORDER — ESCITALOPRAM OXALATE 10 MG/1
10 TABLET ORAL
Qty: 90 | Refills: 3 | Status: ACTIVE | COMMUNITY
Start: 2019-09-04 | End: 1900-01-01

## 2020-10-20 RX ORDER — METOPROLOL TARTRATE 50 MG/1
50 TABLET, FILM COATED ORAL TWICE DAILY
Qty: 180 | Refills: 6 | Status: ACTIVE | COMMUNITY
Start: 1900-01-01 | End: 1900-01-01

## 2020-11-10 RX ORDER — SILDENAFIL 100 MG/1
100 TABLET, FILM COATED ORAL
Qty: 6 | Refills: 3 | Status: ACTIVE | COMMUNITY
Start: 2020-08-05 | End: 1900-01-01

## 2020-12-21 ENCOUNTER — RX RENEWAL (OUTPATIENT)
Age: 45
End: 2020-12-21

## 2020-12-21 RX ORDER — LISINOPRIL 20 MG/1
20 TABLET ORAL DAILY
Qty: 90 | Refills: 0 | Status: ACTIVE | COMMUNITY
Start: 2020-12-21 | End: 1900-01-01

## 2020-12-21 RX ORDER — AMLODIPINE BESYLATE 5 MG/1
5 TABLET ORAL DAILY
Qty: 90 | Refills: 0 | Status: ACTIVE | COMMUNITY
Start: 2019-08-27 | End: 1900-01-01

## 2021-02-25 RX ORDER — TRAMADOL HYDROCHLORIDE 50 MG/1
50 TABLET, COATED ORAL
Qty: 20 | Refills: 0 | Status: ACTIVE | COMMUNITY
Start: 2019-08-27 | End: 1900-01-01

## 2021-03-02 RX ORDER — DANTROLENE SODIUM 25 MG/1
25 CAPSULE ORAL
Qty: 30 | Refills: 3 | Status: ACTIVE | COMMUNITY
Start: 2019-09-04 | End: 1900-01-01

## 2021-03-25 NOTE — PHYSICAL EXAM
[Normal] : Normal skin color and pigmentation, normal turgor and no rash [de-identified] : amb w QC and AFO, some steppage and does not fully heel strike.  [de-identified] : LUE motor 0/5, LLE 4/5 HF, 2/5 KE, 0/5 ADF/APF; RUE/RLE- 5/5; MAS 2 spasticity L EF/WF, MAS 2 L PF; sustained cloonus LLE Valtrex Pregnancy And Lactation Text: this medication is Pregnancy Category B and is considered safe during pregnancy. This medication is not directly found in breast milk but it's metabolite acyclovir is present.

## 2021-03-30 ENCOUNTER — RX RENEWAL (OUTPATIENT)
Age: 46
End: 2021-03-30

## 2021-04-01 ENCOUNTER — RX RENEWAL (OUTPATIENT)
Age: 46
End: 2021-04-01

## 2021-04-02 ENCOUNTER — RX RENEWAL (OUTPATIENT)
Age: 46
End: 2021-04-02

## 2021-04-06 ENCOUNTER — RX RENEWAL (OUTPATIENT)
Age: 46
End: 2021-04-06

## 2021-05-19 NOTE — PROGRESS NOTE ADULT - REASON FOR ADMISSION
right thalamic hemorrhage
There are no Wet Read(s) to document.

## 2022-11-23 NOTE — PROCEDURE NOTE - ADDITIONAL PROCEDURE DETAILS
23-Nov-2022
Incision made just to the left of the left mid pupillary line 10 cm behind the nasion. A self-retaining retractor was placed. Jenny hole was drilled with the cranial twist drill. The dura was punctured with a spinal needle. A ventricular catheter with the stylet was passed using the ipsilateral medial canthus and tragus as land marks.  The catheter was advanced to 5cm at the cortex and 6cm to the skull. CSF egress was identified.  A trochar was used to tunnel the catheter 4-5cm posterior to the insertion site.  Catheter was secured to the scalp with 3.0 nylon and staples.  Incision was closed with staples and sutures. The ventricular catheter was attached to the external ventricular drainage chamber in a sterile fashion. Sponge and needle counts were correct.
Consent: Detailed explanation of the procedure, treatment options, risks including but not limited to infection and bleeding, and benefits were explained to the patient (or family). A written informed consent was obtained.     Technique: A time out was preformed identifying the correct procedure, the correct location with the nursing staff.  The right neck was prepped and draped in the usual sterile fashion. The right internal jugular vein was accessed under ultrasound guidance. Using modified Seldinger technique,  a triple lumen was inserted and guidewire was removed. Blood was withdrawn from all lumens and flushed with normal saline.  The catheter was sutured in place and a sterile dressing was applied over the site. The patient tolerated the procedure well.
Pre-incisional antibiotic was given. Head was shaved and Kocher's point was marked on the right side. Skin was prepped and draped in usual sterile fashion. Lidocaine was infiltrated in the marked area. A 2 cm linear incision in A-P direction was made. A small gracy hole was made using a manual hand-held drill along the same trajectory of EVD. Dura was pierced with sharp end of trocar. EVD catheter was passed to 7cm at the skin and active egress of CSF was noted after the first attempt. CSF did not appear under high pressure. The catheter was tunneled under the skin and was firmly affixed to the skin using nylon suture. The incision was closed with staples  and occlusive dressing was placed over the insertion site. The catheter was connected to a closed collection system and set for a continuous drainage at 10cm H2O. The patient tolerated the procedure well.

## 2023-03-10 NOTE — PATIENT PROFILE ADULT - LAST ORAL INTAKE
[FreeTextEntry1] : Documented by Adore Higginbotham acting as a scribe for Dr. Yemi Howard on 03/10/2023. 
06-Apr-2019

## 2024-10-10 NOTE — PROGRESS NOTE ADULT - ASSESSMENT
Next Appt none  Last Appt 2/7/24    Refill request for   Date Department Ordering Authorizing   7/15/2024 [facility] [place] [address] [place], Madelyn SANCHEZ, Audrey Dooley APNP     Outpatient Medication Detail     Disp Refills Start End    venlafaxine XR (EFFEXOR XR) 150 MG 24 hr capsule 30 capsule 0 7/15/2024 --    Sig - Route: Take 1 capsule by mouth daily. - Oral    Sent to pharmacy as: Venlafaxine HCl  MG Oral Capsule Extended Release 24 Hour (EFFEXOR XR)    Class: Eprescribe      Refilled per standing med protocol.     44y/M with   1.  intracerebral hemorrhage (r basal ganglia / thalamic; small L basal ganglia, R pontine), brain compression, cerebral edema   2.  dyslipidemia    PLAN:   NEURO: neurochecks q1h, PRN pain meds with Tylenol / fentanyl PRN; switch sedation from propofol to precedex to RASS of 0 to -1  ICH:  BP control  seizure prophylaxis: as per neurosurgery  EVD: monitor output, keep at 10cm h20  REHAB:  physical therapy evaluation and management    EARLY MOB:   HOB bedrest    PULM:  full vent support; CPAP trial this afternoon if RASS achieved   CARDIO:  SBP goal 100-140mm Hg; cardene drip to SBP goal; start lisinopril 10 mg PO daily   ENDO:  Blood sugar goals 140-180 mg/dL, continue insulin sliding scale; atorvastatin 40mg PO daily, A1C  4.9  GI:  docusate senna; PPI for GI prophylaxis (intubated)  DIET: continue Jevity at goal  RENAL:  Na goal 140-145; decrease 2% to 25cc/hr; recheck BMP this afternoon  HEM/ONC: Hb stable; given DDAVP and platelet transfusion for aspirin reversal  VTE Prophylaxis: SCDs, SQH tonight if ok with NS  ID: afebrile, no leukocytosis, no ABx, high for aspiration / HAP  Social: will update family; sister freeman, father, common law wife who is pregnant    ATTENDING ATTESTATION:  I was physically present for the key portions of the evaluation and management (E/M) service provided.  I agree with the above history, physical and plan, which I have reviewed and edited where appropriate.    Patient at high risk for neurological deterioration or death due to:  ICU delirium, aspiration PNA, DVT / PE.  Critical care time, excluding procedures: 60 minutes spent on total encounter, more than 50% of the visit was spent counseling and/or coordinating care by the attending physician.     Plan discussed with RN, house staff. 44y/M with   1.  intracerebral hemorrhage (r basal ganglia / thalamic; small L basal ganglia, R pontine), brain compression, cerebral edema   2.  dyslipidemia    PLAN:   NEURO: neurochecks q1h, PRN pain meds with Tylenol / fentanyl PRN; switch sedation from propofol to precedex to RASS of 0 to -1  ICH:  BP control  seizure prophylaxis: as per neurosurgery  EVD: monitor output, keep at 10cm h20  REHAB:  physical therapy evaluation and management    EARLY MOB:   HOB bedrest    PULM:  full vent support; CPAP trial this afternoon if RASS achieved   CARDIO:  SBP goal 100-140mm Hg; cardene drip to SBP goal; increase lisinopril 20 mg PO daily ; start norvasc 10 d  ENDO:  Blood sugar goals 140-180 mg/dL, continue insulin sliding scale; atorvastatin 40mg PO daily, A1C  4.9  GI:  docusate senna; PPI for GI prophylaxis (intubated)  DIET: continue Jevity at goal  RENAL:  Na goal 140-145; decrease 2% to 25cc/hr; recheck BMP this afternoon  HEM/ONC: Hb stable; given DDAVP and platelet transfusion for aspirin reversal  VTE Prophylaxis: SCDs, SQH tonight if ok with NS  ID: afebrile, no leukocytosis, no ABx, high for aspiration / HAP  Social: will update family; sister freeman, father, common law wife who is pregnant    ATTENDING ATTESTATION:  I was physically present for the key portions of the evaluation and management (E/M) service provided.  I agree with the above history, physical and plan, which I have reviewed and edited where appropriate.    Patient at high risk for neurological deterioration or death due to:  ICU delirium, aspiration PNA, DVT / PE.  Critical care time, excluding procedures: 75 minutes spent on total encounter, more than 50% of the visit was spent counseling and/or coordinating care by the attending physician.     Plan discussed with RN, house staff.

## 2025-04-17 NOTE — PROCEDURE NOTE - NSINFORMCONSENT_GEN_A_CORE
Benefits, risks, and possible complications of procedure explained to patient/caregiver who verbalized understanding and gave verbal consent.
Benefits, risks, and possible complications of procedure explained to patient/caregiver who verbalized understanding and gave written consent.
Benefits, risks, and possible complications of procedure explained to patient/caregiver who verbalized understanding and gave verbal consent.
Benefits, risks, and possible complications of procedure explained to patient/caregiver who verbalized understanding and gave written consent.
Benefits, risks, and possible complications of procedure explained to patient/caregiver who verbalized understanding and gave verbal consent.
Treatment Goal Explanation (Does Not Render In The Note): Stable for the purposes of categorizing medical decision making is defined by the specific treatment goals for an individual patient. A patient that is not at their treatment goal is not stable, even if the condition has not changed and there is no short- term threat to life or function.
Treatment Goal Met?: yes

## 2025-07-22 NOTE — PRE-OP CHECKLIST - ISOLATION PRECAUTIONS
The skin at the access site was anesthetized. Using a micropuncture needle with ultrasound (Veodin) the left femoral vein was succesfully accessed using a KIT INTRO 4FR 21GA 10CM 7CM .018IN 40CM STIFFEN DIL GW COIL. Ultrasound found the vessel was patent. A permanent recording was saved. none